# Patient Record
Sex: MALE | Race: WHITE | NOT HISPANIC OR LATINO | Employment: OTHER | URBAN - METROPOLITAN AREA
[De-identification: names, ages, dates, MRNs, and addresses within clinical notes are randomized per-mention and may not be internally consistent; named-entity substitution may affect disease eponyms.]

---

## 2017-01-03 ENCOUNTER — ALLSCRIPTS OFFICE VISIT (OUTPATIENT)
Dept: OTHER | Facility: OTHER | Age: 77
End: 2017-01-03

## 2017-01-20 ENCOUNTER — ALLSCRIPTS OFFICE VISIT (OUTPATIENT)
Dept: OTHER | Facility: OTHER | Age: 77
End: 2017-01-20

## 2017-01-20 ENCOUNTER — APPOINTMENT (OUTPATIENT)
Dept: LAB | Facility: MEDICAL CENTER | Age: 77
End: 2017-01-20
Payer: COMMERCIAL

## 2017-01-20 ENCOUNTER — TRANSCRIBE ORDERS (OUTPATIENT)
Dept: ADMINISTRATIVE | Facility: HOSPITAL | Age: 77
End: 2017-01-20

## 2017-01-20 DIAGNOSIS — Z01.818 OTHER SPECIFIED PRE-OPERATIVE EXAMINATION: ICD-10-CM

## 2017-01-20 DIAGNOSIS — Z01.812 PRE-OPERATIVE LABORATORY EXAMINATION: ICD-10-CM

## 2017-01-20 DIAGNOSIS — C43.4 MALIGNANT MELANOMA OF SKIN OF SCALP AND NECK (HCC): ICD-10-CM

## 2017-01-20 DIAGNOSIS — C43.4 MALIGNANT MELANOMA OF SKIN OF SCALP AND NECK (HCC): Primary | ICD-10-CM

## 2017-01-20 LAB
ALBUMIN SERPL BCP-MCNC: 3.6 G/DL (ref 3.5–5)
ALP SERPL-CCNC: 127 U/L (ref 46–116)
ALT SERPL W P-5'-P-CCNC: 28 U/L (ref 12–78)
ANION GAP SERPL CALCULATED.3IONS-SCNC: 5 MMOL/L (ref 4–13)
APTT PPP: 28 SECONDS (ref 24–36)
AST SERPL W P-5'-P-CCNC: 20 U/L (ref 5–45)
BASOPHILS # BLD AUTO: 0.02 THOUSANDS/ΜL (ref 0–0.1)
BASOPHILS NFR BLD AUTO: 0 % (ref 0–1)
BILIRUB SERPL-MCNC: 1.08 MG/DL (ref 0.2–1)
BUN SERPL-MCNC: 15 MG/DL (ref 5–25)
CALCIUM SERPL-MCNC: 9.1 MG/DL (ref 8.3–10.1)
CHLORIDE SERPL-SCNC: 106 MMOL/L (ref 100–108)
CO2 SERPL-SCNC: 29 MMOL/L (ref 21–32)
CREAT SERPL-MCNC: 1.04 MG/DL (ref 0.6–1.3)
EOSINOPHIL # BLD AUTO: 0.27 THOUSAND/ΜL (ref 0–0.61)
EOSINOPHIL NFR BLD AUTO: 3 % (ref 0–6)
ERYTHROCYTE [DISTWIDTH] IN BLOOD BY AUTOMATED COUNT: 14.7 % (ref 11.6–15.1)
GFR SERPL CREATININE-BSD FRML MDRD: >60 ML/MIN/1.73SQ M
GLUCOSE SERPL-MCNC: 79 MG/DL (ref 65–140)
HCT VFR BLD AUTO: 40.6 % (ref 36.5–49.3)
HGB BLD-MCNC: 13.5 G/DL (ref 12–17)
INR PPP: 1.01 (ref 0.86–1.16)
LYMPHOCYTES # BLD AUTO: 1.7 THOUSANDS/ΜL (ref 0.6–4.47)
LYMPHOCYTES NFR BLD AUTO: 21 % (ref 14–44)
MCH RBC QN AUTO: 28.8 PG (ref 26.8–34.3)
MCHC RBC AUTO-ENTMCNC: 33.3 G/DL (ref 31.4–37.4)
MCV RBC AUTO: 87 FL (ref 82–98)
MONOCYTES # BLD AUTO: 0.83 THOUSAND/ΜL (ref 0.17–1.22)
MONOCYTES NFR BLD AUTO: 10 % (ref 4–12)
NEUTROPHILS # BLD AUTO: 5.13 THOUSANDS/ΜL (ref 1.85–7.62)
NEUTS SEG NFR BLD AUTO: 66 % (ref 43–75)
NRBC BLD AUTO-RTO: 0 /100 WBCS
PLATELET # BLD AUTO: 175 THOUSANDS/UL (ref 149–390)
PMV BLD AUTO: 11.9 FL (ref 8.9–12.7)
POTASSIUM SERPL-SCNC: 4.3 MMOL/L (ref 3.5–5.3)
PROT SERPL-MCNC: 7.5 G/DL (ref 6.4–8.2)
PROTHROMBIN TIME: 13.4 SECONDS (ref 12–14.3)
RBC # BLD AUTO: 4.69 MILLION/UL (ref 3.88–5.62)
SODIUM SERPL-SCNC: 140 MMOL/L (ref 136–145)
WBC # BLD AUTO: 7.97 THOUSAND/UL (ref 4.31–10.16)

## 2017-01-20 PROCEDURE — 85610 PROTHROMBIN TIME: CPT

## 2017-01-20 PROCEDURE — 85025 COMPLETE CBC W/AUTO DIFF WBC: CPT

## 2017-01-20 PROCEDURE — 36415 COLL VENOUS BLD VENIPUNCTURE: CPT

## 2017-01-20 PROCEDURE — 85730 THROMBOPLASTIN TIME PARTIAL: CPT

## 2017-01-20 PROCEDURE — 80053 COMPREHEN METABOLIC PANEL: CPT

## 2017-01-30 ENCOUNTER — GENERIC CONVERSION - ENCOUNTER (OUTPATIENT)
Dept: OTHER | Facility: OTHER | Age: 77
End: 2017-01-30

## 2017-01-31 ENCOUNTER — ANESTHESIA EVENT (OUTPATIENT)
Dept: PERIOP | Facility: HOSPITAL | Age: 77
End: 2017-01-31
Payer: COMMERCIAL

## 2017-01-31 ENCOUNTER — TRANSCRIBE ORDERS (OUTPATIENT)
Dept: ADMINISTRATIVE | Facility: HOSPITAL | Age: 77
End: 2017-01-31

## 2017-01-31 DIAGNOSIS — Z01.818 OTHER SPECIFIED PRE-OPERATIVE EXAMINATION: ICD-10-CM

## 2017-01-31 DIAGNOSIS — Z01.812 PRE-OPERATIVE LABORATORY EXAMINATION: ICD-10-CM

## 2017-01-31 DIAGNOSIS — C43.4 MALIGNANT MELANOMA OF SKIN OF SCALP AND NECK (HCC): Primary | ICD-10-CM

## 2017-02-06 ENCOUNTER — GENERIC CONVERSION - ENCOUNTER (OUTPATIENT)
Dept: OTHER | Facility: OTHER | Age: 77
End: 2017-02-06

## 2017-02-06 ENCOUNTER — ANESTHESIA (OUTPATIENT)
Dept: PERIOP | Facility: HOSPITAL | Age: 77
End: 2017-02-06
Payer: COMMERCIAL

## 2017-02-06 ENCOUNTER — HOSPITAL ENCOUNTER (OUTPATIENT)
Facility: HOSPITAL | Age: 77
Setting detail: OUTPATIENT SURGERY
Discharge: HOME | End: 2017-02-06
Attending: SURGERY | Admitting: SURGERY
Payer: COMMERCIAL

## 2017-02-06 VITALS
WEIGHT: 224 LBS | TEMPERATURE: 97.8 F | RESPIRATION RATE: 20 BRPM | OXYGEN SATURATION: 100 % | BODY MASS INDEX: 32.07 KG/M2 | SYSTOLIC BLOOD PRESSURE: 158 MMHG | DIASTOLIC BLOOD PRESSURE: 78 MMHG | HEIGHT: 70 IN | HEART RATE: 65 BPM

## 2017-02-06 DIAGNOSIS — C43.4 MALIGNANT MELANOMA OF SCALP AND NECK (HCC): ICD-10-CM

## 2017-02-06 PROCEDURE — 88305 TISSUE EXAM BY PATHOLOGIST: CPT | Performed by: SURGERY

## 2017-02-06 RX ORDER — FENTANYL CITRATE 50 UG/ML
INJECTION, SOLUTION INTRAMUSCULAR; INTRAVENOUS AS NEEDED
Status: DISCONTINUED | OUTPATIENT
Start: 2017-02-06 | End: 2017-02-06 | Stop reason: SURG

## 2017-02-06 RX ORDER — OXYCODONE HYDROCHLORIDE AND ACETAMINOPHEN 5; 325 MG/1; MG/1
1 TABLET ORAL EVERY 4 HOURS PRN
Status: DISCONTINUED | OUTPATIENT
Start: 2017-02-06 | End: 2017-02-06 | Stop reason: HOSPADM

## 2017-02-06 RX ORDER — EPHEDRINE SULFATE 50 MG/ML
INJECTION, SOLUTION INTRAVENOUS AS NEEDED
Status: DISCONTINUED | OUTPATIENT
Start: 2017-02-06 | End: 2017-02-06 | Stop reason: SURG

## 2017-02-06 RX ORDER — SODIUM CHLORIDE 9 MG/ML
125 INJECTION, SOLUTION INTRAVENOUS CONTINUOUS
Status: DISCONTINUED | OUTPATIENT
Start: 2017-02-06 | End: 2017-02-06 | Stop reason: HOSPADM

## 2017-02-06 RX ORDER — PROPOFOL 10 MG/ML
INJECTION, EMULSION INTRAVENOUS AS NEEDED
Status: DISCONTINUED | OUTPATIENT
Start: 2017-02-06 | End: 2017-02-06 | Stop reason: SURG

## 2017-02-06 RX ORDER — ONDANSETRON 2 MG/ML
4 INJECTION INTRAMUSCULAR; INTRAVENOUS ONCE
Status: DISCONTINUED | OUTPATIENT
Start: 2017-02-06 | End: 2017-02-06 | Stop reason: HOSPADM

## 2017-02-06 RX ORDER — FENTANYL CITRATE/PF 50 MCG/ML
25 SYRINGE (ML) INJECTION
Status: DISCONTINUED | OUTPATIENT
Start: 2017-02-06 | End: 2017-02-06 | Stop reason: HOSPADM

## 2017-02-06 RX ORDER — ONDANSETRON 2 MG/ML
INJECTION INTRAMUSCULAR; INTRAVENOUS AS NEEDED
Status: DISCONTINUED | OUTPATIENT
Start: 2017-02-06 | End: 2017-02-06 | Stop reason: SURG

## 2017-02-06 RX ADMIN — SODIUM CHLORIDE 125 ML/HR: 0.9 INJECTION, SOLUTION INTRAVENOUS at 06:50

## 2017-02-06 RX ADMIN — LIDOCAINE HYDROCHLORIDE 40 MG: 20 INJECTION, SOLUTION INTRAVENOUS at 08:01

## 2017-02-06 RX ADMIN — DEXAMETHASONE SODIUM PHOSPHATE 8 MG: 10 INJECTION INTRAMUSCULAR; INTRAVENOUS at 07:47

## 2017-02-06 RX ADMIN — FENTANYL CITRATE 50 MCG: 50 INJECTION, SOLUTION INTRAMUSCULAR; INTRAVENOUS at 07:47

## 2017-02-06 RX ADMIN — FENTANYL CITRATE 50 MCG: 50 INJECTION, SOLUTION INTRAMUSCULAR; INTRAVENOUS at 08:11

## 2017-02-06 RX ADMIN — PROPOFOL 200 MG: 10 INJECTION, EMULSION INTRAVENOUS at 08:01

## 2017-02-06 RX ADMIN — EPHEDRINE SULFATE 10 MG: 50 INJECTION, SOLUTION INTRAMUSCULAR; INTRAVENOUS; SUBCUTANEOUS at 08:19

## 2017-02-06 RX ADMIN — ONDANSETRON HYDROCHLORIDE 8 MG: 2 INJECTION, SOLUTION INTRAVENOUS at 07:46

## 2017-02-24 ENCOUNTER — ALLSCRIPTS OFFICE VISIT (OUTPATIENT)
Dept: OTHER | Facility: OTHER | Age: 77
End: 2017-02-24

## 2017-04-11 ENCOUNTER — ALLSCRIPTS OFFICE VISIT (OUTPATIENT)
Dept: OTHER | Facility: OTHER | Age: 77
End: 2017-04-11

## 2017-05-10 ENCOUNTER — GENERIC CONVERSION - ENCOUNTER (OUTPATIENT)
Dept: OTHER | Facility: OTHER | Age: 77
End: 2017-05-10

## 2017-07-11 DIAGNOSIS — E78.00 PURE HYPERCHOLESTEROLEMIA: ICD-10-CM

## 2017-07-11 DIAGNOSIS — R73.01 IMPAIRED FASTING GLUCOSE: ICD-10-CM

## 2017-07-11 DIAGNOSIS — I10 ESSENTIAL (PRIMARY) HYPERTENSION: ICD-10-CM

## 2017-07-11 DIAGNOSIS — G47.30 SLEEP APNEA: ICD-10-CM

## 2017-07-20 ENCOUNTER — LAB CONVERSION - ENCOUNTER (OUTPATIENT)
Dept: OTHER | Facility: OTHER | Age: 77
End: 2017-07-20

## 2017-07-20 LAB
A/G RATIO (HISTORICAL): 1.6 (CALC) (ref 1–2.5)
ALBUMIN SERPL BCP-MCNC: 4.1 G/DL (ref 3.6–5.1)
ALP SERPL-CCNC: 129 U/L (ref 40–115)
ALT SERPL W P-5'-P-CCNC: 19 U/L (ref 9–46)
AST SERPL W P-5'-P-CCNC: 28 U/L (ref 10–35)
BILIRUB SERPL-MCNC: 1.2 MG/DL (ref 0.2–1.2)
BUN SERPL-MCNC: 19 MG/DL (ref 7–25)
BUN/CREA RATIO (HISTORICAL): ABNORMAL (CALC) (ref 6–22)
CALCIUM SERPL-MCNC: 9.4 MG/DL (ref 8.6–10.3)
CHLORIDE SERPL-SCNC: 103 MMOL/L (ref 98–110)
CHOLEST SERPL-MCNC: 162 MG/DL (ref 125–200)
CHOLEST/HDLC SERPL: 2.8 (CALC)
CO2 SERPL-SCNC: 26 MMOL/L (ref 20–31)
CREAT SERPL-MCNC: 1.09 MG/DL (ref 0.7–1.18)
EGFR AFRICAN AMERICAN (HISTORICAL): 76 ML/MIN/1.73M2
EGFR-AMERICAN CALC (HISTORICAL): 66 ML/MIN/1.73M2
GAMMA GLOBULIN (HISTORICAL): 2.6 G/DL (CALC) (ref 1.9–3.7)
GLUCOSE (HISTORICAL): 90 MG/DL (ref 65–99)
HBA1C MFR BLD HPLC: 5.9 % OF TOTAL HGB
HDLC SERPL-MCNC: 58 MG/DL
LDL CHOLESTEROL (HISTORICAL): 86 MG/DL (CALC)
NON-HDL-CHOL (CHOL-HDL) (HISTORICAL): 104 MG/DL (CALC)
POTASSIUM SERPL-SCNC: 5.2 MMOL/L (ref 3.5–5.3)
SODIUM SERPL-SCNC: 140 MMOL/L (ref 135–146)
TOTAL PROTEIN (HISTORICAL): 6.7 G/DL (ref 6.1–8.1)
TRIGL SERPL-MCNC: 89 MG/DL

## 2017-08-01 ENCOUNTER — ALLSCRIPTS OFFICE VISIT (OUTPATIENT)
Dept: OTHER | Facility: OTHER | Age: 77
End: 2017-08-01

## 2017-08-14 ENCOUNTER — HOSPITAL ENCOUNTER (OUTPATIENT)
Dept: SLEEP CENTER | Facility: CLINIC | Age: 77
Discharge: HOME | End: 2017-08-14
Payer: COMMERCIAL

## 2017-08-14 ENCOUNTER — TRANSCRIBE ORDERS (OUTPATIENT)
Dept: SLEEP CENTER | Facility: CLINIC | Age: 77
End: 2017-08-14

## 2017-08-14 DIAGNOSIS — G47.33 OSA (OBSTRUCTIVE SLEEP APNEA): Primary | ICD-10-CM

## 2017-08-14 DIAGNOSIS — G47.33 OBSTRUCTIVE SLEEP APNEA (ADULT) (PEDIATRIC): ICD-10-CM

## 2017-09-06 ENCOUNTER — GENERIC CONVERSION - ENCOUNTER (OUTPATIENT)
Dept: OTHER | Facility: OTHER | Age: 77
End: 2017-09-06

## 2017-09-15 ENCOUNTER — GENERIC CONVERSION - ENCOUNTER (OUTPATIENT)
Dept: OTHER | Facility: OTHER | Age: 77
End: 2017-09-15

## 2017-11-14 ENCOUNTER — ALLSCRIPTS OFFICE VISIT (OUTPATIENT)
Dept: OTHER | Facility: OTHER | Age: 77
End: 2017-11-14

## 2018-01-11 ENCOUNTER — GENERIC CONVERSION - ENCOUNTER (OUTPATIENT)
Dept: OTHER | Facility: OTHER | Age: 78
End: 2018-01-11

## 2018-01-11 ENCOUNTER — HOSPITAL ENCOUNTER (OUTPATIENT)
Dept: RADIOLOGY | Facility: HOSPITAL | Age: 78
Discharge: HOME | End: 2018-01-11
Payer: COMMERCIAL

## 2018-01-11 DIAGNOSIS — J20.9 ACUTE BRONCHITIS: ICD-10-CM

## 2018-01-11 PROCEDURE — 71046 X-RAY EXAM CHEST 2 VIEWS: CPT

## 2018-01-12 VITALS
WEIGHT: 224.25 LBS | TEMPERATURE: 98.4 F | SYSTOLIC BLOOD PRESSURE: 142 MMHG | OXYGEN SATURATION: 98 % | DIASTOLIC BLOOD PRESSURE: 88 MMHG | HEART RATE: 76 BPM | RESPIRATION RATE: 14 BRPM | BODY MASS INDEX: 33.21 KG/M2 | HEIGHT: 69 IN

## 2018-01-13 VITALS
WEIGHT: 227.13 LBS | HEART RATE: 56 BPM | TEMPERATURE: 98.1 F | BODY MASS INDEX: 34.42 KG/M2 | HEIGHT: 68 IN | SYSTOLIC BLOOD PRESSURE: 128 MMHG | RESPIRATION RATE: 15 BRPM | DIASTOLIC BLOOD PRESSURE: 80 MMHG

## 2018-01-13 VITALS
OXYGEN SATURATION: 98 % | SYSTOLIC BLOOD PRESSURE: 148 MMHG | RESPIRATION RATE: 15 BRPM | BODY MASS INDEX: 33.36 KG/M2 | TEMPERATURE: 98.5 F | WEIGHT: 225.25 LBS | HEART RATE: 77 BPM | DIASTOLIC BLOOD PRESSURE: 90 MMHG | HEIGHT: 69 IN

## 2018-01-13 VITALS
WEIGHT: 226 LBS | TEMPERATURE: 97.7 F | BODY MASS INDEX: 33.47 KG/M2 | HEIGHT: 69 IN | RESPIRATION RATE: 18 BRPM | HEART RATE: 60 BPM | DIASTOLIC BLOOD PRESSURE: 82 MMHG | SYSTOLIC BLOOD PRESSURE: 124 MMHG

## 2018-01-13 VITALS
BODY MASS INDEX: 33.25 KG/M2 | HEIGHT: 69 IN | HEART RATE: 56 BPM | RESPIRATION RATE: 24 BRPM | SYSTOLIC BLOOD PRESSURE: 130 MMHG | DIASTOLIC BLOOD PRESSURE: 86 MMHG | WEIGHT: 224.5 LBS | TEMPERATURE: 97.8 F

## 2018-01-14 VITALS
HEART RATE: 52 BPM | DIASTOLIC BLOOD PRESSURE: 78 MMHG | HEIGHT: 69 IN | SYSTOLIC BLOOD PRESSURE: 140 MMHG | TEMPERATURE: 97.8 F | RESPIRATION RATE: 16 BRPM | WEIGHT: 224.25 LBS | BODY MASS INDEX: 33.21 KG/M2

## 2018-01-15 NOTE — MISCELLANEOUS
Message  Post op phone call - left message      Active Problems    1  Bradycardia (427 89) (R00 1)   2  Bunion of great toe of left foot (727 1) (M21 612)   3  CAD (coronary artery disease) (414 00) (I25 10)   4  Colon cancer screening (V76 51) (Z12 11)   5  Colonoscopy (Fiberoptic) Screening   6  Depression (311) (F32 9)   7  Elevated prostate specific antigen (PSA) (790 93) (R97 20)   8  Glaucoma Screening   9  Hypercholesterolemia (272 0) (E78 00)   10  Hypertension (401 9) (I10)   11  Impaired fasting glucose (790 21) (R73 01)   12  Melanoma of right side of neck (172 4) (C43 4)   13  Need for diphtheria-tetanus-pertussis (Tdap) vaccine (V06 1) (Z23)   14  Need for pneumococcal vaccination (V03 82) (Z23)   15  Screening for genitourinary condition (V81 6) (Z13 89)   16  Screening for neurological condition (V80 09) (Z13 89)   17  Sleep apnea (780 57) (G47 30)   18  Stasis edema of left lower extremity (459 30) (I87 302)    Current Meds   1  Aspirin 81 MG TABS; TAKE 1 TABLET DAILY; Therapy: 39ANZ5453 to Recorded   2  Atorvastatin Calcium 80 MG Oral Tablet; TAKE 1 TABLET DAILY; Therapy: 30GYD4853 to ([phone])  Requested for: 73LHH6072; Last   Rx:86Sby3304 Ordered   3  Lisinopril 20 MG Oral Tablet; TAKE 1 TABLET DAILY; Therapy: 58CZR1149 to (Evaluate:28Jun2017)  Requested for: 17RFG2100; Last   Rx:15Jsk4870 Ordered   4  Metoprolol Tartrate 25 MG Oral Tablet; TAKE ONE TABLET BY MOUTH TWICE DAILY; Therapy: 33USK7858 to (Evaluate:15Mar2017)  Requested for: 03Zkq0990; Last   Rx:62Kze8785 Ordered   5  Nitroglycerin 0 4 MG Sublingual Tablet Sublingual (Nitrostat); PLACE 1 TABLET UNDER   THE TONGUE EVERY 5 MINUTES UP TO 3 DOSES AS NEEDED FOR   CHEST PAIN;   Therapy: 63CNM9959 to (Evaluate:73Hrd2290)  Requested for: 91CLY2100; Last   Rx:10Jan2017 Ordered    Allergies    1   Lexapro TABS    Signatures   Electronically signed by : Mercedes Carty, ; Feb 10 2017  3:15PM EST                       (Author)

## 2018-01-17 ENCOUNTER — ALLSCRIPTS OFFICE VISIT (OUTPATIENT)
Dept: OTHER | Facility: OTHER | Age: 78
End: 2018-01-17

## 2018-01-18 NOTE — PROGRESS NOTES
Assessment   1  Acute bronchitis with bronchospasm (466 0) (J20 9)   2  Hypertension (401 9) (I10)   3  Sleep apnea (780 57) (G47 30)   4  CAD (coronary artery disease) (414 00) (I25 10)    Discussion/Summary      I believe that the infectious component of the patient's illness has resolved  He does have postinfectious airway hyperreactivity  Because of concern that he is not using albuterol effectively, I recommended that he use Advair Diskus  I reviewed the appropriate use of this medication with him  He can still use albuterol if necessary  His blood pressure is well controlled  He has no symptoms of myocardial ischemia  He will maintain his other medications  He will return as scheduled in February  Chief Complaint   has bronchitis, given Z-Agapito and Ventolin MDI 1/11/2018 @ [facility], chest Xray negative  a little better but continue to cough, problem sleeping in bed, not using CPAP machine due to persistent cough and wheezing      History of Present Illness   HPI: The patient returned to the office for re-evaluation of acute bronchitis  He was here about 1 week ago  He was treated with azithromycin and albuterol inhaler  Initially, he was producing a lot of discolored sputum  His sputum has resolved  He has no fever  He has no shortness of breath or chest pain  However, he continues to cough  He has some wheezing at times  He has had no hemoptysis  He is not having much head congestion  His wife is concerned that he is not using his albuterol inhaler michele Colbert  Review of Systems        Constitutional: No fever or chills, feels well, no tiredness, no recent weight gain or weight loss  Eyes: No complaints of eye pain, no red eyes, no discharge from eyes, no itchy eyes  ENT: as noted in HPI  Cardiovascular: as noted in HPI  Respiratory: as noted in HPI  Gastrointestinal: No complaints of abdominal pain, no constipation, no nausea or vomiting, no diarrhea or bloody stools  Genitourinary: No complaints of dysuria, no incontinence, no hesitancy, no nocturia, no genital lesion, no testicular pain  Neurological: No compliants of headache, no confusion, no convulsions, no numbness or tingling, no dizziness or fainting, no limb weakness, no difficulty walking  Endocrine: No complaints of proptosis, no hot flashes, no muscle weakness, no erectile dysfunction, no deepening of the voice, no feelings of weakness  Hematologic/Lymphatic: No complaints of swollen glands, no swollen glands in the neck, does not bleed easily, no easy bruising  Active Problems   1  Bradycardia (427 89) (R00 1)   2  CAD (coronary artery disease) (414 00) (I25 10)   3  Colon cancer screening (V76 51) (Z12 11)   4  Depression (311) (F32 9)   5  Elevated prostate specific antigen (PSA) (790 93) (R97 20)   6  Hypercholesterolemia (272 0) (E78 00)   7  Hypertension (401 9) (I10)   8  Impaired fasting glucose (790 21) (R73 01)   9  Sleep apnea (780 57) (G47 30)   10  Stasis edema of left lower extremity (459 30) (I87 302)    Past Medical History   Active Problems And Past Medical History Reviewed: The active problems and past medical history were reviewed and updated today  Social History    · Denied: History of Alcohol use   · Never a smoker  The social history was reviewed and is unchanged  Family History   Family History Reviewed: The family history was reviewed and updated today  Current Meds    1  Aspirin 81 MG TABS; TAKE 1 TABLET DAILY; Therapy: 74OAA6461 to Recorded   2  Atorvastatin Calcium 80 MG Oral Tablet; TAKE 1 TABLET DAILY; Therapy: 85MJT6587 to [phone])  Requested for: 77MNB1926; Last     Rx:27Oct2017 Ordered   3  Lisinopril 20 MG Oral Tablet; TAKE 1 TABLET DAILY; Therapy: 65WZJ9987 to (OVBGJMDI:93ZNM5412)  Requested for: 85MHV0189; Last     Rx:67Vag7043 Ordered   4   Metoprolol Tartrate 25 MG Oral Tablet; TAKE ONE TABLET BY MOUTH TWICE DAILY; Therapy: 45XXF3782 to (Evaluate:10Mar2018)  Requested for: 08Gwn6363; Last     Rx:11Sep2017 Ordered   5  Nitroglycerin 0 4 MG Sublingual Tablet Sublingual; PLACE 1 TABLET UNDER THE     TONGUE EVERY 5 MINUTES UP TO 3 DOSES AS NEEDED FOR CHEST PAIN;     Therapy: 89SSG7308 to (Evaluate:46Vqv8716)  Requested for: 43REH9801; Last     Rx:10Jan2017 Ordered   6  Ventolin  (90 Base) MCG/ACT Inhalation Aerosol Solution; INHALE 1 TO 2 PUFFS     EVERY 4 TO 6 HOURS AS NEEDED; Therapy: 23HAM5660 to (Last Rx:11Jan2018)  Requested for: 82YKU3149 Ordered    Allergies   1  Lexapro TABS    Vitals    Recorded: 04XMM9142 10:49AM   Temperature 97 9 F   Heart Rate 68   Respiration 24   Systolic 085   Diastolic 86   Height 5 ft 8 in   Weight 219 lb 6 oz   BMI Calculated 33 36   BSA Calculated 2 13     Physical Exam        Constitutional      General appearance: No acute distress, well appearing and well nourished  Ears, Nose, Mouth, and Throat      External inspection of ears and nose: Normal        Otoscopic examination: Tympanic membrance translucent with normal light reflex  Canals patent without erythema  Nasal mucosa, septum, and turbinates: Normal without edema or erythema  Oropharynx: Normal with no erythema, edema, exudate or lesions  Pulmonary      Respiratory effort: No increased work of breathing or signs of respiratory distress  Auscultation of lungs: Abnormal  -- Scattered wheezes  Cardiovascular      Palpation of heart: Normal PMI, no thrills  Auscultation of heart: Normal rate and rhythm, normal S1 and S2, without murmurs  Examination of extremities for edema and/or varicosities: Normal        Carotid pulses: Normal        Abdomen      Abdomen: Non-tender, no masses  Liver and spleen: No hepatomegaly or splenomegaly  Lymphatic      Palpation of lymph nodes in neck: No lymphadenopathy         Musculoskeletal      Gait and station: Normal  Inspection/palpation of joints, bones, and muscles: Normal        Psychiatric      Orientation to person, place and time: Normal        Mood and affect: Normal           Future Appointments      Date/Time Provider Specialty Site   05/14/2018 09:45 AM Garold Riedel, MD Urology [address]   02/12/2018 10:00 AM RAYNE Camacho   Internal Medicine [facility]   03/16/2018 10:30 AM Jelena Garrison MD Surgical Oncology [facility] [place]     Signatures    Electronically signed by : Marrian Cabot, M D ; Jan 17 2018 11:42AM EST                       (Author)

## 2018-01-22 VITALS
BODY MASS INDEX: 34.56 KG/M2 | HEART RATE: 71 BPM | OXYGEN SATURATION: 99 % | TEMPERATURE: 99.1 F | RESPIRATION RATE: 18 BRPM | WEIGHT: 228 LBS | SYSTOLIC BLOOD PRESSURE: 128 MMHG | DIASTOLIC BLOOD PRESSURE: 88 MMHG | HEIGHT: 68 IN

## 2018-01-22 VITALS
HEIGHT: 68 IN | TEMPERATURE: 97.9 F | DIASTOLIC BLOOD PRESSURE: 86 MMHG | RESPIRATION RATE: 24 BRPM | HEART RATE: 68 BPM | BODY MASS INDEX: 33.25 KG/M2 | WEIGHT: 219.38 LBS | SYSTOLIC BLOOD PRESSURE: 144 MMHG

## 2018-01-24 VITALS
RESPIRATION RATE: 20 BRPM | OXYGEN SATURATION: 93 % | HEART RATE: 73 BPM | TEMPERATURE: 98.9 F | HEIGHT: 68 IN | BODY MASS INDEX: 33.87 KG/M2 | SYSTOLIC BLOOD PRESSURE: 200 MMHG | DIASTOLIC BLOOD PRESSURE: 100 MMHG | WEIGHT: 223.5 LBS

## 2018-01-24 VITALS — SYSTOLIC BLOOD PRESSURE: 156 MMHG | DIASTOLIC BLOOD PRESSURE: 94 MMHG

## 2018-02-12 ENCOUNTER — OFFICE VISIT (OUTPATIENT)
Dept: INTERNAL MEDICINE CLINIC | Facility: CLINIC | Age: 78
End: 2018-02-12
Payer: COMMERCIAL

## 2018-02-12 VITALS
HEART RATE: 42 BPM | BODY MASS INDEX: 32.07 KG/M2 | HEIGHT: 70 IN | WEIGHT: 224 LBS | SYSTOLIC BLOOD PRESSURE: 124 MMHG | TEMPERATURE: 97.5 F | DIASTOLIC BLOOD PRESSURE: 84 MMHG

## 2018-02-12 DIAGNOSIS — I25.10 CORONARY ARTERY DISEASE INVOLVING NATIVE CORONARY ARTERY OF NATIVE HEART WITHOUT ANGINA PECTORIS: Primary | ICD-10-CM

## 2018-02-12 DIAGNOSIS — I71.4 ABDOMINAL ANEURYSM (HCC): ICD-10-CM

## 2018-02-12 DIAGNOSIS — R73.01 IMPAIRED FASTING GLUCOSE: ICD-10-CM

## 2018-02-12 DIAGNOSIS — R00.1 BRADYCARDIA: ICD-10-CM

## 2018-02-12 DIAGNOSIS — G47.33 OBSTRUCTIVE SLEEP APNEA SYNDROME: ICD-10-CM

## 2018-02-12 DIAGNOSIS — I10 ESSENTIAL HYPERTENSION: ICD-10-CM

## 2018-02-12 DIAGNOSIS — E78.00 HYPERCHOLESTEROLEMIA: ICD-10-CM

## 2018-02-12 PROBLEM — C43.4 MALIGNANT MELANOMA OF NECK (HCC): Status: ACTIVE | Noted: 2017-02-01

## 2018-02-12 PROBLEM — E78.5 HYPERLIPIDEMIA: Status: ACTIVE | Noted: 2017-02-01

## 2018-02-12 PROBLEM — I71.40 ABDOMINAL ANEURYSM: Status: ACTIVE | Noted: 2017-02-01

## 2018-02-12 PROCEDURE — 3079F DIAST BP 80-89 MM HG: CPT | Performed by: INTERNAL MEDICINE

## 2018-02-12 PROCEDURE — 3074F SYST BP LT 130 MM HG: CPT | Performed by: INTERNAL MEDICINE

## 2018-02-12 PROCEDURE — 93000 ELECTROCARDIOGRAM COMPLETE: CPT | Performed by: INTERNAL MEDICINE

## 2018-02-12 PROCEDURE — 99214 OFFICE O/P EST MOD 30 MIN: CPT | Performed by: INTERNAL MEDICINE

## 2018-02-12 NOTE — PROGRESS NOTES
[facility] Internal Medicine [place]      NAME: Jn Nuñez  AGE: 69 y o  SEX: male  : 1940   MRN: [id number]    DATE: 2018  TIME: 11:44 AM    Assessment and Plan   1  Coronary artery disease involving native coronary artery of native heart without angina pectoris  Stable on current medications    2  Essential hypertension   improved with titration of lisinopril    3  Hypercholesterolemia    Stable on atorvastatin    4  Abdominal aneurysm [facility])   recently checked by Cardiology, stable    5  Obstructive sleep apnea syndrome   on CPAP    6  Impaired fasting glucose   repeat labs have been ordered to reassess this    7  Bradycardia   EKG show sinus bradycardia  This is no doubt related to metoprolol  The patient is asymptomatic  His current medication will continue  Overall, the patient is doing very well  He will maintain his current medications  Updated lab work will be obtained in the immediate future  - POCT ECG          Return to office in: Three months    Chief Complaint    routine follow-up    History of Present Illness      the patient returned to the office for re-evaluation of hypertension, hyperlipidemia, obstructive sleep apnea, and coronary artery disease  Since his last visit he has been feeling well  He has had no chest pain, shortness of breath, palpitations, or dizziness  He was seen by a cardiologist who increased his lisinopril because his blood pressure was 170  He is tolerating this well  He is tolerating his other medications  The following portions of the patient's history were reviewed and updated as appropriate: allergies, current medications, past family history, past medical history, past social history, past surgical history and problem list     Review of Systems   Review of Systems   Constitutional: Negative  HENT: Negative for congestion, ear pain, postnasal drip, rhinorrhea, sore throat and trouble swallowing      Eyes: Negative for pain, discharge, redness and visual disturbance  Respiratory: Negative for cough, shortness of breath and wheezing  Cardiovascular: Negative  Gastrointestinal: Negative  Endocrine: Negative  Genitourinary: Negative for difficulty urinating, dysuria, frequency, hematuria and urgency  Musculoskeletal: Negative for arthralgias, gait problem, joint swelling and myalgias  Skin: Negative for rash  Neurological: Negative for dizziness, speech difficulty, weakness, light-headedness, numbness and headaches  Hematological: Negative  Psychiatric/Behavioral: Negative for confusion, decreased concentration, dysphoric mood and sleep disturbance  The patient is not nervous/anxious  Active Problem List     Patient Active Problem List   Diagnosis    Abdominal aneurysm (HCC)    CAD (coronary artery disease)    Hypercholesterolemia    Hypertension    Impaired fasting glucose    Malignant melanoma of neck (HCC)    Sleep apnea    Bradycardia    Depression    Elevated prostate specific antigen (PSA)       Objective   /84 (BP Location: Left arm, Patient Position: Sitting, Cuff Size: Standard)   Pulse (!) 42   Temp 97 5 °F (36 4 °C) (Tympanic)   Ht 5' 10" (1 778 m)   Wt 102 kg (224 lb)   BMI 32 14 kg/m²     Physical Exam   Constitutional: He is oriented to person, place, and time  He appears well-developed and well-nourished  No distress  HENT:   Head: Normocephalic and atraumatic  Neck: Normal range of motion  Neck supple  No JVD present  No tracheal deviation present  No thyromegaly present  Cardiovascular: Regular rhythm, normal heart sounds and intact distal pulses  Exam reveals no gallop and no friction rub  No murmur heard  The patient is bradycardic   Pulmonary/Chest: Effort normal and breath sounds normal  No respiratory distress  He has no wheezes  He has no rales  He exhibits no tenderness  Abdominal: Soft  Bowel sounds are normal  He exhibits no distension and no mass   There is no tenderness  There is no rebound and no guarding  Musculoskeletal: Normal range of motion  He exhibits no edema, tenderness or deformity  Lymphadenopathy:     He has no cervical adenopathy  Neurological: He is alert and oriented to person, place, and time  Psychiatric: He has a normal mood and affect   His behavior is normal  Judgment and thought content normal        Pertinent Laboratory/Diagnostic Studies:    No recent labs    Current Medications     Current Outpatient Prescriptions:     aspirin 81 MG tablet, Take 1 tablet by mouth daily, Disp: , Rfl:     atorvastatin (LIPITOR) 80 mg tablet, Take 80 mg by mouth every morning, Disp: , Rfl:     ibuprofen (MOTRIN) 400 mg tablet, Take 400 mg by mouth every 6 (six) hours as needed for mild pain, Disp: , Rfl:     lisinopril (ZESTRIL) 20 mg tablet, Take 20 mg by mouth 2 (two) times a day  , Disp: , Rfl:     metoprolol tartrate (LOPRESSOR) 25 mg tablet, Take 25 mg by mouth 2 (two) times a day  , Disp: , Rfl:     nitroglycerin (NITROSTAT) 0 4 mg SL tablet, Place 0 4 mg under the tongue every 5 (five) minutes as needed for chest pain, Disp: , Rfl:       Kaiden Alba MD

## 2018-02-14 DIAGNOSIS — I25.10 ATHEROSCLEROTIC HEART DISEASE OF NATIVE CORONARY ARTERY WITHOUT ANGINA PECTORIS: ICD-10-CM

## 2018-02-14 DIAGNOSIS — E78.00 PURE HYPERCHOLESTEROLEMIA: ICD-10-CM

## 2018-02-14 DIAGNOSIS — I10 ESSENTIAL (PRIMARY) HYPERTENSION: ICD-10-CM

## 2018-02-14 DIAGNOSIS — G47.30 SLEEP APNEA: ICD-10-CM

## 2018-02-14 DIAGNOSIS — R73.01 IMPAIRED FASTING GLUCOSE: ICD-10-CM

## 2018-02-15 LAB
ALBUMIN SERPL-MCNC: 4 G/DL (ref 3.6–5.1)
ALBUMIN/GLOB SERPL: 1.6 (CALC) (ref 1–2.5)
ALP SERPL-CCNC: 117 U/L (ref 40–115)
ALT SERPL-CCNC: 19 U/L (ref 9–46)
AST SERPL-CCNC: 24 U/L (ref 10–35)
BASOPHILS # BLD AUTO: 38 CELLS/UL (ref 0–200)
BASOPHILS NFR BLD AUTO: 0.6 %
BILIRUB SERPL-MCNC: 1.2 MG/DL (ref 0.2–1.2)
BUN SERPL-MCNC: 16 MG/DL (ref 7–25)
BUN/CREAT SERPL: ABNORMAL (CALC) (ref 6–22)
CALCIUM SERPL-MCNC: 9 MG/DL (ref 8.6–10.3)
CHLORIDE SERPL-SCNC: 106 MMOL/L (ref 98–110)
CHOLEST SERPL-MCNC: 152 MG/DL
CHOLEST/HDLC SERPL: 2.6 (CALC)
CO2 SERPL-SCNC: 28 MMOL/L (ref 20–31)
CREAT SERPL-MCNC: 1.17 MG/DL (ref 0.7–1.18)
EOSINOPHIL # BLD AUTO: 246 CELLS/UL (ref 15–500)
EOSINOPHIL NFR BLD AUTO: 3.9 %
ERYTHROCYTE [DISTWIDTH] IN BLOOD BY AUTOMATED COUNT: 14 % (ref 11–15)
GLOBULIN SER CALC-MCNC: 2.5 G/DL (CALC) (ref 1.9–3.7)
GLUCOSE SERPL-MCNC: 102 MG/DL (ref 65–99)
HBA1C MFR BLD: 5.7 % OF TOTAL HGB
HCT VFR BLD AUTO: 41.6 % (ref 38.5–50)
HDLC SERPL-MCNC: 58 MG/DL
HGB BLD-MCNC: 13.8 G/DL (ref 13.2–17.1)
LDLC SERPL CALC-MCNC: 78 MG/DL (CALC)
LYMPHOCYTES # BLD AUTO: 1638 CELLS/UL (ref 850–3900)
LYMPHOCYTES NFR BLD AUTO: 26 %
MCH RBC QN AUTO: 29.6 PG (ref 27–33)
MCHC RBC AUTO-ENTMCNC: 33.2 G/DL (ref 32–36)
MCV RBC AUTO: 89.3 FL (ref 80–100)
MONOCYTES # BLD AUTO: 693 CELLS/UL (ref 200–950)
MONOCYTES NFR BLD AUTO: 11 %
NEUTROPHILS # BLD AUTO: 3686 CELLS/UL (ref 1500–7800)
NEUTROPHILS NFR BLD AUTO: 58.5 %
NONHDLC SERPL-MCNC: 94 MG/DL (CALC)
PLATELET # BLD AUTO: 136 THOUSAND/UL (ref 140–400)
PMV BLD REES-ECKER: 12.4 FL (ref 7.5–12.5)
POTASSIUM SERPL-SCNC: 4.5 MMOL/L (ref 3.5–5.3)
PROT SERPL-MCNC: 6.5 G/DL (ref 6.1–8.1)
RBC # BLD AUTO: 4.66 MILLION/UL (ref 4.2–5.8)
SL AMB EGFR AFRICAN AMERICAN: 70 ML/MIN/1.73M2
SL AMB EGFR NON AFRICAN AMERICAN: 60 ML/MIN/1.73M2
SODIUM SERPL-SCNC: 142 MMOL/L (ref 135–146)
TRIGL SERPL-MCNC: 79 MG/DL
WBC # BLD AUTO: 6.3 THOUSAND/UL (ref 3.8–10.8)

## 2018-02-26 NOTE — RESULT NOTES
Message   Recorded as Task   Date: 01/11/2018 04:30 PM, Created By: Estee Bell   Task Name: Follow Up   Assigned To: Brenden Platt   Regarding Patient: Pato Easton, Status: Active   CommentBolling Plane - 11 Jan 2018 4:30 PM     TASK CREATED  no pneumonia, can start the azithromycin   Erin Torres - 11 Jan 2018 4:39 PM     TASK EDITED  Spoke to wife and gave her the results   she understood

## 2018-03-12 DIAGNOSIS — I10 ESSENTIAL HYPERTENSION: Primary | ICD-10-CM

## 2018-04-12 RX ORDER — NITROGLYCERIN 0.4 MG/1
TABLET SUBLINGUAL
Qty: 25 TABLET | Refills: 4 | Status: CANCELLED | OUTPATIENT
Start: 2018-04-12

## 2018-04-13 DIAGNOSIS — I25.10 CORONARY ARTERY DISEASE INVOLVING NATIVE HEART WITHOUT ANGINA PECTORIS, UNSPECIFIED VESSEL OR LESION TYPE: Primary | ICD-10-CM

## 2018-04-13 RX ORDER — NITROGLYCERIN 0.4 MG/1
0.4 TABLET SUBLINGUAL
Qty: 90 TABLET | Refills: 0 | Status: SHIPPED | OUTPATIENT
Start: 2018-04-13 | End: 2019-03-05 | Stop reason: SDUPTHER

## 2018-05-04 ENCOUNTER — OFFICE VISIT (OUTPATIENT)
Dept: SURGICAL ONCOLOGY | Facility: CLINIC | Age: 78
End: 2018-05-04
Payer: COMMERCIAL

## 2018-05-04 VITALS
RESPIRATION RATE: 14 BRPM | TEMPERATURE: 98.4 F | WEIGHT: 229.4 LBS | BODY MASS INDEX: 32.84 KG/M2 | HEIGHT: 70 IN | HEART RATE: 56 BPM | SYSTOLIC BLOOD PRESSURE: 136 MMHG | DIASTOLIC BLOOD PRESSURE: 82 MMHG

## 2018-05-04 DIAGNOSIS — C43.4 MALIGNANT MELANOMA OF NECK (HCC): Primary | ICD-10-CM

## 2018-05-04 PROCEDURE — 99213 OFFICE O/P EST LOW 20 MIN: CPT | Performed by: NURSE PRACTITIONER

## 2018-05-04 RX ORDER — ACETAMINOPHEN 500 MG
500 TABLET ORAL EVERY 6 HOURS PRN
Status: ON HOLD | COMMUNITY
End: 2021-12-31 | Stop reason: CLARIF

## 2018-05-04 NOTE — PROGRESS NOTES
Surgical Oncology Follow Up       [facility] SURGICAL ONCOLOGY [place]  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  At the time of his original biopsy this was noted to be a T1a melanoma  Upon our pathology review, this was classified as a melanoma in situ  A wide excision was performed by Dr Zeina Finn  Surgical pathology revealed no residual melanoma  He has no new complaints today  He denies headaches, back pain, bone pain, cough, shortness of breath, abdominal pain  He notices no changes at his melanoma site  He continues to follow with Dr Bhavesh Barclay twice a year  He was last evaluated in January and he reports no new concerns at that visit  I did stress the importance of continuing dermatological surveillance with his dermatologist   We will plan to see the patient back on a p r n  basis  I have instructed him to call with any new concerns or symptoms  All of his questions are answered  History of Present Illness:        Malignant melanoma of neck ([facility] [address] )    1/26/2017 Initial Diagnosis     Malignant melanoma of neck (HCC)    Right superior lateral neck biopsy    Melanoma in situ         2/6/2017 Surgery     Wide excision (Dr Zeina Finn)    Surgical pathology revealed no residual melanoma in situ             -Interval History:  Patient presents today for a six-month follow-up visit  He has no new complaints today  He continues to follow with Dr Bhavesh Barclay on a regular basis- last seen in January with no new concerns       Dermatologist:   [place]    Review of Systems:  Review of Systems   Constitutional: Negative for appetite change, chills, diaphoresis, fever and unexpected weight change  Respiratory: Negative for cough, shortness of breath and wheezing  Cardiovascular: Negative for chest pain, palpitations and leg swelling  Gastrointestinal: Negative for abdominal pain, diarrhea, nausea and vomiting  Musculoskeletal: Negative for arthralgias, back pain and myalgias  Skin: Negative for rash  Neurological: Negative for light-headedness and headaches  Hematological: Negative for adenopathy  Psychiatric/Behavioral: Negative for agitation and confusion  Patient Active Problem List   Diagnosis    [facility]    CAD (coronary artery disease)    Hypercholesterolemia    Hypertension    Impaired fasting glucose    Malignant melanoma of neck (HCC)    Sleep apnea    Bradycardia    Depression    Elevated prostate specific antigen (PSA)     Past Medical History:   Diagnosis Date    AAA (abdominal aortic aneurysm) without rupture ([facility] )     has annual US to check    Angina pectoris ([facility] )     chronic stable angina, but it is mild and tolerates walking dogs and walking up flight os stairs without symptoms  Using Nitro no more than 2 times per mt      Ankle pain, left     Bilateral wrist pain     Coronary artery disease     CPAP (continuous positive airway pressure) dependence     History of colon polyps     [place] (hard of hearing)     Slight    Hyperlipidemia     Hypertension     Ischemic cardiomyopathy     with apical aneurysm    [facility] ) 12/2016    Right side of neck    Myocardial infarction [facility] ) 1996    x1    Pneumonia     x1 as a baby    Sleep apnea     Wears glasses      Past Surgical History:   Procedure Laterality Date    CARDIAC SURGERY      CABG x6    CATARACT EXTRACTION Bilateral     COLONOSCOPY      CORONARY ANGIOPLASTY WITH STENT PLACEMENT      2 stents    CORONARY ARTERY BYPASS GRAFT  SKIN BIOPSY      Melanoma - surgical removal    SKIN LESION EXCISION Right 2/6/2017    Procedure: WIDE EXCISION MELANOMA SCAR LATERAL NECK;  Surgeon: Cindy Manzano MD;  Location: [facility];  Service:      Family History   Problem Relation Age of Onset    Hypertension Mother     Breast cancer Mother      Social History     Social History    Marital status: /Civil Union     Spouse name: N/A    Number of children: N/A    Years of education: N/A     Occupational History    Not on file  Social History Main Topics    Smoking status: Never Smoker    Smokeless tobacco: Never Used    Alcohol use Yes      Comment: 1-2 beers rarely    Drug use: No    Sexual activity: Not on file     Other Topics Concern    Not on file     Social History Narrative    No narrative on file       Current Outpatient Prescriptions:     acetaminophen (TYLENOL) 500 mg tablet, Take 500 mg by mouth every 6 (six) hours as needed for mild pain, Disp: , Rfl:     aspirin 81 MG tablet, Take 1 tablet by mouth daily, Disp: , Rfl:     atorvastatin (LIPITOR) 80 mg tablet, Take 80 mg by mouth every morning, Disp: , Rfl:     lisinopril (ZESTRIL) 20 mg tablet, Take 20 mg by mouth 2 (two) times a day  , Disp: , Rfl:     metoprolol tartrate (LOPRESSOR) 25 mg tablet, Take 1 tablet (25 mg total) by mouth 2 (two) times a day, Disp: 180 tablet, Rfl: 1    nitroglycerin (NITROSTAT) 0 4 mg SL tablet, Place 1 tablet (0 4 mg total) under the tongue every 5 (five) minutes as needed for chest pain, Disp: 90 tablet, Rfl: 0    ibuprofen (MOTRIN) 400 mg tablet, Take 400 mg by mouth every 6 (six) hours as needed for mild pain, Disp: , Rfl:   Allergies   Allergen Reactions    Abciximab Other (See Comments)     rcd 9/27/01 & 5/16/03    Escitalopram Rash     Vitals:    05/04/18 1048   BP: 136/82   Pulse: 56   Resp: 14   Temp: 98 4 °F (36 9 °C)       Physical Exam   Constitutional: He is oriented to person, place, and time   He appears well-developed and well-nourished  No distress  HENT:   Head: Normocephalic and atraumatic  Neck: Normal range of motion  Cardiovascular: Normal rate, regular rhythm and normal heart sounds  Pulmonary/Chest: Effort normal and breath sounds normal    Abdominal: Soft  Normal appearance  He exhibits no distension and no mass  There is no hepatosplenomegaly  There is no tenderness  Musculoskeletal: Normal range of motion  He exhibits no edema  Lymphadenopathy:     He has no axillary adenopathy  Right: No supraclavicular adenopathy present  Left: No supraclavicular adenopathy present  Neurological: He is alert and oriented to person, place, and time  Skin: Skin is warm and dry  No rash noted  Right lateral incision site is well healed without skin changes or nodularity is noted  There is no neck, supraclavicular or axillary lymphadenopathy noted  Psychiatric: He has a normal mood and affect  Vitals reviewed  Advance Care Planning/Advance Directives:  Discussed disease status, cancer treatment plans and/or cancer treatment goals with the patient

## 2018-05-14 ENCOUNTER — OFFICE VISIT (OUTPATIENT)
Dept: UROLOGY | Facility: MEDICAL CENTER | Age: 78
End: 2018-05-14
Payer: COMMERCIAL

## 2018-05-14 VITALS
BODY MASS INDEX: 32.5 KG/M2 | HEIGHT: 70 IN | DIASTOLIC BLOOD PRESSURE: 82 MMHG | SYSTOLIC BLOOD PRESSURE: 112 MMHG | WEIGHT: 227 LBS

## 2018-05-14 DIAGNOSIS — N40.1 BPH WITH OBSTRUCTION/LOWER URINARY TRACT SYMPTOMS: Primary | ICD-10-CM

## 2018-05-14 DIAGNOSIS — N13.8 BPH WITH OBSTRUCTION/LOWER URINARY TRACT SYMPTOMS: Primary | ICD-10-CM

## 2018-05-14 DIAGNOSIS — R97.20 ELEVATED PROSTATE SPECIFIC ANTIGEN (PSA): ICD-10-CM

## 2018-05-14 LAB
SL AMB  POCT GLUCOSE, UA: ABNORMAL
SL AMB LEUKOCYTE ESTERASE,UA: ABNORMAL
SL AMB POCT BILIRUBIN,UA: ABNORMAL
SL AMB POCT BLOOD,UA: ABNORMAL
SL AMB POCT CLARITY,UA: CLEAR
SL AMB POCT COLOR,UA: YELLOW
SL AMB POCT KETONES,UA: ABNORMAL
SL AMB POCT NITRITE,UA: ABNORMAL
SL AMB POCT PH,UA: 5.5
SL AMB POCT SPECIFIC GRAVITY,UA: 1.01
SL AMB POCT URINE PROTEIN: ABNORMAL
SL AMB POCT UROBILINOGEN: 1

## 2018-05-14 PROCEDURE — 99214 OFFICE O/P EST MOD 30 MIN: CPT | Performed by: UROLOGY

## 2018-05-14 PROCEDURE — 81003 URINALYSIS AUTO W/O SCOPE: CPT | Performed by: UROLOGY

## 2018-05-14 NOTE — ASSESSMENT & PLAN NOTE
The patient has undergone prior prostate biopsies which were benign  His most recent PSA is improved at 3 9   (May 1, 2018)  The pros and cons of PSA testing were discussed

## 2018-05-14 NOTE — PATIENT INSTRUCTIONS
Benign Prostatic Hypertrophy   WHAT YOU NEED TO KNOW:   Benign prostatic hypertrophy (BPH) is a condition that causes your prostate gland to grow larger than normal  The prostate gland is the male sex gland that produces a fluid that is part of semen  It is about the size of a walnut and it is located under the bladder  As the prostate grows, it can squeeze the urethra  This can block urine flow and cause urinary problems  DISCHARGE INSTRUCTIONS:   Medicines:   · Alpha blockers: This medicine relaxes the muscles in your prostate and bladder  It may help you urinate more easily  · 5 alpha reductase inhibitors: These medicines block the production of a hormone that causes the prostate to get larger  It may help slow the growth of the prostate or shrink the prostate  · Take your medicine as directed  Contact your healthcare provider if you think your medicine is not helping or if you have side effects  Tell him or her if you are allergic to any medicine  Keep a list of the medicines, vitamins, and herbs you take  Include the amounts, and when and why you take them  Bring the list or the pill bottles to follow-up visits  Carry your medicine list with you in case of an emergency  Follow up with your healthcare provider as directed:  Write down your questions so you remember to ask them during your visits  Manage BPH:   · Do not let your bladder get too full before you empty it  Urinate when you feel the urge  · Limit alcohol  Do not drink large amounts of any liquid at one time  · Decrease the amount of salt you eat  Examples of salty foods are chips, cured meats, and canned soups  Do not use table salt  · Healthcare providers may tell you not to eat spicy foods such as chilli peppers  This may help you find out if spicy food makes your BPH symptoms worse  · You may have sex if you feel well  Contact your healthcare provider if:   · There is a large amount of blood in your urine  · Your signs and symptoms get worse  · You have a fever  · You have questions or concerns about your condition or care  Seek care immediately if:   · You are unable to urinate  · Your bladder feels very full and painful  © 2017 [address] [employer] Information is for End User's use only and may not be sold, redistributed or otherwise used for commercial purposes  All illustrations and images included in CareNotes® are the copyrighted property of A D A M , Inc  or Petar Herr  The above information is an  only  It is not intended as medical advice for individual conditions or treatments  Talk to your doctor, nurse or pharmacist before following any medical regimen to see if it is safe and effective for you

## 2018-05-14 NOTE — PROGRESS NOTES
Assessment/Plan:    BPH with obstruction/lower urinary tract symptoms  AUA symptom score is 6  He is delighted with his voiding pattern  We will continue to follow with watchful waiting  Elevated prostate specific antigen (PSA)  The patient has undergone prior prostate biopsies which were benign  His most recent PSA is improved at 3 9   (May 1, 2018)  The pros and cons of PSA testing were discussed  Diagnoses and all orders for this visit:    BPH with obstruction/lower urinary tract symptoms  -     POCT urine dip auto non-scope    Elevated prostate specific antigen (PSA)          Subjective:      Patient ID: Ritesh Odom is a 69 y o  male  69-year-old male followed for lower tract symptoms secondary to BPH and history of elevated PSA  He reports he is voiding well  His stream is good and he empties his bladder adequately  He gets up at most once a night to urinate  He has no urgency or incontinence  There is no gross hematuria, dysuria or symptoms of infection  He is very happy with his voiding pattern  He has no new back or bone pain  The following portions of the patient's history were reviewed and updated as appropriate: allergies, current medications, past family history, past medical history, past social history, past surgical history and problem list     Review of Systems   Constitutional: Negative for chills, diaphoresis, fatigue and fever  HENT: Negative  Eyes: Negative  Respiratory: Negative  Cardiovascular: Negative  Endocrine: Negative  Musculoskeletal: Negative  Skin: Negative  Skin cancer   Allergic/Immunologic: Negative  Neurological: Negative  Hematological: Negative  Psychiatric/Behavioral: Negative  Objective:      /82 (BP Location: Left arm, Patient Position: Sitting)   Ht 5' 10" (1 778 m)   Wt 103 kg (227 lb)   BMI 32 57 kg/m²          Physical Exam   Constitutional: He is oriented to person, place, and time   He appears well-developed and well-nourished  HENT:   Head: Normocephalic and atraumatic  Eyes: Conjunctivae are normal    Neck: Neck supple  Cardiovascular: Normal rate  Pulmonary/Chest: Effort normal    Abdominal: Soft  Bowel sounds are normal  He exhibits no distension and no mass  There is no tenderness  There is no rebound, no guarding and no CVA tenderness  Hernia confirmed negative in the right inguinal area and confirmed negative in the left inguinal area  No hernia   Genitourinary: Rectum normal, testes normal and penis normal  Prostate is enlarged  Prostate is not tender  Right testis shows no mass  Left testis shows no mass  No phimosis or hypospadias  Genitourinary Comments: 2+ no nodule   Musculoskeletal: He exhibits no edema  Neurological: He is alert and oriented to person, place, and time  Skin: Skin is warm and dry  Psychiatric: He has a normal mood and affect   His behavior is normal  Judgment and thought content normal

## 2018-05-14 NOTE — ASSESSMENT & PLAN NOTE
AUA symptom score is 6  He is delighted with his voiding pattern  We will continue to follow with watchful waiting

## 2018-05-15 ENCOUNTER — OFFICE VISIT (OUTPATIENT)
Dept: INTERNAL MEDICINE CLINIC | Facility: CLINIC | Age: 78
End: 2018-05-15
Payer: COMMERCIAL

## 2018-05-15 VITALS
HEIGHT: 70 IN | TEMPERATURE: 98.1 F | HEART RATE: 56 BPM | WEIGHT: 223 LBS | RESPIRATION RATE: 18 BRPM | DIASTOLIC BLOOD PRESSURE: 82 MMHG | SYSTOLIC BLOOD PRESSURE: 152 MMHG | BODY MASS INDEX: 31.92 KG/M2

## 2018-05-15 DIAGNOSIS — E78.00 HYPERCHOLESTEROLEMIA: ICD-10-CM

## 2018-05-15 DIAGNOSIS — I10 ESSENTIAL HYPERTENSION: Primary | ICD-10-CM

## 2018-05-15 DIAGNOSIS — R97.20 ELEVATED PROSTATE SPECIFIC ANTIGEN (PSA): ICD-10-CM

## 2018-05-15 DIAGNOSIS — M79.672 LEFT FOOT PAIN: ICD-10-CM

## 2018-05-15 DIAGNOSIS — I25.10 CORONARY ARTERY DISEASE INVOLVING NATIVE CORONARY ARTERY OF NATIVE HEART WITHOUT ANGINA PECTORIS: ICD-10-CM

## 2018-05-15 DIAGNOSIS — G47.33 OBSTRUCTIVE SLEEP APNEA SYNDROME: ICD-10-CM

## 2018-05-15 DIAGNOSIS — R73.01 IMPAIRED FASTING GLUCOSE: ICD-10-CM

## 2018-05-15 PROCEDURE — 1101F PT FALLS ASSESS-DOCD LE1/YR: CPT | Performed by: INTERNAL MEDICINE

## 2018-05-15 PROCEDURE — 3725F SCREEN DEPRESSION PERFORMED: CPT | Performed by: INTERNAL MEDICINE

## 2018-05-15 PROCEDURE — 99214 OFFICE O/P EST MOD 30 MIN: CPT | Performed by: INTERNAL MEDICINE

## 2018-05-16 NOTE — PROGRESS NOTES
[facility] [place]      NAME: Zully Croft  AGE: 69 y o  SEX: male  : 1940   MRN: [id number]    DATE: 2018  TIME: 3:40 PM    Assessment and Plan   1  Essential hypertension  Well controlled  - CBC  - Comprehensive metabolic panel    2  Hypercholesterolemia  Well controlled on atorvastatin  - Lipid panel    3  Coronary artery disease involving native coronary artery of native heart without angina pectoris  No angina  On aggressive risk factor modification  4  Obstructive sleep apnea syndrome  Using CPAP  5  Impaired fasting glucose  Stable on diet  Check hemoglobin A1c   - HEMOGLOBIN A1C W/ EAG ESTIMATION; Future    6  Elevated prostate specific antigen (PSA)  Following with Urology  7  Left foot pain  The patient has some foot pain which is likely related to a bunion   - Ambulatory referral to Podiatry; Future    Overall, the patient is doing well  He has no angina  His risk factor control is good  He will continue his current medications  Return to office in:  3 months    Chief Complaint     Chief Complaint   Patient presents with    Follow-up     3 months       History of Present Illness     The patient returned to the office for re-evaluation of hypertension, hypercholesterolemia, coronary artery disease, obstructive sleep apnea, and impaired fasting glucose  Overall, he has been feeling well  He has had no chest pain, shortness of breath, palpitations, or dizziness  He is tolerating CPAP for sleep apnea  He has remained quite active  He is tolerating his medications well  The following portions of the patient's history were reviewed and updated as appropriate: allergies, current medications, past family history, past medical history, past social history, past surgical history and problem list     Review of Systems   Review of Systems   Constitutional: Negative      HENT: Negative for congestion, ear pain, postnasal drip, rhinorrhea, sore throat and trouble swallowing  Eyes: Negative for pain, discharge, redness and visual disturbance  Respiratory: Negative for cough, shortness of breath and wheezing  Cardiovascular: Negative  Gastrointestinal: Negative  Endocrine: Negative  Genitourinary: Negative for difficulty urinating, dysuria, frequency, hematuria and urgency  Musculoskeletal: Negative for arthralgias, gait problem, joint swelling and myalgias  Skin: Negative for rash  Neurological: Negative for dizziness, speech difficulty, weakness, light-headedness, numbness and headaches  Hematological: Negative  Psychiatric/Behavioral: Negative for confusion, decreased concentration, dysphoric mood and sleep disturbance  The patient is not nervous/anxious  Active Problem List     Patient Active Problem List   Diagnosis    Abdominal aneurysm ([address] )    CAD (coronary artery disease)    Hypercholesterolemia    Hypertension    Impaired fasting glucose    Malignant melanoma of neck (HCC)    Sleep apnea    Bradycardia    Depression    Elevated prostate specific antigen (PSA)    BPH with obstruction/lower urinary tract symptoms    Left foot pain       Objective   /82 (BP Location: Left arm, Patient Position: Sitting, Cuff Size: Standard)   Pulse 56   Temp 98 1 °F (36 7 °C) (Tympanic)   Resp 18   Ht 5' 10" (1 778 m)   Wt 101 kg (223 lb)   BMI 32 00 kg/m²     Physical Exam   Constitutional: He is oriented to person, place, and time  He appears well-developed and well-nourished  No distress  HENT:   Head: Normocephalic and atraumatic  Neck: Neck supple  No JVD present  No tracheal deviation present  No thyromegaly present  Cardiovascular: Normal rate, regular rhythm, normal heart sounds and intact distal pulses  Exam reveals no gallop and no friction rub  No murmur heard  Pulmonary/Chest: Effort normal and breath sounds normal  He has no wheezes  He has no rales  He exhibits no tenderness  Abdominal: Soft  Bowel sounds are normal  He exhibits no distension and no mass  There is no tenderness  There is no rebound  Musculoskeletal: Normal range of motion  He exhibits no edema or tenderness  Lymphadenopathy:     He has no cervical adenopathy  Neurological: He is alert and oriented to person, place, and time  Skin: Skin is warm and dry  Psychiatric: He has a normal mood and affect   His behavior is normal  Judgment and thought content normal        Pertinent Laboratory/Diagnostic Studies:  Office Visit on 05/14/2018   Component Date Value Ref Range Status     COLOR,UA 05/14/2018 yellow   Final     CLARITY,UA 05/14/2018 clear   Final     SPECIFIC GRAVITY,UA 05/14/2018 1 015   Final     PH,UA 05/14/2018 5 5   Final    LEUKOCYTE ESTERASE,UA 05/14/2018 neg   Final     NITRITE,UA 05/14/2018 neg   Final    GLUCOSE, UA 05/14/2018 neg   Final     KETONES,UA 05/14/2018 neg   Final     BILIRUBIN,UA 05/14/2018 neg   Final     BLOOD,UA 05/14/2018 trace-intact   Final    SL AMB POCT URINE PROTEIN 05/14/2018 trace   Final    SL AMB POCT UROBILINOGEN 05/14/2018 1 0   Final           Current Medications     Current Outpatient Prescriptions:     acetaminophen (TYLENOL) 500 mg tablet, Take 500 mg by mouth every 6 (six) hours as needed for mild pain, Disp: , Rfl:     aspirin 81 MG tablet, Take 1 tablet by mouth daily, Disp: , Rfl:     atorvastatin (LIPITOR) 80 mg tablet, Take 80 mg by mouth every morning, Disp: , Rfl:     lisinopril (ZESTRIL) 20 mg tablet, Take 20 mg by mouth 2 (two) times a day  , Disp: , Rfl:     metoprolol tartrate (LOPRESSOR) 25 mg tablet, Take 1 tablet (25 mg total) by mouth 2 (two) times a day, Disp: 180 tablet, Rfl: 1    nitroglycerin (NITROSTAT) 0 4 mg SL tablet, Place 1 tablet (0 4 mg total) under the tongue every 5 (five) minutes as needed for chest pain, Disp: 90 tablet, Rfl: 0      Cipriano Somers MD

## 2018-07-20 DIAGNOSIS — I10 ESSENTIAL HYPERTENSION: ICD-10-CM

## 2018-07-24 DIAGNOSIS — E78.00 HYPERCHOLESTEROLEMIA: Primary | ICD-10-CM

## 2018-07-24 RX ORDER — ATORVASTATIN CALCIUM 80 MG/1
TABLET, FILM COATED ORAL
Qty: 90 TABLET | Refills: 3 | Status: SHIPPED | OUTPATIENT
Start: 2018-07-24 | End: 2018-10-24 | Stop reason: SDUPTHER

## 2018-08-13 ENCOUNTER — OFFICE VISIT (OUTPATIENT)
Dept: SLEEP CENTER | Facility: CLINIC | Age: 78
End: 2018-08-13
Payer: COMMERCIAL

## 2018-08-13 VITALS
DIASTOLIC BLOOD PRESSURE: 70 MMHG | WEIGHT: 224.6 LBS | BODY MASS INDEX: 32.16 KG/M2 | SYSTOLIC BLOOD PRESSURE: 124 MMHG | HEIGHT: 70 IN | HEART RATE: 60 BPM

## 2018-08-13 DIAGNOSIS — E66.9 OBESITY (BMI 30-39.9): ICD-10-CM

## 2018-08-13 DIAGNOSIS — G47.33 OSA (OBSTRUCTIVE SLEEP APNEA): Primary | ICD-10-CM

## 2018-08-13 DIAGNOSIS — G47.61 PLMD (PERIODIC LIMB MOVEMENT DISORDER): ICD-10-CM

## 2018-08-13 DIAGNOSIS — G47.10 HYPERSOMNIA: ICD-10-CM

## 2018-08-13 DIAGNOSIS — I10 ESSENTIAL HYPERTENSION: ICD-10-CM

## 2018-08-13 DIAGNOSIS — G47.9 SLEEP DISTURBANCE: ICD-10-CM

## 2018-08-13 DIAGNOSIS — I25.10 CORONARY ARTERY DISEASE INVOLVING NATIVE CORONARY ARTERY OF NATIVE HEART WITHOUT ANGINA PECTORIS: ICD-10-CM

## 2018-08-13 PROCEDURE — 99214 OFFICE O/P EST MOD 30 MIN: CPT | Performed by: INTERNAL MEDICINE

## 2018-08-13 RX ORDER — ALBUTEROL SULFATE 90 UG/1
1 AEROSOL, METERED RESPIRATORY (INHALATION) EVERY 6 HOURS PRN
COMMUNITY
End: 2019-03-05 | Stop reason: SDUPTHER

## 2018-08-13 RX ORDER — CARVEDILOL 6.25 MG/1
6.25 TABLET ORAL 2 TIMES DAILY WITH MEALS
Refills: 0 | COMMUNITY
Start: 2018-07-30 | End: 2018-11-27 | Stop reason: SINTOL

## 2018-08-13 NOTE — PROGRESS NOTES
Review of Systems      Genitourinary none   Cardiology none   Gastrointestinal none   Neurology none   Constitutional none   Integumentary none   Psychiatry none   Musculoskeletal muscle aches   Pulmonary none   ENT none   Endocrine none   Hematological none

## 2018-08-13 NOTE — PROGRESS NOTES
Follow-Up Note - Sleep Center   Holger London  69 y o  male  RNY:[birth year]  XOE:[id number]    CC: I saw this patient for follow-up in clinic today for his Sleep Disordered Breathing, Coexisting Sleep and Medical Problems  PFSH, Problem List, Medications & Allergies were reviewed in EMR  Interval changes: [none reported ]   He  has a past medical history of AAA (abdominal aortic aneurysm) without rupture ([facility] ); Angina pectoris ([place] [address] ); Ankle pain, left; Bilateral wrist pain; BPH with obstruction/lower urinary tract symptoms; Coronary artery disease; CPAP (continuous positive airway pressure) dependence; Elevated PSA; History of colon polyps; [place] hearing); Hyperlipidemia; Hypertension; Ischemic cardiomyopathy; Melanoma ([place] [address] ) (12/2016); Myocardial infarction ([place] [address] ) (1996); Pneumonia; Sleep apnea; and Wears glasses  He has a current medication list which includes the following prescription(s): acetaminophen, albuterol, aspirin, atorvastatin, carvedilol, lisinopril, nitroglycerin, and metoprolol tartrate  ROS: Reviewed (see attached)  He reports less sore throat since he got cleaning machine for his CPAP  He is no longer needing albuterol  He has had adjustment of his antihypertensive medication  HPI:  With respect to compliance, data download shows:  No data was available, but he reports regular use of the device for > 4hours/night  Joselito Kim reports  · no  difficulty tolerating PAP;   · no  adverse effects:   · Benefitting from use:    · He is not aware of jerking movements during sleep  Sleep Routine: He reports getting 10 hr sleep; he has no difficulty initiating or maintaining sleep   He awakens spontaneously feeling refreshed  He denied excessive drowsiness[ ]  He rated himself at Total score: 3 /24 on the [place] sleepiness scale  Habits:[ reports that he has never smoked  He has never used smokeless tobacco ], [ reports that he drinks alcohol ], [ reports that he does not use drugs  ], Caffeine use: limited[ ], Exercise routine: regular [ ]  EXAM: /70   Pulse 60   Ht 5' 10" (1 778 m)   Wt 102 kg (224 lb 9 6 oz)   BMI 32 23 kg/m²      Patient is alert, orientated, cooperative [and in no distress]  Mental state [appears normal]  Craniofacial anatomy normal  There are [no] facial pressure marks or rashes  Neck Circumference: 43 cm  There are no abnormal neck masses  Nasal airway is [patent ]  Mucous membranes appeared normal  The oral airway [is crowded ] [Apart from truncal obesity,] the rest of exam (Heart, Lungs, Abdomen, CNS and Musculoskeletal systems) was unremarkable   IMPRESSION:     1  JORGE (obstructive sleep apnea)  Sleep F/U  - established patient   2  PLMD (periodic limb movement disorder)     3  Sleep disturbance     4  Hypersomnia     5  Essential hypertension     6  Coronary artery disease involving native coronary artery of native heart without angina pectoris     7  Obesity (BMI 30-39  9)         PLAN:  1  Treatment with  PAP is medically necessary and Shila Sheehan is agreable to continue use  2  Instruction on care of equipment, strategies to improve comfort and importance of compliance with PAP therapy were discussed  3  Pressure settin cm  4  Rx provided to replace supplies and Care coordinated with DME provider  5  Strategies for weight reduction discussed  6  No treatment is needed for the periodic limb movements of sleep  7  I advise limiting time in bed to less than 9 hours  8  With your consent, follow-up is advised in [1 Megha Tracy [or sooner if needed] [to monitor progress, compliance and to adjust therapy]  Thank you for allowing me to participate in the care of this patient      Sincerely,    Authenticated electronically by Yulia Christy MD on    Board Certified Specialist

## 2018-08-21 LAB — HBA1C MFR BLD HPLC: 5.8 %

## 2018-08-22 LAB
ALBUMIN SERPL-MCNC: 3.7 G/DL (ref 3.6–5.1)
ALBUMIN/GLOB SERPL: 1.5 (CALC) (ref 1–2.5)
ALP SERPL-CCNC: 109 U/L (ref 40–115)
ALT SERPL-CCNC: 16 U/L (ref 9–46)
AST SERPL-CCNC: 20 U/L (ref 10–35)
BASOPHILS # BLD AUTO: 41 CELLS/UL (ref 0–200)
BASOPHILS NFR BLD AUTO: 0.7 %
BILIRUB SERPL-MCNC: 1 MG/DL (ref 0.2–1.2)
BUN SERPL-MCNC: 15 MG/DL (ref 7–25)
BUN/CREAT SERPL: NORMAL (CALC) (ref 6–22)
CALCIUM SERPL-MCNC: 8.7 MG/DL (ref 8.6–10.3)
CHLORIDE SERPL-SCNC: 104 MMOL/L (ref 98–110)
CHOLEST SERPL-MCNC: 139 MG/DL
CHOLEST/HDLC SERPL: 2.6 (CALC)
CO2 SERPL-SCNC: 29 MMOL/L (ref 20–32)
CREAT SERPL-MCNC: 1.15 MG/DL (ref 0.7–1.18)
EOSINOPHIL # BLD AUTO: 302 CELLS/UL (ref 15–500)
EOSINOPHIL NFR BLD AUTO: 5.2 %
ERYTHROCYTE [DISTWIDTH] IN BLOOD BY AUTOMATED COUNT: 13.4 % (ref 11–15)
EST. AVERAGE GLUCOSE BLD GHB EST-MCNC: 120 (CALC)
EST. AVERAGE GLUCOSE BLD GHB EST-SCNC: 6.6 (CALC)
GLOBULIN SER CALC-MCNC: 2.5 G/DL (CALC) (ref 1.9–3.7)
GLUCOSE SERPL-MCNC: 91 MG/DL (ref 65–99)
HBA1C MFR BLD: 5.8 % OF TOTAL HGB
HCT VFR BLD AUTO: 39.9 % (ref 38.5–50)
HDLC SERPL-MCNC: 54 MG/DL
HGB BLD-MCNC: 13.1 G/DL (ref 13.2–17.1)
LDLC SERPL CALC-MCNC: 67 MG/DL (CALC)
LYMPHOCYTES # BLD AUTO: 1340 CELLS/UL (ref 850–3900)
LYMPHOCYTES NFR BLD AUTO: 23.1 %
MCH RBC QN AUTO: 29.8 PG (ref 27–33)
MCHC RBC AUTO-ENTMCNC: 32.8 G/DL (ref 32–36)
MCV RBC AUTO: 90.7 FL (ref 80–100)
MONOCYTES # BLD AUTO: 580 CELLS/UL (ref 200–950)
MONOCYTES NFR BLD AUTO: 10 %
NEUTROPHILS # BLD AUTO: 3538 CELLS/UL (ref 1500–7800)
NEUTROPHILS NFR BLD AUTO: 61 %
NONHDLC SERPL-MCNC: 85 MG/DL (CALC)
PLATELET # BLD AUTO: 146 THOUSAND/UL (ref 140–400)
PMV BLD REES-ECKER: 11.6 FL (ref 7.5–12.5)
POTASSIUM SERPL-SCNC: 4.8 MMOL/L (ref 3.5–5.3)
PROT SERPL-MCNC: 6.2 G/DL (ref 6.1–8.1)
RBC # BLD AUTO: 4.4 MILLION/UL (ref 4.2–5.8)
SL AMB EGFR AFRICAN AMERICAN: 71 ML/MIN/1.73M2
SL AMB EGFR NON AFRICAN AMERICAN: 61 ML/MIN/1.73M2
SODIUM SERPL-SCNC: 139 MMOL/L (ref 135–146)
TRIGL SERPL-MCNC: 92 MG/DL
WBC # BLD AUTO: 5.8 THOUSAND/UL (ref 3.8–10.8)

## 2018-08-27 ENCOUNTER — OFFICE VISIT (OUTPATIENT)
Dept: INTERNAL MEDICINE CLINIC | Facility: CLINIC | Age: 78
End: 2018-08-27
Payer: COMMERCIAL

## 2018-08-27 VITALS
RESPIRATION RATE: 20 BRPM | DIASTOLIC BLOOD PRESSURE: 84 MMHG | HEIGHT: 70 IN | TEMPERATURE: 97.8 F | BODY MASS INDEX: 32.5 KG/M2 | SYSTOLIC BLOOD PRESSURE: 168 MMHG | WEIGHT: 227 LBS | HEART RATE: 60 BPM

## 2018-08-27 DIAGNOSIS — G47.33 OSA (OBSTRUCTIVE SLEEP APNEA): ICD-10-CM

## 2018-08-27 DIAGNOSIS — R73.01 IMPAIRED FASTING GLUCOSE: ICD-10-CM

## 2018-08-27 DIAGNOSIS — I10 ESSENTIAL HYPERTENSION: Primary | ICD-10-CM

## 2018-08-27 DIAGNOSIS — E78.00 HYPERCHOLESTEROLEMIA: ICD-10-CM

## 2018-08-27 DIAGNOSIS — N13.8 BPH WITH OBSTRUCTION/LOWER URINARY TRACT SYMPTOMS: ICD-10-CM

## 2018-08-27 DIAGNOSIS — N40.1 BPH WITH OBSTRUCTION/LOWER URINARY TRACT SYMPTOMS: ICD-10-CM

## 2018-08-27 DIAGNOSIS — I25.10 CORONARY ARTERY DISEASE INVOLVING NATIVE CORONARY ARTERY OF NATIVE HEART WITHOUT ANGINA PECTORIS: ICD-10-CM

## 2018-08-27 PROCEDURE — 3008F BODY MASS INDEX DOCD: CPT | Performed by: INTERNAL MEDICINE

## 2018-08-27 PROCEDURE — 99214 OFFICE O/P EST MOD 30 MIN: CPT | Performed by: INTERNAL MEDICINE

## 2018-08-28 NOTE — PROGRESS NOTES
[facility] [place]      NAME: Ritesh Odom  AGE: 69 y o  SEX: male  : 1940   MRN: [id number]    DATE: 2018  TIME: 7:48 AM    Assessment and Plan   1  Essential hypertension  Well controlled  2  Coronary artery disease involving native coronary artery of native heart without angina pectoris  Asymptomatic  On aspirin and appropriate risk factor modification   - Comprehensive metabolic panel; Future    3  JORGE (obstructive sleep apnea)  Tolerating CPAP  4  Impaired fasting glucose  Stable on diet  - Hemoglobin A1C; Future    5  Hypercholesterolemia  Adequately controlled on atorvastatin  - Lipid panel; Future    6  BPH with obstruction/lower urinary tract symptoms  The patient is satisfied with his current voiding pattern  No pharmacologic intervention is needed at present  Return to office in:   3 months    Chief Complaint     Chief Complaint   Patient presents with    Follow-up     3 months, Dr Vi Erazo, cardiology, stopped Metoprolol and started Coreg 6 25mg BID       History of Present Illness     The patient returned to the office for re-evaluation of hypertension, hyperlipidemia, coronary artery disease, obstructive sleep apnea, and impaired fasting glucose  Since his last visit he has been feeling pretty well  When he was seen by his cardiologist, his blood pressure control was not adequate  He was therefore withdrawn from metoprolol and started on carvedilol  He has been monitoring his blood pressure at home and it has been rather good  He has had no chest pain, shortness of breath, palpitations, or dizziness  He is tolerating his medications  The following portions of the patient's history were reviewed and updated as appropriate: allergies, current medications, past family history, past medical history, past social history, past surgical history and problem list     Review of Systems   Review of Systems   Constitutional: Negative      HENT: Negative for congestion, ear pain, postnasal drip, rhinorrhea, sore throat and trouble swallowing  Eyes: Negative for pain, discharge, redness and visual disturbance  Respiratory: Negative for cough, shortness of breath and wheezing  Cardiovascular: Negative  Gastrointestinal: Negative  Endocrine: Negative  Genitourinary: Negative for difficulty urinating, dysuria, frequency, hematuria and urgency  Musculoskeletal: Negative for arthralgias, gait problem, joint swelling and myalgias  Skin: Negative for rash  Neurological: Negative for dizziness, speech difficulty, weakness, light-headedness, numbness and headaches  Hematological: Negative  Psychiatric/Behavioral: Negative for confusion, decreased concentration, dysphoric mood and sleep disturbance  The patient is not nervous/anxious  Active Problem List     Patient Active Problem List   Diagnosis    CAD (coronary artery disease)    Hypercholesterolemia    Essential hypertension    Impaired fasting glucose    Malignant melanoma of neck (HCC)    JORGE (obstructive sleep apnea)    Bradycardia    Depression    Elevated prostate specific antigen (PSA)    BPH with obstruction/lower urinary tract symptoms    Left foot pain    PLMD (periodic limb movement disorder)    Hypersomnia    Obesity (BMI 30-39  9)       Objective   /84 (BP Location: Left arm, Patient Position: Sitting, Cuff Size: Standard)   Pulse 60   Temp 97 8 °F (36 6 °C) (Tympanic)   Resp 20   Ht 5' 10" (1 778 m)   Wt 103 kg (227 lb)   BMI 32 57 kg/m²     Physical Exam   Constitutional: He is oriented to person, place, and time  He appears well-developed and well-nourished  No distress  HENT:   Head: Normocephalic and atraumatic  Neck: Neck supple  No JVD present  No tracheal deviation present  No thyromegaly present  Cardiovascular: Normal rate, regular rhythm, normal heart sounds and intact distal pulses    Exam reveals no gallop and no friction rub     No murmur heard  Pulmonary/Chest: Effort normal and breath sounds normal  He has no wheezes  He has no rales  He exhibits no tenderness  Abdominal: Soft  Bowel sounds are normal  He exhibits no distension and no mass  There is no tenderness  There is no rebound  Musculoskeletal: Normal range of motion  He exhibits no edema or tenderness  Lymphadenopathy:     He has no cervical adenopathy  Neurological: He is alert and oriented to person, place, and time  Skin: Skin is warm and dry  Psychiatric: He has a normal mood and affect   His behavior is normal  Judgment and thought content normal        Pertinent Laboratory/Diagnostic Studies:  Orders Only on 08/22/2018   Component Date Value Ref Range Status    Hemoglobin A1C 08/21/2018 5 8   Final   Orders Only on 08/21/2018   Component Date Value Ref Range Status    Total Cholesterol 08/21/2018 139  <200 mg/dL Final    HDL 08/21/2018 54  >40 mg/dL Final    Triglycerides 08/21/2018 92  <150 mg/dL Final    LDL Direct 08/21/2018 67  mg/dL (calc) Final    Chol HDLC Ratio 08/21/2018 2 6  <5 0 (calc) Final    Non-HDL Cholesterol 08/21/2018 85  <130 mg/dL (calc) Final    Glucose 08/21/2018 91  65 - 99 mg/dL Final    BUN 08/21/2018 15  7 - 25 mg/dL Final    Creatinine 08/21/2018 1 15  0 70 - 1 18 mg/dL Final    eGFR Non  08/21/2018 61  > OR = 60 mL/min/1 73m2 Final    SL AMB EGFR  08/21/2018 71  > OR = 60 mL/min/1 73m2 Final    SL AMB BUN/CREATININE RATIO 92/15/0741 NOT APPLICABLE  6 - 22 (calc) Final    Sodium 08/21/2018 139  135 - 146 mmol/L Final    SL AMB POTASSIUM 08/21/2018 4 8  3 5 - 5 3 mmol/L Final    Chloride 08/21/2018 104  98 - 110 mmol/L Final    SL AMB CARBON DIOXIDE 08/21/2018 29  20 - 32 mmol/L Final    SL AMB CALCIUM 08/21/2018 8 7  8 6 - 10 3 mg/dL Final    SL AMB PROTEIN, TOTAL 08/21/2018 6 2  6 1 - 8 1 g/dL Final    Albumin 08/21/2018 3 7  3 6 - 5 1 g/dL Final    Globulin 08/21/2018 2 5 1 9 - 3 7 g/dL (calc) Final    SL AMB ALBUMIN/GLOBULIN RATIO 08/21/2018 1 5  1 0 - 2 5 (calc) Final    TOTAL BILIRUBIN 08/21/2018 1 0  0 2 - 1 2 mg/dL Final    Alkaline Phosphatase 08/21/2018 109  40 - 115 U/L Final    SL AMB AST 08/21/2018 20  10 - 35 U/L Final    SL AMB ALT 08/21/2018 16  9 - 46 U/L Final    White Blood Cell Count 08/21/2018 5 8  3 8 - 10 8 Thousand/uL Final    Red Blood Cell Count 08/21/2018 4 40  4 20 - 5 80 Million/uL Final    Hemoglobin 08/21/2018 13 1* 13 2 - 17 1 g/dL Final    HCT 08/21/2018 39 9  38 5 - 50 0 % Final    MCV 08/21/2018 90 7  80 0 - 100 0 fL Final    MCH 08/21/2018 29 8  27 0 - 33 0 pg Final    MCHC 08/21/2018 32 8  32 0 - 36 0 g/dL Final    RDW 08/21/2018 13 4  11 0 - 15 0 % Final    Platelet Count 54/61/5742 146  140 - 400 Thousand/uL Final    SL AMB MPV 08/21/2018 11 6  7 5 - 12 5 fL Final    Neutrophils (Absolute) 08/21/2018 3538  1,500 - 7,800 cells/uL Final    Lymphocytes (Absolute) 08/21/2018 1340  850 - 3,900 cells/uL Final    Monocytes (Absolute) 08/21/2018 580  200 - 950 cells/uL Final    Eosinophils (Absolute) 08/21/2018 302  15 - 500 cells/uL Final    Basophils (Absolute) 08/21/2018 41  0 - 200 cells/uL Final    Neutrophils 08/21/2018 61  % Final    Lymphocytes 08/21/2018 23 1  % Final    Monocytes 08/21/2018 10 0  % Final    Eosinophils 08/21/2018 5 2  % Final    Basophils Relative 08/21/2018 0 7  % Final    Hemoglobin A1C 08/21/2018 5 8* <5 7 % of total Hgb Final    Estimated Average Glucose 08/21/2018 120  (calc) Final    Estimated Average Glucose (mmol/L) 08/21/2018 6 6  (calc) Final           Current Medications     Current Outpatient Prescriptions:     acetaminophen (TYLENOL) 500 mg tablet, Take 500 mg by mouth every 6 (six) hours as needed for mild pain, Disp: , Rfl:     albuterol (PROVENTIL HFA,VENTOLIN HFA) 90 mcg/act inhaler, Inhale 1 puff every 6 (six) hours as needed for wheezing, Disp: , Rfl:     aspirin 81 MG tablet, Take 1 tablet by mouth daily, Disp: , Rfl:     atorvastatin (LIPITOR) 80 mg tablet, take 1 tablet by mouth once daily, Disp: 90 tablet, Rfl: 3    carvedilol (COREG) 6 25 mg tablet, Take 6 25 mg by mouth 2 (two) times a day with meals, Disp: , Rfl: 0    lisinopril (ZESTRIL) 20 mg tablet, Take 20 mg by mouth 2 (two) times a day  , Disp: , Rfl:     nitroglycerin (NITROSTAT) 0 4 mg SL tablet, Place 1 tablet (0 4 mg total) under the tongue every 5 (five) minutes as needed for chest pain, Disp: 90 tablet, Rfl: 0      Tariq Hidalgo MD

## 2018-09-12 ENCOUNTER — APPOINTMENT (OUTPATIENT)
Dept: RADIOLOGY | Age: 78
End: 2018-09-12
Payer: COMMERCIAL

## 2018-09-12 ENCOUNTER — TRANSCRIBE ORDERS (OUTPATIENT)
Dept: ADMINISTRATIVE | Facility: HOSPITAL | Age: 78
End: 2018-09-12

## 2018-09-12 DIAGNOSIS — M79.672 LEFT FOOT PAIN: Primary | ICD-10-CM

## 2018-09-12 DIAGNOSIS — M79.672 LEFT FOOT PAIN: ICD-10-CM

## 2018-09-12 PROCEDURE — 73630 X-RAY EXAM OF FOOT: CPT

## 2018-10-24 DIAGNOSIS — E78.00 HYPERCHOLESTEROLEMIA: ICD-10-CM

## 2018-10-24 RX ORDER — ATORVASTATIN CALCIUM 80 MG/1
80 TABLET, FILM COATED ORAL DAILY
Qty: 90 TABLET | Refills: 1 | Status: SHIPPED | OUTPATIENT
Start: 2018-10-24 | End: 2019-10-22 | Stop reason: SDUPTHER

## 2018-11-27 ENCOUNTER — OFFICE VISIT (OUTPATIENT)
Dept: INTERNAL MEDICINE CLINIC | Facility: CLINIC | Age: 78
End: 2018-11-27
Payer: COMMERCIAL

## 2018-11-27 VITALS
BODY MASS INDEX: 31.92 KG/M2 | HEIGHT: 70 IN | HEART RATE: 68 BPM | SYSTOLIC BLOOD PRESSURE: 144 MMHG | WEIGHT: 223 LBS | TEMPERATURE: 98.1 F | DIASTOLIC BLOOD PRESSURE: 84 MMHG | RESPIRATION RATE: 24 BRPM

## 2018-11-27 DIAGNOSIS — I25.10 CORONARY ARTERY DISEASE INVOLVING NATIVE CORONARY ARTERY OF NATIVE HEART WITHOUT ANGINA PECTORIS: ICD-10-CM

## 2018-11-27 DIAGNOSIS — Z23 NEED FOR INFLUENZA VACCINATION: ICD-10-CM

## 2018-11-27 DIAGNOSIS — N13.8 BPH WITH OBSTRUCTION/LOWER URINARY TRACT SYMPTOMS: ICD-10-CM

## 2018-11-27 DIAGNOSIS — N40.1 BPH WITH OBSTRUCTION/LOWER URINARY TRACT SYMPTOMS: ICD-10-CM

## 2018-11-27 DIAGNOSIS — I10 ESSENTIAL HYPERTENSION: ICD-10-CM

## 2018-11-27 DIAGNOSIS — G47.33 OSA (OBSTRUCTIVE SLEEP APNEA): ICD-10-CM

## 2018-11-27 DIAGNOSIS — E78.00 HYPERCHOLESTEROLEMIA: ICD-10-CM

## 2018-11-27 DIAGNOSIS — R07.89 CHEST TIGHTNESS: Primary | ICD-10-CM

## 2018-11-27 PROCEDURE — 90662 IIV NO PRSV INCREASED AG IM: CPT

## 2018-11-27 PROCEDURE — 99214 OFFICE O/P EST MOD 30 MIN: CPT | Performed by: INTERNAL MEDICINE

## 2018-11-27 PROCEDURE — 4040F PNEUMOC VAC/ADMIN/RCVD: CPT

## 2018-11-27 PROCEDURE — 3008F BODY MASS INDEX DOCD: CPT | Performed by: INTERNAL MEDICINE

## 2018-11-27 PROCEDURE — 93000 ELECTROCARDIOGRAM COMPLETE: CPT | Performed by: INTERNAL MEDICINE

## 2018-11-27 PROCEDURE — 1036F TOBACCO NON-USER: CPT | Performed by: INTERNAL MEDICINE

## 2018-11-27 PROCEDURE — G0008 ADMIN INFLUENZA VIRUS VAC: HCPCS

## 2018-11-27 PROCEDURE — 1160F RVW MEDS BY RX/DR IN RCRD: CPT | Performed by: INTERNAL MEDICINE

## 2018-11-27 PROCEDURE — 1160F RVW MEDS BY RX/DR IN RCRD: CPT

## 2018-11-27 NOTE — PROGRESS NOTES
[facility] Internal Medicine [place]      NAME: Stephen Henriquez  AGE: 69 y o  SEX: male  : 1940   MRN: [id number]    DATE: 2018  TIME: 5:21 PM    Assessment and Plan   1  Chest tightness  EKG is unremarkable  The patient attributes this symptom to the institution of carvedilol therapy  - POCT ECG  - metoprolol tartrate (LOPRESSOR) 25 mg tablet; Take 1 tablet (25 mg total) by mouth every 12 (twelve) hours  Dispense: 180 tablet; Refill: 0    2  Need for influenza vaccination  - influenza vaccine, [id number], high-dose, PF 0 5 mL, for patients 65 yr+ (FLUZONE HIGH-DOSE)    3  Essential hypertension  Adequately controlled at the moment    4  Coronary artery disease involving native coronary artery of native heart without angina pectoris  Asymptomatic  On aspirin and appropriate risk factor modification  5  JORGE (obstructive sleep apnea)  On CPAP    6  Hypercholesterolemia  On atorvastatin    7  BPH with obstruction/lower urinary tract symptoms  Currently satisfied with his urinary pattern  The patient has been intolerant of carvedilol  He will be switched back to metoprolol  He will monitor his blood pressure carefully  If his blood pressure remains inadequately controlled, metoprolol could be increased or alternately, lisinopril could be increased  Return to office in:   1 month    Chief Complaint     Chief Complaint   Patient presents with    Cough     yellow phlegm, runny nose, chest tightness, no fever       History of Present Illness     The patient came to the office because of chest tightness and lethargy  Several months ago he was switched from metoprolol to carvedilol  He says since then he has not felt well  He has a feeling of tightness or heaviness in his chest   This is constant and not related to any exercise  He is not short of breath  He has had some cough    He has had no fever, sputum, wheezing, etc   He has been monitoring his blood pressure at home and it has been high at times  The following portions of the patient's history were reviewed and updated as appropriate: allergies, current medications, past family history, past medical history, past social history, past surgical history and problem list     Review of Systems   Review of Systems   Constitutional: Negative  HENT: Negative for congestion, ear pain, postnasal drip, rhinorrhea, sore throat and trouble swallowing  Eyes: Negative for pain, discharge, redness and visual disturbance  Respiratory: Positive for cough and chest tightness  Negative for shortness of breath and wheezing  Cardiovascular: Negative  Gastrointestinal: Negative  Endocrine: Negative  Genitourinary: Negative for difficulty urinating, dysuria, frequency, hematuria and urgency  Musculoskeletal: Negative for arthralgias, gait problem, joint swelling and myalgias  Skin: Negative for rash  Neurological: Negative for dizziness, speech difficulty, weakness, light-headedness, numbness and headaches  Hematological: Negative  Psychiatric/Behavioral: Negative for confusion, decreased concentration, dysphoric mood and sleep disturbance  The patient is not nervous/anxious  Active Problem List     Patient Active Problem List   Diagnosis    CAD (coronary artery disease)    Hypercholesterolemia    Essential hypertension    Impaired fasting glucose    Malignant melanoma of neck (HCC)    JORGE (obstructive sleep apnea)    Bradycardia    Depression    Elevated prostate specific antigen (PSA)    BPH with obstruction/lower urinary tract symptoms    PLMD (periodic limb movement disorder)    Obesity (BMI 30-39  9)       Objective   /84 (BP Location: Left arm, Patient Position: Sitting, Cuff Size: Standard)   Pulse 68 Comment: irregular  Temp 98 1 °F (36 7 °C) (Tympanic)   Resp (!) 24   Ht 5' 10" (1 778 m)   Wt 101 kg (223 lb)   BMI 32 00 kg/m²     Physical Exam   Constitutional: He is oriented to person, place, and time  He appears well-developed and well-nourished  No distress  HENT:   Head: Normocephalic and atraumatic  Neck: Neck supple  No JVD present  No tracheal deviation present  No thyromegaly present  Cardiovascular: Normal rate, regular rhythm, normal heart sounds and intact distal pulses  Exam reveals no gallop and no friction rub  No murmur heard  Pulmonary/Chest: Effort normal and breath sounds normal  He has no wheezes  He has no rales  He exhibits no tenderness  Abdominal: Soft  Bowel sounds are normal  He exhibits no distension and no mass  There is no tenderness  There is no rebound  Musculoskeletal: Normal range of motion  He exhibits no edema or tenderness  Lymphadenopathy:     He has no cervical adenopathy  Neurological: He is alert and oriented to person, place, and time  Skin: Skin is warm and dry  Psychiatric: He has a normal mood and affect  His behavior is normal  Judgment and thought content normal        Pertinent Laboratory/Diagnostic Studies:  No visits with results within 3 Month(s) from this visit     Latest known visit with results is:   Orders Only on 08/22/2018   Component Date Value Ref Range Status    Hemoglobin A1C 08/21/2018 5 8   Final           Current Medications     Current Outpatient Prescriptions:     acetaminophen (TYLENOL) 500 mg tablet, Take 500 mg by mouth every 6 (six) hours as needed for mild pain, Disp: , Rfl:     albuterol (PROVENTIL HFA,VENTOLIN HFA) 90 mcg/act inhaler, Inhale 1 puff every 6 (six) hours as needed for wheezing, Disp: , Rfl:     aspirin 81 MG tablet, Take 1 tablet by mouth daily, Disp: , Rfl:     atorvastatin (LIPITOR) 80 mg tablet, Take 1 tablet (80 mg total) by mouth daily, Disp: 90 tablet, Rfl: 1    lisinopril (ZESTRIL) 20 mg tablet, Take 20 mg by mouth 2 (two) times a day  , Disp: , Rfl:     metoprolol tartrate (LOPRESSOR) 25 mg tablet, Take 1 tablet (25 mg total) by mouth every 12 (twelve) hours, Disp: 180 tablet, Rfl: 0    nitroglycerin (NITROSTAT) 0 4 mg SL tablet, Place 1 tablet (0 4 mg total) under the tongue every 5 (five) minutes as needed for chest pain, Disp: 90 tablet, Rfl: 0      Josie Sandy MD

## 2018-11-29 ENCOUNTER — TELEPHONE (OUTPATIENT)
Dept: INTERNAL MEDICINE CLINIC | Facility: CLINIC | Age: 78
End: 2018-11-29

## 2018-12-18 ENCOUNTER — APPOINTMENT (OUTPATIENT)
Dept: LAB | Age: 78
End: 2018-12-18
Payer: COMMERCIAL

## 2018-12-18 DIAGNOSIS — R73.01 IMPAIRED FASTING GLUCOSE: ICD-10-CM

## 2018-12-18 DIAGNOSIS — E78.00 HYPERCHOLESTEROLEMIA: ICD-10-CM

## 2018-12-18 DIAGNOSIS — I25.10 CORONARY ARTERY DISEASE INVOLVING NATIVE CORONARY ARTERY OF NATIVE HEART WITHOUT ANGINA PECTORIS: ICD-10-CM

## 2018-12-18 LAB
ALBUMIN SERPL BCP-MCNC: 3.5 G/DL (ref 3.5–5)
ALP SERPL-CCNC: 132 U/L (ref 46–116)
ALT SERPL W P-5'-P-CCNC: 31 U/L (ref 12–78)
ANION GAP SERPL CALCULATED.3IONS-SCNC: 3 MMOL/L (ref 4–13)
AST SERPL W P-5'-P-CCNC: 26 U/L (ref 5–45)
BILIRUB SERPL-MCNC: 1.17 MG/DL (ref 0.2–1)
BUN SERPL-MCNC: 18 MG/DL (ref 5–25)
CALCIUM SERPL-MCNC: 9.1 MG/DL (ref 8.3–10.1)
CHLORIDE SERPL-SCNC: 104 MMOL/L (ref 100–108)
CHOLEST SERPL-MCNC: 148 MG/DL (ref 50–200)
CO2 SERPL-SCNC: 30 MMOL/L (ref 21–32)
CREAT SERPL-MCNC: 1.2 MG/DL (ref 0.6–1.3)
ERYTHROCYTE [DISTWIDTH] IN BLOOD BY AUTOMATED COUNT: 13.9 % (ref 11.6–15.1)
EST. AVERAGE GLUCOSE BLD GHB EST-MCNC: 140 MG/DL
GFR SERPL CREATININE-BSD FRML MDRD: 58 ML/MIN/1.73SQ M
GLUCOSE P FAST SERPL-MCNC: 94 MG/DL (ref 65–99)
HBA1C MFR BLD: 6.5 % (ref 4.2–6.3)
HCT VFR BLD AUTO: 41.1 % (ref 36.5–49.3)
HDLC SERPL-MCNC: 50 MG/DL (ref 40–60)
HGB BLD-MCNC: 13.3 G/DL (ref 12–17)
LDLC SERPL CALC-MCNC: 73 MG/DL (ref 0–100)
MCH RBC QN AUTO: 29 PG (ref 26.8–34.3)
MCHC RBC AUTO-ENTMCNC: 32.4 G/DL (ref 31.4–37.4)
MCV RBC AUTO: 90 FL (ref 82–98)
NONHDLC SERPL-MCNC: 98 MG/DL
PLATELET # BLD AUTO: 161 THOUSANDS/UL (ref 149–390)
PMV BLD AUTO: 11.4 FL (ref 8.9–12.7)
POTASSIUM SERPL-SCNC: 4.4 MMOL/L (ref 3.5–5.3)
PROT SERPL-MCNC: 7.2 G/DL (ref 6.4–8.2)
RBC # BLD AUTO: 4.59 MILLION/UL (ref 3.88–5.62)
SODIUM SERPL-SCNC: 137 MMOL/L (ref 136–145)
TRIGL SERPL-MCNC: 123 MG/DL
WBC # BLD AUTO: 6.74 THOUSAND/UL (ref 4.31–10.16)

## 2018-12-18 PROCEDURE — 36415 COLL VENOUS BLD VENIPUNCTURE: CPT | Performed by: INTERNAL MEDICINE

## 2018-12-18 PROCEDURE — 83036 HEMOGLOBIN GLYCOSYLATED A1C: CPT

## 2018-12-18 PROCEDURE — 80061 LIPID PANEL: CPT | Performed by: INTERNAL MEDICINE

## 2018-12-18 PROCEDURE — 80053 COMPREHEN METABOLIC PANEL: CPT | Performed by: INTERNAL MEDICINE

## 2018-12-18 PROCEDURE — 85027 COMPLETE CBC AUTOMATED: CPT | Performed by: INTERNAL MEDICINE

## 2018-12-26 ENCOUNTER — OFFICE VISIT (OUTPATIENT)
Dept: INTERNAL MEDICINE CLINIC | Facility: CLINIC | Age: 78
End: 2018-12-26
Payer: COMMERCIAL

## 2018-12-26 VITALS
HEIGHT: 70 IN | WEIGHT: 222 LBS | HEART RATE: 60 BPM | SYSTOLIC BLOOD PRESSURE: 134 MMHG | TEMPERATURE: 98.9 F | DIASTOLIC BLOOD PRESSURE: 82 MMHG | BODY MASS INDEX: 31.78 KG/M2 | RESPIRATION RATE: 20 BRPM

## 2018-12-26 DIAGNOSIS — N13.8 BPH WITH OBSTRUCTION/LOWER URINARY TRACT SYMPTOMS: ICD-10-CM

## 2018-12-26 DIAGNOSIS — I10 ESSENTIAL HYPERTENSION: ICD-10-CM

## 2018-12-26 DIAGNOSIS — R73.01 IMPAIRED FASTING GLUCOSE: ICD-10-CM

## 2018-12-26 DIAGNOSIS — E78.00 HYPERCHOLESTEROLEMIA: ICD-10-CM

## 2018-12-26 DIAGNOSIS — G47.33 OSA (OBSTRUCTIVE SLEEP APNEA): ICD-10-CM

## 2018-12-26 DIAGNOSIS — I25.10 CORONARY ARTERY DISEASE INVOLVING NATIVE CORONARY ARTERY OF NATIVE HEART WITHOUT ANGINA PECTORIS: Primary | ICD-10-CM

## 2018-12-26 DIAGNOSIS — N40.1 BPH WITH OBSTRUCTION/LOWER URINARY TRACT SYMPTOMS: ICD-10-CM

## 2018-12-26 PROCEDURE — 1160F RVW MEDS BY RX/DR IN RCRD: CPT | Performed by: INTERNAL MEDICINE

## 2018-12-26 PROCEDURE — 3079F DIAST BP 80-89 MM HG: CPT | Performed by: INTERNAL MEDICINE

## 2018-12-26 PROCEDURE — 99214 OFFICE O/P EST MOD 30 MIN: CPT | Performed by: INTERNAL MEDICINE

## 2018-12-26 PROCEDURE — 1036F TOBACCO NON-USER: CPT | Performed by: INTERNAL MEDICINE

## 2018-12-26 PROCEDURE — 3075F SYST BP GE 130 - 139MM HG: CPT | Performed by: INTERNAL MEDICINE

## 2018-12-27 NOTE — PROGRESS NOTES
[facility] [place]      NAME: Sonia Sylvester  AGE: 69 y o  SEX: male  : 1940   MRN: [id number]    DATE: 2018  TIME: 3:58 PM    Assessment and Plan   1  Coronary artery disease involving native coronary artery of native heart without angina pectoris  The patient is having no angina  He is on aspirin and appropriate risk factor modification  2  Essential hypertension  Well controlled  3  Hypercholesterolemia  Well controlled on atorvastatin  4  BPH with obstruction/lower urinary tract symptoms  The patient's symptoms are currently mild  No specific intervention is needed at present  5  JORGE (obstructive sleep apnea)  On CPAP  6  Impaired fasting glucose  Stable on diet  Overall, the patient is doing well  His blood pressure and lipids are good  He has no angina  He will continue his current medications  Return to office in:   3 months    Chief Complaint     Chief Complaint   Patient presents with    Follow-up     3 months       History of Present Illness     The patient returned to the office for re-evaluation of hypertension, hypercholesterolemia, coronary artery disease, and obstructive sleep apnea  Since his last visit he has been feeling pretty well  He denies chest pain, shortness of breath, palpitations, or dizziness  He is tolerating his medications well  The following portions of the patient's history were reviewed and updated as appropriate: allergies, current medications, past family history, past medical history, past social history, past surgical history and problem list     Review of Systems   Review of Systems   Constitutional: Negative  HENT: Negative for congestion, ear pain, postnasal drip, rhinorrhea, sore throat and trouble swallowing  Eyes: Negative for pain, discharge, redness and visual disturbance  Respiratory: Negative for cough, shortness of breath and wheezing  Cardiovascular: Negative      Gastrointestinal: Negative  Endocrine: Negative  Genitourinary: Negative for difficulty urinating, dysuria, frequency, hematuria and urgency  Musculoskeletal: Negative for arthralgias, gait problem, joint swelling and myalgias  Skin: Negative for rash  Neurological: Negative for dizziness, speech difficulty, weakness, light-headedness, numbness and headaches  Hematological: Negative  Psychiatric/Behavioral: Negative for confusion, decreased concentration, dysphoric mood and sleep disturbance  The patient is not nervous/anxious  Active Problem List     Patient Active Problem List   Diagnosis    CAD (coronary artery disease)    Hypercholesterolemia    Essential hypertension    Impaired fasting glucose    Malignant melanoma of neck (HCC)    JORGE (obstructive sleep apnea)    Bradycardia    Depression    Elevated prostate specific antigen (PSA)    BPH with obstruction/lower urinary tract symptoms    PLMD (periodic limb movement disorder)    Obesity (BMI 30-39  9)       Objective   /82 (BP Location: Left arm, Patient Position: Sitting, Cuff Size: Standard)   Pulse 60   Temp 98 9 °F (37 2 °C) (Tympanic)   Resp 20   Ht 5' 10" (1 778 m)   Wt 101 kg (222 lb)   BMI 31 85 kg/m²     Physical Exam   Constitutional: He is oriented to person, place, and time  He appears well-developed and well-nourished  No distress  HENT:   Head: Normocephalic and atraumatic  Neck: Neck supple  No JVD present  No tracheal deviation present  No thyromegaly present  Cardiovascular: Normal rate, regular rhythm, normal heart sounds and intact distal pulses  Exam reveals no gallop and no friction rub  No murmur heard  Pulmonary/Chest: Effort normal and breath sounds normal  He has no wheezes  He has no rales  He exhibits no tenderness  Abdominal: Soft  Bowel sounds are normal  He exhibits no distension and no mass  There is no tenderness  There is no rebound  Musculoskeletal: Normal range of motion   He exhibits no edema or tenderness  Lymphadenopathy:     He has no cervical adenopathy  Neurological: He is alert and oriented to person, place, and time  Skin: Skin is warm and dry  Psychiatric: He has a normal mood and affect   His behavior is normal  Judgment and thought content normal        Pertinent Laboratory/Diagnostic Studies:  Appointment on 12/18/2018   Component Date Value Ref Range Status    Hemoglobin A1C 12/18/2018 6 5* 4 2 - 6 3 % Final    EAG 12/18/2018 140  mg/dl Final           Current Medications     Current Outpatient Prescriptions:     acetaminophen (TYLENOL) 500 mg tablet, Take 500 mg by mouth every 6 (six) hours as needed for mild pain, Disp: , Rfl:     albuterol (PROVENTIL HFA,VENTOLIN HFA) 90 mcg/act inhaler, Inhale 1 puff every 6 (six) hours as needed for wheezing, Disp: , Rfl:     aspirin 81 MG tablet, Take 1 tablet by mouth daily, Disp: , Rfl:     atorvastatin (LIPITOR) 80 mg tablet, Take 1 tablet (80 mg total) by mouth daily, Disp: 90 tablet, Rfl: 1    lisinopril (ZESTRIL) 20 mg tablet, Take 20 mg by mouth 2 (two) times a day  , Disp: , Rfl:     metoprolol tartrate (LOPRESSOR) 25 mg tablet, Take 1 tablet (25 mg total) by mouth every 12 (twelve) hours, Disp: 180 tablet, Rfl: 0    nitroglycerin (NITROSTAT) 0 4 mg SL tablet, Place 1 tablet (0 4 mg total) under the tongue every 5 (five) minutes as needed for chest pain, Disp: 90 tablet, Rfl: 0      Adolph Vasquez MD

## 2019-01-22 DIAGNOSIS — I10 ESSENTIAL HYPERTENSION: Primary | ICD-10-CM

## 2019-01-22 RX ORDER — LISINOPRIL 20 MG/1
20 TABLET ORAL 2 TIMES DAILY
Qty: 180 TABLET | Refills: 0 | Status: SHIPPED | OUTPATIENT
Start: 2019-01-22 | End: 2020-01-22 | Stop reason: SDUPTHER

## 2019-02-22 DIAGNOSIS — R07.89 CHEST TIGHTNESS: ICD-10-CM

## 2019-03-05 DIAGNOSIS — J98.01 BRONCHOSPASM: Primary | ICD-10-CM

## 2019-03-05 DIAGNOSIS — I25.10 CORONARY ARTERY DISEASE INVOLVING NATIVE HEART WITHOUT ANGINA PECTORIS, UNSPECIFIED VESSEL OR LESION TYPE: ICD-10-CM

## 2019-03-06 PROBLEM — J98.01 BRONCHOSPASM: Status: ACTIVE | Noted: 2019-03-06

## 2019-03-06 RX ORDER — ALBUTEROL SULFATE 90 UG/1
1 AEROSOL, METERED RESPIRATORY (INHALATION) EVERY 6 HOURS PRN
Qty: 1 INHALER | Refills: 4 | Status: SHIPPED | OUTPATIENT
Start: 2019-03-06 | End: 2020-07-13 | Stop reason: SDUPTHER

## 2019-03-06 RX ORDER — NITROGLYCERIN 0.4 MG/1
0.4 TABLET SUBLINGUAL
Qty: 90 TABLET | Refills: 1 | Status: SHIPPED | OUTPATIENT
Start: 2019-03-06 | End: 2020-06-09 | Stop reason: SDUPTHER

## 2019-03-21 RX ORDER — AMLODIPINE BESYLATE 5 MG/1
5 TABLET ORAL DAILY
Refills: 0 | COMMUNITY
Start: 2019-01-28 | End: 2020-01-28 | Stop reason: SDUPTHER

## 2019-03-26 ENCOUNTER — OFFICE VISIT (OUTPATIENT)
Dept: INTERNAL MEDICINE CLINIC | Facility: CLINIC | Age: 79
End: 2019-03-26
Payer: COMMERCIAL

## 2019-03-26 VITALS
TEMPERATURE: 98.4 F | WEIGHT: 222 LBS | RESPIRATION RATE: 16 BRPM | DIASTOLIC BLOOD PRESSURE: 78 MMHG | BODY MASS INDEX: 31.78 KG/M2 | HEART RATE: 50 BPM | HEIGHT: 70 IN | SYSTOLIC BLOOD PRESSURE: 118 MMHG

## 2019-03-26 DIAGNOSIS — I10 ESSENTIAL HYPERTENSION: Primary | ICD-10-CM

## 2019-03-26 DIAGNOSIS — I25.10 CORONARY ARTERY DISEASE INVOLVING NATIVE CORONARY ARTERY OF NATIVE HEART WITHOUT ANGINA PECTORIS: ICD-10-CM

## 2019-03-26 DIAGNOSIS — R73.01 IMPAIRED FASTING GLUCOSE: ICD-10-CM

## 2019-03-26 DIAGNOSIS — E66.9 OBESITY (BMI 30-39.9): ICD-10-CM

## 2019-03-26 DIAGNOSIS — E78.00 HYPERCHOLESTEROLEMIA: ICD-10-CM

## 2019-03-26 DIAGNOSIS — G47.33 OSA (OBSTRUCTIVE SLEEP APNEA): ICD-10-CM

## 2019-03-26 PROCEDURE — 99214 OFFICE O/P EST MOD 30 MIN: CPT | Performed by: INTERNAL MEDICINE

## 2019-03-26 PROCEDURE — 3078F DIAST BP <80 MM HG: CPT | Performed by: INTERNAL MEDICINE

## 2019-03-26 PROCEDURE — 3074F SYST BP LT 130 MM HG: CPT | Performed by: INTERNAL MEDICINE

## 2019-03-26 PROCEDURE — 1160F RVW MEDS BY RX/DR IN RCRD: CPT | Performed by: INTERNAL MEDICINE

## 2019-03-26 PROCEDURE — 1125F AMNT PAIN NOTED PAIN PRSNT: CPT | Performed by: INTERNAL MEDICINE

## 2019-03-26 PROCEDURE — 1170F FXNL STATUS ASSESSED: CPT | Performed by: INTERNAL MEDICINE

## 2019-03-26 PROCEDURE — G0439 PPPS, SUBSEQ VISIT: HCPCS | Performed by: INTERNAL MEDICINE

## 2019-03-26 NOTE — PROGRESS NOTES
Assessment and Plan:    Problem List Items Addressed This Visit     None        Health Maintenance Due   Topic Date Due    Medicare Annual Wellness Visit (AWV)  1940    SLP PLAN OF CARE  1940    BMI: Followup Plan  05/28/1959     The patient is doing well  He is maintaining a healthy lifestyle  He neither smokes, drinks, nor uses illicit drugs  He is watching his diet pretty well  I did suggest that he try to lose some weight  He exercises regularly  He has no symptoms of psychiatric or cognitive dysfunction  He has not fallen  His home environment seem safe  He is up-to-date with his health screening tests  He is up-to-date with pneumococcal, influenza, and tetanus vaccines  We discussed herpes zoster vaccination  I discussed advance care planning with the patient  He does have a living will  He anticipates that his wife or daughter would be his surrogate decision maker  He does not have an advanced directive  I reviewed with him the nature of this document  HPI:  Juany Harrell is a 69 y o  male here for his Subsequent Wellness Visit  Patient Active Problem List   Diagnosis    CAD (coronary artery disease)    Hypercholesterolemia    Essential hypertension    Impaired fasting glucose    Malignant melanoma of neck (HCC)    JORGE (obstructive sleep apnea)    Bradycardia    Depression    Elevated prostate specific antigen (PSA)    BPH with obstruction/lower urinary tract symptoms    PLMD (periodic limb movement disorder)    Obesity (BMI 30-39  9)    Bronchospasm     Past Medical History:   Diagnosis Date    AAA (abdominal aortic aneurysm) without rupture (HCC)     has annual US to check    Angina pectoris ([address] )     chronic stable angina, but it is mild and tolerates walking dogs and walking up flight os stairs without symptoms  Using Nitro no more than 2 times per mt      Ankle pain, left     Bilateral wrist pain     BPH with obstruction/lower urinary tract symptoms  Coronary artery disease     CPAP (continuous positive airway pressure) dependence     Elevated PSA     History of colon polyps     [place] (hard of hearing)     Slight    Hyperlipidemia     Hypertension     Ischemic cardiomyopathy     with apical aneurysm    Melanoma ([facility] [address] ) 12/2016    Right side of neck    Myocardial infarction ([facility] [address] ) 1996    x1    Pneumonia     x1 as a baby    Sleep apnea     Wears glasses      Past Surgical History:   Procedure Laterality Date    CARDIAC SURGERY      CABG x6    CATARACT EXTRACTION Bilateral     COLONOSCOPY      CORONARY ANGIOPLASTY WITH STENT PLACEMENT      2 stents    CORONARY ARTERY BYPASS GRAFT      PROSTATE BIOPSY  2000,2001    SKIN BIOPSY      Melanoma - surgical removal    SKIN LESION EXCISION Right 2/6/2017    Procedure: WIDE EXCISION MELANOMA SCAR LATERAL NECK;  Surgeon: Mark Cramer MD;  Location: [facility];  Service:      Family History   Problem Relation Age of Onset    Hypertension Mother     Breast cancer Mother      Social History     Tobacco Use   Smoking Status Never Smoker   Smokeless Tobacco Never Used     Social History     Substance and Sexual Activity   Alcohol Use Yes    Comment: 1-2 beers rarely      Social History     Substance and Sexual Activity   Drug Use No       Current Outpatient Medications   Medication Sig Dispense Refill    acetaminophen (TYLENOL) 500 mg tablet Take 500 mg by mouth every 6 (six) hours as needed for mild pain      albuterol (PROVENTIL HFA,VENTOLIN HFA) 90 mcg/act inhaler Inhale 1 puff every 6 (six) hours as needed for wheezing 1 Inhaler 4    amLODIPine (NORVASC) 5 mg tablet Take 5 mg by mouth daily  0    aspirin 81 MG tablet Take 1 tablet by mouth daily      atorvastatin (LIPITOR) 80 mg tablet Take 1 tablet (80 mg total) by mouth daily 90 tablet 1    lisinopril (ZESTRIL) 20 mg tablet Take 1 tablet (20 mg total) by mouth 2 (two) times a day 180 tablet 0    metoprolol tartrate (LOPRESSOR) 25 mg tablet Take 1 tablet (25 mg total) by mouth every 12 (twelve) hours 180 tablet 1    nitroglycerin (NITROSTAT) 0 4 mg SL tablet Place 1 tablet (0 4 mg total) under the tongue every 5 (five) minutes as needed for chest pain 90 tablet 1     No current facility-administered medications for this visit  Allergies   Allergen Reactions    Abciximab Other (See Comments)     rcd 9/27/01 & 5/16/03    Escitalopram Rash     Immunization History   Administered Date(s) Administered    INFLUENZA 11/03/2015, 09/26/2016, 09/28/2016, 11/14/2017, 11/27/2018    Influenza Quadrivalent Preservative Free 3 years and older IM 10/14/2014    Influenza Quadrivalent, 6-35 Months IM 11/03/2015    Influenza Split High Dose Preservative Free IM 09/26/2016, 11/14/2017    Influenza TIV (IM) 09/27/2011, 10/01/2012, 12/02/2013    Influenza, high dose seasonal 0 5 mL 11/27/2018    Pneumococcal Conjugate 13-Valent 07/28/2015    Pneumococcal Polysaccharide PPV23 09/20/2006, 01/03/2017    Tdap 01/03/2017       Patient Care Team:  Rolanda Oneil MD as PCP - DO Rolanda Mooney MD Boby Lites, MD    Medicare Screening Tests and Risk Assessments:  Ben Morrissey is here for his Subsequent Wellness visit  Health Risk Assessment:  Patient rates overall health as good  Patient feels that their physical health rating is Same  Eyesight was rated as Same  Hearing was rated as Same  Patient feels that their emotional and mental health rating is Same  Pain experienced by patient in the last 7 days has been Some  Patient's pain rating has been 5/10  Patient states that he has experienced no weight loss or gain in last 6 months  Emotional/Mental Health:  Patient has been feeling nervous/anxious  PHQ-9 Depression Screening:    Frequency of the following problems over the past two weeks:      1  Little interest or pleasure in doing things: 0 - not at all      2   Feeling down, depressed, or hopeless: 0 - not at all  PHQ-2 Score: 0          Broken Bones/Falls: Fall Risk Assessment:    In the past year, patient has experienced: No history of falling in past year          Bladder/Bowel:  Patient has not leaked urine accidently in the last six months  Patient reports no loss of bowel control  Immunizations:  Patient has had a flu vaccination within the last year  Patient has received a pneumonia shot  Patient has not received a shingles shot  Patient has received tetanus/diphtheria shot  Date of tetanus/diphtheria shot: 1/3/2017    Home Safety:  Patient does not have trouble with stairs inside or outside of their home  Patient currently reports that there are no safety hazards present in home, working smoke alarms, working carbon monoxide detectors  Preventative Screenings:   prostate cancer screen performed, colon cancer screen completed, cholesterol screen completed, glaucoma eye exam completed, 5/8/2018      Nutrition:  Current diet: Regular, Low Cholesterol, Low Saturated Fat, Limited junk food and No Added Salt with servings of the following:    Medications:  Patient is currently taking over-the-counter supplements  List of OTC medications includes: tylenol & ASA  Patient is able to manage medications  Lifestyle Choices:  Patient reports no tobacco use  Patient has not smoked or used tobacco in the past   Patient reports no alcohol use  Patient drives a vehicle  Patient wears seat belt  Activities of Daily Living:  Can get out of bed by his or her self, able to dress self, able to make own meals, able to do own shopping, able to bathe self, can do own laundry/housekeeping, can manage own money, pay bills and track expenses    Previous Hospitalizations:  No hospitalization or ED visit in past 12 months        Advanced Directives:  Patient has decided on a power of   Patient has spoken to designated power of   Patient has completed advanced directive          Preventative Screening/Counseling:      Cardiovascular:      General: Screening Current          Diabetes:      General: Screening Current          Colorectal Cancer:      General: Screening Current          Prostate Cancer:      General: Screening Current          Osteoporosis:      General: Screening Not Indicated          AAA:      General: Screening Not Indicated          Glaucoma:      General: Screening Current          HIV:      General: Screening Not Indicated          Hepatitis C:      General: Screening Not Indicated        Advanced Directives:   Patient has living will for healthcare, has durable POA for healthcare, patient does not have an advanced directive  Information on ACP and/or AD provided  End of life assessment reviewed with patient       Immunizations:      Influenza: Influenza UTD This Year      Pneumococcal: Lifetime Vaccine Completed      Shingrix: Risks & Benefits Discussed      Hepatitis B (Low risk patients): Series Not Indicated      TDAP: Tdap Vaccine UTD

## 2019-03-27 NOTE — PROGRESS NOTES
[facility] [place]      NAME: Juliet Patel  AGE: 69 y o  SEX: male  : 1940   MRN: [id number]    DATE: 3/27/2019  TIME: 12:11 PM    Assessment and Plan   1  Essential hypertension  Well controlled  - CBC; Future  - Comprehensive metabolic panel; Future    2  Coronary artery disease involving native coronary artery of native heart without angina pectoris  No angina  On aspirin and appropriate risk factor modification  3  JORGE (obstructive sleep apnea)  On CPAP  4  Impaired fasting glucose  Stable on diet  5  Hypercholesterolemia  Well controlled on atorvastatin  - Lipid panel; Future    6  Obesity (BMI 30-39  9)  BMI Counseling: Body mass index is 31 85 kg/m²  Discussed the patient's BMI with him  The BMI is above average  BMI counseling and education was provided to the patient  Nutrition recommendations include reducing portion sizes and decreasing overall calorie intake  Exercise recommendations include moderate aerobic physical activity for 150 minutes/week  The patient is doing generally well  His blood pressure, lipids, and glucose have been adequately controlled  He has no angina  He will continue his current medications  Return to office in:  3 months    Chief Complaint     Chief Complaint   Patient presents with    Medicare Wellness Visit    Follow-up     3 months       History of Present Illness     The patient returned to the office for re-evaluation of hypertension, hypercholesterolemia, impaired fasting glucose, coronary artery disease, and obstructive sleep apnea  Since his last visit he has been feeling pretty well  He denies chest pain, shortness of breath, palpitations, or dizziness  He has been tolerating his medications well  He is using CPAP with good effect      The following portions of the patient's history were reviewed and updated as appropriate: allergies, current medications, past family history, past medical history, past social history, past surgical history and problem list     Review of Systems   Review of Systems   Constitutional: Negative  HENT: Negative for congestion, ear pain, postnasal drip, rhinorrhea, sore throat and trouble swallowing  Eyes: Negative for pain, discharge, redness and visual disturbance  Respiratory: Negative for cough, shortness of breath and wheezing  Cardiovascular: Negative  Gastrointestinal: Negative  Endocrine: Negative  Genitourinary: Negative for difficulty urinating, dysuria, frequency, hematuria and urgency  Musculoskeletal: Negative for arthralgias, gait problem, joint swelling and myalgias  Skin: Negative for rash  Neurological: Negative for dizziness, speech difficulty, weakness, light-headedness, numbness and headaches  Hematological: Negative  Psychiatric/Behavioral: Negative for confusion, decreased concentration, dysphoric mood and sleep disturbance  The patient is not nervous/anxious  Active Problem List     Patient Active Problem List   Diagnosis    CAD (coronary artery disease)    Hypercholesterolemia    Essential hypertension    Impaired fasting glucose    Malignant melanoma of neck (HCC)    JORGE (obstructive sleep apnea)    Bradycardia    Depression    Elevated prostate specific antigen (PSA)    BPH with obstruction/lower urinary tract symptoms    PLMD (periodic limb movement disorder)    Obesity (BMI 30-39  9)    Bronchospasm       Objective   /78 (BP Location: Left arm, Patient Position: Sitting, Cuff Size: Standard)   Pulse (!) 50   Temp 98 4 °F (36 9 °C) (Tympanic)   Resp 16   Ht 5' 10" (1 778 m)   Wt 101 kg (222 lb)   BMI 31 85 kg/m²     Physical Exam   Constitutional: He is oriented to person, place, and time  He appears well-developed and well-nourished  No distress  HENT:   Head: Normocephalic and atraumatic  Neck: Neck supple  No JVD present  No tracheal deviation present  No thyromegaly present     Cardiovascular: Normal rate, regular rhythm, normal heart sounds and intact distal pulses  Exam reveals no gallop and no friction rub  No murmur heard  Pulmonary/Chest: Effort normal and breath sounds normal  He has no wheezes  He has no rales  He exhibits no tenderness  Abdominal: Soft  Bowel sounds are normal  He exhibits no distension and no mass  There is no tenderness  There is no rebound  Musculoskeletal: Normal range of motion  He exhibits no edema or tenderness  Lymphadenopathy:     He has no cervical adenopathy  Neurological: He is alert and oriented to person, place, and time  Skin: Skin is warm and dry  Psychiatric: He has a normal mood and affect  His behavior is normal  Judgment and thought content normal        Pertinent Laboratory/Diagnostic Studies:  No visits with results within 3 Month(s) from this visit     Latest known visit with results is:   Appointment on 12/18/2018   Component Date Value Ref Range Status    Hemoglobin A1C 12/18/2018 6 5* 4 2 - 6 3 % Final    EAG 12/18/2018 140  mg/dl Final           Current Medications     Current Outpatient Medications:     acetaminophen (TYLENOL) 500 mg tablet, Take 500 mg by mouth every 6 (six) hours as needed for mild pain, Disp: , Rfl:     albuterol (PROVENTIL HFA,VENTOLIN HFA) 90 mcg/act inhaler, Inhale 1 puff every 6 (six) hours as needed for wheezing, Disp: 1 Inhaler, Rfl: 4    amLODIPine (NORVASC) 5 mg tablet, Take 5 mg by mouth daily, Disp: , Rfl: 0    aspirin 81 MG tablet, Take 1 tablet by mouth daily, Disp: , Rfl:     atorvastatin (LIPITOR) 80 mg tablet, Take 1 tablet (80 mg total) by mouth daily, Disp: 90 tablet, Rfl: 1    lisinopril (ZESTRIL) 20 mg tablet, Take 1 tablet (20 mg total) by mouth 2 (two) times a day, Disp: 180 tablet, Rfl: 0    metoprolol tartrate (LOPRESSOR) 25 mg tablet, Take 1 tablet (25 mg total) by mouth every 12 (twelve) hours, Disp: 180 tablet, Rfl: 1    nitroglycerin (NITROSTAT) 0 4 mg SL tablet, Place 1 tablet (0 4 mg total) under the tongue every 5 (five) minutes as needed for chest pain, Disp: 90 tablet, Rfl: 1      Maia Georges MD

## 2019-04-11 ENCOUNTER — TELEPHONE (OUTPATIENT)
Dept: UROLOGY | Facility: MEDICAL CENTER | Age: 79
End: 2019-04-11

## 2019-04-11 DIAGNOSIS — N13.8 BPH WITH OBSTRUCTION/LOWER URINARY TRACT SYMPTOMS: Primary | ICD-10-CM

## 2019-04-11 DIAGNOSIS — N40.1 BPH WITH OBSTRUCTION/LOWER URINARY TRACT SYMPTOMS: Primary | ICD-10-CM

## 2019-05-24 ENCOUNTER — APPOINTMENT (OUTPATIENT)
Dept: LAB | Age: 79
End: 2019-05-24
Payer: COMMERCIAL

## 2019-05-24 DIAGNOSIS — N13.8 BPH WITH OBSTRUCTION/LOWER URINARY TRACT SYMPTOMS: ICD-10-CM

## 2019-05-24 DIAGNOSIS — N40.1 BPH WITH OBSTRUCTION/LOWER URINARY TRACT SYMPTOMS: ICD-10-CM

## 2019-05-24 LAB — PSA SERPL-MCNC: 4.6 NG/ML (ref 0–4)

## 2019-05-24 PROCEDURE — G0103 PSA SCREENING: HCPCS

## 2019-05-24 PROCEDURE — 36415 COLL VENOUS BLD VENIPUNCTURE: CPT

## 2019-06-05 ENCOUNTER — OFFICE VISIT (OUTPATIENT)
Dept: UROLOGY | Facility: MEDICAL CENTER | Age: 79
End: 2019-06-05
Payer: COMMERCIAL

## 2019-06-05 VITALS
HEART RATE: 56 BPM | BODY MASS INDEX: 31.7 KG/M2 | WEIGHT: 221.4 LBS | DIASTOLIC BLOOD PRESSURE: 76 MMHG | HEIGHT: 70 IN | SYSTOLIC BLOOD PRESSURE: 118 MMHG

## 2019-06-05 DIAGNOSIS — N40.1 BPH WITH OBSTRUCTION/LOWER URINARY TRACT SYMPTOMS: Primary | ICD-10-CM

## 2019-06-05 DIAGNOSIS — N13.8 BPH WITH OBSTRUCTION/LOWER URINARY TRACT SYMPTOMS: Primary | ICD-10-CM

## 2019-06-05 DIAGNOSIS — R97.20 ELEVATED PSA: ICD-10-CM

## 2019-06-05 LAB
SL AMB  POCT GLUCOSE, UA: NORMAL
SL AMB LEUKOCYTE ESTERASE,UA: NORMAL
SL AMB POCT BILIRUBIN,UA: NORMAL
SL AMB POCT BLOOD,UA: NORMAL
SL AMB POCT CLARITY,UA: CLEAR
SL AMB POCT COLOR,UA: NORMAL
SL AMB POCT KETONES,UA: NORMAL
SL AMB POCT NITRITE,UA: NORMAL
SL AMB POCT PH,UA: 6.5
SL AMB POCT SPECIFIC GRAVITY,UA: 1.01
SL AMB POCT URINE PROTEIN: NORMAL
SL AMB POCT UROBILINOGEN: 1

## 2019-06-05 PROCEDURE — 81003 URINALYSIS AUTO W/O SCOPE: CPT | Performed by: UROLOGY

## 2019-06-05 PROCEDURE — 99214 OFFICE O/P EST MOD 30 MIN: CPT | Performed by: UROLOGY

## 2019-06-28 ENCOUNTER — APPOINTMENT (OUTPATIENT)
Dept: LAB | Age: 79
End: 2019-06-28
Payer: COMMERCIAL

## 2019-06-28 DIAGNOSIS — E78.00 HYPERCHOLESTEROLEMIA: ICD-10-CM

## 2019-06-28 DIAGNOSIS — I10 ESSENTIAL HYPERTENSION: ICD-10-CM

## 2019-06-28 LAB
ALBUMIN SERPL BCP-MCNC: 3.8 G/DL (ref 3.5–5)
ALP SERPL-CCNC: 119 U/L (ref 46–116)
ALT SERPL W P-5'-P-CCNC: 27 U/L (ref 12–78)
ANION GAP SERPL CALCULATED.3IONS-SCNC: 5 MMOL/L (ref 4–13)
AST SERPL W P-5'-P-CCNC: 21 U/L (ref 5–45)
BILIRUB SERPL-MCNC: 1.05 MG/DL (ref 0.2–1)
BUN SERPL-MCNC: 22 MG/DL (ref 5–25)
CALCIUM SERPL-MCNC: 9 MG/DL (ref 8.3–10.1)
CHLORIDE SERPL-SCNC: 106 MMOL/L (ref 100–108)
CHOLEST SERPL-MCNC: 153 MG/DL (ref 50–200)
CO2 SERPL-SCNC: 27 MMOL/L (ref 21–32)
CREAT SERPL-MCNC: 1.12 MG/DL (ref 0.6–1.3)
ERYTHROCYTE [DISTWIDTH] IN BLOOD BY AUTOMATED COUNT: 13.6 % (ref 11.6–15.1)
GFR SERPL CREATININE-BSD FRML MDRD: 63 ML/MIN/1.73SQ M
GLUCOSE P FAST SERPL-MCNC: 104 MG/DL (ref 65–99)
HCT VFR BLD AUTO: 41.8 % (ref 36.5–49.3)
HDLC SERPL-MCNC: 56 MG/DL (ref 40–60)
HGB BLD-MCNC: 13.6 G/DL (ref 12–17)
LDLC SERPL CALC-MCNC: 75 MG/DL (ref 0–100)
MCH RBC QN AUTO: 29.7 PG (ref 26.8–34.3)
MCHC RBC AUTO-ENTMCNC: 32.5 G/DL (ref 31.4–37.4)
MCV RBC AUTO: 91 FL (ref 82–98)
NONHDLC SERPL-MCNC: 97 MG/DL
PLATELET # BLD AUTO: 157 THOUSANDS/UL (ref 149–390)
PMV BLD AUTO: 12 FL (ref 8.9–12.7)
POTASSIUM SERPL-SCNC: 4.2 MMOL/L (ref 3.5–5.3)
PROT SERPL-MCNC: 7.4 G/DL (ref 6.4–8.2)
RBC # BLD AUTO: 4.58 MILLION/UL (ref 3.88–5.62)
SODIUM SERPL-SCNC: 138 MMOL/L (ref 136–145)
TRIGL SERPL-MCNC: 109 MG/DL
WBC # BLD AUTO: 7.61 THOUSAND/UL (ref 4.31–10.16)

## 2019-06-28 PROCEDURE — 80053 COMPREHEN METABOLIC PANEL: CPT

## 2019-06-28 PROCEDURE — 85027 COMPLETE CBC AUTOMATED: CPT

## 2019-06-28 PROCEDURE — 80061 LIPID PANEL: CPT

## 2019-06-28 PROCEDURE — 36415 COLL VENOUS BLD VENIPUNCTURE: CPT

## 2019-07-09 ENCOUNTER — OFFICE VISIT (OUTPATIENT)
Dept: INTERNAL MEDICINE CLINIC | Facility: CLINIC | Age: 79
End: 2019-07-09
Payer: COMMERCIAL

## 2019-07-09 ENCOUNTER — APPOINTMENT (OUTPATIENT)
Dept: RADIOLOGY | Age: 79
End: 2019-07-09
Payer: COMMERCIAL

## 2019-07-09 VITALS
HEART RATE: 52 BPM | HEIGHT: 70 IN | DIASTOLIC BLOOD PRESSURE: 80 MMHG | WEIGHT: 225.6 LBS | TEMPERATURE: 98.4 F | SYSTOLIC BLOOD PRESSURE: 144 MMHG | RESPIRATION RATE: 16 BRPM | BODY MASS INDEX: 32.3 KG/M2

## 2019-07-09 DIAGNOSIS — N40.1 BPH WITH OBSTRUCTION/LOWER URINARY TRACT SYMPTOMS: ICD-10-CM

## 2019-07-09 DIAGNOSIS — N13.8 BPH WITH OBSTRUCTION/LOWER URINARY TRACT SYMPTOMS: ICD-10-CM

## 2019-07-09 DIAGNOSIS — M79.632 LEFT FOREARM PAIN: ICD-10-CM

## 2019-07-09 DIAGNOSIS — G47.33 OSA (OBSTRUCTIVE SLEEP APNEA): ICD-10-CM

## 2019-07-09 DIAGNOSIS — C43.4 MALIGNANT MELANOMA OF NECK (HCC): ICD-10-CM

## 2019-07-09 DIAGNOSIS — R73.01 IMPAIRED FASTING GLUCOSE: ICD-10-CM

## 2019-07-09 DIAGNOSIS — I10 ESSENTIAL HYPERTENSION: ICD-10-CM

## 2019-07-09 DIAGNOSIS — E78.00 HYPERCHOLESTEROLEMIA: ICD-10-CM

## 2019-07-09 DIAGNOSIS — I25.10 CORONARY ARTERY DISEASE INVOLVING NATIVE CORONARY ARTERY OF NATIVE HEART WITHOUT ANGINA PECTORIS: Primary | ICD-10-CM

## 2019-07-09 PROCEDURE — 73090 X-RAY EXAM OF FOREARM: CPT

## 2019-07-09 PROCEDURE — 1160F RVW MEDS BY RX/DR IN RCRD: CPT | Performed by: INTERNAL MEDICINE

## 2019-07-09 PROCEDURE — 99214 OFFICE O/P EST MOD 30 MIN: CPT | Performed by: INTERNAL MEDICINE

## 2019-07-10 NOTE — PROGRESS NOTES
[facility] [place]      NAME: Aletha Romero  AGE: 67 y o  SEX: male  : 1940   MRN: [id number]    DATE: 7/10/2019  TIME: 7:52 AM    Assessment and Plan   1  Coronary artery disease involving native coronary artery of native heart without angina pectoris  No angina  The patient is on aspirin and appropriate risk factor modification  2  Essential hypertension  Well controlled  3  Hypercholesterolemia  Well controlled on atorvastatin  - Comprehensive metabolic panel; Future  - Lipid panel; Future    4  Impaired fasting glucose  Stable on diet  - Hemoglobin A1C; Future    5  JORGE (obstructive sleep apnea)  Stable on CPAP  6  BPH with obstruction/lower urinary tract symptoms  The patient is voiding is satisfactory at present  7  Malignant melanoma of neck (HCC)  No clinical evidence of recurrence  8  Left forearm pain  The patient has a lump on his left forearm  This is seems to be part of his ulna     An x-ray will be obtained to clarify the issue  - XR forearm 2 vw left; Future    Overall, the patient is doing well  He will continue his current medications  I encouraged him to continue to watch his diet carefully and to exercise as much as possible  Return to office in:  3 months    Chief Complaint     Chief Complaint   Patient presents with    Follow-up     3 month       History of Present Illness     The patient returned to the office for re-evaluation of hypertension, hypercholesterolemia, coronary artery disease, impaired fasting glucose, and obstructive sleep apnea  Since his last visit he has been feeling pretty well  He does feel somewhat anxious because his wife was recently diagnosed with recurrent lymphoma  He has no chest pain, shortness of breath, palpitations, or dizziness  He is walking his dog twice daily  He is tolerating his medications well      The following portions of the patient's history were reviewed and updated as appropriate: allergies, current medications, past family history, past medical history, past social history, past surgical history and problem list     Review of Systems   Review of Systems   Constitutional: Negative  HENT: Negative for congestion, ear pain, postnasal drip, rhinorrhea, sore throat and trouble swallowing  Eyes: Negative for pain, discharge, redness and visual disturbance  Respiratory: Negative for cough, shortness of breath and wheezing  Cardiovascular: Negative  Gastrointestinal: Negative  Endocrine: Negative  Genitourinary: Negative for difficulty urinating, dysuria, frequency, hematuria and urgency  Musculoskeletal: Negative for arthralgias, gait problem, joint swelling and myalgias  Skin: Negative for rash  Neurological: Negative for dizziness, speech difficulty, weakness, light-headedness, numbness and headaches  Hematological: Negative  Psychiatric/Behavioral: Negative for confusion, decreased concentration, dysphoric mood and sleep disturbance  The patient is not nervous/anxious  Active Problem List     Patient Active Problem List   Diagnosis    CAD (coronary artery disease)    Hypercholesterolemia    Essential hypertension    Impaired fasting glucose    Malignant melanoma of neck (HCC)    JORGE (obstructive sleep apnea)    Bradycardia    Depression    Elevated prostate specific antigen (PSA)    BPH with obstruction/lower urinary tract symptoms    PLMD (periodic limb movement disorder)    Obesity (BMI 30-39  9)    Bronchospasm    Left forearm pain       Objective   /80 (BP Location: Left arm, Patient Position: Sitting, Cuff Size: Standard)   Pulse (!) 52   Temp 98 4 °F (36 9 °C)   Resp 16   Ht 5' 10" (1 778 m)   Wt 102 kg (225 lb 9 6 oz)   BMI 32 37 kg/m²     Physical Exam   Constitutional: He is oriented to person, place, and time  He appears well-developed and well-nourished  No distress  HENT:   Head: Normocephalic and atraumatic  Neck: Neck supple  No JVD present  No tracheal deviation present  No thyromegaly present  Cardiovascular: Normal rate, regular rhythm, normal heart sounds and intact distal pulses  Exam reveals no gallop and no friction rub  No murmur heard  Pulmonary/Chest: Effort normal and breath sounds normal  He has no wheezes  He has no rales  He exhibits no tenderness  Abdominal: Soft  Bowel sounds are normal  He exhibits no distension and no mass  There is no tenderness  There is no rebound  Musculoskeletal: Normal range of motion  He exhibits no edema or tenderness  Lymphadenopathy:     He has no cervical adenopathy  Neurological: He is alert and oriented to person, place, and time  Skin: Skin is warm and dry  Psychiatric: He has a normal mood and affect   His behavior is normal  Judgment and thought content normal        Pertinent Laboratory/Diagnostic Studies:  Appointment on 06/28/2019   Component Date Value Ref Range Status    WBC 06/28/2019 7 61  4 31 - 10 16 Thousand/uL Final    RBC 06/28/2019 4 58  3 88 - 5 62 Million/uL Final    Hemoglobin 06/28/2019 13 6  12 0 - 17 0 g/dL Final    Hematocrit 06/28/2019 41 8  36 5 - 49 3 % Final    MCV 06/28/2019 91  82 - 98 fL Final    MCH 06/28/2019 29 7  26 8 - 34 3 pg Final    MCHC 06/28/2019 32 5  31 4 - 37 4 g/dL Final    RDW 06/28/2019 13 6  11 6 - 15 1 % Final    Platelets 04/26/4410 157  149 - 390 Thousands/uL Final    MPV 06/28/2019 12 0  8 9 - 12 7 fL Final    Sodium 06/28/2019 138  136 - 145 mmol/L Final    Potassium 06/28/2019 4 2  3 5 - 5 3 mmol/L Final    Chloride 06/28/2019 106  100 - 108 mmol/L Final    CO2 06/28/2019 27  21 - 32 mmol/L Final    ANION GAP 06/28/2019 5  4 - 13 mmol/L Final    BUN 06/28/2019 22  5 - 25 mg/dL Final    Creatinine 06/28/2019 1 12  0 60 - 1 30 mg/dL Final    Glucose, Fasting 06/28/2019 104* 65 - 99 mg/dL Final    Calcium 06/28/2019 9 0  8 3 - 10 1 mg/dL Final    AST 06/28/2019 21  5 - 45 U/L Final    ALT 06/28/2019 27 12 - 78 U/L Final    Alkaline Phosphatase 06/28/2019 119* 46 - 116 U/L Final    Total Protein 06/28/2019 7 4  6 4 - 8 2 g/dL Final    Albumin 06/28/2019 3 8  3 5 - 5 0 g/dL Final    Total Bilirubin 06/28/2019 1 05* 0 20 - 1 00 mg/dL Final    eGFR 06/28/2019 63  ml/min/1 73sq m Final    Cholesterol 06/28/2019 153  50 - 200 mg/dL Final    Triglycerides 06/28/2019 109  <=150 mg/dL Final    HDL, Direct 06/28/2019 56  40 - 60 mg/dL Final    LDL Calculated 06/28/2019 75  0 - 100 mg/dL Final    Non-HDL-Chol (CHOL-HDL) 06/28/2019 97  mg/dl Final   Office Visit on 06/05/2019   Component Date Value Ref Range Status     COLOR,UA 06/05/2019 gold   Final    CLARITY,UA 06/05/2019 clear   Final    SPECIFIC GRAVITY,UA 06/05/2019 1 015   Final     PH,UA 06/05/2019 6 5   Final    LEUKOCYTE ESTERASE,UA 06/05/2019 neg   Final    NITRITE,UA 06/05/2019 neg   Final    GLUCOSE, UA 06/05/2019 neg   Final    KETONES,UA 06/05/2019 neg   Final    BILIRUBIN,UA 06/05/2019 neg   Final    BLOOD,UA 06/05/2019 neg   Final    POCT URINE PROTEIN 06/05/2019 neg   Final    SL AMB POCT UROBILINOGEN 06/05/2019 1 0   Final   Appointment on 05/24/2019   Component Date Value Ref Range Status    PSA 05/24/2019 4 6* 0 0 - 4 0 ng/mL Final           Current Medications     Current Outpatient Medications:     acetaminophen (TYLENOL) 500 mg tablet, Take 500 mg by mouth every 6 (six) hours as needed for mild pain, Disp: , Rfl:     albuterol (PROVENTIL HFA,VENTOLIN HFA) 90 mcg/act inhaler, Inhale 1 puff every 6 (six) hours as needed for wheezing, Disp: 1 Inhaler, Rfl: 4    amLODIPine (NORVASC) 5 mg tablet, Take 5 mg by mouth daily, Disp: , Rfl: 0    aspirin 81 MG tablet, Take 1 tablet by mouth daily, Disp: , Rfl:     atorvastatin (LIPITOR) 80 mg tablet, Take 1 tablet (80 mg total) by mouth daily, Disp: 90 tablet, Rfl: 1    lisinopril (ZESTRIL) 20 mg tablet, Take 1 tablet (20 mg total) by mouth 2 (two) times a day, Disp: 180 tablet, Rfl: 0    metoprolol tartrate (LOPRESSOR) 25 mg tablet, Take 1 tablet (25 mg total) by mouth every 12 (twelve) hours, Disp: 180 tablet, Rfl: 1    nitroglycerin (NITROSTAT) 0 4 mg SL tablet, Place 1 tablet (0 4 mg total) under the tongue every 5 (five) minutes as needed for chest pain, Disp: 90 tablet, Rfl: 1      Shae Medina MD

## 2019-07-22 DIAGNOSIS — E78.00 HYPERCHOLESTEROLEMIA: ICD-10-CM

## 2019-07-22 RX ORDER — ATORVASTATIN CALCIUM 80 MG/1
TABLET, FILM COATED ORAL
Qty: 90 TABLET | Refills: 3 | Status: SHIPPED | OUTPATIENT
Start: 2019-07-22 | End: 2019-09-12 | Stop reason: SDUPTHER

## 2019-08-19 ENCOUNTER — OFFICE VISIT (OUTPATIENT)
Dept: SLEEP CENTER | Facility: CLINIC | Age: 79
End: 2019-08-19
Payer: COMMERCIAL

## 2019-08-19 VITALS
BODY MASS INDEX: 32.5 KG/M2 | HEIGHT: 70 IN | SYSTOLIC BLOOD PRESSURE: 110 MMHG | WEIGHT: 227 LBS | DIASTOLIC BLOOD PRESSURE: 60 MMHG

## 2019-08-19 DIAGNOSIS — I10 ESSENTIAL HYPERTENSION: ICD-10-CM

## 2019-08-19 DIAGNOSIS — G47.33 OSA (OBSTRUCTIVE SLEEP APNEA): Primary | ICD-10-CM

## 2019-08-19 DIAGNOSIS — E66.9 OBESITY (BMI 30-39.9): ICD-10-CM

## 2019-08-19 DIAGNOSIS — I25.10 CORONARY ARTERY DISEASE INVOLVING NATIVE CORONARY ARTERY OF NATIVE HEART WITHOUT ANGINA PECTORIS: ICD-10-CM

## 2019-08-19 DIAGNOSIS — G47.61 PERIODIC LIMB MOVEMENTS OF SLEEP: ICD-10-CM

## 2019-08-19 PROCEDURE — 3074F SYST BP LT 130 MM HG: CPT | Performed by: INTERNAL MEDICINE

## 2019-08-19 PROCEDURE — 99214 OFFICE O/P EST MOD 30 MIN: CPT | Performed by: INTERNAL MEDICINE

## 2019-08-19 NOTE — PROGRESS NOTES
Review of Systems      Genitourinary need to urinate more than twice a night   Cardiology none   Gastrointestinal none   Neurology none   Constitutional none   Integumentary none   Psychiatry none   Musculoskeletal none   Pulmonary wheezing   ENT none   Endocrine none   Hematological none

## 2019-08-19 NOTE — PROGRESS NOTES
Follow-Up Note - [facility]   Diallo Youssef  67 y o  male  NFS:[id number]  WVN:[id number]    CC: I saw this patient for follow-up in clinic today for his Sleep Disordered Breathing, Coexisting Sleep and Medical Problems  PFSH, Problem List, Medications & Allergies were reviewed in EMR  Interval changes: none reported  He  has a past medical history of AAA (abdominal aortic aneurysm) without rupture ([facility] ), Angina pectoris ([facility] ), Ankle pain, left, Bilateral wrist pain, BPH with obstruction/lower urinary tract symptoms, Coronary artery disease, CPAP (continuous positive airway pressure) dependence, Elevated PSA, History of colon polyps, [place] (hard of hearing), Hyperlipidemia, Hypertension, Ischemic cardiomyopathy, Melanoma ([place] [address] ) (12/2016), Myocardial infarction ([place] [address] ) (1996), Pneumonia, Sleep apnea, and Wears glasses  He has a current medication list which includes the following prescription(s): acetaminophen, albuterol, amlodipine, aspirin, atorvastatin, atorvastatin, lisinopril, metoprolol tartrate, and nitroglycerin  ROS: Reviewed (see attached)  DATA REVIEWED:  using PAP > 4 hours/night 100% of the time  Estimated CONNIE 1 6/hour at pressure of 12cm H2O     SUBJECTIVE: Regarding use of PAP, Tuan Bacon reports:   · He is experiencing no  adverse effects:   · He is   benefiting from use: sleeping better   · Periodic limb movements are no longer interrupting sleep  Sleep Routine: He reports getting >8 5 hrs sleep  ; he has no difficulty initiating or maintaining sleep   He awakens spontaneously and feels refreshed  He denied excessive drowsiness  He rated himself at Total score: 4 /24 on the [facility] sleepiness scale  Habits: reports that he has never smoked  He has never used smokeless tobacco ,  reports that he drinks alcohol ,  reports that he does not use drugs  , Caffeine use: limited , Exercise routine: regular          OBJECTIVE: /60   Ht 5' 10" (1 778 m)   Wt 103 kg (227 lb)   BMI 32 57 kg/m²    Constitutional: Patient is well groomed; well appearing  Skin/Extrem: warm & dry; col & hydration normal; no edema  Psych: cooperativeand in no distress  Mental State appears normal   CNS: Alert, orientated, clear & coherent speech  H&N: EOMI; NC/AT:no facial pressure marks, no rashes  ENMT Mucus membranes normal Nasal airway:patent  Oral airway: crowded  Resp:effort is normal CVS: RRR ABD:truncal obesity MSK:Gait normal     ASSESSMENT: Primary Sleep/Secondary(to Medical or Psych conditions) & comorbidities   1  JORGE (obstructive sleep apnea)  PAP DME Resupply/Reorder   2  Periodic limb movements of sleep     3  Essential hypertension     4  Coronary artery disease involving native coronary artery of native heart without angina pectoris     5  Obesity (BMI 30-39  9)       PLAN:  1  Treatment with  PAP is medically necessary and Josep Mike is agreable to continue use  2  Care of equipment, methods to improve comfort using PAP and importance of compliance with therapy were discussed  3  Pressure setting: continue 12 cmH2O     4  Rx provided to replace supplies and Care coordinated with DME provider  5  Strategies for weight reduction were discussed  6  No medication is needed for the periodic limb movements of sleep  7  Follow-up is advised in 1 year or sooner if needed to monitor progress, compliance and to adjust therapy  Thank you for allowing me to participate in the care of this patient      Sincerely,    Authenticated electronically by Nikolai Mallory MD on 98/87/52   Board Certified Specialist

## 2019-08-19 NOTE — PATIENT INSTRUCTIONS

## 2019-09-12 ENCOUNTER — OFFICE VISIT (OUTPATIENT)
Dept: INTERNAL MEDICINE CLINIC | Facility: CLINIC | Age: 79
End: 2019-09-12
Payer: COMMERCIAL

## 2019-09-12 VITALS
SYSTOLIC BLOOD PRESSURE: 132 MMHG | HEART RATE: 64 BPM | WEIGHT: 224.2 LBS | TEMPERATURE: 98.8 F | BODY MASS INDEX: 32.1 KG/M2 | DIASTOLIC BLOOD PRESSURE: 70 MMHG | OXYGEN SATURATION: 99 % | HEIGHT: 70 IN

## 2019-09-12 DIAGNOSIS — H66.90 ACUTE OTITIS MEDIA, UNSPECIFIED OTITIS MEDIA TYPE: Primary | ICD-10-CM

## 2019-09-12 PROCEDURE — 99213 OFFICE O/P EST LOW 20 MIN: CPT | Performed by: INTERNAL MEDICINE

## 2019-09-12 RX ORDER — AMOXICILLIN AND CLAVULANATE POTASSIUM 875; 125 MG/1; MG/1
1 TABLET, FILM COATED ORAL EVERY 12 HOURS SCHEDULED
Qty: 14 TABLET | Refills: 0 | Status: SHIPPED | OUTPATIENT
Start: 2019-09-12 | End: 2019-09-19

## 2019-09-12 NOTE — PROGRESS NOTES
Assessment/Plan:  Advised to start Augmentin, Tylenol as needed for pain  Keep the eyes clean, if the redness in the discharge does not improve, call the office  I do not suspect he has bacterial conjunctivitis  Call the office if symptoms do not improve by Monday  Otherwise vitals are stable, he will be seeing Dr Phillip Funez in October  Diagnoses and all orders for this visit:    Acute otitis media, unspecified otitis media type  -     amoxicillin-clavulanate (AUGMENTIN) 875-125 mg per tablet; Take 1 tablet by mouth every 12 (twelve) hours for 7 days          Subjective:   Chief Complaint   Patient presents with    Itchy Eye     red and watery     Cough    Earache        Patient ID: Chace Real is a 67 y o  male  He comes in for an acute appointment  For the last 2 days he has been feeling unwell  He had quite a bit of watery discharge from his eyes yesterday, he has been complaining of significant pain on his left ear with some difficulty swallowing due to discomfort in his throat  No visual changes  He has had a dry cough, does not feel congested on the chest, no wheezing or shortness of breath worse than usual   He has a rescue inhaler which she carries a long but has not needed to use recently  No fevers      The following portions of the patient's history were reviewed and updated as appropriate: current medications, past medical history, past social history and past surgical history      PHQ-9 Depression Screening    PHQ-9:    Frequency of the following problems over the past two weeks:                Current Outpatient Medications:     acetaminophen (TYLENOL) 500 mg tablet, Take 500 mg by mouth every 6 (six) hours as needed for mild pain, Disp: , Rfl:     albuterol (PROVENTIL HFA,VENTOLIN HFA) 90 mcg/act inhaler, Inhale 1 puff every 6 (six) hours as needed for wheezing, Disp: 1 Inhaler, Rfl: 4    amLODIPine (NORVASC) 5 mg tablet, Take 5 mg by mouth daily, Disp: , Rfl: 0    aspirin 81 MG tablet, Take 1 tablet by mouth daily, Disp: , Rfl:     atorvastatin (LIPITOR) 80 mg tablet, Take 1 tablet (80 mg total) by mouth daily, Disp: 90 tablet, Rfl: 1    lisinopril (ZESTRIL) 20 mg tablet, Take 1 tablet (20 mg total) by mouth 2 (two) times a day, Disp: 180 tablet, Rfl: 0    metoprolol tartrate (LOPRESSOR) 25 mg tablet, Take 1 tablet (25 mg total) by mouth every 12 (twelve) hours, Disp: 180 tablet, Rfl: 1    nitroglycerin (NITROSTAT) 0 4 mg SL tablet, Place 1 tablet (0 4 mg total) under the tongue every 5 (five) minutes as needed for chest pain, Disp: 90 tablet, Rfl: 1    amoxicillin-clavulanate (AUGMENTIN) 875-125 mg per tablet, Take 1 tablet by mouth every 12 (twelve) hours for 7 days, Disp: 14 tablet, Rfl: 0    Review of Systems   Constitutional: Negative for fatigue, fever and unexpected weight change  HENT: Positive for congestion and sore throat  Negative for ear pain and hearing loss  Eyes: Positive for discharge  Negative for pain  Respiratory: Positive for cough  Negative for chest tightness and shortness of breath  Cardiovascular: Negative for chest pain and palpitations  Gastrointestinal: Negative for abdominal pain, blood in stool, constipation, diarrhea and nausea  Genitourinary: Negative for dysuria, frequency and hematuria  Musculoskeletal: Negative for arthralgias and joint swelling  Skin: Negative for rash  Allergic/Immunologic: Negative for immunocompromised state  Neurological: Negative for dizziness and headaches  Hematological: Negative for adenopathy  Psychiatric/Behavioral: Negative for confusion and sleep disturbance  Objective:  /70 (BP Location: Left arm, Patient Position: Sitting, Cuff Size: Large)   Pulse 64   Temp 98 8 °F (37 1 °C) (Tympanic)   Ht 5' 10" (1 778 m)   Wt 102 kg (224 lb 3 2 oz)   SpO2 99%   BMI 32 17 kg/m²      Physical Exam   Constitutional: He appears well-developed and well-nourished     HENT:   Head: Normocephalic and atraumatic  Right Ear: Tympanic membrane normal    Left Ear: There is swelling  Tympanic membrane is erythematous  Nose: Nose normal    Mouth/Throat: Posterior oropharyngeal erythema present  No posterior oropharyngeal edema  Eyes: Pupils are equal, round, and reactive to light  Conjunctivae are normal  Right eye exhibits no discharge  Left eye exhibits no discharge  Right conjunctiva is not injected  Left conjunctiva is not injected  Neck: Normal range of motion  Neck supple  No thyromegaly present  Cardiovascular: Normal rate, regular rhythm, S1 normal, S2 normal and normal heart sounds  PMI is not displaced  No murmur heard  Pulmonary/Chest: Effort normal and breath sounds normal  No accessory muscle usage  No apnea  No respiratory distress  He has no rhonchi  He has no rales  Abdominal: Soft  Normal appearance and bowel sounds are normal  He exhibits no shifting dullness  There is no hepatosplenomegaly  There is no tenderness  There is no rebound and no CVA tenderness  Musculoskeletal: Normal range of motion  He exhibits no edema or tenderness  Lymphadenopathy:     He has no cervical adenopathy  Neurological: He is alert  Skin: Skin is warm and intact  No rash noted  Psychiatric: He has a normal mood and affect  His speech is normal    Nursing note and vitals reviewed  No results found for this or any previous visit (from the past 1008 hour(s))  ]    No results found

## 2019-09-23 ENCOUNTER — OFFICE VISIT (OUTPATIENT)
Dept: INTERNAL MEDICINE CLINIC | Facility: CLINIC | Age: 79
End: 2019-09-23
Payer: COMMERCIAL

## 2019-09-23 VITALS
HEIGHT: 70 IN | SYSTOLIC BLOOD PRESSURE: 138 MMHG | BODY MASS INDEX: 32.35 KG/M2 | RESPIRATION RATE: 18 BRPM | WEIGHT: 226 LBS | HEART RATE: 49 BPM | DIASTOLIC BLOOD PRESSURE: 79 MMHG | TEMPERATURE: 99.1 F

## 2019-09-23 DIAGNOSIS — H92.02 LEFT EAR PAIN: Primary | ICD-10-CM

## 2019-09-23 PROCEDURE — 99213 OFFICE O/P EST LOW 20 MIN: CPT | Performed by: INTERNAL MEDICINE

## 2019-09-24 NOTE — PROGRESS NOTES
Assessment/Plan:  I advised an evaluation with an ENT surgeon, arrangements were made him to see an ENT surgeon by our office  Otherwise vitals are stable     Diagnoses and all orders for this visit:    Left ear pain  -     Ambulatory Referral to Otolaryngology; Future          Subjective:   Chief Complaint   Patient presents with    Earache     Left        Patient ID: Letha Fang is a 67 y o  male  He comes in for an acute appointment, he notes that for the last 2 weeks he has continued to have discomfort on his left ear and feels congested, here his hearing seems to be impaired  He completed Augmentin as instructed  The following portions of the patient's history were reviewed and updated as appropriate: current medications, past medical history, past social history and past surgical history      PHQ-9 Depression Screening    PHQ-9:    Frequency of the following problems over the past two weeks:                Current Outpatient Medications:     acetaminophen (TYLENOL) 500 mg tablet, Take 500 mg by mouth every 6 (six) hours as needed for mild pain, Disp: , Rfl:     albuterol (PROVENTIL HFA,VENTOLIN HFA) 90 mcg/act inhaler, Inhale 1 puff every 6 (six) hours as needed for wheezing, Disp: 1 Inhaler, Rfl: 4    amLODIPine (NORVASC) 5 mg tablet, Take 5 mg by mouth daily, Disp: , Rfl: 0    aspirin 81 MG tablet, Take 1 tablet by mouth daily, Disp: , Rfl:     atorvastatin (LIPITOR) 80 mg tablet, Take 1 tablet (80 mg total) by mouth daily, Disp: 90 tablet, Rfl: 1    lisinopril (ZESTRIL) 20 mg tablet, Take 1 tablet (20 mg total) by mouth 2 (two) times a day, Disp: 180 tablet, Rfl: 0    metoprolol tartrate (LOPRESSOR) 25 mg tablet, Take 1 tablet (25 mg total) by mouth every 12 (twelve) hours, Disp: 180 tablet, Rfl: 1    nitroglycerin (NITROSTAT) 0 4 mg SL tablet, Place 1 tablet (0 4 mg total) under the tongue every 5 (five) minutes as needed for chest pain, Disp: 90 tablet, Rfl: 1    Review of Systems Constitutional: Negative for fatigue, fever and unexpected weight change  HENT: Positive for ear pain  Negative for hearing loss and sore throat  Eyes: Negative for pain and discharge  Respiratory: Negative for cough, chest tightness and shortness of breath  Cardiovascular: Negative for chest pain and palpitations  Gastrointestinal: Negative for abdominal pain, blood in stool, constipation, diarrhea and nausea  Genitourinary: Negative for dysuria, frequency and hematuria  Musculoskeletal: Negative for arthralgias and joint swelling  Skin: Negative for rash  Allergic/Immunologic: Negative for immunocompromised state  Neurological: Negative for dizziness and headaches  Hematological: Negative for adenopathy  Psychiatric/Behavioral: Negative for confusion and sleep disturbance  Objective:  /79 (BP Location: Left arm, Patient Position: Sitting, Cuff Size: Standard)   Pulse (!) 49   Temp 99 1 °F (37 3 °C)   Resp 18   Ht 5' 10" (1 778 m)   Wt 103 kg (226 lb)   BMI 32 43 kg/m²      Physical Exam   Constitutional: He appears well-developed and well-nourished  HENT:   Head: Normocephalic and atraumatic  Right Ear: Tympanic membrane and ear canal normal    Nose: Nose normal    Mouth/Throat: Oropharynx is clear and moist and mucous membranes are normal  No oropharyngeal exudate or posterior oropharyngeal edema  No tonsillar exudate  Visualize blood around the tympanic membrane, the canal looks normal but unable to visualize the entire temp acne membrane due to the blood  No results found for this or any previous visit (from the past 1008 hour(s))  ]    No results found

## 2019-10-09 ENCOUNTER — APPOINTMENT (OUTPATIENT)
Dept: LAB | Age: 79
End: 2019-10-09
Payer: COMMERCIAL

## 2019-10-09 DIAGNOSIS — R73.01 IMPAIRED FASTING GLUCOSE: ICD-10-CM

## 2019-10-09 DIAGNOSIS — E78.00 HYPERCHOLESTEROLEMIA: ICD-10-CM

## 2019-10-09 LAB
ALBUMIN SERPL BCP-MCNC: 3.9 G/DL (ref 3.5–5)
ALP SERPL-CCNC: 120 U/L (ref 46–116)
ALT SERPL W P-5'-P-CCNC: 26 U/L (ref 12–78)
ANION GAP SERPL CALCULATED.3IONS-SCNC: 6 MMOL/L (ref 4–13)
AST SERPL W P-5'-P-CCNC: 24 U/L (ref 5–45)
BILIRUB SERPL-MCNC: 1.12 MG/DL (ref 0.2–1)
BUN SERPL-MCNC: 17 MG/DL (ref 5–25)
CALCIUM SERPL-MCNC: 9.4 MG/DL (ref 8.3–10.1)
CHLORIDE SERPL-SCNC: 107 MMOL/L (ref 100–108)
CHOLEST SERPL-MCNC: 152 MG/DL (ref 50–200)
CO2 SERPL-SCNC: 29 MMOL/L (ref 21–32)
CREAT SERPL-MCNC: 1.18 MG/DL (ref 0.6–1.3)
EST. AVERAGE GLUCOSE BLD GHB EST-MCNC: 111 MG/DL
GFR SERPL CREATININE-BSD FRML MDRD: 59 ML/MIN/1.73SQ M
GLUCOSE P FAST SERPL-MCNC: 91 MG/DL (ref 65–99)
HBA1C MFR BLD: 5.5 % (ref 4.2–6.3)
HDLC SERPL-MCNC: 49 MG/DL (ref 40–60)
LDLC SERPL CALC-MCNC: 78 MG/DL (ref 0–100)
NONHDLC SERPL-MCNC: 103 MG/DL
POTASSIUM SERPL-SCNC: 4.9 MMOL/L (ref 3.5–5.3)
PROT SERPL-MCNC: 7.5 G/DL (ref 6.4–8.2)
SODIUM SERPL-SCNC: 142 MMOL/L (ref 136–145)
TRIGL SERPL-MCNC: 125 MG/DL

## 2019-10-09 PROCEDURE — 80061 LIPID PANEL: CPT

## 2019-10-09 PROCEDURE — 36415 COLL VENOUS BLD VENIPUNCTURE: CPT

## 2019-10-09 PROCEDURE — 80053 COMPREHEN METABOLIC PANEL: CPT

## 2019-10-09 PROCEDURE — 83036 HEMOGLOBIN GLYCOSYLATED A1C: CPT

## 2019-10-22 ENCOUNTER — OFFICE VISIT (OUTPATIENT)
Dept: INTERNAL MEDICINE CLINIC | Facility: CLINIC | Age: 79
End: 2019-10-22
Payer: COMMERCIAL

## 2019-10-22 VITALS
HEART RATE: 73 BPM | HEIGHT: 70 IN | OXYGEN SATURATION: 97 % | DIASTOLIC BLOOD PRESSURE: 77 MMHG | SYSTOLIC BLOOD PRESSURE: 133 MMHG | TEMPERATURE: 99.5 F | WEIGHT: 223.6 LBS | BODY MASS INDEX: 32.01 KG/M2

## 2019-10-22 DIAGNOSIS — I10 ESSENTIAL HYPERTENSION: ICD-10-CM

## 2019-10-22 DIAGNOSIS — G47.33 OSA (OBSTRUCTIVE SLEEP APNEA): ICD-10-CM

## 2019-10-22 DIAGNOSIS — N13.8 BPH WITH OBSTRUCTION/LOWER URINARY TRACT SYMPTOMS: ICD-10-CM

## 2019-10-22 DIAGNOSIS — Z23 FLU VACCINE NEED: ICD-10-CM

## 2019-10-22 DIAGNOSIS — I25.10 CORONARY ARTERY DISEASE INVOLVING NATIVE CORONARY ARTERY OF NATIVE HEART WITHOUT ANGINA PECTORIS: Primary | ICD-10-CM

## 2019-10-22 DIAGNOSIS — E78.00 HYPERCHOLESTEROLEMIA: ICD-10-CM

## 2019-10-22 DIAGNOSIS — R07.89 CHEST TIGHTNESS: ICD-10-CM

## 2019-10-22 DIAGNOSIS — R73.01 IMPAIRED FASTING GLUCOSE: ICD-10-CM

## 2019-10-22 DIAGNOSIS — N40.1 BPH WITH OBSTRUCTION/LOWER URINARY TRACT SYMPTOMS: ICD-10-CM

## 2019-10-22 PROCEDURE — 3078F DIAST BP <80 MM HG: CPT | Performed by: INTERNAL MEDICINE

## 2019-10-22 PROCEDURE — 90662 IIV NO PRSV INCREASED AG IM: CPT | Performed by: INTERNAL MEDICINE

## 2019-10-22 PROCEDURE — 99214 OFFICE O/P EST MOD 30 MIN: CPT | Performed by: INTERNAL MEDICINE

## 2019-10-22 PROCEDURE — G0008 ADMIN INFLUENZA VIRUS VAC: HCPCS | Performed by: INTERNAL MEDICINE

## 2019-10-22 PROCEDURE — 3075F SYST BP GE 130 - 139MM HG: CPT | Performed by: INTERNAL MEDICINE

## 2019-10-22 RX ORDER — ATORVASTATIN CALCIUM 80 MG/1
80 TABLET, FILM COATED ORAL DAILY
Qty: 90 TABLET | Refills: 3 | Status: SHIPPED | OUTPATIENT
Start: 2019-10-22 | End: 2020-07-13 | Stop reason: SDUPTHER

## 2019-10-23 NOTE — PROGRESS NOTES
[facility] [place]      NAME: Gigi Simon  AGE: 67 y o  SEX: male  : 1940   MRN: [id number]    DATE: 10/23/2019  TIME: 7:44 AM    Assessment and Plan   1  Coronary artery disease involving native coronary artery of native heart without angina pectoris  No angina  On aspirin and appropriate risk factor modification  2  Essential hypertension  Well controlled    3  Hypercholesterolemia  Stable on atorvastatin  - atorvastatin (LIPITOR) 80 mg tablet; Take 1 tablet (80 mg total) by mouth daily  Dispense: 90 tablet; Refill: 3    4  Impaired fasting glucose  Stable on diet  5  JORGE (obstructive sleep apnea)  Utilizing CPAP  6  BPH with obstruction/lower urinary tract symptoms  The patient's symptoms are currently minimal   Continued observation is appropriate  7  Flu vaccine need  - influenza vaccine, [id number], high-dose, PF 0 5 mL (FLUZONE HIGH-DOSE)    Overall, the patient is doing reasonably well  He will continue his current medications  I encouraged him to continue to watch his diet and exercise as much as possible  Return to office in:  3 months    Chief Complaint     Chief Complaint   Patient presents with    Follow-up     3 month       History of Present Illness     The patient returned to the office for re-evaluation of hypertension, hypercholesterolemia, impaired fasting glucose, and coronary artery disease  Since his last visit he has been feeling pretty well  He has had no chest pain, shortness of breath, palpitations, or dizziness  He denies PND, orthopnea, or edema  He is tolerating his medications well  His wife is undergoing chemotherapy for non-Hodgkin's lymphoma and seems to be doing pretty well      The following portions of the patient's history were reviewed and updated as appropriate: allergies, current medications, past family history, past medical history, past social history, past surgical history and problem list     Review of Systems   Review of Systems   Constitutional: Negative  HENT: Negative for congestion, ear pain, postnasal drip, rhinorrhea, sore throat and trouble swallowing  Eyes: Negative for pain, discharge, redness and visual disturbance  Respiratory: Negative for cough, shortness of breath and wheezing  Cardiovascular: Negative  Gastrointestinal: Negative  Endocrine: Negative  Genitourinary: Negative for difficulty urinating, dysuria, frequency, hematuria and urgency  Musculoskeletal: Negative for arthralgias, gait problem, joint swelling and myalgias  Skin: Negative for rash  Neurological: Negative for dizziness, speech difficulty, weakness, light-headedness, numbness and headaches  Hematological: Negative  Psychiatric/Behavioral: Negative for confusion, decreased concentration, dysphoric mood and sleep disturbance  The patient is not nervous/anxious  Active Problem List     Patient Active Problem List   Diagnosis    CAD (coronary artery disease)    Hypercholesterolemia    Essential hypertension    Impaired fasting glucose    Malignant melanoma of neck (HCC)    JORGE (obstructive sleep apnea)    Bradycardia    Depression    Elevated prostate specific antigen (PSA)    BPH with obstruction/lower urinary tract symptoms    PLMD (periodic limb movement disorder)    Obesity (BMI 30-39  9)    Bronchospasm    Left forearm pain       Objective   /77 (BP Location: Left arm, Patient Position: Sitting, Cuff Size: Standard)   Pulse 73   Temp 99 5 °F (37 5 °C) (Tympanic)   Ht 5' 10" (1 778 m)   Wt 101 kg (223 lb 9 6 oz)   SpO2 97%   BMI 32 08 kg/m²     Physical Exam   Constitutional: He is oriented to person, place, and time  He appears well-developed and well-nourished  No distress  HENT:   Head: Normocephalic and atraumatic  Neck: Neck supple  No JVD present  No tracheal deviation present  No thyromegaly present     Cardiovascular: Normal rate, regular rhythm, normal heart sounds and intact distal pulses  Exam reveals no gallop and no friction rub  No murmur heard  Pulmonary/Chest: Effort normal and breath sounds normal  He has no wheezes  He has no rales  He exhibits no tenderness  Abdominal: Soft  Bowel sounds are normal  He exhibits no distension and no mass  There is no tenderness  There is no rebound  Musculoskeletal: Normal range of motion  He exhibits no edema or tenderness  Lymphadenopathy:     He has no cervical adenopathy  Neurological: He is alert and oriented to person, place, and time  Skin: Skin is warm and dry  Psychiatric: He has a normal mood and affect   His behavior is normal  Judgment and thought content normal        Pertinent Laboratory/Diagnostic Studies:  Appointment on 10/09/2019   Component Date Value Ref Range Status    Sodium 10/09/2019 142  136 - 145 mmol/L Final    Potassium 10/09/2019 4 9  3 5 - 5 3 mmol/L Final    Chloride 10/09/2019 107  100 - 108 mmol/L Final    CO2 10/09/2019 29  21 - 32 mmol/L Final    ANION GAP 10/09/2019 6  4 - 13 mmol/L Final    BUN 10/09/2019 17  5 - 25 mg/dL Final    Creatinine 10/09/2019 1 18  0 60 - 1 30 mg/dL Final    Glucose, Fasting 10/09/2019 91  65 - 99 mg/dL Final    Calcium 10/09/2019 9 4  8 3 - 10 1 mg/dL Final    AST 10/09/2019 24  5 - 45 U/L Final    ALT 10/09/2019 26  12 - 78 U/L Final    Alkaline Phosphatase 10/09/2019 120* 46 - 116 U/L Final    Total Protein 10/09/2019 7 5  6 4 - 8 2 g/dL Final    Albumin 10/09/2019 3 9  3 5 - 5 0 g/dL Final    Total Bilirubin 10/09/2019 1 12* 0 20 - 1 00 mg/dL Final    eGFR 10/09/2019 59  ml/min/1 73sq m Final    Hemoglobin A1C 10/09/2019 5 5  4 2 - 6 3 % Final    EAG 10/09/2019 111  mg/dl Final    Cholesterol 10/09/2019 152  50 - 200 mg/dL Final    Triglycerides 10/09/2019 125  <=150 mg/dL Final    HDL, Direct 10/09/2019 49  40 - 60 mg/dL Final    LDL Calculated 10/09/2019 78  0 - 100 mg/dL Final    Non-HDL-Chol (CHOL-HDL) 10/09/2019 103  mg/dl Final Current Medications     Current Outpatient Medications:     acetaminophen (TYLENOL) 500 mg tablet, Take 500 mg by mouth every 6 (six) hours as needed for mild pain, Disp: , Rfl:     albuterol (PROVENTIL HFA,VENTOLIN HFA) 90 mcg/act inhaler, Inhale 1 puff every 6 (six) hours as needed for wheezing, Disp: 1 Inhaler, Rfl: 4    amLODIPine (NORVASC) 5 mg tablet, Take 5 mg by mouth daily, Disp: , Rfl: 0    aspirin 81 MG tablet, Take 1 tablet by mouth daily, Disp: , Rfl:     atorvastatin (LIPITOR) 80 mg tablet, Take 1 tablet (80 mg total) by mouth daily, Disp: 90 tablet, Rfl: 3    fluticasone (FLONASE) 50 mcg/act nasal spray, 2 sprays into each nostril daily, Disp: 1 Bottle, Rfl: 4    lisinopril (ZESTRIL) 20 mg tablet, Take 1 tablet (20 mg total) by mouth 2 (two) times a day, Disp: 180 tablet, Rfl: 0    nitroglycerin (NITROSTAT) 0 4 mg SL tablet, Place 1 tablet (0 4 mg total) under the tongue every 5 (five) minutes as needed for chest pain, Disp: 90 tablet, Rfl: 1    ofloxacin (FLOXIN) 0 3 % otic solution, Administer 5 drops into the left ear 2 (two) times a day, Disp: 10 mL, Rfl: 5    metoprolol tartrate (LOPRESSOR) 25 mg tablet, take 1 tablet by mouth every 12 hours, Disp: 180 tablet, Rfl: 1      Aimee Perez MD

## 2019-12-02 ENCOUNTER — APPOINTMENT (OUTPATIENT)
Dept: LAB | Age: 79
End: 2019-12-02
Payer: COMMERCIAL

## 2019-12-02 DIAGNOSIS — R97.20 ELEVATED PSA: ICD-10-CM

## 2019-12-02 PROCEDURE — 36415 COLL VENOUS BLD VENIPUNCTURE: CPT

## 2019-12-02 PROCEDURE — 84153 ASSAY OF PSA TOTAL: CPT

## 2019-12-02 PROCEDURE — 84154 ASSAY OF PSA FREE: CPT

## 2019-12-03 LAB
PSA FREE MFR SERPL: 30.2 %
PSA FREE SERPL-MCNC: 1.63 NG/ML
PSA SERPL-MCNC: 5.4 NG/ML (ref 0–4)

## 2019-12-06 NOTE — PROGRESS NOTES
12/10/2019      Chief Complaint   Patient presents with    Elevated PSA       Assessment and Plan    67 y o  male managed by Dr Kira Sanchez    1  Elevated PSA s/p prior negative prostate biopsies  - PSA 5 4 (12/2/19), 4 6 (5/24/19), 3 9 (5/1/18)  - recommend a mpMRI at this time as the patients PSA continues to rise, the patient is agreeable to this     FU 1 month to review mpMRI      History of Present Illness  Rolando Reyna is a 67 y o  male here for follow up evaluation of an elevated PSA  The patient is status post negative prostate biopsies in the past   His PSA from 2018 to now has risen from 3 9 up to 5 4  The patient is doing well from a urinary standpoint  His AUA symptom score is 2 and his quality of life is mostly satisfied  He does mention that he has been under lot of stress recently as his wife was diagnosed with cancer and has been undergoing chemo  This has occurred between July and now  Review of Systems   Constitutional: Negative for activity change, chills and fever  Gastrointestinal: Negative for abdominal distention and abdominal pain  Musculoskeletal: Negative for back pain and gait problem  Psychiatric/Behavioral: Negative for behavioral problems and confusion  AUA SYMPTOM SCORE      Most Recent Value   AUA SYMPTOM SCORE   How often have you had a sensation of not emptying your bladder completely after you finished urinating? 0   How often have you had to urinate again less than two hours after you finished urinating? 0   How often have you found you stopped and started again several times when you urinate?  0   How often have you found it difficult to postpone urination? 0   How often have you had a weak urinary stream?  0   How often have you had to push or strain to begin urination? 0   How many times did you most typically get up to urinate from the time you went to bed at night until the time you got up in the morning?   2   Quality of Life: If you were to spend the rest of your life with your urinary condition just the way it is now, how would you feel about that?  2   AUA SYMPTOM SCORE  2          Past Medical History  Past Medical History:   Diagnosis Date    AAA [facility]) [facility] )     has annual US to check    Angina pectoris ([facility] )     chronic stable angina, but it is mild and tolerates walking dogs and walking up flight os stairs without symptoms  Using Nitro no more than 2 times per mt      Ankle pain, left     Bilateral wrist pain     BPH with obstruction/lower urinary tract symptoms     Coronary artery disease     CPAP (continuous positive airway pressure) dependence     Elevated PSA     History of colon polyps     [place] (hard of hearing)     Slight    Hyperlipidemia     Hypertension     Ischemic cardiomyopathy     with apical aneurysm    Melanoma [facility] ) 12/2016    Right side of neck    Myocardial infarction ([facility] ) 1996    x1    Pneumonia     x1 as a baby    Sleep apnea     Wears glasses        Past Social History  Past Surgical History:   Procedure Laterality Date    CARDIAC SURGERY      CABG x6    CATARACT EXTRACTION Bilateral     COLONOSCOPY      CORONARY ANGIOPLASTY WITH STENT PLACEMENT      2 stents    CORONARY ARTERY BYPASS GRAFT      PROSTATE BIOPSY  2000,2001    SKIN BIOPSY      Melanoma - surgical removal    SKIN LESION EXCISION Right 2/6/2017    Procedure: WIDE EXCISION MELANOMA SCAR LATERAL NECK;  Surgeon: Brody Knapp MD;  Location: [facility];  Service:     WISDOM TOOTH EXTRACTION       Social History     Tobacco Use   Smoking Status Never Smoker   Smokeless Tobacco Never Used       Past Family History  Family History   Problem Relation Age of Onset    Hypertension Mother     Breast cancer Mother        Past Social history  Social History     Socioeconomic History    Marital status: /Civil Union     Spouse name: Not on file    Number of children: Not on file    Years of education: Not on file    Highest education level: Not on file   Occupational History    Not on file   Social Needs    Financial resource strain: Not on file    Food insecurity:     Worry: Not on file     Inability: Not on file    Transportation needs:     Medical: Not on file     Non-medical: Not on file   Tobacco Use    Smoking status: Never Smoker    Smokeless tobacco: Never Used   Substance and Sexual Activity    Alcohol use: Yes     Comment: 1-2 beers rarely    Drug use: No    Sexual activity: Not on file   Lifestyle    Physical activity:     Days per week: Not on file     Minutes per session: Not on file    Stress: Not on file   Relationships    Social connections:     Talks on phone: Not on file     Gets together: Not on file     Attends [employer] service: Not on file     Active member of club or organization: Not on file     Attends meetings of clubs or organizations: Not on file     Relationship status: Not on file    Intimate partner violence:     Fear of current or ex partner: Not on file     Emotionally abused: Not on file     Physically abused: Not on file     Forced sexual activity: Not on file   Other Topics Concern    Not on file   Social History Narrative    DOES NOT CONSUME CAFFEINE       Current Medications  Current Outpatient Medications   Medication Sig Dispense Refill    acetaminophen (TYLENOL) 500 mg tablet Take 500 mg by mouth every 6 (six) hours as needed for mild pain      albuterol (PROVENTIL HFA,VENTOLIN HFA) 90 mcg/act inhaler Inhale 1 puff every 6 (six) hours as needed for wheezing 1 Inhaler 4    amLODIPine (NORVASC) 5 mg tablet Take 5 mg by mouth daily  0    aspirin 81 MG tablet Take 1 tablet by mouth daily      atorvastatin (LIPITOR) 80 mg tablet Take 1 tablet (80 mg total) by mouth daily 90 tablet 3    fluticasone (FLONASE) 50 mcg/act nasal spray 2 sprays into each nostril daily 1 Bottle 4    lisinopril (ZESTRIL) 20 mg tablet Take 1 tablet (20 mg total) by mouth 2 (two) times a day 180 tablet 0    metoprolol tartrate (LOPRESSOR) 25 mg tablet take 1 tablet by mouth every 12 hours 180 tablet 1    nitroglycerin (NITROSTAT) 0 4 mg SL tablet Place 1 tablet (0 4 mg total) under the tongue every 5 (five) minutes as needed for chest pain 90 tablet 1    ofloxacin (FLOXIN) 0 3 % otic solution Administer 5 drops into the left ear 2 (two) times a day 10 mL 5     No current facility-administered medications for this visit  Allergies  Allergies   Allergen Reactions    Abciximab Other (See Comments)     rcd 9/27/01 & 5/16/03    Escitalopram Rash         The following portions of the patient's history were reviewed and updated as appropriate: allergies, current medications, past medical history, past social history, past surgical history and problem list       Vitals  Vitals:    12/10/19 1018   BP: 130/68   Pulse: 68   Weight: 104 kg (229 lb 9 6 oz)   Height: 5' 10" (1 778 m)         Physical Exam  Constitutional   General appearance: Patient is seated and in no acute distress, well appearing and well nourished  Head and Face   Head and face: Normal     Eyes   Conjunctiva and lids: No erythema, swelling or discharge  Ears, Nose, Mouth, and Throat   Hearing: Normal     Pulmonary   Respiratory effort: No increased work of breathing or signs of respiratory distress  Cardiovascular   Examination of extremities for edema and/or varicosities: Normal     Abdomen   Abdomen: Non-tender, no masses  Musculoskeletal   Gait and station: Normal   Skin   Skin and subcutaneous tissue: Warm, dry, and intact  No visible lesions or rashes    Psychiatric   Judgment and insight: Normal  Recent and remote memory:  Normal  Mood and affect: Normal      Results  No results found for this or any previous visit (from the past 1 hour(s)) ]  Lab Results   Component Value Date    PSA 5 4 (H) 12/02/2019    PSA 4 6 (H) 05/24/2019     Lab Results   Component Value Date    CALCIUM 9 4 10/09/2019     07/19/2017 K 4 9 10/09/2019    CO2 29 10/09/2019     10/09/2019    BUN 17 10/09/2019    CREATININE 1 18 10/09/2019     Lab Results   Component Value Date    WBC 7 61 06/28/2019    HGB 13 6 06/28/2019    HCT 41 8 06/28/2019    MCV 91 06/28/2019     06/28/2019       Orders  Orders Placed This Encounter   Procedures    MRI prostate multiparametric wo w contrast     Standing Status:   Future     Standing Expiration Date:   12/10/2023     Scheduling Instructions: There is no preparation for this test  Please leave your jewelry and valuables at home, wedding rings are the exception  Please bring your physician order, insurance cards, a form of photo ID and a list of your medications with you  Arrive 15 minutes prior to your appointment time in order to       register  Please bring any prior CT or MRI studies of this area that were not performed at a [facility]  To schedule this appointment, please contact Central Scheduling at [phone]  Order Specific Question:   Reason for Exam:     Answer:   elevated PSA s/p negative biopsies     Order Specific Question:   What is the patient's sedation requirement? Answer:   Unknown    Basic metabolic panel     This is a patient instruction: Patient fasting for 8 hours or longer recommended       Standing Status:   Future     Standing Expiration Date:   12/10/2020

## 2019-12-10 ENCOUNTER — OFFICE VISIT (OUTPATIENT)
Dept: UROLOGY | Facility: MEDICAL CENTER | Age: 79
End: 2019-12-10
Payer: COMMERCIAL

## 2019-12-10 VITALS
SYSTOLIC BLOOD PRESSURE: 130 MMHG | HEART RATE: 68 BPM | WEIGHT: 229.6 LBS | DIASTOLIC BLOOD PRESSURE: 68 MMHG | HEIGHT: 70 IN | BODY MASS INDEX: 32.87 KG/M2

## 2019-12-10 DIAGNOSIS — N40.1 BPH WITH OBSTRUCTION/LOWER URINARY TRACT SYMPTOMS: Primary | ICD-10-CM

## 2019-12-10 DIAGNOSIS — N13.8 BPH WITH OBSTRUCTION/LOWER URINARY TRACT SYMPTOMS: Primary | ICD-10-CM

## 2019-12-10 DIAGNOSIS — R97.20 ELEVATED PROSTATE SPECIFIC ANTIGEN (PSA): ICD-10-CM

## 2019-12-10 PROCEDURE — 99213 OFFICE O/P EST LOW 20 MIN: CPT | Performed by: PHYSICIAN ASSISTANT

## 2019-12-13 ENCOUNTER — APPOINTMENT (OUTPATIENT)
Dept: LAB | Age: 79
End: 2019-12-13
Payer: COMMERCIAL

## 2019-12-13 DIAGNOSIS — R97.20 ELEVATED PROSTATE SPECIFIC ANTIGEN (PSA): ICD-10-CM

## 2019-12-13 LAB
ANION GAP SERPL CALCULATED.3IONS-SCNC: 3 MMOL/L (ref 4–13)
BUN SERPL-MCNC: 20 MG/DL (ref 5–25)
CALCIUM SERPL-MCNC: 9.2 MG/DL (ref 8.3–10.1)
CHLORIDE SERPL-SCNC: 107 MMOL/L (ref 100–108)
CO2 SERPL-SCNC: 29 MMOL/L (ref 21–32)
CREAT SERPL-MCNC: 1.11 MG/DL (ref 0.6–1.3)
GFR SERPL CREATININE-BSD FRML MDRD: 63 ML/MIN/1.73SQ M
GLUCOSE SERPL-MCNC: 84 MG/DL (ref 65–140)
POTASSIUM SERPL-SCNC: 4.3 MMOL/L (ref 3.5–5.3)
SODIUM SERPL-SCNC: 139 MMOL/L (ref 136–145)

## 2019-12-13 PROCEDURE — 36415 COLL VENOUS BLD VENIPUNCTURE: CPT

## 2019-12-13 PROCEDURE — 80048 BASIC METABOLIC PNL TOTAL CA: CPT

## 2019-12-20 ENCOUNTER — HOSPITAL ENCOUNTER (OUTPATIENT)
Dept: RADIOLOGY | Age: 79
Discharge: HOME | End: 2019-12-20
Payer: COMMERCIAL

## 2019-12-20 DIAGNOSIS — R97.20 ELEVATED PROSTATE SPECIFIC ANTIGEN (PSA): ICD-10-CM

## 2019-12-20 PROCEDURE — 76377 3D RENDER W/INTRP POSTPROCES: CPT

## 2019-12-20 PROCEDURE — 72197 MRI PELVIS W/O & W/DYE: CPT

## 2019-12-20 PROCEDURE — A9585 GADOBUTROL INJECTION: HCPCS | Performed by: PHYSICIAN ASSISTANT

## 2019-12-20 RX ADMIN — GADOBUTROL 10 ML: 604.72 INJECTION INTRAVENOUS at 11:48

## 2020-01-03 ENCOUNTER — OFFICE VISIT (OUTPATIENT)
Dept: UROLOGY | Facility: MEDICAL CENTER | Age: 80
End: 2020-01-03
Payer: COMMERCIAL

## 2020-01-03 VITALS
SYSTOLIC BLOOD PRESSURE: 138 MMHG | HEIGHT: 70 IN | BODY MASS INDEX: 32.35 KG/M2 | DIASTOLIC BLOOD PRESSURE: 82 MMHG | WEIGHT: 226 LBS | HEART RATE: 68 BPM

## 2020-01-03 DIAGNOSIS — N13.8 BPH WITH OBSTRUCTION/LOWER URINARY TRACT SYMPTOMS: Primary | ICD-10-CM

## 2020-01-03 DIAGNOSIS — N40.1 BPH WITH OBSTRUCTION/LOWER URINARY TRACT SYMPTOMS: Primary | ICD-10-CM

## 2020-01-03 DIAGNOSIS — R97.20 ELEVATED PSA: ICD-10-CM

## 2020-01-03 LAB
SL AMB  POCT GLUCOSE, UA: NORMAL
SL AMB LEUKOCYTE ESTERASE,UA: NORMAL
SL AMB POCT BILIRUBIN,UA: NORMAL
SL AMB POCT BLOOD,UA: NORMAL
SL AMB POCT CLARITY,UA: CLEAR
SL AMB POCT COLOR,UA: YELLOW
SL AMB POCT KETONES,UA: NORMAL
SL AMB POCT NITRITE,UA: NORMAL
SL AMB POCT PH,UA: 6.5
SL AMB POCT SPECIFIC GRAVITY,UA: 1.01
SL AMB POCT URINE PROTEIN: NORMAL
SL AMB POCT UROBILINOGEN: 0.2

## 2020-01-03 PROCEDURE — 99213 OFFICE O/P EST LOW 20 MIN: CPT | Performed by: UROLOGY

## 2020-01-03 PROCEDURE — 81003 URINALYSIS AUTO W/O SCOPE: CPT | Performed by: UROLOGY

## 2020-01-03 NOTE — PATIENT INSTRUCTIONS
Prostate Specific Antigen   WHAT YOU NEED TO KNOW:   What is a prostate specific antigen (PSA) test?  A PSA test is a blood test used to screen men for prostate cancer  A PSA test is also used to monitor how well prostate cancer treatment is working  What other test may be done with a PSA test?  A digital rectal exam is usually performed with a PSA test  Your healthcare provider will insert a gloved finger into your rectum to feel if your prostate is large, firm, or has lumps  Who needs a PSA test?  Some experts recommend a PSA test for men ages 48 to 79  They also recommend testing men with a high risk for prostate cancer at age 36 or 39  Risk factors may include being  or having a brother or father with prostate cancer  Other experts may not recommend PSA testing  Your healthcare provider can help you decide if you need a PSA test    What do the results of a PSA test mean? Most healthy men have a PSA level less than 4 ng/mL  If your PSA level is higher than 4 ng/mL you may need more tests  Examples include blood or urine tests, an ultrasound, MRI, CT scan, or a prostate biopsy  Ask your healthcare provider for more information on these tests  What else can cause a high PSA level? A high PSA level does not always mean you have prostate cancer  Certain conditions or procedures can increase PSA levels  Examples include:  · Older age    · Procedures such as a prostate biopsy or a cystoscopy    · An enlarged prostate    · Recent sexual activity    · A prostate infection    · Certain exercises that put pressure on the prostate such as bicycling    · Medicine such as testosterone  CARE AGREEMENT:   You have the right to help plan your care  Learn about your health condition and how it may be treated  Discuss treatment options with your caregivers to decide what care you want to receive  You always have the right to refuse treatment  The above information is an  only   It is not intended as medical advice for individual conditions or treatments  Talk to your doctor, nurse or pharmacist before following any medical regimen to see if it is safe and effective for you  © 2017 [employer] Information is for End User's use only and may not be sold, redistributed or otherwise used for commercial purposes  All illustrations and images included in CareNotes® are the copyrighted property of A D A M , Inc  or Petar Herr

## 2020-01-03 NOTE — PROGRESS NOTES
Chief complaint:  Elevated PSA    HPI:  25-year-old male who has recently been noted to have a rising PSA  Most recently his PSA was 5 4  He reports he is voiding well  His stream is good and he empties his bladder adequately  There is no gross hematuria, dysuria or symptoms of infection  He has not had any change in his voiding pattern  He remains pleased with how he is urinating  Patient has had a prior prostate biopsy  He recently underwent multiparametric MRI of the prostate is here to review results  Past medical history, allergies, medications, past surgical history, tobacco history and family history were reviewed  The MRI reveals a 101 g prostate with no lesion identified  and is felt to represent a PIRADS 1 gland  Urinalysis today is negative  AUA symptom score is 2 and the patient is pleased with his voiding pattern      I discussed the findings of the MRI with the patient  We discussed the PIRADS scoring system  At this point I do not feel a repeat biopsy is necessary  The patient will return in 6 months and we will plan to recheck a PSA prior to his next visit  51    15 minutes was spent in face-to-face discussion regarding the above

## 2020-01-03 NOTE — LETTER
January 3, 2020     Kallie Lisa MD  [address] [facility]    Patient: Stefanie Barroso   YOB: 1940   Date of Visit: 1/3/2020       Dear Dr Tata Adkins: Thank you for referring Stefanie Barrsoo to me for evaluation  Below are my notes for this consultation  If you have questions, please do not hesitate to call me  I look forward to following your patient along with you  Sincerely,        Volodymyr Jewell MD        CC: No Recipients  Volodymyr Jewell MD  1/3/2020 12:06 PM  Sign at close encounter  Chief complaint:  Elevated PSA    HPI:  23-year-old male who has recently been noted to have a rising PSA  Most recently his PSA was 5 4  He reports he is voiding well  His stream is good and he empties his bladder adequately  There is no gross hematuria, dysuria or symptoms of infection  He has not had any change in his voiding pattern  He remains pleased with how he is urinating  Patient has had a prior prostate biopsy  He recently underwent multiparametric MRI of the prostate is here to review results  Past medical history, allergies, medications, past surgical history, tobacco history and family history were reviewed  The MRI reveals a 101 g prostate with no lesion identified  and is felt to represent a PIRADS 1 gland  Urinalysis today is negative  AUA symptom score is 2 and the patient is pleased with his voiding pattern      I discussed the findings of the MRI with the patient  We discussed the PIRADS scoring system  At this point I do not feel a repeat biopsy is necessary  The patient will return in 6 months and we will plan to recheck a PSA prior to his next visit

## 2020-01-22 DIAGNOSIS — I10 ESSENTIAL HYPERTENSION: ICD-10-CM

## 2020-01-22 RX ORDER — LISINOPRIL 20 MG/1
20 TABLET ORAL 2 TIMES DAILY
Qty: 180 TABLET | Refills: 3 | Status: SHIPPED | OUTPATIENT
Start: 2020-01-22 | End: 2020-07-13 | Stop reason: SDUPTHER

## 2020-01-28 ENCOUNTER — OFFICE VISIT (OUTPATIENT)
Dept: INTERNAL MEDICINE CLINIC | Facility: CLINIC | Age: 80
End: 2020-01-28
Payer: COMMERCIAL

## 2020-01-28 VITALS
SYSTOLIC BLOOD PRESSURE: 110 MMHG | TEMPERATURE: 98.7 F | HEART RATE: 60 BPM | BODY MASS INDEX: 32.07 KG/M2 | WEIGHT: 224 LBS | HEIGHT: 70 IN | OXYGEN SATURATION: 97 % | DIASTOLIC BLOOD PRESSURE: 60 MMHG

## 2020-01-28 DIAGNOSIS — I10 ESSENTIAL HYPERTENSION: Primary | ICD-10-CM

## 2020-01-28 DIAGNOSIS — G47.33 OSA (OBSTRUCTIVE SLEEP APNEA): ICD-10-CM

## 2020-01-28 DIAGNOSIS — I25.10 CORONARY ARTERY DISEASE INVOLVING NATIVE CORONARY ARTERY OF NATIVE HEART WITHOUT ANGINA PECTORIS: ICD-10-CM

## 2020-01-28 DIAGNOSIS — R73.01 IMPAIRED FASTING GLUCOSE: ICD-10-CM

## 2020-01-28 DIAGNOSIS — N13.8 BPH WITH OBSTRUCTION/LOWER URINARY TRACT SYMPTOMS: ICD-10-CM

## 2020-01-28 DIAGNOSIS — E78.00 HYPERCHOLESTEROLEMIA: ICD-10-CM

## 2020-01-28 DIAGNOSIS — N40.1 BPH WITH OBSTRUCTION/LOWER URINARY TRACT SYMPTOMS: ICD-10-CM

## 2020-01-28 PROCEDURE — 99214 OFFICE O/P EST MOD 30 MIN: CPT | Performed by: INTERNAL MEDICINE

## 2020-01-28 PROCEDURE — 1160F RVW MEDS BY RX/DR IN RCRD: CPT | Performed by: INTERNAL MEDICINE

## 2020-01-28 PROCEDURE — 1036F TOBACCO NON-USER: CPT | Performed by: INTERNAL MEDICINE

## 2020-01-28 PROCEDURE — 3074F SYST BP LT 130 MM HG: CPT | Performed by: INTERNAL MEDICINE

## 2020-01-28 PROCEDURE — 3078F DIAST BP <80 MM HG: CPT | Performed by: INTERNAL MEDICINE

## 2020-01-28 RX ORDER — AMLODIPINE BESYLATE 5 MG/1
5 TABLET ORAL DAILY
Qty: 90 TABLET | Refills: 3 | Status: SHIPPED | OUTPATIENT
Start: 2020-01-28 | End: 2020-07-13 | Stop reason: SDUPTHER

## 2020-01-28 NOTE — PROGRESS NOTES
[facility] [place]      NAME: Ranjeet Coombs  AGE: 67 y o  SEX: male  : 1940   MRN: [id number]    DATE: 2020  TIME: 12:34 PM    Assessment and Plan   1  Essential hypertension  Well controlled  - amLODIPine (NORVASC) 5 mg tablet; Take 1 tablet (5 mg total) by mouth daily  Dispense: 90 tablet; Refill: 3    2  Coronary artery disease involving native coronary artery of native heart without angina pectoris  No angina  On appropriate risk factor modification and aspirin  3  Hypercholesterolemia  Well controlled on atorvastatin  4  BPH with obstruction/lower urinary tract symptoms  The patient's symptoms are mild  No specific intervention is necessary  5  JORGE (obstructive sleep apnea)  Tolerating CPAP  6  Impaired fasting glucose  Stable on diet  The patient is doing well  He has follow-up arranged with dermatology  He will be contacting his cardiologist's office for follow-up in March  He will continue his medications as they are for now  Return to office in:  3 months    Chief Complaint     Chief Complaint   Patient presents with    Follow-up     3 month follow up        History of Present Illness     The patient returned to the office for re-evaluation of hypertension, hypercholesterolemia, impaired fasting glucose, coronary artery disease, BPH, and obstructive sleep apnea  Since his last visit he has been feeling well  He has had no chest pain, shortness of breath, palpitations, or dizziness  He is tolerating his medications well  The following portions of the patient's history were reviewed and updated as appropriate: allergies, current medications, past family history, past medical history, past social history, past surgical history and problem list     Review of Systems   Review of Systems   Constitutional: Negative  HENT: Negative for congestion, ear pain, postnasal drip, rhinorrhea, sore throat and trouble swallowing      Eyes: Negative for pain, discharge, redness and visual disturbance  Respiratory: Negative for cough, shortness of breath and wheezing  Cardiovascular: Negative  Gastrointestinal: Negative  Endocrine: Negative  Genitourinary: Negative for difficulty urinating, dysuria, frequency, hematuria and urgency  Musculoskeletal: Negative for arthralgias, gait problem, joint swelling and myalgias  Skin: Negative for rash  Neurological: Negative for dizziness, speech difficulty, weakness, light-headedness, numbness and headaches  Hematological: Negative  Psychiatric/Behavioral: Negative for confusion, decreased concentration, dysphoric mood and sleep disturbance  The patient is not nervous/anxious  Active Problem List     Patient Active Problem List   Diagnosis    CAD (coronary artery disease)    Hypercholesterolemia    Essential hypertension    Impaired fasting glucose    Malignant melanoma of neck (HCC)    JORGE (obstructive sleep apnea)    Bradycardia    Depression    Elevated prostate specific antigen (PSA)    BPH with obstruction/lower urinary tract symptoms    PLMD (periodic limb movement disorder)    Obesity (BMI 30-39  9)    Bronchospasm    Left forearm pain       Objective   /60 (BP Location: Left arm, Patient Position: Sitting, Cuff Size: Standard)   Pulse 60   Temp 98 7 °F (37 1 °C) (Tympanic)   Ht 5' 10" (1 778 m)   Wt 102 kg (224 lb)   SpO2 97%   BMI 32 14 kg/m²     Physical Exam   Constitutional: He is oriented to person, place, and time  He appears well-developed and well-nourished  No distress  HENT:   Head: Normocephalic and atraumatic  Neck: Neck supple  No JVD present  No tracheal deviation present  No thyromegaly present  Cardiovascular: Normal rate, regular rhythm, normal heart sounds and intact distal pulses  Exam reveals no gallop and no friction rub  No murmur heard  Pulmonary/Chest: Effort normal and breath sounds normal  He has no wheezes  He has no rales   He exhibits no tenderness  Abdominal: Soft  Bowel sounds are normal  He exhibits no distension and no mass  There is no tenderness  There is no rebound  Musculoskeletal: Normal range of motion  He exhibits no edema or tenderness  Lymphadenopathy:     He has no cervical adenopathy  Neurological: He is alert and oriented to person, place, and time  Skin: Skin is warm and dry  Psychiatric: He has a normal mood and affect   His behavior is normal  Judgment and thought content normal        Pertinent Laboratory/Diagnostic Studies:  Office Visit on 01/03/2020   Component Date Value Ref Range Status     COLOR,UA 01/03/2020 yellow   Final    CLARITY,UA 01/03/2020 clear   Final    SPECIFIC GRAVITY,UA 01/03/2020 1 015   Final     PH,UA 01/03/2020 6 5   Final    LEUKOCYTE ESTERASE,UA 01/03/2020 neg   Final    NITRITE,UA 01/03/2020 neg   Final    GLUCOSE, UA 01/03/2020 neg   Final    KETONES,UA 01/03/2020 neg   Final    BILIRUBIN,UA 01/03/2020 neg   Final    BLOOD,UA 01/03/2020 neg   Final    POCT URINE PROTEIN 01/03/2020 neg   Final    SL AMB POCT UROBILINOGEN 01/03/2020 0 2   Final   Appointment on 12/13/2019   Component Date Value Ref Range Status    Sodium 12/13/2019 139  136 - 145 mmol/L Final    Potassium 12/13/2019 4 3  3 5 - 5 3 mmol/L Final    Chloride 12/13/2019 107  100 - 108 mmol/L Final    CO2 12/13/2019 29  21 - 32 mmol/L Final    ANION GAP 12/13/2019 3* 4 - 13 mmol/L Final    BUN 12/13/2019 20  5 - 25 mg/dL Final    Creatinine 12/13/2019 1 11  0 60 - 1 30 mg/dL Final    Glucose 12/13/2019 84  65 - 140 mg/dL Final    Calcium 12/13/2019 9 2  8 3 - 10 1 mg/dL Final    eGFR 12/13/2019 63  ml/min/1 73sq m Final   Appointment on 12/02/2019   Component Date Value Ref Range Status    Prostate Specific Antigen Total 12/02/2019 5 4* 0 0 - 4 0 ng/mL Final    PSA, Free 12/02/2019 1 63  N/A ng/mL Final    PSA, Free Pct 12/02/2019 30 2  % Final           Current Medications     Current Outpatient Medications:     acetaminophen (TYLENOL) 500 mg tablet, Take 500 mg by mouth every 6 (six) hours as needed for mild pain, Disp: , Rfl:     albuterol (PROVENTIL HFA,VENTOLIN HFA) 90 mcg/act inhaler, Inhale 1 puff every 6 (six) hours as needed for wheezing, Disp: 1 Inhaler, Rfl: 4    amLODIPine (NORVASC) 5 mg tablet, Take 1 tablet (5 mg total) by mouth daily, Disp: 90 tablet, Rfl: 3    aspirin 81 MG tablet, Take 1 tablet by mouth daily, Disp: , Rfl:     atorvastatin (LIPITOR) 80 mg tablet, Take 1 tablet (80 mg total) by mouth daily, Disp: 90 tablet, Rfl: 3    fluticasone (FLONASE) 50 mcg/act nasal spray, 2 sprays into each nostril daily, Disp: 1 Bottle, Rfl: 4    lisinopril (ZESTRIL) 20 mg tablet, Take 1 tablet (20 mg total) by mouth 2 (two) times a day, Disp: 180 tablet, Rfl: 3    metoprolol tartrate (LOPRESSOR) 25 mg tablet, take 1 tablet by mouth every 12 hours, Disp: 180 tablet, Rfl: 1    nitroglycerin (NITROSTAT) 0 4 mg SL tablet, Place 1 tablet (0 4 mg total) under the tongue every 5 (five) minutes as needed for chest pain, Disp: 90 tablet, Rfl: 1    ofloxacin (FLOXIN) 0 3 % otic solution, Administer 5 drops into the left ear 2 (two) times a day, Disp: 10 mL, Rfl: 5      Arely Cabezas MD

## 2020-02-20 DIAGNOSIS — R07.89 CHEST TIGHTNESS: ICD-10-CM

## 2020-06-09 DIAGNOSIS — I25.10 CORONARY ARTERY DISEASE INVOLVING NATIVE HEART WITHOUT ANGINA PECTORIS, UNSPECIFIED VESSEL OR LESION TYPE: ICD-10-CM

## 2020-06-09 RX ORDER — NITROGLYCERIN 0.4 MG/1
0.4 TABLET SUBLINGUAL
Qty: 90 TABLET | Refills: 1 | Status: SHIPPED | OUTPATIENT
Start: 2020-06-09 | End: 2020-07-13 | Stop reason: SDUPTHER

## 2020-07-02 ENCOUNTER — APPOINTMENT (OUTPATIENT)
Dept: LAB | Age: 80
End: 2020-07-02
Payer: COMMERCIAL

## 2020-07-02 DIAGNOSIS — N13.8 BPH WITH OBSTRUCTION/LOWER URINARY TRACT SYMPTOMS: ICD-10-CM

## 2020-07-02 DIAGNOSIS — R97.20 ELEVATED PSA: ICD-10-CM

## 2020-07-02 DIAGNOSIS — N40.1 BPH WITH OBSTRUCTION/LOWER URINARY TRACT SYMPTOMS: ICD-10-CM

## 2020-07-02 PROCEDURE — 36415 COLL VENOUS BLD VENIPUNCTURE: CPT

## 2020-07-02 PROCEDURE — 84154 ASSAY OF PSA FREE: CPT

## 2020-07-02 PROCEDURE — 84153 ASSAY OF PSA TOTAL: CPT

## 2020-07-04 LAB
PSA FREE MFR SERPL: 26.3 %
PSA FREE SERPL-MCNC: 0.92 NG/ML
PSA SERPL-MCNC: 3.5 NG/ML (ref 0–4)

## 2020-07-10 ENCOUNTER — OFFICE VISIT (OUTPATIENT)
Dept: UROLOGY | Facility: MEDICAL CENTER | Age: 80
End: 2020-07-10
Payer: COMMERCIAL

## 2020-07-10 VITALS
BODY MASS INDEX: 32.35 KG/M2 | SYSTOLIC BLOOD PRESSURE: 118 MMHG | DIASTOLIC BLOOD PRESSURE: 72 MMHG | WEIGHT: 226 LBS | HEIGHT: 70 IN | TEMPERATURE: 97.1 F

## 2020-07-10 DIAGNOSIS — N40.1 BPH WITH OBSTRUCTION/LOWER URINARY TRACT SYMPTOMS: Primary | ICD-10-CM

## 2020-07-10 DIAGNOSIS — N13.8 BPH WITH OBSTRUCTION/LOWER URINARY TRACT SYMPTOMS: Primary | ICD-10-CM

## 2020-07-10 DIAGNOSIS — R97.20 ELEVATED PROSTATE SPECIFIC ANTIGEN (PSA): ICD-10-CM

## 2020-07-10 PROCEDURE — 1036F TOBACCO NON-USER: CPT | Performed by: UROLOGY

## 2020-07-10 PROCEDURE — 1160F RVW MEDS BY RX/DR IN RCRD: CPT | Performed by: UROLOGY

## 2020-07-10 PROCEDURE — 4040F PNEUMOC VAC/ADMIN/RCVD: CPT | Performed by: UROLOGY

## 2020-07-10 PROCEDURE — 3008F BODY MASS INDEX DOCD: CPT | Performed by: UROLOGY

## 2020-07-10 PROCEDURE — 3074F SYST BP LT 130 MM HG: CPT | Performed by: UROLOGY

## 2020-07-10 PROCEDURE — 3078F DIAST BP <80 MM HG: CPT | Performed by: UROLOGY

## 2020-07-10 PROCEDURE — 99214 OFFICE O/P EST MOD 30 MIN: CPT | Performed by: UROLOGY

## 2020-07-10 NOTE — ASSESSMENT & PLAN NOTE
PSA has improved  With most recent PSA being 3 5 (July 2, 2020)  We will continue to follow and plan to recheck a PSA in 1 year  MRI done in December 2019 reveals a 101 g prostate and was felt to represent a PIRADS 1 MRI

## 2020-07-10 NOTE — PROGRESS NOTES
Assessment/Plan:    BPH with obstruction/lower urinary tract symptoms  AUA symptom score is 2  He is pleased with his voiding pattern  Urinalysis in January was negative  He is doing well and we will plan to continue to follow his voiding pattern with watchful waiting  He will return in 1 year  Elevated prostate specific antigen (PSA)  PSA has improved  With most recent PSA being 3 5 (July 2, 2020)  We will continue to follow and plan to recheck a PSA in 1 year  MRI done in December 2019 reveals a 101 g prostate and was felt to represent a PIRADS 1 MRI  Diagnoses and all orders for this visit:    BPH with obstruction/lower urinary tract symptoms    Elevated prostate specific antigen (PSA)  -     PSA, total and free; Future          Subjective:      Patient ID: Massimo Frost is a 79 y o  male  Benign Prostatic Hypertrophy   This is a chronic problem  The current episode started more than 1 year ago  The problem is unchanged  Irritative symptoms do not include frequency, nocturia or urgency  Nocturia x 1  Obstructive symptoms include a slower stream  Obstructive symptoms do not include dribbling, incomplete emptying, an intermittent stream, straining or a weak stream  Pertinent negatives include no chills, dysuria, genital pain, hematuria, hesitancy, nausea or vomiting  AUA score is 0-7  His sexual activity is non-contributory to the current illness  Nothing aggravates the symptoms  Past treatments include nothing  The following portions of the patient's history were reviewed and updated as appropriate: allergies, current medications, past family history, past medical history, past social history, past surgical history and problem list     Review of Systems   Constitutional: Negative for chills, diaphoresis, fatigue and fever  HENT: Negative  Eyes: Negative  Respiratory: Negative  Cardiovascular: Negative  Gastrointestinal: Negative  Negative for nausea and vomiting  Endocrine: Negative  Genitourinary: Negative for dysuria, frequency, hematuria, hesitancy, incomplete emptying, nocturia and urgency  See HPI   Musculoskeletal: Negative  Skin: Negative  Allergic/Immunologic: Negative  Neurological: Negative  Hematological: Negative  Psychiatric/Behavioral: Negative  AUA SYMPTOM SCORE      Most Recent Value   AUA SYMPTOM SCORE   How often have you had a sensation of not emptying your bladder completely after you finished urinating? 1   How often have you had to urinate again less than two hours after you finished urinating? 0   How often have you found you stopped and started again several times when you urinate?  0   How often have you found it difficult to postpone urination? 0   How often have you had a weak urinary stream?  0   How often have you had to push or strain to begin urination? 0   How many times did you most typically get up to urinate from the time you went to bed at night until the time you got up in the morning? 1   Quality of Life: If you were to spend the rest of your life with your urinary condition just the way it is now, how would you feel about that?  1   AUA SYMPTOM SCORE  2        Objective:      /72 (BP Location: Left arm, Patient Position: Sitting, Cuff Size: Adult)   Temp (!) 97 1 °F (36 2 °C)   Ht 5' 10" (1 778 m)   Wt 103 kg (226 lb)   BMI 32 43 kg/m²          Physical Exam   Constitutional: He is oriented to person, place, and time  He appears well-developed and well-nourished  HENT:   Head: Normocephalic and atraumatic  Eyes: Conjunctivae are normal    Neck: Neck supple  Cardiovascular: Normal rate  Pulmonary/Chest: Effort normal    Abdominal: Soft  Bowel sounds are normal  He exhibits no distension and no mass  There is no tenderness  There is no rebound, no guarding and no CVA tenderness  No hernia  Genitourinary: Rectum normal, testes normal and penis normal  Right testis shows no mass  Left testis shows no mass  No phimosis or hypospadias  Genitourinary Comments: Prostate 2 times enlarged and palpably benign  There is no nodularity noted  Musculoskeletal: He exhibits no edema  Neurological: He is alert and oriented to person, place, and time  Skin: Skin is warm and dry  Psychiatric: He has a normal mood and affect  His behavior is normal  Judgment and thought content normal    Vitals reviewed

## 2020-07-10 NOTE — ASSESSMENT & PLAN NOTE
AUA symptom score is 2  He is pleased with his voiding pattern  Urinalysis in January was negative  He is doing well and we will plan to continue to follow his voiding pattern with watchful waiting  He will return in 1 year

## 2020-07-13 ENCOUNTER — OFFICE VISIT (OUTPATIENT)
Dept: INTERNAL MEDICINE CLINIC | Facility: CLINIC | Age: 80
End: 2020-07-13
Payer: COMMERCIAL

## 2020-07-13 VITALS
HEIGHT: 70 IN | BODY MASS INDEX: 31.92 KG/M2 | DIASTOLIC BLOOD PRESSURE: 80 MMHG | HEART RATE: 54 BPM | WEIGHT: 223 LBS | TEMPERATURE: 97.5 F | SYSTOLIC BLOOD PRESSURE: 150 MMHG | OXYGEN SATURATION: 98 %

## 2020-07-13 DIAGNOSIS — I25.10 CORONARY ARTERY DISEASE INVOLVING NATIVE HEART WITHOUT ANGINA PECTORIS, UNSPECIFIED VESSEL OR LESION TYPE: ICD-10-CM

## 2020-07-13 DIAGNOSIS — G47.33 OSA (OBSTRUCTIVE SLEEP APNEA): ICD-10-CM

## 2020-07-13 DIAGNOSIS — E78.00 HYPERCHOLESTEROLEMIA: ICD-10-CM

## 2020-07-13 DIAGNOSIS — R73.01 IMPAIRED FASTING GLUCOSE: ICD-10-CM

## 2020-07-13 DIAGNOSIS — Z00.00 MEDICARE ANNUAL WELLNESS VISIT, SUBSEQUENT: Primary | ICD-10-CM

## 2020-07-13 DIAGNOSIS — J98.01 BRONCHOSPASM: ICD-10-CM

## 2020-07-13 DIAGNOSIS — R97.20 ELEVATED PROSTATE SPECIFIC ANTIGEN (PSA): ICD-10-CM

## 2020-07-13 DIAGNOSIS — I25.10 CORONARY ARTERY DISEASE INVOLVING NATIVE CORONARY ARTERY OF NATIVE HEART WITHOUT ANGINA PECTORIS: ICD-10-CM

## 2020-07-13 DIAGNOSIS — I10 ESSENTIAL HYPERTENSION: ICD-10-CM

## 2020-07-13 PROCEDURE — 1125F AMNT PAIN NOTED PAIN PRSNT: CPT | Performed by: INTERNAL MEDICINE

## 2020-07-13 PROCEDURE — 3079F DIAST BP 80-89 MM HG: CPT | Performed by: INTERNAL MEDICINE

## 2020-07-13 PROCEDURE — 1160F RVW MEDS BY RX/DR IN RCRD: CPT | Performed by: INTERNAL MEDICINE

## 2020-07-13 PROCEDURE — 4040F PNEUMOC VAC/ADMIN/RCVD: CPT | Performed by: INTERNAL MEDICINE

## 2020-07-13 PROCEDURE — 3077F SYST BP >= 140 MM HG: CPT | Performed by: INTERNAL MEDICINE

## 2020-07-13 PROCEDURE — 1170F FXNL STATUS ASSESSED: CPT | Performed by: INTERNAL MEDICINE

## 2020-07-13 PROCEDURE — 1101F PT FALLS ASSESS-DOCD LE1/YR: CPT | Performed by: INTERNAL MEDICINE

## 2020-07-13 PROCEDURE — G0439 PPPS, SUBSEQ VISIT: HCPCS | Performed by: INTERNAL MEDICINE

## 2020-07-13 PROCEDURE — 3008F BODY MASS INDEX DOCD: CPT | Performed by: INTERNAL MEDICINE

## 2020-07-13 PROCEDURE — 1036F TOBACCO NON-USER: CPT | Performed by: INTERNAL MEDICINE

## 2020-07-13 PROCEDURE — 99214 OFFICE O/P EST MOD 30 MIN: CPT | Performed by: INTERNAL MEDICINE

## 2020-07-13 PROCEDURE — 3288F FALL RISK ASSESSMENT DOCD: CPT | Performed by: INTERNAL MEDICINE

## 2020-07-13 RX ORDER — ATORVASTATIN CALCIUM 80 MG/1
80 TABLET, FILM COATED ORAL DAILY
Qty: 90 TABLET | Refills: 3 | Status: SHIPPED | OUTPATIENT
Start: 2020-07-13 | End: 2021-07-13 | Stop reason: SDUPTHER

## 2020-07-13 RX ORDER — LISINOPRIL 20 MG/1
20 TABLET ORAL 2 TIMES DAILY
Qty: 180 TABLET | Refills: 3 | Status: SHIPPED | OUTPATIENT
Start: 2020-07-13 | End: 2021-07-27 | Stop reason: SDUPTHER

## 2020-07-13 RX ORDER — NITROGLYCERIN 0.4 MG/1
0.4 TABLET SUBLINGUAL
Qty: 100 TABLET | Refills: 1 | Status: SHIPPED | OUTPATIENT
Start: 2020-07-13 | End: 2020-09-14

## 2020-07-13 RX ORDER — ALBUTEROL SULFATE 90 UG/1
1 AEROSOL, METERED RESPIRATORY (INHALATION) EVERY 6 HOURS PRN
Qty: 1 INHALER | Refills: 4 | Status: SHIPPED | OUTPATIENT
Start: 2020-07-13 | End: 2020-11-13 | Stop reason: SDUPTHER

## 2020-07-13 RX ORDER — AMLODIPINE BESYLATE 5 MG/1
5 TABLET ORAL DAILY
Qty: 90 TABLET | Refills: 3 | Status: SHIPPED | OUTPATIENT
Start: 2020-07-13 | End: 2021-09-02 | Stop reason: DRUGHIGH

## 2020-07-13 NOTE — PATIENT INSTRUCTIONS

## 2020-07-13 NOTE — PROGRESS NOTES
[facility] [place]      NAME: Yared Zapata  AGE: 79 y o  SEX: male  : 1940   MRN: [id number]    DATE: 2020  TIME: 12:26 PM    Assessment and Plan   1  Medicare annual wellness visit, subsequent  See separate note    2  Coronary artery disease involving native coronary artery of native heart without angina pectoris  No angina  On aspirin and appropriate risk factor modification   - CBC  - Comprehensive metabolic panel    3  Hypercholesterolemia  Controlled on atorvastatin  - Lipid panel  - atorvastatin (LIPITOR) 80 mg tablet; Take 1 tablet (80 mg total) by mouth daily  Dispense: 90 tablet; Refill: 3    4  JORGE (obstructive sleep apnea)  Stable on CPAP  5  Impaired fasting glucose  The patient's glucose has been generally stable  Hemoglobin A1c will be repeated  - Hemoglobin A1C    6  Elevated prostate specific antigen (PSA)  The patient recently had an MRI of his prostate which was reassuring  He is seeing Dr Ryan Nunes regularly  7  Obesity  BMI Counseling: Body mass index is 32 kg/m²  The BMI is above normal  Nutrition recommendations include reducing portion sizes and decreasing overall calorie intake  Exercise recommendations include moderate aerobic physical activity for 150 minutes/week  8  Essential hypertension  Well controlled  - amLODIPine (NORVASC) 5 mg tablet; Take 1 tablet (5 mg total) by mouth daily  Dispense: 90 tablet; Refill: 3  - lisinopril (ZESTRIL) 20 mg tablet; Take 1 tablet (20 mg total) by mouth 2 (two) times a day  Dispense: 180 tablet; Refill: 3    The patient is doing generally well  His blood pressure, lipids, and glucose have all been stable  He is having no angina  He will continue his current regimen      Return to office in:  3 months    Chief Complaint     Chief Complaint   Patient presents with   [facility] Wellness Visit     Subsequent       History of Present Illness     The patient returned to the office for re-evaluation of hypertension, hypercholesterolemia, impaired fasting glucose, coronary artery disease, BPH with an elevated PSA, and history of malignant melanoma  Since his last visit he has been feeling well  He has had no chest pain, shortness of breath, palpitations, dizziness  His appetite has been good  He has been walking regularly  He has had no trouble with his medications  The following portions of the patient's history were reviewed and updated as appropriate: allergies, current medications, past family history, past medical history, past social history, past surgical history and problem list     Review of Systems   Review of Systems   Constitutional: Negative  HENT: Negative for congestion, ear pain, postnasal drip, rhinorrhea, sore throat and trouble swallowing  Eyes: Negative for pain, discharge, redness and visual disturbance  Respiratory: Negative for cough, shortness of breath and wheezing  Cardiovascular: Negative  Gastrointestinal: Negative  Endocrine: Negative  Genitourinary: Negative for difficulty urinating, dysuria, frequency, hematuria and urgency  Musculoskeletal: Negative for arthralgias, gait problem, joint swelling and myalgias  Skin: Negative for rash  Neurological: Negative for dizziness, speech difficulty, weakness, light-headedness, numbness and headaches  Hematological: Negative  Psychiatric/Behavioral: Negative for confusion, decreased concentration, dysphoric mood and sleep disturbance  The patient is not nervous/anxious          Active Problem List     Patient Active Problem List   Diagnosis    CAD (coronary artery disease)    Hypercholesterolemia    Essential hypertension    Impaired fasting glucose    Malignant melanoma of neck (HCC)    JORGE (obstructive sleep apnea)    Bradycardia    Depression    Elevated prostate specific antigen (PSA)    BPH with obstruction/lower urinary tract symptoms    PLMD (periodic limb movement disorder)    Obesity (BMI 30-39  9)    Bronchospasm    Left forearm pain       Objective   /80   Pulse (!) 54   Temp 97 5 °F (36 4 °C) (Tympanic)   Ht 5' 10" (1 778 m)   Wt 101 kg (223 lb)   SpO2 98%   BMI 32 00 kg/m²     Physical Exam   Constitutional: He is oriented to person, place, and time  He appears well-developed and well-nourished  No distress  HENT:   Head: Normocephalic and atraumatic  Neck: Neck supple  No JVD present  No tracheal deviation present  No thyromegaly present  Cardiovascular: Normal rate, regular rhythm, normal heart sounds and intact distal pulses  Exam reveals no gallop and no friction rub  No murmur heard  Pulmonary/Chest: Effort normal and breath sounds normal  He has no wheezes  He has no rales  He exhibits no tenderness  Abdominal: Soft  Bowel sounds are normal  He exhibits no distension and no mass  There is no tenderness  There is no rebound  Musculoskeletal: Normal range of motion  He exhibits no edema or tenderness  Lymphadenopathy:     He has no cervical adenopathy  Neurological: He is alert and oriented to person, place, and time  Skin: Skin is warm and dry  Psychiatric: He has a normal mood and affect   His behavior is normal  Judgment and thought content normal        Pertinent Laboratory/Diagnostic Studies:  Appointment on 07/02/2020   Component Date Value Ref Range Status    Prostate Specific Antigen Total 07/02/2020 3 5  0 0 - 4 0 ng/mL Final    PSA, Free 07/02/2020 0 92  N/A ng/mL Final    PSA, Free Pct 07/02/2020 26 3  % Final           Current Medications     Current Outpatient Medications:     acetaminophen (TYLENOL) 500 mg tablet, Take 500 mg by mouth every 6 (six) hours as needed for mild pain, Disp: , Rfl:     albuterol (PROVENTIL HFA,VENTOLIN HFA) 90 mcg/act inhaler, Inhale 1 puff every 6 (six) hours as needed for wheezing, Disp: 1 Inhaler, Rfl: 4    amLODIPine (NORVASC) 5 mg tablet, Take 1 tablet (5 mg total) by mouth daily, Disp: 90 tablet, Rfl: 3    aspirin 81 MG tablet, Take 1 tablet by mouth daily, Disp: , Rfl:     atorvastatin (LIPITOR) 80 mg tablet, Take 1 tablet (80 mg total) by mouth daily, Disp: 90 tablet, Rfl: 3    fluticasone (FLONASE) 50 mcg/act nasal spray, 2 sprays into each nostril daily, Disp: 1 Bottle, Rfl: 4    lisinopril (ZESTRIL) 20 mg tablet, Take 1 tablet (20 mg total) by mouth 2 (two) times a day, Disp: 180 tablet, Rfl: 3    metoprolol tartrate (LOPRESSOR) 25 mg tablet, Take 1 tablet (25 mg total) by mouth every 12 (twelve) hours, Disp: 180 tablet, Rfl: 1    nitroglycerin (NITROSTAT) 0 4 mg SL tablet, Place 1 tablet (0 4 mg total) under the tongue every 5 (five) minutes as needed for chest pain, Disp: 100 tablet, Rfl: 1      Radha Babin MD

## 2020-07-13 NOTE — PROGRESS NOTES
Assessment and Plan:     1  Medicare wellness evaluation    The patient is doing pretty well  He is trying to maintain a healthy lifestyle  He does not smoke, drink, or use illicit drugs  He does not follow his diet as carefully as he might  He does exercise regularly by walking his dog  He has not fallen  He has no symptoms of psychiatric or cognitive disease  His home environment is safe  He is managing his activities of daily living without any difficulty  He is up-to-date with appropriate health screening studies  He is up-to-date with pneumococcal, influenza, and tetanus vaccines  I advised him to consider a herpes zoster vaccination  I discussed advance care planning with the patient  He does have a living will  His wife will be his durable power of   We reviewed advance directives  Preventive health issues were discussed with patient, and age appropriate screening tests were ordered as noted in patient's After Visit Summary  Personalized health advice and appropriate referrals for health education or preventive services given if needed, as noted in patient's After Visit Summary  History of Present Illness:     Patient presents for Medicare Annual Wellness visit    Patient Care Team:  Josie Sandy MD as PCP - General  DO Sandoval Florez MD Marine Cannon, DPM (Podiatry)  Aimee Santos MD (Cardiology)     Problem List:     Patient Active Problem List   Diagnosis    CAD (coronary artery disease)    Hypercholesterolemia    Essential hypertension    Impaired fasting glucose    Malignant [facility] [address] )    JORGE (obstructive sleep apnea)    Bradycardia    Depression    Elevated prostate specific antigen (PSA)    BPH with obstruction/lower urinary tract symptoms    PLMD (periodic limb movement disorder)    Obesity (BMI 30-39  9)    Bronchospasm    Left forearm pain      Past Medical and Surgical History:     Past Medical History:   Diagnosis Date    AAA (abdominal aortic aneurysm) without rupture (HCC)     has annual US to check    [facility] p[facility]is [facility] )     chronic stable angina, but it is mild and tolerates walking dogs and walking up flight os stairs without symptoms  Using Nitro no more than 2 times per mt      Ankle pain, left     Bilateral wrist pain     BPH with obstruction/lower urinary tract symptoms     Coronary artery disease     CPAP (continuous positive airway pressure) dependence     Elevated PSA     History of colon polyps     [place] (hard of hearing)     Slight    Hyperlipidemia     Hypertension     Ischemic cardiomyopathy     with apical aneurysm    Melanoma ([facility] ) 12/2016    Right side of neck    Myocardial infarction ([facility] ) 1996    x1    Pneumonia     x1 as a baby    Sleep apnea     Wears glasses      Past Surgical History:   Procedure Laterality Date    CARDIAC SURGERY      CABG x6    CATARACT EXTRACTION Bilateral     COLONOSCOPY      CORONARY ANGIOPLASTY WITH STENT PLACEMENT      2 stents    CORONARY ARTERY BYPASS GRAFT      PROSTATE BIOPSY  2000,2001    SKIN BIOPSY      Melanoma - surgical removal    SKIN LESION EXCISION Right 2/6/2017    Procedure: WIDE EXCISION MELANOMA SCAR LATERAL NECK;  Surgeon: Reagan Singh MD;  Location: [facility];  Service:     WISDOM TOOTH EXTRACTION        Family History:     Family History   Problem Relation Age of Onset    Hypertension Mother     Breast cancer Mother       Social History:        Social History     Socioeconomic History    Marital status: /Civil Union     Spouse name: None    Number of children: None    Years of education: None    Highest education level: None   Occupational History    None   Social Needs    Financial resource strain: None    Food insecurity:     Worry: None     Inability: None    Transportation needs:     Medical: None     Non-medical: None   Tobacco Use    Smoking status: Never Smoker    Smokeless tobacco: Never Used   Substance and Sexual Activity    Alcohol use: Yes     Comment: 1-2 beers rarely    Drug use: No    Sexual activity: None   Lifestyle    Physical activity:     Days per week: None     Minutes per session: None    Stress: None   Relationships    Social connections:     Talks on phone: None     Gets together: None     Attends Advent service: None     Active member of club or organization: None     Attends meetings of clubs or organizations: None     Relationship status: None    Intimate partner violence:     Fear of current or ex partner: None     Emotionally abused: None     Physically abused: None     Forced sexual activity: None   Other Topics Concern    None   Social History Narrative    DOES NOT CONSUME CAFFEINE      Medications and Allergies:     Current Outpatient Medications   Medication Sig Dispense Refill    acetaminophen (TYLENOL) 500 mg tablet Take 500 mg by mouth every 6 (six) hours as needed for mild pain      albuterol (PROVENTIL HFA,VENTOLIN HFA) 90 mcg/act inhaler Inhale 1 puff every 6 (six) hours as needed for wheezing 1 Inhaler 4    amLODIPine (NORVASC) 5 mg tablet Take 1 tablet (5 mg total) by mouth daily 90 tablet 3    aspirin 81 MG tablet Take 1 tablet by mouth daily      atorvastatin (LIPITOR) 80 mg tablet Take 1 tablet (80 mg total) by mouth daily 90 tablet 3    fluticasone (FLONASE) 50 mcg/act nasal spray 2 sprays into each nostril daily 1 Bottle 4    lisinopril (ZESTRIL) 20 mg tablet Take 1 tablet (20 mg total) by mouth 2 (two) times a day 180 tablet 3    metoprolol tartrate (LOPRESSOR) 25 mg tablet Take 1 tablet (25 mg total) by mouth every 12 (twelve) hours 180 tablet 1    nitroglycerin (NITROSTAT) 0 4 mg SL tablet Place 1 tablet (0 4 mg total) under the tongue every 5 (five) minutes as needed for chest pain 90 tablet 1    ofloxacin (FLOXIN) 0 3 % otic solution Administer 5 drops into the left ear 2 (two) times a day 10 mL 5     No current facility-administered medications for this visit  Allergies   Allergen Reactions    Abciximab Other (See Comments)     rcd 9/27/01 & 5/16/03    Escitalopram Rash      Immunizations:     Immunization History   Administered Date(s) Administered    INFLUENZA 11/03/2015, 09/26/2016, 09/28/2016, 11/14/2017, 11/27/2018    Influenza Quadrivalent Preservative Free 3 years and older IM 10/14/2014    Influenza Quadrivalent, 6-35 Months IM 11/03/2015    Influenza Split High Dose Preservative Free IM 09/26/2016, 11/14/2017    Influenza TIV (IM) 09/27/2011, 10/01/2012, 12/02/2013    Influenza, high dose seasonal 0 5 mL 11/27/2018, 10/22/2019    Pneumococcal Conjugate 13-Valent 07/28/2015    Pneumococcal Polysaccharide PPV23 09/20/2006, 01/03/2017    Tdap 01/03/2017      Health Maintenance:         Topic Date Due    CRC Screening: Colonoscopy  04/12/2021         Topic Date Due    Influenza Vaccine  07/01/2020      Medicare Health Risk Assessment:     /80   Pulse (!) 54   Temp 97 5 °F (36 4 °C) (Tympanic)   Ht 5' 10" (1 778 m)   Wt 101 kg (223 lb)   SpO2 98%   BMI 32 00 kg/m²      Fili Roberts is here for his Subsequent Wellness visit  Last Medicare Wellness visit information reviewed, patient interviewed and updates made to the record today  Health Risk Assessment:   Patient rates overall health as good  Patient feels that their physical health rating is Same  Eyesight was rated as Same  Hearing was rated as Same  Patient feels that their emotional and mental health rating is Same  Pain experienced in the last 7 days has been some  Patient's pain rating has been 5/10  Patient states that he has experienced no weight loss or gain in last 6 months  Fall Risk Screening: In the past year, patient has experienced: no history of falling in past year      Home Safety:  Patient does not have trouble with stairs inside or outside of their home   Patient has working smoke alarms and has working carbon monoxide detector  Home safety hazards include: none  Nutrition:   Current diet is Regular, Low Cholesterol, Limited junk food, No Added Salt and Low Saturated Fat  Medications:   Patient is currently taking over-the-counter supplements  OTC medications include: tylenol & ASA  Patient is able to manage medications  Activities of Daily Living (ADLs)/Instrumental Activities of Daily Living (IADLs):   Walk and transfer into and out of bed and chair?: Yes  Dress and groom yourself?: Yes    Bathe or shower yourself?: Yes    Feed yourself? Yes  Do your laundry/housekeeping?: Yes  Manage your money, pay your bills and track your expenses?: Yes  Make your own meals?: Yes    Do your own shopping?: Yes    Previous Hospitalizations:   Any hospitalizations or ED visits within the last 12 months?: No      Advance Care Planning:   Living will: Yes    Durable POA for healthcare:  Yes    Advanced directive: No    Advanced directive counseling given: Yes    End of Life Decisions reviewed with patient: Yes      Cognitive Screening:   Provider or family/friend/caregiver concerned regarding cognition?: No    PREVENTIVE SCREENINGS      Cardiovascular Screening:    General: Screening Not Indicated and History Lipid Disorder      Diabetes Screening:     General: Screening Current      Colorectal Cancer Screening:     General: Screening Current      Prostate Cancer Screening:    General: Screening Not Indicated      Osteoporosis Screening:    General: Screening Not Indicated      Abdominal Aortic Aneurysm (AAA) Screening:        General: Screening Not Indicated and History AAA      Lung Cancer Screening:     General: Screening Not Indicated      Hepatitis C Screening:    General: Screening Not Indicated      Gabriele Holbrook MD

## 2020-09-11 ENCOUNTER — OFFICE VISIT (OUTPATIENT)
Dept: SLEEP CENTER | Facility: CLINIC | Age: 80
End: 2020-09-11
Payer: COMMERCIAL

## 2020-09-11 VITALS
WEIGHT: 226.2 LBS | DIASTOLIC BLOOD PRESSURE: 74 MMHG | SYSTOLIC BLOOD PRESSURE: 130 MMHG | HEART RATE: 59 BPM | BODY MASS INDEX: 32.38 KG/M2 | HEIGHT: 70 IN | OXYGEN SATURATION: 97 %

## 2020-09-11 DIAGNOSIS — I10 ESSENTIAL HYPERTENSION: ICD-10-CM

## 2020-09-11 DIAGNOSIS — G47.61 PERIODIC LIMB MOVEMENTS OF SLEEP: ICD-10-CM

## 2020-09-11 DIAGNOSIS — J98.01 BRONCHOSPASM: ICD-10-CM

## 2020-09-11 DIAGNOSIS — G47.33 OSA (OBSTRUCTIVE SLEEP APNEA): Primary | ICD-10-CM

## 2020-09-11 DIAGNOSIS — E66.9 OBESITY (BMI 30-39.9): ICD-10-CM

## 2020-09-11 DIAGNOSIS — I25.10 CORONARY ARTERY DISEASE INVOLVING NATIVE CORONARY ARTERY OF NATIVE HEART WITHOUT ANGINA PECTORIS: ICD-10-CM

## 2020-09-11 PROCEDURE — 3078F DIAST BP <80 MM HG: CPT | Performed by: INTERNAL MEDICINE

## 2020-09-11 PROCEDURE — 99214 OFFICE O/P EST MOD 30 MIN: CPT | Performed by: INTERNAL MEDICINE

## 2020-09-11 NOTE — PROGRESS NOTES
Review of Systems      Genitourinary need to urinate more than twice a night   Cardiology none   Gastrointestinal none   Neurology none   Constitutional none   Integumentary none   Psychiatry none   Musculoskeletal joint pain and muscle aches   Pulmonary none   ENT none   Endocrine none   Hematological none

## 2020-09-11 NOTE — PATIENT INSTRUCTIONS

## 2020-09-11 NOTE — PROGRESS NOTES
Follow-Up Note - Sleep [facility]   Massimo Frost  79 y o  male  VFF:[id number]  KSB:[id number]    CC: I saw this patient for follow-up in clinic today for Sleep disordered breathing, Coexisting Sleep and Medical Problems  Results of prior studies in 2015: The diagnostic study demonstrated an RDI of 11 per hour, considerably higher during REM at 33 per hour  Minimum oxygen saturation was 71% any spent 2% of the study with saturations less than 90%  During the subsequent therapeutic study, sleep disordered breathing was adequately remediated with nasal CPAP at 12 cm H2O  There were persistent moderate to severe periodic limb movements of sleep    PFSH, Problem List, Medications & Allergies were reviewed in EMR  Interval changes: none reported  He  has a past medical history of AAA (abdominal aortic aneurysm) without rupture ([facility] ), Angina pectoris ([facility] ), Ankle pain, left, Bilateral wrist pain, BPH with obstruction/lower urinary tract symptoms, Coronary artery disease, CPAP (continuous positive airway pressure) dependence, Elevated PSA, History of colon polyps, [place] (hard of hearing), Hyperlipidemia, Hypertension, Ischemic cardiomyopathy, Melanoma ([facility] ) (12/2016), Myocardial infarction ([facility] ) (1996), Pneumonia, Sleep apnea, and Wears glasses  He has a current medication list which includes the following prescription(s): acetaminophen, albuterol, amlodipine, aspirin, atorvastatin, lisinopril, metoprolol tartrate, nitroglycerin, and fluticasone  ROS: constitutional, psychiatric, ENT, respiratory,CVS, GI, UGS, CNS, MSK, integumentary, endocrine, hematological reviewed  Significant for he is on a inhaler for wheezing  He reports no cardiac symptoms  Margoth Skipper DATA REVIEWED:  using PAP > 4 hours/night 100% of the time  Estimated CONNIE 0 9/hour at pressure of 12 cm H2O @90th percentile     SUBJECTIVE: Regarding use of PAP, Lugenia Corson reports:   · He is experiencing no  adverse effects:   · He is   benefiting from use: sleeping better and no longer snoring / having breathing difficulties   ·   Sleep Routine: He reports getting 8-9 hrs sleep  ; he has no difficulty initiating or maintaining sleep   He awakens spontaneously and feels refreshed  He denied excessive drowsiness   He rated himself at Total score: 2 /24 on the Birney sleepiness scale  Habits: reports that he has never smoked  He has never used smokeless tobacco ,  reports current alcohol use ,  reports no history of drug use , Caffeine use: limited , Exercise routine: regular    EXAM: /74   Pulse 59   Ht 5' 10" (1 778 m)   Wt 103 kg (226 lb 3 2 oz)   SpO2 97%   BMI 32 46 kg/m²     Patient is well groomed; well appearing  Skin/Extrem: warm & dry; col & hydration normal; no edema  Psych: cooperativeand in no distress  Mental state appears normal   CNS: Alert, orientated, clear & coherent speech  H&N: EOMI; NC/AT:no facial pressure marks, no rashes  Neck Circumference: 17 5 cm  ENMT Mucus membranes appear normal Nasal airway:patent  Oral airway:  crowded  Resp:effort is normal CVS: RRR ABD:truncal obesity MSK:Gait normal     IMPRESSION: Primary Sleep/Secondary(to Medical or Psych conditions) & comorbidities   1  JORGE (obstructive sleep apnea)  Cpap DME   2  Periodic limb movements of sleep     3  Essential hypertension     4  Coronary artery disease involving native coronary artery of native heart without angina pectoris     5  Obesity (BMI 30-39 9)     6  Bronchospasm         PLAN:  1  Treatment with  PAP is medically necessary and Noman Holley is agreable to continue use  2  Care of equipment, methods to improve comfort using PAP and importance of compliance with therapy were discussed  3  Pressure setting: continue 12 cmH2O     4  The patient's current unit has now reached its 5 yr reasonable, useful life and needs to be replaced  5  Rx provided to replace he has machine, supplies and Care coordinated with DME provider     6  No medication is needed for the periodic limb movements of sleep  7  Discussed  strategies for weight reduction  8  Follow-up is advised in 2 months to monitor progress, compliance and to adjust therapy  Thank you for allowing me to participate in the care of this patient      Sincerely,    Authenticated electronically by Anthony Miller MD on 72/41/47   Board Certified Specialist

## 2020-09-12 DIAGNOSIS — I25.10 CORONARY ARTERY DISEASE INVOLVING NATIVE HEART WITHOUT ANGINA PECTORIS, UNSPECIFIED VESSEL OR LESION TYPE: ICD-10-CM

## 2020-09-14 RX ORDER — NITROGLYCERIN 0.4 MG/1
0.4 TABLET SUBLINGUAL
Qty: 100 TABLET | Refills: 1 | Status: SHIPPED | OUTPATIENT
Start: 2020-09-14

## 2020-10-12 ENCOUNTER — LAB (OUTPATIENT)
Dept: LAB | Age: 80
End: 2020-10-12
Payer: COMMERCIAL

## 2020-10-12 LAB
ALBUMIN SERPL BCP-MCNC: 3.9 G/DL (ref 3.5–5)
ALP SERPL-CCNC: 124 U/L (ref 46–116)
ALT SERPL W P-5'-P-CCNC: 23 U/L (ref 12–78)
ANION GAP SERPL CALCULATED.3IONS-SCNC: 2 MMOL/L (ref 4–13)
AST SERPL W P-5'-P-CCNC: 21 U/L (ref 5–45)
BILIRUB SERPL-MCNC: 1.09 MG/DL (ref 0.2–1)
BUN SERPL-MCNC: 19 MG/DL (ref 5–25)
CALCIUM SERPL-MCNC: 9.1 MG/DL (ref 8.3–10.1)
CHLORIDE SERPL-SCNC: 109 MMOL/L (ref 100–108)
CHOLEST SERPL-MCNC: 145 MG/DL (ref 50–200)
CO2 SERPL-SCNC: 29 MMOL/L (ref 21–32)
CREAT SERPL-MCNC: 1.2 MG/DL (ref 0.6–1.3)
ERYTHROCYTE [DISTWIDTH] IN BLOOD BY AUTOMATED COUNT: 13.9 % (ref 11.6–15.1)
EST. AVERAGE GLUCOSE BLD GHB EST-MCNC: 117 MG/DL
GFR SERPL CREATININE-BSD FRML MDRD: 57 ML/MIN/1.73SQ M
GLUCOSE P FAST SERPL-MCNC: 97 MG/DL (ref 65–99)
HBA1C MFR BLD: 5.7 %
HCT VFR BLD AUTO: 42.6 % (ref 36.5–49.3)
HDLC SERPL-MCNC: 56 MG/DL
HGB BLD-MCNC: 13.6 G/DL (ref 12–17)
LDLC SERPL CALC-MCNC: 69 MG/DL (ref 0–100)
MCH RBC QN AUTO: 29.6 PG (ref 26.8–34.3)
MCHC RBC AUTO-ENTMCNC: 31.9 G/DL (ref 31.4–37.4)
MCV RBC AUTO: 93 FL (ref 82–98)
NONHDLC SERPL-MCNC: 89 MG/DL
PLATELET # BLD AUTO: 168 THOUSANDS/UL (ref 149–390)
PMV BLD AUTO: 11.6 FL (ref 8.9–12.7)
POTASSIUM SERPL-SCNC: 4.7 MMOL/L (ref 3.5–5.3)
PROT SERPL-MCNC: 7.5 G/DL (ref 6.4–8.2)
RBC # BLD AUTO: 4.6 MILLION/UL (ref 3.88–5.62)
SODIUM SERPL-SCNC: 140 MMOL/L (ref 136–145)
TRIGL SERPL-MCNC: 99 MG/DL
WBC # BLD AUTO: 7.45 THOUSAND/UL (ref 4.31–10.16)

## 2020-10-12 PROCEDURE — 83036 HEMOGLOBIN GLYCOSYLATED A1C: CPT | Performed by: INTERNAL MEDICINE

## 2020-10-12 PROCEDURE — 85027 COMPLETE CBC AUTOMATED: CPT | Performed by: INTERNAL MEDICINE

## 2020-10-12 PROCEDURE — 36415 COLL VENOUS BLD VENIPUNCTURE: CPT | Performed by: INTERNAL MEDICINE

## 2020-10-12 PROCEDURE — 80061 LIPID PANEL: CPT | Performed by: INTERNAL MEDICINE

## 2020-10-12 PROCEDURE — 80053 COMPREHEN METABOLIC PANEL: CPT | Performed by: INTERNAL MEDICINE

## 2020-10-22 ENCOUNTER — TELEPHONE (OUTPATIENT)
Dept: ADMINISTRATIVE | Facility: OTHER | Age: 80
End: 2020-10-22

## 2020-10-22 ENCOUNTER — OFFICE VISIT (OUTPATIENT)
Dept: INTERNAL MEDICINE CLINIC | Facility: CLINIC | Age: 80
End: 2020-10-22
Payer: COMMERCIAL

## 2020-10-22 VITALS
HEART RATE: 65 BPM | BODY MASS INDEX: 32.07 KG/M2 | DIASTOLIC BLOOD PRESSURE: 62 MMHG | SYSTOLIC BLOOD PRESSURE: 140 MMHG | WEIGHT: 224 LBS | OXYGEN SATURATION: 96 % | HEIGHT: 70 IN

## 2020-10-22 DIAGNOSIS — I25.10 CORONARY ARTERY DISEASE INVOLVING NATIVE CORONARY ARTERY OF NATIVE HEART WITHOUT ANGINA PECTORIS: ICD-10-CM

## 2020-10-22 DIAGNOSIS — G47.33 OSA (OBSTRUCTIVE SLEEP APNEA): ICD-10-CM

## 2020-10-22 DIAGNOSIS — R73.01 IMPAIRED FASTING GLUCOSE: ICD-10-CM

## 2020-10-22 DIAGNOSIS — F32.A DEPRESSION, UNSPECIFIED DEPRESSION TYPE: ICD-10-CM

## 2020-10-22 DIAGNOSIS — Z23 FLU VACCINE NEED: Primary | ICD-10-CM

## 2020-10-22 DIAGNOSIS — I10 ESSENTIAL HYPERTENSION: ICD-10-CM

## 2020-10-22 DIAGNOSIS — R07.89 CHEST TIGHTNESS: ICD-10-CM

## 2020-10-22 DIAGNOSIS — E78.00 HYPERCHOLESTEROLEMIA: ICD-10-CM

## 2020-10-22 PROCEDURE — 90662 IIV NO PRSV INCREASED AG IM: CPT | Performed by: INTERNAL MEDICINE

## 2020-10-22 PROCEDURE — 3725F SCREEN DEPRESSION PERFORMED: CPT | Performed by: INTERNAL MEDICINE

## 2020-10-22 PROCEDURE — 1160F RVW MEDS BY RX/DR IN RCRD: CPT | Performed by: INTERNAL MEDICINE

## 2020-10-22 PROCEDURE — 99214 OFFICE O/P EST MOD 30 MIN: CPT | Performed by: INTERNAL MEDICINE

## 2020-10-22 PROCEDURE — 1036F TOBACCO NON-USER: CPT | Performed by: INTERNAL MEDICINE

## 2020-10-22 PROCEDURE — G0008 ADMIN INFLUENZA VIRUS VAC: HCPCS | Performed by: INTERNAL MEDICINE

## 2020-11-13 DIAGNOSIS — J98.01 BRONCHOSPASM: ICD-10-CM

## 2020-11-13 RX ORDER — ALBUTEROL SULFATE 90 UG/1
1 AEROSOL, METERED RESPIRATORY (INHALATION) EVERY 6 HOURS PRN
Qty: 1 INHALER | Refills: 4 | Status: SHIPPED | OUTPATIENT
Start: 2020-11-13 | End: 2021-09-02 | Stop reason: SDUPTHER

## 2020-11-20 ENCOUNTER — OFFICE VISIT (OUTPATIENT)
Dept: SLEEP CENTER | Facility: CLINIC | Age: 80
End: 2020-11-20
Payer: COMMERCIAL

## 2020-11-20 VITALS
WEIGHT: 223.4 LBS | HEART RATE: 55 BPM | DIASTOLIC BLOOD PRESSURE: 58 MMHG | BODY MASS INDEX: 31.98 KG/M2 | HEIGHT: 70 IN | SYSTOLIC BLOOD PRESSURE: 106 MMHG

## 2020-11-20 DIAGNOSIS — I10 ESSENTIAL HYPERTENSION: ICD-10-CM

## 2020-11-20 DIAGNOSIS — G47.61 PERIODIC LIMB MOVEMENTS OF SLEEP: ICD-10-CM

## 2020-11-20 DIAGNOSIS — I25.10 CORONARY ARTERY DISEASE INVOLVING NATIVE CORONARY ARTERY OF NATIVE HEART WITHOUT ANGINA PECTORIS: ICD-10-CM

## 2020-11-20 DIAGNOSIS — G47.33 OSA (OBSTRUCTIVE SLEEP APNEA): Primary | ICD-10-CM

## 2020-11-20 DIAGNOSIS — E66.9 OBESITY (BMI 30-39.9): ICD-10-CM

## 2020-11-20 DIAGNOSIS — J98.01 BRONCHOSPASM: ICD-10-CM

## 2020-11-20 PROCEDURE — 3078F DIAST BP <80 MM HG: CPT | Performed by: INTERNAL MEDICINE

## 2020-11-20 PROCEDURE — 99214 OFFICE O/P EST MOD 30 MIN: CPT | Performed by: INTERNAL MEDICINE

## 2020-11-20 PROCEDURE — 3074F SYST BP LT 130 MM HG: CPT | Performed by: INTERNAL MEDICINE

## 2020-11-20 PROCEDURE — 1160F RVW MEDS BY RX/DR IN RCRD: CPT | Performed by: INTERNAL MEDICINE

## 2020-11-20 PROCEDURE — 1036F TOBACCO NON-USER: CPT | Performed by: INTERNAL MEDICINE

## 2020-11-23 ENCOUNTER — TELEPHONE (OUTPATIENT)
Dept: SLEEP CENTER | Facility: CLINIC | Age: 80
End: 2020-11-23

## 2021-01-28 ENCOUNTER — OFFICE VISIT (OUTPATIENT)
Dept: INTERNAL MEDICINE CLINIC | Facility: CLINIC | Age: 81
End: 2021-01-28
Payer: COMMERCIAL

## 2021-01-28 VITALS
HEIGHT: 70 IN | DIASTOLIC BLOOD PRESSURE: 66 MMHG | HEART RATE: 52 BPM | RESPIRATION RATE: 12 BRPM | WEIGHT: 218.2 LBS | BODY MASS INDEX: 31.24 KG/M2 | TEMPERATURE: 97.8 F | SYSTOLIC BLOOD PRESSURE: 119 MMHG

## 2021-01-28 DIAGNOSIS — G47.33 OSA (OBSTRUCTIVE SLEEP APNEA): ICD-10-CM

## 2021-01-28 DIAGNOSIS — I10 ESSENTIAL HYPERTENSION: Primary | ICD-10-CM

## 2021-01-28 DIAGNOSIS — I25.10 CORONARY ARTERY DISEASE INVOLVING NATIVE CORONARY ARTERY OF NATIVE HEART WITHOUT ANGINA PECTORIS: ICD-10-CM

## 2021-01-28 DIAGNOSIS — C43.4 MALIGNANT MELANOMA OF NECK (HCC): ICD-10-CM

## 2021-01-28 DIAGNOSIS — N13.8 BPH WITH OBSTRUCTION/LOWER URINARY TRACT SYMPTOMS: ICD-10-CM

## 2021-01-28 DIAGNOSIS — I25.118 CORONARY ARTERY DISEASE OF NATIVE ARTERY OF NATIVE HEART WITH STABLE ANGINA PECTORIS (HCC): ICD-10-CM

## 2021-01-28 DIAGNOSIS — R73.01 IMPAIRED FASTING GLUCOSE: ICD-10-CM

## 2021-01-28 DIAGNOSIS — N40.1 BPH WITH OBSTRUCTION/LOWER URINARY TRACT SYMPTOMS: ICD-10-CM

## 2021-01-28 DIAGNOSIS — E78.00 HYPERCHOLESTEROLEMIA: ICD-10-CM

## 2021-01-28 PROCEDURE — 3078F DIAST BP <80 MM HG: CPT | Performed by: INTERNAL MEDICINE

## 2021-01-28 PROCEDURE — 1036F TOBACCO NON-USER: CPT | Performed by: INTERNAL MEDICINE

## 2021-01-28 PROCEDURE — 1160F RVW MEDS BY RX/DR IN RCRD: CPT | Performed by: INTERNAL MEDICINE

## 2021-01-28 PROCEDURE — 3074F SYST BP LT 130 MM HG: CPT | Performed by: INTERNAL MEDICINE

## 2021-01-28 PROCEDURE — 99214 OFFICE O/P EST MOD 30 MIN: CPT | Performed by: INTERNAL MEDICINE

## 2021-01-28 NOTE — PROGRESS NOTES
[facility]      NAME: Elva Bateman  AGE: 79 y o  SEX: male  : 1940   MRN: [id number]    DATE: 2021  TIME: 12:46 PM    Assessment and Plan   1  Essential hypertension  Adequately controlled    2  Hypercholesterolemia  Well controlled on atorvastatin  - Comprehensive metabolic panel; Future  - Lipid panel; Future    3  Impaired fasting glucose  Stable on diet  - Hemoglobin A1C; Future    4  JORGE (obstructive sleep apnea)  Stable on CPAP    5  Coronary artery disease involving native coronary artery of native heart without angina pectoris  No angina  On aspirin and appropriate risk factor modification  6  Malignant melanoma of neck ([facility] [address] )  Followed closely by Dermatology  7  BPH with obstruction/lower urinary tract symptoms  Symptoms are mild  No specific intervention is needed  The patient appears to be doing well  His blood pressure, lipids, and glucose are well controlled  He is having no angina  A history of aortic aneurysm was raised by our coding team after review of the patient's medical record  He underwent catheterization in  at which time the possibility of an aortic aneurysm was mention  Thereafter he underwent ultrasonography which did not confirm the presence of an aneurysm  I recommended to the patient that he received COVID-19 vaccination  We will attempt to enroll him for this via the [address] [place] [address] portal         Return to office in:  3 months    Chief Complaint     Chief Complaint   Patient presents with    Follow-up     3 month       History of Present Illness     The patient returned to the office for re-evaluation of hypertension, hypercholesterolemia, impaired fasting glucose, coronary artery disease, obstructive sleep apnea, and BPH  Since his last visit he has been feeling well  He has had no chest pain, shortness of breath, palpitations, or dizziness  He is tolerating his medications well    He follows regularly with Dermatology because of a history of melanoma  The following portions of the patient's history were reviewed and updated as appropriate: allergies, current medications, past family history, past medical history, past social history, past surgical history and problem list     Review of Systems   Review of Systems   Constitutional: Negative  HENT: Negative for congestion, ear pain, postnasal drip, rhinorrhea, sore throat and trouble swallowing  Eyes: Negative for pain, discharge, redness and visual disturbance  Respiratory: Negative for cough, shortness of breath and wheezing  Cardiovascular: Negative  Gastrointestinal: Negative  Endocrine: Negative  Genitourinary: Negative for difficulty urinating, dysuria, frequency, hematuria and urgency  Musculoskeletal: Negative for arthralgias, gait problem, joint swelling and myalgias  Skin: Negative for rash  Neurological: Negative for dizziness, speech difficulty, weakness, light-headedness, numbness and headaches  Hematological: Negative  Psychiatric/Behavioral: Negative for confusion, decreased concentration, dysphoric mood and sleep disturbance  The patient is not nervous/anxious  Active Problem List     Patient Active Problem List   Diagnosis    Coronary artery disease of native artery of native heart with stable angina pectoris ([facility] [address] )    Hypercholesterolemia    Essential hypertension    Impaired fasting glucose    Malignant melanoma of neck (HCC)    JORGE (obstructive sleep apnea)    Bradycardia    Depression    Elevated prostate specific antigen (PSA)    BPH with obstruction/lower urinary tract symptoms    PLMD (periodic limb movement disorder)    Obesity (BMI 30-39  9)    Bronchospasm    Left forearm pain       Objective   /66 (BP Location: Left arm, Patient Position: Sitting, Cuff Size: Standard)   Pulse (!) 52   Temp 97 8 °F (36 6 °C)   Resp 12   Ht 5' 10" (1 778 m)   Wt 99 kg (218 lb 3 2 oz)   BMI 31 31 kg/m² Physical Exam  Constitutional:       General: He is not in acute distress  Appearance: He is well-developed  HENT:      Head: Normocephalic and atraumatic  Neck:      Musculoskeletal: Neck supple  Thyroid: No thyromegaly  Vascular: No JVD  Trachea: No tracheal deviation  Cardiovascular:      Rate and Rhythm: Normal rate and regular rhythm  Heart sounds: Normal heart sounds  No murmur  No friction rub  No gallop  Pulmonary:      Effort: Pulmonary effort is normal       Breath sounds: Normal breath sounds  No wheezing or rales  Chest:      Chest wall: No tenderness  Abdominal:      General: Bowel sounds are normal  There is no distension  Palpations: Abdomen is soft  There is no mass  Tenderness: There is no abdominal tenderness  There is no rebound  Musculoskeletal: Normal range of motion  General: No tenderness  Lymphadenopathy:      Cervical: No cervical adenopathy  Skin:     General: Skin is warm and dry  Neurological:      Mental Status: He is alert and oriented to person, place, and time  Psychiatric:         Mood and Affect: Mood normal          Behavior: Behavior normal          Thought Content: Thought content normal          Judgment: Judgment normal          Pertinent Laboratory/Diagnostic Studies:  No visits with results within 3 Month(s) from this visit     Latest known visit with results is:   Office Visit on 07/13/2020   Component Date Value Ref Range Status    WBC 10/12/2020 7 45  4 31 - 10 16 Thousand/uL Final    RBC 10/12/2020 4 60  3 88 - 5 62 Million/uL Final    Hemoglobin 10/12/2020 13 6  12 0 - 17 0 g/dL Final    Hematocrit 10/12/2020 42 6  36 5 - 49 3 % Final    MCV 10/12/2020 93  82 - 98 fL Final    MCH 10/12/2020 29 6  26 8 - 34 3 pg Final    MCHC 10/12/2020 31 9  31 4 - 37 4 g/dL Final    RDW 10/12/2020 13 9  11 6 - 15 1 % Final    Platelets 87/51/3613 168  149 - 390 Thousands/uL Final    MPV 10/12/2020 11 6  8 9 - 12 7 fL Final    Sodium 10/12/2020 140  136 - 145 mmol/L Final    Potassium 10/12/2020 4 7  3 5 - 5 3 mmol/L Final    Chloride 10/12/2020 109* 100 - 108 mmol/L Final    CO2 10/12/2020 29  21 - 32 mmol/L Final    ANION GAP 10/12/2020 2* 4 - 13 mmol/L Final    BUN 10/12/2020 19  5 - 25 mg/dL Final    Creatinine 10/12/2020 1 20  0 60 - 1 30 mg/dL Final    Glucose, Fasting 10/12/2020 97  65 - 99 mg/dL Final    Calcium 10/12/2020 9 1  8 3 - 10 1 mg/dL Final    AST 10/12/2020 21  5 - 45 U/L Final    ALT 10/12/2020 23  12 - 78 U/L Final    Alkaline Phosphatase 10/12/2020 124* 46 - 116 U/L Final    Total Protein 10/12/2020 7 5  6 4 - 8 2 g/dL Final    Albumin 10/12/2020 3 9  3 5 - 5 0 g/dL Final    Total Bilirubin 10/12/2020 1 09* 0 20 - 1 00 mg/dL Final    eGFR 10/12/2020 57  ml/min/1 73sq m Final    Hemoglobin A1C 10/12/2020 5 7* Normal 3 8-5 6%; PreDiabetic 5 7-6 4%;  Diabetic >=6 5%; Glycemic control for adults with diabetes <7 0% % Final    EAG 10/12/2020 117  mg/dl Final    Cholesterol 10/12/2020 145  50 - 200 mg/dL Final    Triglycerides 10/12/2020 99  <=150 mg/dL Final    HDL, Direct 10/12/2020 56  >=40 mg/dL Final    LDL Calculated 10/12/2020 69  0 - 100 mg/dL Final    Non-HDL-Chol (CHOL-HDL) 10/12/2020 89  mg/dl Final           Current Medications     Current Outpatient Medications:     acetaminophen (TYLENOL) 500 mg tablet, Take 500 mg by mouth every 6 (six) hours as needed for mild pain, Disp: , Rfl:     albuterol (PROVENTIL HFA,VENTOLIN HFA) 90 mcg/act inhaler, Inhale 1 puff every 6 (six) hours as needed for wheezing, Disp: 1 Inhaler, Rfl: 4    amLODIPine (NORVASC) 5 mg tablet, Take 1 tablet (5 mg total) by mouth daily, Disp: 90 tablet, Rfl: 3    aspirin 81 MG tablet, Take 1 tablet by mouth daily, Disp: , Rfl:     atorvastatin (LIPITOR) 80 mg tablet, Take 1 tablet (80 mg total) by mouth daily, Disp: 90 tablet, Rfl: 3    fluticasone (FLONASE) 50 mcg/act nasal spray, 2 sprays into each nostril daily, Disp: 1 Bottle, Rfl: 4    lisinopril (ZESTRIL) 20 mg tablet, Take 1 tablet (20 mg total) by mouth 2 (two) times a day, Disp: 180 tablet, Rfl: 3    metoprolol tartrate (LOPRESSOR) 25 mg tablet, Take 1 tablet (25 mg total) by mouth every 12 (twelve) hours, Disp: 180 tablet, Rfl: 3    nitroglycerin (NITROSTAT) 0 4 mg SL tablet, PLACE 1 TABLET (0 4 MG TOTAL) UNDER THE TONGUE EVERY 5 (FIVE) MINUTES AS NEEDED FOR CHEST PAIN, Disp: 100 tablet, Rfl: 1      Nini Perez MD

## 2021-02-06 ENCOUNTER — IMMUNIZATIONS (OUTPATIENT)
Dept: FAMILY MEDICINE CLINIC | Facility: HOSPITAL | Age: 81
End: 2021-02-06

## 2021-02-06 DIAGNOSIS — Z23 ENCOUNTER FOR IMMUNIZATION: Primary | ICD-10-CM

## 2021-02-06 PROCEDURE — 0011A SARS-COV-2 / COVID-19 MRNA VACCINE (MODERNA) 100 MCG: CPT

## 2021-02-06 PROCEDURE — 91301 SARS-COV-2 / COVID-19 MRNA VACCINE (MODERNA) 100 MCG: CPT

## 2021-03-09 ENCOUNTER — IMMUNIZATIONS (OUTPATIENT)
Dept: FAMILY MEDICINE CLINIC | Facility: HOSPITAL | Age: 81
End: 2021-03-09

## 2021-03-09 DIAGNOSIS — Z23 ENCOUNTER FOR IMMUNIZATION: Primary | ICD-10-CM

## 2021-03-09 PROCEDURE — 0012A SARS-COV-2 / COVID-19 MRNA VACCINE (MODERNA) 100 MCG: CPT

## 2021-03-09 PROCEDURE — 91301 SARS-COV-2 / COVID-19 MRNA VACCINE (MODERNA) 100 MCG: CPT

## 2021-04-05 ENCOUNTER — LAB (OUTPATIENT)
Dept: LAB | Age: 81
End: 2021-04-05
Payer: COMMERCIAL

## 2021-04-05 DIAGNOSIS — R73.01 IMPAIRED FASTING GLUCOSE: ICD-10-CM

## 2021-04-05 DIAGNOSIS — E78.00 HYPERCHOLESTEROLEMIA: ICD-10-CM

## 2021-04-05 LAB
ALBUMIN SERPL BCP-MCNC: 3.8 G/DL (ref 3.5–5)
ALP SERPL-CCNC: 123 U/L (ref 46–116)
ALT SERPL W P-5'-P-CCNC: 27 U/L (ref 12–78)
ANION GAP SERPL CALCULATED.3IONS-SCNC: 4 MMOL/L (ref 4–13)
AST SERPL W P-5'-P-CCNC: 19 U/L (ref 5–45)
BILIRUB SERPL-MCNC: 1.09 MG/DL (ref 0.2–1)
BUN SERPL-MCNC: 21 MG/DL (ref 5–25)
CALCIUM SERPL-MCNC: 8.9 MG/DL (ref 8.3–10.1)
CHLORIDE SERPL-SCNC: 105 MMOL/L (ref 100–108)
CHOLEST SERPL-MCNC: 149 MG/DL (ref 50–200)
CO2 SERPL-SCNC: 29 MMOL/L (ref 21–32)
CREAT SERPL-MCNC: 1.23 MG/DL (ref 0.6–1.3)
EST. AVERAGE GLUCOSE BLD GHB EST-MCNC: 126 MG/DL
GFR SERPL CREATININE-BSD FRML MDRD: 55 ML/MIN/1.73SQ M
GLUCOSE P FAST SERPL-MCNC: 93 MG/DL (ref 65–99)
HBA1C MFR BLD: 6 %
HDLC SERPL-MCNC: 54 MG/DL
LDLC SERPL CALC-MCNC: 78 MG/DL (ref 0–100)
NONHDLC SERPL-MCNC: 95 MG/DL
POTASSIUM SERPL-SCNC: 4.4 MMOL/L (ref 3.5–5.3)
PROT SERPL-MCNC: 7.2 G/DL (ref 6.4–8.2)
SODIUM SERPL-SCNC: 138 MMOL/L (ref 136–145)
TRIGL SERPL-MCNC: 87 MG/DL

## 2021-04-05 PROCEDURE — 83036 HEMOGLOBIN GLYCOSYLATED A1C: CPT

## 2021-04-05 PROCEDURE — 36415 COLL VENOUS BLD VENIPUNCTURE: CPT

## 2021-04-05 PROCEDURE — 80061 LIPID PANEL: CPT

## 2021-04-05 PROCEDURE — 80053 COMPREHEN METABOLIC PANEL: CPT

## 2021-04-29 ENCOUNTER — RA CDI HCC (OUTPATIENT)
Dept: OTHER | Facility: HOSPITAL | Age: 81
End: 2021-04-29

## 2021-04-29 NOTE — PROGRESS NOTES
[facility] [[email or web address]] [facility]  coding opportunities          Chart reviewed, no opportunity found: CHART REVIEWED, NO OPPORTUNITY FOUND              Patients insurance company: [employer] (Medicare Advantage and Commercial)           HCC's reported 1/28/21

## 2021-05-06 ENCOUNTER — OFFICE VISIT (OUTPATIENT)
Dept: INTERNAL MEDICINE CLINIC | Facility: CLINIC | Age: 81
End: 2021-05-06
Payer: COMMERCIAL

## 2021-05-06 VITALS
HEART RATE: 66 BPM | DIASTOLIC BLOOD PRESSURE: 60 MMHG | RESPIRATION RATE: 16 BRPM | OXYGEN SATURATION: 99 % | SYSTOLIC BLOOD PRESSURE: 122 MMHG | BODY MASS INDEX: 31.5 KG/M2 | TEMPERATURE: 96.3 F | WEIGHT: 220 LBS | HEIGHT: 70 IN

## 2021-05-06 DIAGNOSIS — I25.118 CORONARY ARTERY DISEASE OF NATIVE ARTERY OF NATIVE HEART WITH STABLE ANGINA PECTORIS (HCC): ICD-10-CM

## 2021-05-06 DIAGNOSIS — R73.01 IMPAIRED FASTING GLUCOSE: ICD-10-CM

## 2021-05-06 DIAGNOSIS — E78.00 HYPERCHOLESTEROLEMIA: ICD-10-CM

## 2021-05-06 DIAGNOSIS — N13.8 BPH WITH OBSTRUCTION/LOWER URINARY TRACT SYMPTOMS: ICD-10-CM

## 2021-05-06 DIAGNOSIS — R97.20 ELEVATED PROSTATE SPECIFIC ANTIGEN (PSA): ICD-10-CM

## 2021-05-06 DIAGNOSIS — G47.33 OSA (OBSTRUCTIVE SLEEP APNEA): ICD-10-CM

## 2021-05-06 DIAGNOSIS — I10 ESSENTIAL HYPERTENSION: Primary | ICD-10-CM

## 2021-05-06 DIAGNOSIS — N40.1 BPH WITH OBSTRUCTION/LOWER URINARY TRACT SYMPTOMS: ICD-10-CM

## 2021-05-06 DIAGNOSIS — C43.4 MALIGNANT MELANOMA OF NECK (HCC): ICD-10-CM

## 2021-05-06 PROCEDURE — 99214 OFFICE O/P EST MOD 30 MIN: CPT | Performed by: INTERNAL MEDICINE

## 2021-05-06 PROCEDURE — 3078F DIAST BP <80 MM HG: CPT | Performed by: INTERNAL MEDICINE

## 2021-05-06 PROCEDURE — 3074F SYST BP LT 130 MM HG: CPT | Performed by: INTERNAL MEDICINE

## 2021-05-06 PROCEDURE — 1036F TOBACCO NON-USER: CPT | Performed by: INTERNAL MEDICINE

## 2021-05-06 PROCEDURE — 1160F RVW MEDS BY RX/DR IN RCRD: CPT | Performed by: INTERNAL MEDICINE

## 2021-05-06 NOTE — PROGRESS NOTES
[facility] [place]      NAME: Lis Millan  AGE: 79 y o  SEX: male  : 1940   MRN: [id number]    DATE: 2021  TIME: 12:19 PM    Assessment and Plan   1  Essential hypertension    Well controlled  2  Coronary artery disease of native artery of native heart with stable angina pectoris ([address] )    No angina  On aspirin and appropriate risk factor modification  3  Impaired fasting glucose    Stable on diet  4  JORGE (obstructive sleep apnea)    Tolerating CPAP  5  Hypercholesterolemia    Controlled on atorvastatin  6  BPH with obstruction/lower urinary tract symptoms    Satisfactory symptom control at the moment without any medication  7  Elevated prostate specific antigen (PSA)    Follows regularly with Urology  8  Malignant melanoma of neck (HCC)    No evidence of recurrence  The patient is doing generally well  His blood pressure, lipids, and glucose are all well controlled  He will continue his current medications  He has regular follow-up arranged for his prostate and for his melanoma  He has received his COVID-19 vaccine  Return to office in: Three months    Chief Complaint     Chief Complaint   Patient presents with    Follow-up     3 month        History of Present Illness      the patient returned to the office for re-evaluation of hypertension, hypercholesterolemia, impaired fasting glucose, coronary artery disease, BPH, and history of superficial malignant melanoma  Since his last visit he has been feeling well  He has had no chest pain, shortness of breath, palpitations, or dizziness  He has been quite active  He has had no issues with his medicatio    The following portions of the patient's history were reviewed and updated as appropriate: allergies, current medications, past family history, past medical history, past social history, past surgical history and problem list     Review of Systems   Review of Systems   Constitutional: Negative  HENT: Negative for congestion, ear pain, postnasal drip, rhinorrhea, sore throat and trouble swallowing  Eyes: Negative for pain, discharge, redness and visual disturbance  Respiratory: Negative for cough, shortness of breath and wheezing  Cardiovascular: Negative  Gastrointestinal: Negative  Endocrine: Negative  Genitourinary: Negative for difficulty urinating, dysuria, frequency, hematuria and urgency  Musculoskeletal: Negative for arthralgias, gait problem, joint swelling and myalgias  Skin: Negative for rash  Neurological: Negative for dizziness, speech difficulty, weakness, light-headedness, numbness and headaches  Hematological: Negative  Psychiatric/Behavioral: Negative for confusion, decreased concentration, dysphoric mood and sleep disturbance  The patient is not nervous/anxious  Active Problem List     Patient Active Problem List   Diagnosis    Coronary artery disease of native artery of native heart with stable angina pectoris ([facility] [address] )    Hypercholesterolemia    Essential hypertension    Impaired fasting glucose    Malignant melanoma of neck (HCC)    JORGE (obstructive sleep apnea)    Bradycardia    Depression    Elevated prostate specific antigen (PSA)    BPH with obstruction/lower urinary tract symptoms    PLMD (periodic limb movement disorder)    Obesity (BMI 30-39  9)    Bronchospasm    Left forearm pain       Objective   /60 (BP Location: Left arm, Patient Position: Sitting, Cuff Size: Standard)   Pulse 66   Temp (!) 96 3 °F (35 7 °C)   Resp 16   Ht 5' 10" (1 778 m)   Wt 99 8 kg (220 lb)   SpO2 99%   BMI 31 57 kg/m²     Physical Exam  Constitutional:       General: He is not in acute distress  Appearance: He is well-developed  HENT:      Head: Normocephalic and atraumatic  Neck:      Musculoskeletal: Neck supple  Thyroid: No thyromegaly  Vascular: No JVD  Trachea: No tracheal deviation     Cardiovascular:      Rate and Rhythm: Normal rate and regular rhythm  Heart sounds: Normal heart sounds  No murmur  No friction rub  No gallop  Pulmonary:      Effort: Pulmonary effort is normal       Breath sounds: Normal breath sounds  No wheezing or rales  Chest:      Chest wall: No tenderness  Abdominal:      General: Bowel sounds are normal  There is no distension  Palpations: Abdomen is soft  There is no mass  Tenderness: There is no abdominal tenderness  There is no rebound  Musculoskeletal: Normal range of motion  General: No tenderness  Lymphadenopathy:      Cervical: No cervical adenopathy  Skin:     General: Skin is warm and dry  Neurological:      Mental Status: He is alert and oriented to person, place, and time  Psychiatric:         Mood and Affect: Mood normal          Behavior: Behavior normal          Thought Content: Thought content normal          Judgment: Judgment normal          Pertinent Laboratory/Diagnostic Studies:  Lab on 04/05/2021   Component Date Value Ref Range Status    Sodium 04/05/2021 138  136 - 145 mmol/L Final    Potassium 04/05/2021 4 4  3 5 - 5 3 mmol/L Final    Chloride 04/05/2021 105  100 - 108 mmol/L Final    CO2 04/05/2021 29  21 - 32 mmol/L Final    ANION GAP 04/05/2021 4  4 - 13 mmol/L Final    BUN 04/05/2021 21  5 - 25 mg/dL Final    Creatinine 04/05/2021 1 23  0 60 - 1 30 mg/dL Final    Glucose, Fasting 04/05/2021 93  65 - 99 mg/dL Final    Calcium 04/05/2021 8 9  8 3 - 10 1 mg/dL Final    AST 04/05/2021 19  5 - 45 U/L Final    ALT 04/05/2021 27  12 - 78 U/L Final    Alkaline Phosphatase 04/05/2021 123* 46 - 116 U/L Final    Total Protein 04/05/2021 7 2  6 4 - 8 2 g/dL Final    Albumin 04/05/2021 3 8  3 5 - 5 0 g/dL Final    Total Bilirubin 04/05/2021 1 09* 0 20 - 1 00 mg/dL Final    eGFR 04/05/2021 55  ml/min/1 73sq m Final    Hemoglobin A1C 04/05/2021 6 0* Normal 3 8-5 6%; PreDiabetic 5 7-6 4%;  Diabetic >=6 5%; Glycemic control for adults with diabetes <7 0% % Final    EAG 04/05/2021 126  mg/dl Final    Cholesterol 04/05/2021 149  50 - 200 mg/dL Final    Triglycerides 04/05/2021 87  <=150 mg/dL Final    HDL, Direct 04/05/2021 54  >=40 mg/dL Final    LDL Calculated 04/05/2021 78  0 - 100 mg/dL Final    Non-HDL-Chol (CHOL-HDL) 04/05/2021 95  mg/dl Final           Current Medications     Current Outpatient Medications:     acetaminophen (TYLENOL) 500 mg tablet, Take 500 mg by mouth every 6 (six) hours as needed for mild pain, Disp: , Rfl:     albuterol (PROVENTIL HFA,VENTOLIN HFA) 90 mcg/act inhaler, Inhale 1 puff every 6 (six) hours as needed for wheezing, Disp: 1 Inhaler, Rfl: 4    amLODIPine (NORVASC) 5 mg tablet, Take 1 tablet (5 mg total) by mouth daily, Disp: 90 tablet, Rfl: 3    aspirin 81 MG tablet, Take 1 tablet by mouth daily, Disp: , Rfl:     atorvastatin (LIPITOR) 80 mg tablet, Take 1 tablet (80 mg total) by mouth daily, Disp: 90 tablet, Rfl: 3    fluticasone (FLONASE) 50 mcg/act nasal spray, 2 sprays into each nostril daily, Disp: 1 Bottle, Rfl: 4    lisinopril (ZESTRIL) 20 mg tablet, Take 1 tablet (20 mg total) by mouth 2 (two) times a day, Disp: 180 tablet, Rfl: 3    metoprolol tartrate (LOPRESSOR) 25 mg tablet, Take 1 tablet (25 mg total) by mouth every 12 (twelve) hours, Disp: 180 tablet, Rfl: 3    nitroglycerin (NITROSTAT) 0 4 mg SL tablet, PLACE 1 TABLET (0 4 MG TOTAL) UNDER THE TONGUE EVERY 5 (FIVE) MINUTES AS NEEDED FOR CHEST PAIN, Disp: 100 tablet, Rfl: 1      Robert Figueredo MD

## 2021-05-10 ENCOUNTER — TELEPHONE (OUTPATIENT)
Dept: INTERNAL MEDICINE CLINIC | Facility: CLINIC | Age: 81
End: 2021-05-10

## 2021-07-10 ENCOUNTER — APPOINTMENT (OUTPATIENT)
Dept: LAB | Age: 81
End: 2021-07-10
Payer: COMMERCIAL

## 2021-07-10 DIAGNOSIS — R97.20 ELEVATED PROSTATE SPECIFIC ANTIGEN (PSA): ICD-10-CM

## 2021-07-10 PROCEDURE — 36415 COLL VENOUS BLD VENIPUNCTURE: CPT

## 2021-07-10 PROCEDURE — 84154 ASSAY OF PSA FREE: CPT

## 2021-07-10 PROCEDURE — 84153 ASSAY OF PSA TOTAL: CPT

## 2021-07-13 DIAGNOSIS — E78.00 HYPERCHOLESTEROLEMIA: ICD-10-CM

## 2021-07-14 RX ORDER — ATORVASTATIN CALCIUM 80 MG/1
80 TABLET, FILM COATED ORAL DAILY
Qty: 90 TABLET | Refills: 3 | Status: SHIPPED | OUTPATIENT
Start: 2021-07-14 | End: 2022-07-25 | Stop reason: SDUPTHER

## 2021-07-15 LAB
PSA FREE MFR SERPL: 23.2 %
PSA FREE SERPL-MCNC: 1.88 NG/ML
PSA SERPL-MCNC: 8.1 NG/ML (ref 0–4)

## 2021-07-23 ENCOUNTER — OFFICE VISIT (OUTPATIENT)
Dept: UROLOGY | Facility: MEDICAL CENTER | Age: 81
End: 2021-07-23
Payer: COMMERCIAL

## 2021-07-23 VITALS
BODY MASS INDEX: 31.21 KG/M2 | DIASTOLIC BLOOD PRESSURE: 72 MMHG | HEIGHT: 70 IN | WEIGHT: 218 LBS | SYSTOLIC BLOOD PRESSURE: 118 MMHG

## 2021-07-23 DIAGNOSIS — N40.1 BPH WITH OBSTRUCTION/LOWER URINARY TRACT SYMPTOMS: Primary | ICD-10-CM

## 2021-07-23 DIAGNOSIS — N13.8 BPH WITH OBSTRUCTION/LOWER URINARY TRACT SYMPTOMS: Primary | ICD-10-CM

## 2021-07-23 DIAGNOSIS — R97.20 ELEVATED PSA: ICD-10-CM

## 2021-07-23 LAB
SL AMB  POCT GLUCOSE, UA: NORMAL
SL AMB LEUKOCYTE ESTERASE,UA: NORMAL
SL AMB POCT BILIRUBIN,UA: NORMAL
SL AMB POCT BLOOD,UA: NORMAL
SL AMB POCT CLARITY,UA: CLEAR
SL AMB POCT COLOR,UA: YELLOW
SL AMB POCT KETONES,UA: NORMAL
SL AMB POCT NITRITE,UA: NORMAL
SL AMB POCT PH,UA: 6.5
SL AMB POCT SPECIFIC GRAVITY,UA: 1.01
SL AMB POCT URINE PROTEIN: NORMAL
SL AMB POCT UROBILINOGEN: 1

## 2021-07-23 PROCEDURE — 1036F TOBACCO NON-USER: CPT | Performed by: UROLOGY

## 2021-07-23 PROCEDURE — 1160F RVW MEDS BY RX/DR IN RCRD: CPT | Performed by: UROLOGY

## 2021-07-23 PROCEDURE — 3074F SYST BP LT 130 MM HG: CPT | Performed by: UROLOGY

## 2021-07-23 PROCEDURE — 99214 OFFICE O/P EST MOD 30 MIN: CPT | Performed by: UROLOGY

## 2021-07-23 PROCEDURE — 3078F DIAST BP <80 MM HG: CPT | Performed by: UROLOGY

## 2021-07-23 PROCEDURE — 81003 URINALYSIS AUTO W/O SCOPE: CPT | Performed by: UROLOGY

## 2021-07-23 NOTE — ASSESSMENT & PLAN NOTE
Most recent PSA is 8 1 with 23% free (July 10, 2021)  I recommended that prior to proceeding with any further evaluation we recheck the PSA to ensure it does not improve on its own  We will plan to recheck the PSA in the next 6-12 weeks  He will then return for further discussion

## 2021-07-23 NOTE — PROGRESS NOTES
Assessment/Plan:    BPH with obstruction/lower urinary tract symptoms  AUA symptom score is 1  He is very pleased with his voiding pattern  Urinalysis is negative  We will continue to follow his voiding pattern watchful waiting  Elevated PSA  Most recent PSA is 8 1 with 23% free (July 10, 2021)  I recommended that prior to proceeding with any further evaluation we recheck the PSA to ensure it does not improve on its own  We will plan to recheck the PSA in the next 6-12 weeks  He will then return for further discussion  Diagnoses and all orders for this visit:    BPH with obstruction/lower urinary tract symptoms  -     POCT urine dip auto non-scope    Elevated PSA  -     PSA, total and free; Future          Subjective:      Patient ID: Marimar Sparks is a [de-identified] y o  male  Benign Prostatic Hypertrophy  This is a chronic problem  The current episode started more than 1 year ago  The problem is unchanged  Irritative symptoms do not include frequency, nocturia or urgency  Nocturia x 1  Obstructive symptoms include a slower stream  Obstructive symptoms do not include dribbling, incomplete emptying, an intermittent stream, straining or a weak stream  Pertinent negatives include no chills, dysuria, genital pain, hematuria, hesitancy, nausea or vomiting  AUA score is 0-7  His sexual activity is non-contributory to the current illness  Nothing aggravates the symptoms  Past treatments include nothing  The following portions of the patient's history were reviewed and updated as appropriate: allergies, current medications, past family history, past medical history, past social history, past surgical history and problem list     Review of Systems   Constitutional: Negative  Negative for chills, diaphoresis, fatigue and fever  HENT: Negative  Eyes: Negative  Respiratory: Negative  Cardiovascular: Negative  Gastrointestinal: Negative  Negative for nausea and vomiting  Endocrine: Negative  Genitourinary: Negative for dysuria, frequency, hematuria, hesitancy, incomplete emptying, nocturia and urgency  See HPI   Musculoskeletal: Negative  Skin: Negative  Allergic/Immunologic: Negative  Neurological: Negative  Hematological: Negative  Psychiatric/Behavioral: Negative  AUA SYMPTOM SCORE      Most Recent Value   AUA SYMPTOM SCORE   How often have you had a sensation of not emptying your bladder completely after you finished urinating? 0   How often have you had to urinate again less than two hours after you finished urinating? 0   How often have you found you stopped and started again several times when you urinate?  0   How often have you found it difficult to postpone urination? 0   How often have you had a weak urinary stream?  0   How often have you had to push or strain to begin urination? 0   How many times did you most typically get up to urinate from the time you went to bed at night until the time you got up in the morning? 1   Quality of Life: If you were to spend the rest of your life with your urinary condition just the way it is now, how would you feel about that?  1   AUA SYMPTOM SCORE  1        Objective:      /72   Ht 5' 10" (1 778 m)   Wt 98 9 kg (218 lb)   BMI 31 28 kg/m²          Physical Exam  Vitals reviewed  Constitutional:       Appearance: He is well-developed  HENT:      Head: Normocephalic and atraumatic  Eyes:      General: No scleral icterus  Conjunctiva/sclera: Conjunctivae normal    Cardiovascular:      Rate and Rhythm: Normal rate  Pulmonary:      Effort: Pulmonary effort is normal    Abdominal:      General: Bowel sounds are normal  There is no distension  Palpations: Abdomen is soft  There is no mass  Tenderness: There is no abdominal tenderness  There is no right CVA tenderness, left CVA tenderness, guarding or rebound  Hernia: No hernia is present     Genitourinary:     Penis: Normal  No phimosis or hypospadias  Testes: Normal          Right: Mass not present  Left: Mass not present  Rectum: Normal       Comments: Prostate 2 times enlarged and palpably benign  There is no nodularity noted  Musculoskeletal:         General: Normal range of motion  Cervical back: Normal range of motion and neck supple  Skin:     General: Skin is warm and dry  Neurological:      General: No focal deficit present  Mental Status: He is alert and oriented to person, place, and time  Psychiatric:         Mood and Affect: Mood normal          Behavior: Behavior normal          Thought Content:  Thought content normal          Judgment: Judgment normal

## 2021-07-23 NOTE — PATIENT INSTRUCTIONS
Prostate Specific Antigen   AMBULATORY CARE:   A prostate specific antigen (PSA) test  is a blood test used to screen men for prostate cancer  A PSA test is also used to monitor how well prostate cancer treatment is working  Other test that may be done with a PSA test:  A digital rectal exam is usually performed with a PSA test  Your healthcare provider will insert a gloved finger into your rectum to feel if your prostate is large, firm, or has lumps  Who may need a PSA test:  Some experts recommend a PSA test for men ages 48 to 79  They also recommend testing men with a high risk for prostate cancer at age 36 or 39  Risk factors may include being  or having a brother or father with prostate cancer  Other experts may not recommend PSA testing  Your healthcare provider can help you decide if you need a PSA test    What the results of a PSA test mean:  Most healthy men have a PSA level less than 4 ng/mL  If your PSA level is higher than 4 ng/mL you may need more tests  Examples include blood or urine tests, an ultrasound, MRI, CT scan, or a prostate biopsy  Ask your healthcare provider for more information on these tests  Other causes of a high PSA level:  A high PSA level does not always mean you have prostate cancer  Certain conditions or procedures can increase PSA levels  Examples include:  · Older age    · Procedures such as a prostate biopsy or a cystoscopy    · An enlarged prostate    · Recent sexual activity    · A prostate infection    · Certain exercises that put pressure on the prostate such as bicycling    · Medicine such as testosterone    © Copyright [employer] 2021 Information is for End User's use only and may not be sold, redistributed or otherwise used for commercial purposes  All illustrations and images included in CareNotes® are the copyrighted property of [employer]  or [facility] [employer] Kaiser   The above information is an  only   It is not intended as medical advice for individual conditions or treatments  Talk to your doctor, nurse or pharmacist before following any medical regimen to see if it is safe and effective for you

## 2021-07-23 NOTE — ASSESSMENT & PLAN NOTE
AUA symptom score is 1  He is very pleased with his voiding pattern  Urinalysis is negative  We will continue to follow his voiding pattern watchful waiting

## 2021-07-27 DIAGNOSIS — I10 ESSENTIAL HYPERTENSION: ICD-10-CM

## 2021-07-28 RX ORDER — LISINOPRIL 20 MG/1
20 TABLET ORAL 2 TIMES DAILY
Qty: 180 TABLET | Refills: 3 | Status: SHIPPED | OUTPATIENT
Start: 2021-07-28 | End: 2021-12-16 | Stop reason: SDUPTHER

## 2021-09-02 ENCOUNTER — OFFICE VISIT (OUTPATIENT)
Dept: INTERNAL MEDICINE CLINIC | Facility: CLINIC | Age: 81
End: 2021-09-02
Payer: COMMERCIAL

## 2021-09-02 VITALS
BODY MASS INDEX: 31.18 KG/M2 | HEIGHT: 70 IN | WEIGHT: 217.8 LBS | OXYGEN SATURATION: 99 % | HEART RATE: 65 BPM | SYSTOLIC BLOOD PRESSURE: 127 MMHG | TEMPERATURE: 97.8 F | DIASTOLIC BLOOD PRESSURE: 66 MMHG

## 2021-09-02 DIAGNOSIS — C43.4 MALIGNANT MELANOMA OF NECK (HCC): ICD-10-CM

## 2021-09-02 DIAGNOSIS — I25.118 CORONARY ARTERY DISEASE OF NATIVE ARTERY OF NATIVE HEART WITH STABLE ANGINA PECTORIS (HCC): ICD-10-CM

## 2021-09-02 DIAGNOSIS — J98.01 BRONCHOSPASM: ICD-10-CM

## 2021-09-02 DIAGNOSIS — R97.20 ELEVATED PSA: ICD-10-CM

## 2021-09-02 DIAGNOSIS — Z00.00 MEDICARE ANNUAL WELLNESS VISIT, SUBSEQUENT: Primary | ICD-10-CM

## 2021-09-02 DIAGNOSIS — I10 ESSENTIAL HYPERTENSION: ICD-10-CM

## 2021-09-02 DIAGNOSIS — E78.00 HYPERCHOLESTEROLEMIA: ICD-10-CM

## 2021-09-02 DIAGNOSIS — R73.01 IMPAIRED FASTING GLUCOSE: ICD-10-CM

## 2021-09-02 DIAGNOSIS — R07.89 CHEST TIGHTNESS: ICD-10-CM

## 2021-09-02 DIAGNOSIS — I25.5 CARDIOMYOPATHY, ISCHEMIC: ICD-10-CM

## 2021-09-02 PROCEDURE — 99214 OFFICE O/P EST MOD 30 MIN: CPT | Performed by: INTERNAL MEDICINE

## 2021-09-02 PROCEDURE — G0439 PPPS, SUBSEQ VISIT: HCPCS | Performed by: INTERNAL MEDICINE

## 2021-09-02 RX ORDER — AMLODIPINE BESYLATE 2.5 MG/1
2.5 TABLET ORAL DAILY
Qty: 90 TABLET | Refills: 3 | Status: SHIPPED | OUTPATIENT
Start: 2021-09-02 | End: 2021-12-16 | Stop reason: ALTCHOICE

## 2021-09-02 RX ORDER — AMLODIPINE BESYLATE 5 MG/1
5 TABLET ORAL DAILY
Qty: 90 TABLET | Refills: 3 | Status: CANCELLED | OUTPATIENT
Start: 2021-09-02

## 2021-09-02 RX ORDER — ALBUTEROL SULFATE 90 UG/1
1 AEROSOL, METERED RESPIRATORY (INHALATION) EVERY 6 HOURS PRN
Qty: 8 G | Refills: 1 | Status: SHIPPED | OUTPATIENT
Start: 2021-09-02 | End: 2022-04-04 | Stop reason: SDUPTHER

## 2021-09-02 NOTE — PROGRESS NOTES
Assessment and Plan:     1  Medicare wellness evaluation    The patient is doing pretty well  He does not smoke, drink, or use illicit drugs  He watches his diet pretty carefully  He exercises regularly mostly by walking  He has not fallen  He has no symptoms of cognitive or psychiatric dysfunction  He is able to manage his activities of daily living  His home environment is safe  He is due for colonoscopy for follow-up of a personal history of polyps  We discussed this and he intends to make the arrangements  He is up-to-date with pneumococcal, tetanus, and COVID-19 vaccinations  He intends to get a flu shot this fall be getting a COVID booster when they become available  I recommended herpes zoster vaccine  I discussed advance care planning with the patient  He does have a living will  His wife Jero Newman is his power of   We reviewed advance directives  Preventive health issues were discussed with patient, and age appropriate screening tests were ordered as noted in patient's After Visit Summary  Personalized health advice and appropriate referrals for health education or preventive services given if needed, as noted in patient's After Visit Summary  History of Present Illness:     Patient presents for Medicare Annual Wellness visit    Patient Care Team:  Harshal Harley MD as PCP - General  Drue Merlin, DO Macario Reas, MD Raelyn Butts, DPM (Podiatry)  Brenda Hewitt MD (Cardiology)     Problem List:     Patient Active Problem List   Diagnosis    Coronary artery disease of native artery of native heart with stable angina pectoris ([facility] )    Hypercholesterolemia    Essential hypertension    Impaired fasting glucose    Malignant melanoma of neck ([facility] [facility]    JORGE (obstructive sleep apnea)    Bradycardia    Depression    Elevated PSA    BPH with obstruction/lower urinary tract symptoms    PLMD (periodic limb movement disorder)    Obesity (BMI 30-39  9)    Bronchospasm    Left forearm pain      Past Medical and Surgical History:     Past Medical History:   Diagnosis Date    AAA (abdominal aortic aneurysm) without rupture ([facility] )     has annual US to check    Angina pectoris (HCC)     chronic stable angina, but it is mild and tolerates walking dogs and walking up flight os stairs without symptoms  Using Nitro no more than 2 times per mt      Ankle pain, left     Bilateral wrist pain     BPH with obstruction/lower urinary tract symptoms     Coronary artery disease     CPAP (continuous positive airway pressure) dependence     Elevated PSA     History of colon polyps     [place] (hard of hearing)     Slight    Hyperlipidemia     Hypertension     Ischemic cardiomyopathy     with apical aneurysm    Melanoma ([facility] ) 12/2016    Right side of neck    Myocardial infarction ([place] [address] ) 1996    x1    Pneumonia     x1 as a baby    Sleep apnea     Wears glasses      Past Surgical History:   Procedure Laterality Date    CARDIAC SURGERY      CABG x6    CATARACT EXTRACTION Bilateral     COLONOSCOPY      CORONARY ANGIOPLASTY WITH STENT PLACEMENT      2 stents    CORONARY ARTERY BYPASS GRAFT      PROSTATE BIOPSY  2000,2001    SKIN BIOPSY      Melanoma - surgical removal    SKIN LESION EXCISION Right 2/6/2017    Procedure: WIDE EXCISION MELANOMA SCAR LATERAL NECK;  Surgeon: Joshua Araujo MD;  Location: [facility];  Service:     WISDOM TOOTH EXTRACTION        Family History:     Family History   Problem Relation Age of Onset    Hypertension Mother     Breast cancer Mother       Social History:     Social History     Socioeconomic History    Marital status: /Civil Union     Spouse name: Not on file    Number of children: Not on file    Years of education: Not on file    Highest education level: Not on file   Occupational History    Not on file   Tobacco Use    Smoking status: Never Smoker    Smokeless tobacco: Never Used   Vaping Use    Vaping Use: Never used   Substance and Sexual Activity    Alcohol use: Yes     Comment: 1-2 beers rarely    Drug use: No    Sexual activity: Not on file   Other Topics Concern    Not on file   Social History Narrative    DOES NOT CONSUME CAFFEINE     Social Determinants of Health     Financial Resource Strain:     Difficulty of Paying Living Expenses:    Food Insecurity:     Worried About Running Out of Food in the Last Year:     Ran Out of Food in the Last Year:    Transportation Needs:     Lack of Transportation (Medical):      Lack of Transportation (Non-Medical):    Physical Activity:     Days of Exercise per Week:     Minutes of Exercise per Session:    Stress:     Feeling of Stress :    Social Connections:     Frequency of Communication with Friends and Family:     Frequency of Social Gatherings with Friends and Family:     Attends Taoist Services:     Active Member of Clubs or Organizations:     Attends Club or Organization Meetings:     Marital Status:    Intimate Partner Violence:     Fear of Current or Ex-Partner:     Emotionally Abused:     Physically Abused:     Sexually Abused:       Medications and Allergies:     Current Outpatient Medications   Medication Sig Dispense Refill    acetaminophen (TYLENOL) 500 mg tablet Take 500 mg by mouth every 6 (six) hours as needed for mild pain      albuterol (PROVENTIL HFA,VENTOLIN HFA) 90 mcg/act inhaler Inhale 1 puff every 6 (six) hours as needed for wheezing 1 Inhaler 4    amLODIPine (NORVASC) 5 mg tablet Take 1 tablet (5 mg total) by mouth daily 90 tablet 3    aspirin 81 MG tablet Take 1 tablet by mouth daily      atorvastatin (LIPITOR) 80 mg tablet Take 1 tablet (80 mg total) by mouth daily 90 tablet 3    fluticasone (FLONASE) 50 mcg/act nasal spray 2 sprays into each nostril daily 1 Bottle 4    lisinopril (ZESTRIL) 20 mg tablet Take 1 tablet (20 mg total) by mouth 2 (two) times a day 180 tablet 3    metoprolol tartrate (LOPRESSOR) 25 mg tablet Take 1 tablet (25 mg total) by mouth every 12 (twelve) hours 180 tablet 3    nitroglycerin (NITROSTAT) 0 4 mg SL tablet PLACE 1 TABLET (0 4 MG TOTAL) UNDER THE TONGUE EVERY 5 (FIVE) MINUTES AS NEEDED FOR CHEST PAIN 100 tablet 1     No current facility-administered medications for this visit  Allergies   Allergen Reactions    Abciximab Other (See Comments)     rcd 9/27/01 & 5/16/03    Escitalopram Rash      Immunizations:     Immunization History   Administered Date(s) Administered    INFLUENZA 11/03/2015, 09/26/2016, 09/28/2016, 11/14/2017, 11/27/2018    Influenza Quadrivalent Preservative Free 3 years and older IM 10/14/2014    Influenza Quadrivalent, 6-35 Months IM 11/03/2015    Influenza Split High Dose Preservative Free IM 09/26/2016, 11/14/2017    Influenza, high dose seasonal 0 7 mL 11/27/2018, 10/22/2019, 10/22/2020    Influenza, seasonal, injectable 09/27/2011, 10/01/2012, 12/02/2013    Pneumococcal Conjugate 13-Valent 07/28/2015    Pneumococcal Polysaccharide PPV23 09/20/2006, 01/03/2017    SARS-CoV-2 / COVID-19 mRNA IM (Moderna) 02/06/2021, 03/09/2021    Tdap 01/03/2017      Health Maintenance:         Topic Date Due    Colorectal Cancer Screening  04/12/2021         Topic Date Due    Influenza Vaccine (1) 09/01/2021      Medicare Health Risk Assessment:     There were no vitals taken for this visit  Health Risk Assessment:   Patient rates overall health as good  Patient feels that their physical health rating is same  Patient is satisfied with their life  Eyesight was rated as same  Hearing was rated as same  Patient feels that their emotional and mental health rating is same  Patients states they are sometimes angry  Patient states they are sometimes unusually tired/fatigued  Pain experienced in the last 7 days has been none  Patient states that he has experienced no weight loss or gain in last 6 months  Fall Risk Screening:    In the past year, patient has experienced: no history of falling in past year      Home Safety:  Patient does not have trouble with stairs inside or outside of their home  Patient has working smoke alarms     Nutrition:   Current diet is Regular  Medications:   Patient is currently taking over-the-counter supplements  OTC medications include: see medication list  Patient is able to manage medications  Activities of Daily Living (ADLs)/Instrumental Activities of Daily Living (IADLs):   Walk and transfer into and out of bed and chair?: Yes  Dress and groom yourself?: Yes    Bathe or shower yourself?: Yes    Feed yourself? Yes  Do your laundry/housekeeping?: Yes  Manage your money, pay your bills and track your expenses?: Yes  Make your own meals?: Yes    Do your own shopping?: Yes    Previous Hospitalizations:   Any hospitalizations or ED visits within the last 12 months?: No      Advance Care Planning:   Living will: Yes    Durable POA for healthcare:  Yes    Advanced directive: No    Advanced directive counseling given: Yes    End of Life Decisions reviewed with patient: Yes      Cognitive Screening:   Provider or family/friend/caregiver concerned regarding cognition?: No    PREVENTIVE SCREENINGS      Cardiovascular Screening:    General: Screening Not Indicated and History Lipid Disorder      Diabetes Screening:     General: Screening Current      Colorectal Cancer Screening:     General: Risks and Benefits Discussed      Prostate Cancer Screening:    General: Screening Not Indicated      Osteoporosis Screening:    General: Screening Not Indicated      Abdominal Aortic Aneurysm (AAA) Screening:        General: Screening Not Indicated and History AAA      Lung Cancer Screening:     General: Screening Not Indicated      Hepatitis C Screening:    General: Risks and Benefits Discussed    Screening, Brief Intervention, and Referral to Treatment (SBIRT)    Screening      Single Item Drug Screening:  How often have you used an illegal drug (including marijuana) or a prescription medication for non-medical reasons in the past year? never    Single Item Drug Screen Score: 0  Interpretation: Negative screen for possible drug use disorder      Tommie Gatn MD

## 2021-09-02 NOTE — PATIENT INSTRUCTIONS

## 2021-09-08 NOTE — PROGRESS NOTES
[facility] [place]      NAME: Malika Mazariegos  AGE: [de-identified] y o  SEX: male  : 1940   MRN: [id number]    DATE: 2021  TIME: 5:10 PM    Assessment and Plan   1  Essential hypertension    Well controlled  - amLODIPine (NORVASC) 2 5 mg tablet; Take 1 tablet (2 5 mg total) by mouth daily  Dispense: 90 tablet; Refill: 3  - Comprehensive metabolic panel  - CBC and differential    2  Bronchospasm    Well controlled  The patient requires albuterol only occasionally  - albuterol (PROVENTIL HFA,VENTOLIN HFA) 90 mcg/act inhaler; Inhale 1 puff every 6 (six) hours as needed for wheezing  Dispense: 8 g; Refill: 1    3  Coronary artery disease of native artery of native heart with stable angina pectoris ([facility] [address] )    On aspirin and appropriate risk factor modification  4  Cardiomyopathy, ischemic    No symptoms of congestive heart failure  The patient's most recent echocardiogram showed an ejection fraction of 45%  5  Hypercholesterolemia    Controlled on atorvastatin  - Lipid panel    6  Malignant melanoma of neck ([facility] [address] )    No clinical evidence of recurrence  Followed by Surgical Oncology  7  Elevated PSA    Followed by urology  8  Impaired fasting glucose    Stable on diet  Check hemoglobin A1c   - Hemoglobin A1C    9  BMI   BMI Counseling: Body mass index is 31 25 kg/m²  The BMI is above normal  Nutrition recommendations include decreasing overall calorie intake and 3-5 servings of fruits/vegetables daily  Exercise recommendations include moderate aerobic physical activity for 150 minutes/week  The patient is doing well overall  He will continue his current medications  He will be getting his influenza vaccine and COVID booster later this fall  Return to office in:  Three months    Chief Complaint     Chief Complaint   Patient presents with   [facility] Wellness Visit     due for colonoscopy, and bmi f/u        History of Present Illness      the patient returned to the office for re-evaluation of hypertension, hypercholesterolemia, impaired fasting glucose, coronary artery disease with ischemic cardiomyopathy, and elevated PSA  Since his last visit he has been feeling well  He has had no chest pain, shortness of breath, palpitations, or dizziness  He has been eating and sleeping well  He has been tolerating his medications well  The following portions of the patient's history were reviewed and updated as appropriate: allergies, current medications, past family history, past medical history, past social history, past surgical history and problem list     Review of Systems   Review of Systems   Constitutional: Negative  HENT: Negative for congestion, ear pain, postnasal drip, rhinorrhea, sore throat and trouble swallowing  Eyes: Negative for pain, discharge, redness and visual disturbance  Respiratory: Negative for cough, shortness of breath and wheezing  Cardiovascular: Negative  Gastrointestinal: Negative  Endocrine: Negative  Genitourinary: Negative for difficulty urinating, dysuria, frequency, hematuria and urgency  Musculoskeletal: Negative for arthralgias, gait problem, joint swelling and myalgias  Skin: Negative for rash  Neurological: Negative for dizziness, speech difficulty, weakness, light-headedness, numbness and headaches  Hematological: Negative  Psychiatric/Behavioral: Negative for confusion, decreased concentration, dysphoric mood and sleep disturbance  The patient is not nervous/anxious          Active Problem List     Patient Active Problem List   Diagnosis    Coronary artery disease of native artery of native heart with stable angina pectoris ([address] )    Hypercholesterolemia    Essential hypertension    Impaired fasting glucose    Malignant melanoma of neck (HCC)    JORGE (obstructive sleep apnea)    Bradycardia    Depression    Elevated PSA    BPH with obstruction/lower urinary tract symptoms    PLMD (periodic limb movement disorder)    Obesity (BMI 30-39  9)    Bronchospasm    Left forearm pain    Cardiomyopathy, ischemic       Objective   /66 (BP Location: Left arm, Patient Position: Sitting, Cuff Size: Large)   Pulse 65   Temp 97 8 °F (36 6 °C) (Tympanic)   Ht 5' 10" (1 778 m)   Wt 98 8 kg (217 lb 12 8 oz)   SpO2 99%   BMI 31 25 kg/m²     Physical Exam  Constitutional:       General: He is not in acute distress  Appearance: He is well-developed  HENT:      Head: Normocephalic and atraumatic  Neck:      Thyroid: No thyromegaly  Vascular: No JVD  Trachea: No tracheal deviation  Cardiovascular:      Rate and Rhythm: Normal rate and regular rhythm  Heart sounds: Normal heart sounds  No murmur heard  No friction rub  No gallop  Pulmonary:      Effort: Pulmonary effort is normal       Breath sounds: Normal breath sounds  No wheezing or rales  Chest:      Chest wall: No tenderness  Abdominal:      General: Bowel sounds are normal  There is no distension  Palpations: Abdomen is soft  There is no mass  Tenderness: There is no abdominal tenderness  There is no rebound  Musculoskeletal:         General: No tenderness  Normal range of motion  Cervical back: Neck supple  Lymphadenopathy:      Cervical: No cervical adenopathy  Skin:     General: Skin is warm and dry  Neurological:      Mental Status: He is alert and oriented to person, place, and time  Psychiatric:         Mood and Affect: Mood normal          Behavior: Behavior normal          Thought Content:  Thought content normal          Judgment: Judgment normal          Pertinent Laboratory/Diagnostic Studies:  Office Visit on 07/23/2021   Component Date Value Ref Range Status     COLOR, 07/23/2021 yellow   Final    CLARITY, 07/23/2021 clear   Final    SPECIFIC GRAVITY, 07/23/2021 1 015   Final     PH, 07/23/2021 6 5   Final    LEUKOCYTE ESTERASE, 07/23/2021 neg   Final    NITRITE, 07/23/2021 neg Final    GLUCOSE, UA 07/23/2021 neg   Final    KETONES,UA 07/23/2021 neg   Final    BILIRUBIN,UA 07/23/2021 neg   Final    BLOOD,UA 07/23/2021 neg   Final    POCT URINE PROTEIN 07/23/2021 neg   Final    SL AMB POCT UROBILINOGEN 07/23/2021 1 0   Final   Appointment on 07/10/2021   Component Date Value Ref Range Status    Prostate Specific Antigen Total 07/10/2021 8 1* 0 0 - 4 0 ng/mL Final    PSA, Free 07/10/2021 1 88  N/A ng/mL Final    PSA, Free Pct 07/10/2021 23 2  % Final           Current Medications     Current Outpatient Medications:     acetaminophen (TYLENOL) 500 mg tablet, Take 500 mg by mouth every 6 (six) hours as needed for mild pain, Disp: , Rfl:     albuterol (PROVENTIL HFA,VENTOLIN HFA) 90 mcg/act inhaler, Inhale 1 puff every 6 (six) hours as needed for wheezing, Disp: 8 g, Rfl: 1    amLODIPine (NORVASC) 2 5 mg tablet, Take 1 tablet (2 5 mg total) by mouth daily, Disp: 90 tablet, Rfl: 3    aspirin 81 MG tablet, Take 1 tablet by mouth daily, Disp: , Rfl:     atorvastatin (LIPITOR) 80 mg tablet, Take 1 tablet (80 mg total) by mouth daily, Disp: 90 tablet, Rfl: 3    fluticasone (FLONASE) 50 mcg/act nasal spray, 2 sprays into each nostril daily, Disp: 1 Bottle, Rfl: 4    lisinopril (ZESTRIL) 20 mg tablet, Take 1 tablet (20 mg total) by mouth 2 (two) times a day, Disp: 180 tablet, Rfl: 3    metoprolol tartrate (LOPRESSOR) 25 mg tablet, Take 1 tablet (25 mg total) by mouth every 12 (twelve) hours, Disp: 180 tablet, Rfl: 3    nitroglycerin (NITROSTAT) 0 4 mg SL tablet, PLACE 1 TABLET (0 4 MG TOTAL) UNDER THE TONGUE EVERY 5 (FIVE) MINUTES AS NEEDED FOR CHEST PAIN, Disp: 100 tablet, Rfl: 1      Giselle Le MD

## 2021-09-17 ENCOUNTER — OFFICE VISIT (OUTPATIENT)
Dept: INTERNAL MEDICINE CLINIC | Facility: CLINIC | Age: 81
End: 2021-09-17
Payer: COMMERCIAL

## 2021-09-17 VITALS
SYSTOLIC BLOOD PRESSURE: 155 MMHG | HEIGHT: 70 IN | OXYGEN SATURATION: 99 % | BODY MASS INDEX: 30.95 KG/M2 | TEMPERATURE: 97.8 F | DIASTOLIC BLOOD PRESSURE: 74 MMHG | WEIGHT: 216.2 LBS | HEART RATE: 50 BPM

## 2021-09-17 DIAGNOSIS — G47.00 INSOMNIA, UNSPECIFIED TYPE: ICD-10-CM

## 2021-09-17 DIAGNOSIS — R06.00 DYSPNEA ON EXERTION: Primary | ICD-10-CM

## 2021-09-17 DIAGNOSIS — R06.02 SHORTNESS OF BREATH: ICD-10-CM

## 2021-09-17 PROCEDURE — 3077F SYST BP >= 140 MM HG: CPT | Performed by: INTERNAL MEDICINE

## 2021-09-17 PROCEDURE — 1036F TOBACCO NON-USER: CPT | Performed by: INTERNAL MEDICINE

## 2021-09-17 PROCEDURE — 99214 OFFICE O/P EST MOD 30 MIN: CPT | Performed by: INTERNAL MEDICINE

## 2021-09-17 PROCEDURE — 3078F DIAST BP <80 MM HG: CPT | Performed by: INTERNAL MEDICINE

## 2021-09-17 PROCEDURE — 1160F RVW MEDS BY RX/DR IN RCRD: CPT | Performed by: INTERNAL MEDICINE

## 2021-09-17 NOTE — PROGRESS NOTES
INTERNAL MEDICINE OFFICE VISIT  [facility] Medicine- [place]    NAME: Karli Albright  AGE: [de-identified] y o  SEX: male    DATE OF ENCOUNTER: 9/17/2021    Assessment and Plan     1  Dyspnea on exertion  - unclear etiology of this  Avis Muller is somewhat of a poor historian,  He has some questionable progression of his dyspnea/ dyspnea on exertion, does not appear to be having typical anginal symptoms  He appears to be euvolemic today and weight is similar to prior appointments  ECG was done in the office which showed sinus bradycardia with PVCs, possible T-wave inversions in the inferior leads as well as V3 through V6  -His primary cardiologist is Dr Sheba Ziegler at [facility]  I reached out to her office and was able to speak with 1 of her colleagues, Dr Heriberto Gomez  In speaking with her and reviewing recent visit notes, his symptoms do appear to be somewhat chronic in nature, ECG  Findings appear to be similar to prior obtained during his last cardiology visit in May  - his last stress test was in 2019  We will obtain an updated stress test  Dr Heriberto Gomez will kindly  Past on a message to Dr Sheba Ziegler to inform her of our conversation    - NM myocardial perfusion spect (rx stress and/or rest); Future    2  Insomnia, unspecified type  - advised that he try melatonin to help with insomnia    3  Shortness of breath            No orders of the defined types were placed in this encounter  Chief Complaint     Chief Complaint   Patient presents with    Hypertension    Insomnia     pt had a camila machine for apnea     Shortness of Breath     trouble breathing sometimes        History of Present Illness     Avis Muller is an 52-year-old male with a past medical history of hypertension, CAD status post CABG, ischemic cardiomyopathy ( most recent EF 45%), hypertension, hyperlipidemia here for same-day appointment for shortness of breath     patient is somewhat of a poor historian    He seems to be having possible progression of dyspnea/ dyspnea on exertion  He states his breathing difficulties common go  At times they seem to be related to stress, for example he sometimes "gets worked up with chest tightness" while watching TV  He states he has had some trouble breathing through his mask since the start of the pandemic  Dyspnea is also present with exertion, he states he "sits and relaxes after breakfast an hour before he does anything"  Denies any leg swelling, abdominal distension, palpitations, dizziness, lightheadedness  No associated cough, wheezing, fever, chills  There is a diagnosis of bronchospasm in his chart, he has an albuterol inhaler which he utilizes on an as-needed basis  He also has a CPAP machine at home  This was recalled recently, had a new filter about a month ago  He states that when he uses the machine he "feels like he is not getting on oxygen"  Sleeps on 1 pillow at night, denies orthopnea    The following portions of the patient's history were reviewed and updated as appropriate: allergies, current medications, past family history, past medical history, past social history, past surgical history and problem list     Review of Systems     10 point ROS negative except per HPI    Active Problem List     Patient Active Problem List   Diagnosis    Coronary artery disease of native artery of native heart with stable angina pectoris ([facility] [address] )    Hypercholesterolemia    Essential hypertension    Impaired fasting glucose    Malignant melanoma of neck (HCC)    JORGE (obstructive sleep apnea)    Bradycardia    Depression    Elevated PSA    BPH with obstruction/lower urinary tract symptoms    PLMD (periodic limb movement disorder)    Obesity (BMI 30-39  9)    Bronchospasm    Left forearm pain    Cardiomyopathy, ischemic       Objective     /74 (BP Location: Left arm, Patient Position: Sitting, Cuff Size: Large)   Pulse (!) 50   Temp 97 8 °F (36 6 °C) (Tympanic)   Ht 5' 10" (1 778 m)   Wt 98 1 kg (216 lb 3 2 oz)   SpO2 99%   BMI 31 02 kg/m²     Physical Exam  Constitutional:       Appearance: Normal appearance  He is not ill-appearing  HENT:      Head: Normocephalic and atraumatic  Eyes:      General: No scleral icterus  Right eye: No discharge  Left eye: No discharge  Cardiovascular:      Rate and Rhythm: Normal rate and regular rhythm  Heart sounds: No murmur heard  No friction rub  Pulmonary:      Effort: Pulmonary effort is normal       Breath sounds: Normal breath sounds  No wheezing or rales  Abdominal:      General: Abdomen is flat  There is no distension  Palpations: Abdomen is soft  Tenderness: There is no abdominal tenderness  Musculoskeletal:         General: No swelling or tenderness  Skin:     General: Skin is warm and dry  Findings: No erythema  Neurological:      Mental Status: He is alert  Mental status is at baseline  Motor: No weakness  Psychiatric:         Mood and Affect: Mood normal          Behavior: Behavior normal          Pertinent Laboratory/Diagnostic Studies:  MRI prostate multiparametric wo w contrast    Result Date: 12/27/2019  Impression: 1  PI-RADSv2 1 Category 1 - Very low (clinically significant cancer is highly unlikely to be present)   Workstation performed: [id number]       Images and diagnostics reviewed     Current Medications     Current Outpatient Medications:     acetaminophen (TYLENOL) 500 mg tablet, Take 500 mg by mouth every 6 (six) hours as needed for mild pain, Disp: , Rfl:     albuterol (PROVENTIL HFA,VENTOLIN HFA) 90 mcg/act inhaler, Inhale 1 puff every 6 (six) hours as needed for wheezing, Disp: 8 g, Rfl: 1    amLODIPine (NORVASC) 2 5 mg tablet, Take 1 tablet (2 5 mg total) by mouth daily, Disp: 90 tablet, Rfl: 3    aspirin 81 MG tablet, Take 1 tablet by mouth daily, Disp: , Rfl:     atorvastatin (LIPITOR) 80 mg tablet, Take 1 tablet (80 mg total) by mouth daily, Disp: 90 tablet, Rfl: 3   fluticasone (FLONASE) 50 mcg/act nasal spray, 2 sprays into each nostril daily, Disp: 1 Bottle, Rfl: 4    lisinopril (ZESTRIL) 20 mg tablet, Take 1 tablet (20 mg total) by mouth 2 (two) times a day, Disp: 180 tablet, Rfl: 3    metoprolol tartrate (LOPRESSOR) 25 mg tablet, Take 1 tablet (25 mg total) by mouth every 12 (twelve) hours, Disp: 180 tablet, Rfl: 3    nitroglycerin (NITROSTAT) 0 4 mg SL tablet, PLACE 1 TABLET (0 4 MG TOTAL) UNDER THE TONGUE EVERY 5 (FIVE) MINUTES AS NEEDED FOR CHEST PAIN, Disp: 100 tablet, Rfl: 1    Health Maintenance     Health Maintenance   Topic Date Due    Colorectal Cancer Screening  04/12/2021    Influenza Vaccine (1) 09/01/2021    Depression Remission PHQ  10/22/2021    SLP PLAN OF CARE  07/01/2022 (Originally 1940)    Fall Risk  09/02/2022    Medicare Annual Wellness Visit (AWV)  09/02/2022    BMI: Adult  09/02/2022    BMI: Followup Plan  09/08/2022    DTaP,Tdap,and Td Vaccines (2 - Td or Tdap) 01/03/2027    Pneumococcal Vaccine: 65+ Years  Completed    COVID-19 Vaccine  Completed    HIB Vaccine  Aged Out    Hepatitis B Vaccine  Aged Out    IPV Vaccine  Aged Out    Hepatitis A Vaccine  Aged Out    Meningococcal ACWY Vaccine  Aged Out    HPV Vaccine  Aged [employer] History   Administered Date(s) Administered    INFLUENZA 11/03/2015, 09/26/2016, 09/28/2016, 11/14/2017, 11/27/2018    Influenza Quadrivalent Preservative Free 3 years and older IM 10/14/2014    Influenza Quadrivalent, 6-35 Months IM 11/03/2015    Influenza Split High Dose Preservative Free IM 09/26/2016, 11/14/2017    Influenza, high dose seasonal 0 7 mL 11/27/2018, 10/22/2019, 10/22/2020    Influenza, seasonal, injectable 09/27/2011, 10/01/2012, 12/02/2013    Pneumococcal Conjugate 13-Valent 07/28/2015    Pneumococcal Polysaccharide PPV23 09/20/2006, 01/03/2017    SARS-CoV-2 / COVID-19 mRNA IM (Moderna) 02/06/2021, 03/09/2021    Tdap 01/03/2017       UMESH Hernandez    St  [facility] - [place]  [address], [address]  [place], [address] [facility]  Office: [phone]

## 2021-09-22 ENCOUNTER — TELEPHONE (OUTPATIENT)
Dept: SLEEP CENTER | Facility: CLINIC | Age: 81
End: 2021-09-22

## 2021-09-22 NOTE — TELEPHONE ENCOUNTER
Spoke with patient and his daughter about recalled machine  Reviewed the following information   Continuous Positive Airway Pressure (CPAP) therapy was prescribed to you as a medical necessity and there are risks of discontinuing  use of the device, some of which may be long term  Symptoms you experienced before using CPAP may return such as snoring, apneas, excessive daytime sleepiness, hypertension, cardiac arrhythmias, risk of stroke, congestive heart-failure, exacerbation of COPD and potential respiratory failure  Ultimately, it is a personal decision for you to make if you continue use of an affected device or discontinue until a replacement is provided  Another option would be to check with your medical equipment provider to determine if you are eligible for a new machine through your insurance, if not you can pay out of pocket for a new machine  We are able to provide you with a script, please include your mask type  Patient will check if his old machine is recalled or if it can be set to his current pressure

## 2021-11-30 ENCOUNTER — APPOINTMENT (OUTPATIENT)
Dept: LAB | Age: 81
End: 2021-11-30
Payer: COMMERCIAL

## 2021-11-30 LAB
ALBUMIN SERPL BCP-MCNC: 3.4 G/DL (ref 3.5–5)
ALP SERPL-CCNC: 93 U/L (ref 46–116)
ALT SERPL W P-5'-P-CCNC: 23 U/L (ref 12–78)
ANION GAP SERPL CALCULATED.3IONS-SCNC: 4 MMOL/L (ref 4–13)
AST SERPL W P-5'-P-CCNC: 16 U/L (ref 5–45)
BASOPHILS # BLD AUTO: 0.03 THOUSANDS/ΜL (ref 0–0.1)
BASOPHILS NFR BLD AUTO: 0 % (ref 0–1)
BILIRUB SERPL-MCNC: 1.02 MG/DL (ref 0.2–1)
BUN SERPL-MCNC: 30 MG/DL (ref 5–25)
CALCIUM ALBUM COR SERPL-MCNC: 9.7 MG/DL (ref 8.3–10.1)
CALCIUM SERPL-MCNC: 9.2 MG/DL (ref 8.3–10.1)
CHLORIDE SERPL-SCNC: 106 MMOL/L (ref 100–108)
CHOLEST SERPL-MCNC: 132 MG/DL
CO2 SERPL-SCNC: 28 MMOL/L (ref 21–32)
CREAT SERPL-MCNC: 1.23 MG/DL (ref 0.6–1.3)
EOSINOPHIL # BLD AUTO: 0.12 THOUSAND/ΜL (ref 0–0.61)
EOSINOPHIL NFR BLD AUTO: 2 % (ref 0–6)
ERYTHROCYTE [DISTWIDTH] IN BLOOD BY AUTOMATED COUNT: 15.2 % (ref 11.6–15.1)
EST. AVERAGE GLUCOSE BLD GHB EST-MCNC: 111 MG/DL
GFR SERPL CREATININE-BSD FRML MDRD: 55 ML/MIN/1.73SQ M
GLUCOSE P FAST SERPL-MCNC: 100 MG/DL (ref 65–99)
HBA1C MFR BLD: 5.5 %
HCT VFR BLD AUTO: 40.9 % (ref 36.5–49.3)
HDLC SERPL-MCNC: 49 MG/DL
HGB BLD-MCNC: 12.8 G/DL (ref 12–17)
IMM GRANULOCYTES # BLD AUTO: 0.02 THOUSAND/UL (ref 0–0.2)
IMM GRANULOCYTES NFR BLD AUTO: 0 % (ref 0–2)
LDLC SERPL CALC-MCNC: 68 MG/DL (ref 0–100)
LYMPHOCYTES # BLD AUTO: 1.68 THOUSANDS/ΜL (ref 0.6–4.47)
LYMPHOCYTES NFR BLD AUTO: 21 % (ref 14–44)
MCH RBC QN AUTO: 29.4 PG (ref 26.8–34.3)
MCHC RBC AUTO-ENTMCNC: 31.3 G/DL (ref 31.4–37.4)
MCV RBC AUTO: 94 FL (ref 82–98)
MONOCYTES # BLD AUTO: 0.71 THOUSAND/ΜL (ref 0.17–1.22)
MONOCYTES NFR BLD AUTO: 9 % (ref 4–12)
NEUTROPHILS # BLD AUTO: 5.62 THOUSANDS/ΜL (ref 1.85–7.62)
NEUTS SEG NFR BLD AUTO: 68 % (ref 43–75)
NONHDLC SERPL-MCNC: 83 MG/DL
NRBC BLD AUTO-RTO: 0 /100 WBCS
PLATELET # BLD AUTO: 202 THOUSANDS/UL (ref 149–390)
PMV BLD AUTO: 11.5 FL (ref 8.9–12.7)
POTASSIUM SERPL-SCNC: 4.5 MMOL/L (ref 3.5–5.3)
PROT SERPL-MCNC: 7.1 G/DL (ref 6.4–8.2)
RBC # BLD AUTO: 4.35 MILLION/UL (ref 3.88–5.62)
SODIUM SERPL-SCNC: 138 MMOL/L (ref 136–145)
TRIGL SERPL-MCNC: 76 MG/DL
WBC # BLD AUTO: 8.18 THOUSAND/UL (ref 4.31–10.16)

## 2021-11-30 PROCEDURE — 80053 COMPREHEN METABOLIC PANEL: CPT | Performed by: INTERNAL MEDICINE

## 2021-11-30 PROCEDURE — 85025 COMPLETE CBC W/AUTO DIFF WBC: CPT | Performed by: INTERNAL MEDICINE

## 2021-11-30 PROCEDURE — 36415 COLL VENOUS BLD VENIPUNCTURE: CPT | Performed by: INTERNAL MEDICINE

## 2021-11-30 PROCEDURE — 83036 HEMOGLOBIN GLYCOSYLATED A1C: CPT | Performed by: INTERNAL MEDICINE

## 2021-11-30 PROCEDURE — 80061 LIPID PANEL: CPT | Performed by: INTERNAL MEDICINE

## 2021-12-03 ENCOUNTER — OFFICE VISIT (OUTPATIENT)
Dept: UROLOGY | Facility: MEDICAL CENTER | Age: 81
End: 2021-12-03
Payer: COMMERCIAL

## 2021-12-03 VITALS
DIASTOLIC BLOOD PRESSURE: 80 MMHG | BODY MASS INDEX: 29.78 KG/M2 | HEART RATE: 53 BPM | HEIGHT: 70 IN | SYSTOLIC BLOOD PRESSURE: 130 MMHG | WEIGHT: 208 LBS

## 2021-12-03 DIAGNOSIS — N40.1 BPH WITH OBSTRUCTION/LOWER URINARY TRACT SYMPTOMS: Primary | ICD-10-CM

## 2021-12-03 DIAGNOSIS — N13.8 BPH WITH OBSTRUCTION/LOWER URINARY TRACT SYMPTOMS: Primary | ICD-10-CM

## 2021-12-03 DIAGNOSIS — R97.20 ELEVATED PSA: ICD-10-CM

## 2021-12-03 PROCEDURE — 99214 OFFICE O/P EST MOD 30 MIN: CPT | Performed by: UROLOGY

## 2021-12-03 RX ORDER — ISOSORBIDE MONONITRATE 30 MG/1
30 TABLET, EXTENDED RELEASE ORAL DAILY
COMMUNITY
Start: 2021-11-12 | End: 2022-01-06 | Stop reason: HOSPADM

## 2021-12-03 RX ORDER — TAMSULOSIN HYDROCHLORIDE 0.4 MG/1
0.4 CAPSULE ORAL
COMMUNITY
Start: 2021-10-13

## 2021-12-03 RX ORDER — METOPROLOL SUCCINATE 100 MG/1
100 TABLET, EXTENDED RELEASE ORAL 2 TIMES DAILY
COMMUNITY
Start: 2021-11-03 | End: 2022-01-06 | Stop reason: HOSPADM

## 2021-12-03 RX ORDER — AMIODARONE HYDROCHLORIDE 200 MG/1
200 TABLET ORAL DAILY
COMMUNITY
Start: 2021-11-24 | End: 2022-04-04

## 2021-12-03 RX ORDER — TORSEMIDE 10 MG/1
TABLET ORAL
COMMUNITY
Start: 2021-09-23 | End: 2021-12-16 | Stop reason: ALTCHOICE

## 2021-12-03 RX ORDER — CLOPIDOGREL BISULFATE 75 MG/1
75 TABLET ORAL DAILY
COMMUNITY
Start: 2021-11-17

## 2021-12-16 ENCOUNTER — OFFICE VISIT (OUTPATIENT)
Dept: INTERNAL MEDICINE CLINIC | Facility: CLINIC | Age: 81
End: 2021-12-16
Payer: COMMERCIAL

## 2021-12-16 VITALS
BODY MASS INDEX: 29.35 KG/M2 | TEMPERATURE: 97.6 F | SYSTOLIC BLOOD PRESSURE: 160 MMHG | HEIGHT: 70 IN | HEART RATE: 76 BPM | WEIGHT: 205 LBS | DIASTOLIC BLOOD PRESSURE: 74 MMHG | RESPIRATION RATE: 12 BRPM

## 2021-12-16 DIAGNOSIS — I10 ESSENTIAL HYPERTENSION: ICD-10-CM

## 2021-12-16 DIAGNOSIS — I71.4 ABDOMINAL AORTIC ANEURYSM (AAA) WITHOUT RUPTURE (HCC): ICD-10-CM

## 2021-12-16 DIAGNOSIS — I25.5 CARDIOMYOPATHY, ISCHEMIC: ICD-10-CM

## 2021-12-16 DIAGNOSIS — I25.118 CORONARY ARTERY DISEASE OF NATIVE ARTERY OF NATIVE HEART WITH STABLE ANGINA PECTORIS (HCC): Primary | ICD-10-CM

## 2021-12-16 DIAGNOSIS — F32.9 REACTIVE DEPRESSION: ICD-10-CM

## 2021-12-16 DIAGNOSIS — H69.82 DYSFUNCTION OF LEFT EUSTACHIAN TUBE: ICD-10-CM

## 2021-12-16 DIAGNOSIS — E78.00 HYPERCHOLESTEROLEMIA: ICD-10-CM

## 2021-12-16 PROBLEM — I47.29 NSVT (NONSUSTAINED VENTRICULAR TACHYCARDIA): Status: ACTIVE | Noted: 2021-10-15

## 2021-12-16 PROBLEM — I47.2 NSVT (NONSUSTAINED VENTRICULAR TACHYCARDIA) (HCC): Status: ACTIVE | Noted: 2021-10-15

## 2021-12-16 PROBLEM — I71.40 AAA (ABDOMINAL AORTIC ANEURYSM): Status: ACTIVE | Noted: 2021-10-11

## 2021-12-16 PROBLEM — Z86.79 STATUS POST ENDOVASCULAR ANEURYSM REPAIR (EVAR): Status: ACTIVE | Noted: 2021-11-17

## 2021-12-16 PROBLEM — Z98.890 STATUS POST ENDOVASCULAR ANEURYSM REPAIR (EVAR): Status: ACTIVE | Noted: 2021-11-17

## 2021-12-16 PROCEDURE — 1036F TOBACCO NON-USER: CPT | Performed by: INTERNAL MEDICINE

## 2021-12-16 PROCEDURE — 99214 OFFICE O/P EST MOD 30 MIN: CPT | Performed by: INTERNAL MEDICINE

## 2021-12-16 PROCEDURE — 1160F RVW MEDS BY RX/DR IN RCRD: CPT | Performed by: INTERNAL MEDICINE

## 2021-12-17 RX ORDER — FLUTICASONE PROPIONATE 50 MCG
2 SPRAY, SUSPENSION (ML) NASAL DAILY
Qty: 18.2 ML | Refills: 3 | Status: SHIPPED | OUTPATIENT
Start: 2021-12-17 | End: 2022-07-06 | Stop reason: SDUPTHER

## 2021-12-17 RX ORDER — LISINOPRIL 20 MG/1
20 TABLET ORAL 2 TIMES DAILY
Qty: 180 TABLET | Refills: 3 | Status: SHIPPED | OUTPATIENT
Start: 2021-12-17 | End: 2022-01-06 | Stop reason: HOSPADM

## 2021-12-30 ENCOUNTER — TELEPHONE (OUTPATIENT)
Dept: INTERNAL MEDICINE CLINIC | Facility: CLINIC | Age: 81
End: 2021-12-30

## 2021-12-31 ENCOUNTER — APPOINTMENT (EMERGENCY)
Dept: RADIOLOGY | Facility: HOSPITAL | Age: 81
DRG: 177 | End: 2021-12-31
Payer: COMMERCIAL

## 2021-12-31 ENCOUNTER — HOSPITAL ENCOUNTER (INPATIENT)
Facility: HOSPITAL | Age: 81
LOS: 6 days | Discharge: HOME HEALTH | DRG: 177 | End: 2022-01-06
Attending: EMERGENCY MEDICINE | Admitting: INTERNAL MEDICINE
Payer: COMMERCIAL

## 2021-12-31 ENCOUNTER — OFFICE VISIT (OUTPATIENT)
Dept: URGENT CARE | Age: 81
DRG: 177 | End: 2021-12-31
Payer: COMMERCIAL

## 2021-12-31 VITALS
HEIGHT: 70 IN | RESPIRATION RATE: 60 BRPM | HEART RATE: 45 BPM | SYSTOLIC BLOOD PRESSURE: 184 MMHG | WEIGHT: 205 LBS | TEMPERATURE: 97.1 F | OXYGEN SATURATION: 100 % | BODY MASS INDEX: 29.35 KG/M2 | DIASTOLIC BLOOD PRESSURE: 93 MMHG

## 2021-12-31 DIAGNOSIS — U07.1 COVID-19: ICD-10-CM

## 2021-12-31 DIAGNOSIS — I25.5 ISCHEMIC CARDIOMYOPATHY: ICD-10-CM

## 2021-12-31 DIAGNOSIS — U07.1 COVID-19 VIRUS INFECTION: ICD-10-CM

## 2021-12-31 DIAGNOSIS — R06.03 RESPIRATORY DISTRESS: Primary | ICD-10-CM

## 2021-12-31 DIAGNOSIS — R06.02 SHORTNESS OF BREATH: Primary | ICD-10-CM

## 2021-12-31 DIAGNOSIS — I24.9 ACS (ACUTE CORONARY SYNDROME) (HCC): ICD-10-CM

## 2021-12-31 DIAGNOSIS — I50.9 CHF (CONGESTIVE HEART FAILURE) (HCC): ICD-10-CM

## 2021-12-31 DIAGNOSIS — I50.23 ACUTE ON CHRONIC SYSTOLIC (CONGESTIVE) HEART FAILURE (HCC): ICD-10-CM

## 2021-12-31 PROBLEM — J12.82 PNEUMONIA DUE TO COVID-19 VIRUS: Status: ACTIVE | Noted: 2021-12-31

## 2021-12-31 PROBLEM — F41.9 ANXIETY: Status: ACTIVE | Noted: 2021-12-31

## 2021-12-31 PROBLEM — R06.89 ACUTE RESPIRATORY INSUFFICIENCY: Status: ACTIVE | Noted: 2021-12-31

## 2021-12-31 LAB
2HR DELTA HS TROPONIN: 6 NG/L
ALBUMIN SERPL BCP-MCNC: 3.5 G/DL (ref 3.5–5)
ALP SERPL-CCNC: 127 U/L (ref 46–116)
ALT SERPL W P-5'-P-CCNC: 51 U/L (ref 12–78)
ANION GAP SERPL CALCULATED.3IONS-SCNC: 6 MMOL/L (ref 4–13)
APTT PPP: 26 SECONDS (ref 23–37)
AST SERPL W P-5'-P-CCNC: 44 U/L (ref 5–45)
ATRIAL RATE: 47 BPM
BACTERIA UR QL AUTO: ABNORMAL /HPF
BASOPHILS # BLD AUTO: 0.01 THOUSANDS/ΜL (ref 0–0.1)
BASOPHILS NFR BLD AUTO: 0 % (ref 0–1)
BILIRUB SERPL-MCNC: 0.9 MG/DL (ref 0.2–1)
BILIRUB UR QL STRIP: NEGATIVE
BUN SERPL-MCNC: 19 MG/DL (ref 5–25)
CALCIUM SERPL-MCNC: 8.6 MG/DL (ref 8.3–10.1)
CARDIAC TROPONIN I PNL SERPL HS: 25 NG/L
CARDIAC TROPONIN I PNL SERPL HS: 31 NG/L
CHLORIDE SERPL-SCNC: 101 MMOL/L (ref 100–108)
CLARITY UR: CLEAR
CO2 SERPL-SCNC: 31 MMOL/L (ref 21–32)
COLOR UR: YELLOW
CREAT SERPL-MCNC: 1.2 MG/DL (ref 0.6–1.3)
EOSINOPHIL # BLD AUTO: 0.03 THOUSAND/ΜL (ref 0–0.61)
EOSINOPHIL NFR BLD AUTO: 0 % (ref 0–6)
ERYTHROCYTE [DISTWIDTH] IN BLOOD BY AUTOMATED COUNT: 14.3 % (ref 11.6–15.1)
FLUAV RNA RESP QL NAA+PROBE: NEGATIVE
FLUBV RNA RESP QL NAA+PROBE: NEGATIVE
GFR SERPL CREATININE-BSD FRML MDRD: 56 ML/MIN/1.73SQ M
GLUCOSE SERPL-MCNC: 111 MG/DL (ref 65–140)
GLUCOSE UR STRIP-MCNC: NEGATIVE MG/DL
HCT VFR BLD AUTO: 43.6 % (ref 36.5–49.3)
HGB BLD-MCNC: 14.1 G/DL (ref 12–17)
HGB UR QL STRIP.AUTO: ABNORMAL
IMM GRANULOCYTES # BLD AUTO: 0.03 THOUSAND/UL (ref 0–0.2)
IMM GRANULOCYTES NFR BLD AUTO: 0 % (ref 0–2)
INR PPP: 0.95 (ref 0.84–1.19)
KETONES UR STRIP-MCNC: NEGATIVE MG/DL
LACTATE SERPL-SCNC: 0.8 MMOL/L (ref 0.5–2)
LEUKOCYTE ESTERASE UR QL STRIP: NEGATIVE
LYMPHOCYTES # BLD AUTO: 1.34 THOUSANDS/ΜL (ref 0.6–4.47)
LYMPHOCYTES NFR BLD AUTO: 19 % (ref 14–44)
MCH RBC QN AUTO: 29.3 PG (ref 26.8–34.3)
MCHC RBC AUTO-ENTMCNC: 32.3 G/DL (ref 31.4–37.4)
MCV RBC AUTO: 91 FL (ref 82–98)
MONOCYTES # BLD AUTO: 0.61 THOUSAND/ΜL (ref 0.17–1.22)
MONOCYTES NFR BLD AUTO: 9 % (ref 4–12)
NEUTROPHILS # BLD AUTO: 5.11 THOUSANDS/ΜL (ref 1.85–7.62)
NEUTS SEG NFR BLD AUTO: 72 % (ref 43–75)
NITRITE UR QL STRIP: NEGATIVE
NON-SQ EPI CELLS URNS QL MICRO: ABNORMAL /HPF
NRBC BLD AUTO-RTO: 0 /100 WBCS
NT-PROBNP SERPL-MCNC: 3609 PG/ML
P AXIS: 49 DEGREES
PH UR STRIP.AUTO: 7 [PH]
PLATELET # BLD AUTO: 170 THOUSANDS/UL (ref 149–390)
PMV BLD AUTO: 11.8 FL (ref 8.9–12.7)
POTASSIUM SERPL-SCNC: 5.3 MMOL/L (ref 3.5–5.3)
PR INTERVAL: 202 MS
PROCALCITONIN SERPL-MCNC: <0.05 NG/ML
PROT SERPL-MCNC: 7.3 G/DL (ref 6.4–8.2)
PROT UR STRIP-MCNC: ABNORMAL MG/DL
PROTHROMBIN TIME: 12.4 SECONDS (ref 11.6–14.5)
QRS AXIS: 41 DEGREES
QRSD INTERVAL: 96 MS
QT INTERVAL: 476 MS
QTC INTERVAL: 421 MS
RBC # BLD AUTO: 4.81 MILLION/UL (ref 3.88–5.62)
RBC #/AREA URNS AUTO: ABNORMAL /HPF
RSV RNA RESP QL NAA+PROBE: NEGATIVE
SARS-COV-2 RNA RESP QL NAA+PROBE: POSITIVE
SODIUM SERPL-SCNC: 138 MMOL/L (ref 136–145)
SP GR UR STRIP.AUTO: 1.02 (ref 1–1.03)
T WAVE AXIS: -46 DEGREES
UROBILINOGEN UR QL STRIP.AUTO: 1 E.U./DL
VENTRICULAR RATE: 47 BPM
WBC # BLD AUTO: 7.13 THOUSAND/UL (ref 4.31–10.16)
WBC #/AREA URNS AUTO: ABNORMAL /HPF

## 2021-12-31 PROCEDURE — 87040 BLOOD CULTURE FOR BACTERIA: CPT | Performed by: EMERGENCY MEDICINE

## 2021-12-31 PROCEDURE — 93010 ELECTROCARDIOGRAM REPORT: CPT | Performed by: INTERNAL MEDICINE

## 2021-12-31 PROCEDURE — 81001 URINALYSIS AUTO W/SCOPE: CPT | Performed by: EMERGENCY MEDICINE

## 2021-12-31 PROCEDURE — 84145 PROCALCITONIN (PCT): CPT | Performed by: EMERGENCY MEDICINE

## 2021-12-31 PROCEDURE — 84484 ASSAY OF TROPONIN QUANT: CPT | Performed by: EMERGENCY MEDICINE

## 2021-12-31 PROCEDURE — 83605 ASSAY OF LACTIC ACID: CPT | Performed by: EMERGENCY MEDICINE

## 2021-12-31 PROCEDURE — 99213 OFFICE O/P EST LOW 20 MIN: CPT

## 2021-12-31 PROCEDURE — 85025 COMPLETE CBC W/AUTO DIFF WBC: CPT | Performed by: EMERGENCY MEDICINE

## 2021-12-31 PROCEDURE — 36415 COLL VENOUS BLD VENIPUNCTURE: CPT | Performed by: EMERGENCY MEDICINE

## 2021-12-31 PROCEDURE — 71045 X-RAY EXAM CHEST 1 VIEW: CPT

## 2021-12-31 PROCEDURE — XW033E5 INTRODUCTION OF REMDESIVIR ANTI-INFECTIVE INTO PERIPHERAL VEIN, PERCUTANEOUS APPROACH, NEW TECHNOLOGY GROUP 5: ICD-10-PCS | Performed by: INTERNAL MEDICINE

## 2021-12-31 PROCEDURE — 83880 ASSAY OF NATRIURETIC PEPTIDE: CPT | Performed by: EMERGENCY MEDICINE

## 2021-12-31 PROCEDURE — 80053 COMPREHEN METABOLIC PANEL: CPT | Performed by: EMERGENCY MEDICINE

## 2021-12-31 PROCEDURE — 99285 EMERGENCY DEPT VISIT HI MDM: CPT | Performed by: EMERGENCY MEDICINE

## 2021-12-31 PROCEDURE — 0241U HB NFCT DS VIR RESP RNA 4 TRGT: CPT | Performed by: EMERGENCY MEDICINE

## 2021-12-31 PROCEDURE — 93005 ELECTROCARDIOGRAM TRACING: CPT

## 2021-12-31 PROCEDURE — 85610 PROTHROMBIN TIME: CPT | Performed by: EMERGENCY MEDICINE

## 2021-12-31 PROCEDURE — S9083 URGENT CARE CENTER GLOBAL: HCPCS

## 2021-12-31 PROCEDURE — 99223 1ST HOSP IP/OBS HIGH 75: CPT | Performed by: INTERNAL MEDICINE

## 2021-12-31 PROCEDURE — 85730 THROMBOPLASTIN TIME PARTIAL: CPT | Performed by: EMERGENCY MEDICINE

## 2021-12-31 PROCEDURE — 99285 EMERGENCY DEPT VISIT HI MDM: CPT

## 2021-12-31 RX ORDER — SODIUM CHLORIDE 9 MG/ML
3 INJECTION INTRAVENOUS
Status: DISCONTINUED | OUTPATIENT
Start: 2021-12-31 | End: 2022-01-06 | Stop reason: HOSPADM

## 2021-12-31 RX ORDER — ISOSORBIDE MONONITRATE 30 MG/1
30 TABLET, EXTENDED RELEASE ORAL DAILY
Status: DISCONTINUED | OUTPATIENT
Start: 2021-12-31 | End: 2022-01-02

## 2021-12-31 RX ORDER — ONDANSETRON 2 MG/ML
4 INJECTION INTRAMUSCULAR; INTRAVENOUS EVERY 4 HOURS PRN
Status: DISCONTINUED | OUTPATIENT
Start: 2021-12-31 | End: 2022-01-06 | Stop reason: HOSPADM

## 2021-12-31 RX ORDER — ATORVASTATIN CALCIUM 80 MG/1
80 TABLET, FILM COATED ORAL
Status: DISCONTINUED | OUTPATIENT
Start: 2021-12-31 | End: 2022-01-06 | Stop reason: HOSPADM

## 2021-12-31 RX ORDER — FUROSEMIDE 10 MG/ML
40 INJECTION INTRAMUSCULAR; INTRAVENOUS ONCE
Status: COMPLETED | OUTPATIENT
Start: 2021-12-31 | End: 2021-12-31

## 2021-12-31 RX ORDER — CLOPIDOGREL BISULFATE 75 MG/1
75 TABLET ORAL DAILY
Status: DISCONTINUED | OUTPATIENT
Start: 2021-12-31 | End: 2022-01-02

## 2021-12-31 RX ORDER — DEXAMETHASONE SODIUM PHOSPHATE 4 MG/ML
6 INJECTION, SOLUTION INTRA-ARTICULAR; INTRALESIONAL; INTRAMUSCULAR; INTRAVENOUS; SOFT TISSUE EVERY 24 HOURS
Status: DISCONTINUED | OUTPATIENT
Start: 2021-12-31 | End: 2022-01-02

## 2021-12-31 RX ORDER — ALBUTEROL SULFATE 90 UG/1
1 AEROSOL, METERED RESPIRATORY (INHALATION) 4 TIMES DAILY
Status: DISCONTINUED | OUTPATIENT
Start: 2021-12-31 | End: 2022-01-02

## 2021-12-31 RX ORDER — FUROSEMIDE 10 MG/ML
20 INJECTION INTRAMUSCULAR; INTRAVENOUS
Status: DISCONTINUED | OUTPATIENT
Start: 2021-12-31 | End: 2022-01-02

## 2021-12-31 RX ORDER — AMIODARONE HYDROCHLORIDE 200 MG/1
200 TABLET ORAL DAILY
Status: DISCONTINUED | OUTPATIENT
Start: 2021-12-31 | End: 2022-01-02

## 2021-12-31 RX ORDER — GUAIFENESIN/DEXTROMETHORPHAN 100-10MG/5
10 SYRUP ORAL EVERY 4 HOURS PRN
Status: DISCONTINUED | OUTPATIENT
Start: 2021-12-31 | End: 2022-01-05

## 2021-12-31 RX ORDER — NITROGLYCERIN 0.4 MG/1
0.4 TABLET SUBLINGUAL ONCE
Status: COMPLETED | OUTPATIENT
Start: 2021-12-31 | End: 2021-12-31

## 2021-12-31 RX ORDER — ASPIRIN 81 MG/1
81 TABLET ORAL DAILY
Status: DISCONTINUED | OUTPATIENT
Start: 2021-12-31 | End: 2022-01-02

## 2021-12-31 RX ORDER — LISINOPRIL 20 MG/1
20 TABLET ORAL 2 TIMES DAILY
Status: DISCONTINUED | OUTPATIENT
Start: 2021-12-31 | End: 2022-01-02

## 2021-12-31 RX ORDER — LANOLIN ALCOHOL/MO/W.PET/CERES
6 CREAM (GRAM) TOPICAL
Status: DISCONTINUED | OUTPATIENT
Start: 2021-12-31 | End: 2022-01-06 | Stop reason: HOSPADM

## 2021-12-31 RX ORDER — HEPARIN SODIUM 5000 [USP'U]/ML
5000 INJECTION, SOLUTION INTRAVENOUS; SUBCUTANEOUS EVERY 8 HOURS SCHEDULED
Status: DISCONTINUED | OUTPATIENT
Start: 2021-12-31 | End: 2022-01-02

## 2021-12-31 RX ORDER — TAMSULOSIN HYDROCHLORIDE 0.4 MG/1
0.4 CAPSULE ORAL
Status: DISCONTINUED | OUTPATIENT
Start: 2021-12-31 | End: 2022-01-06 | Stop reason: HOSPADM

## 2021-12-31 RX ORDER — ASPIRIN 81 MG/1
324 TABLET, CHEWABLE ORAL DAILY
Status: DISCONTINUED | OUTPATIENT
Start: 2021-12-31 | End: 2021-12-31 | Stop reason: CLARIF

## 2021-12-31 RX ORDER — ACETAMINOPHEN 325 MG/1
650 TABLET ORAL EVERY 6 HOURS PRN
Status: DISCONTINUED | OUTPATIENT
Start: 2021-12-31 | End: 2022-01-06 | Stop reason: HOSPADM

## 2021-12-31 RX ORDER — METOPROLOL SUCCINATE 50 MG/1
50 TABLET, EXTENDED RELEASE ORAL 2 TIMES DAILY
Status: DISCONTINUED | OUTPATIENT
Start: 2021-12-31 | End: 2022-01-02

## 2021-12-31 RX ORDER — FLUTICASONE PROPIONATE 50 MCG
2 SPRAY, SUSPENSION (ML) NASAL DAILY
Status: DISCONTINUED | OUTPATIENT
Start: 2021-12-31 | End: 2022-01-02

## 2021-12-31 RX ADMIN — CLOPIDOGREL BISULFATE 75 MG: 75 TABLET ORAL at 16:07

## 2021-12-31 RX ADMIN — HEPARIN SODIUM 5000 UNITS: 5000 INJECTION INTRAVENOUS; SUBCUTANEOUS at 16:07

## 2021-12-31 RX ADMIN — ASPIRIN 81 MG: 81 TABLET, COATED ORAL at 16:07

## 2021-12-31 RX ADMIN — REMDESIVIR 200 MG: 100 INJECTION, POWDER, LYOPHILIZED, FOR SOLUTION INTRAVENOUS at 17:19

## 2021-12-31 RX ADMIN — ASPIRIN 324 MG: 81 TABLET, CHEWABLE ORAL at 08:59

## 2021-12-31 RX ADMIN — FLUTICASONE PROPIONATE 2 SPRAY: 50 SPRAY, METERED NASAL at 17:17

## 2021-12-31 RX ADMIN — AMIODARONE HYDROCHLORIDE 200 MG: 200 TABLET ORAL at 16:07

## 2021-12-31 RX ADMIN — FUROSEMIDE 40 MG: 10 INJECTION, SOLUTION INTRAVENOUS at 12:07

## 2021-12-31 RX ADMIN — ATORVASTATIN CALCIUM 80 MG: 80 TABLET, FILM COATED ORAL at 16:07

## 2021-12-31 RX ADMIN — MELATONIN TAB 3 MG 6 MG: 3 TAB at 21:10

## 2021-12-31 RX ADMIN — TAMSULOSIN HYDROCHLORIDE 0.4 MG: 0.4 CAPSULE ORAL at 16:07

## 2021-12-31 RX ADMIN — ACETAMINOPHEN 650 MG: 325 TABLET, FILM COATED ORAL at 21:12

## 2021-12-31 RX ADMIN — DEXAMETHASONE SODIUM PHOSPHATE 6 MG: 4 INJECTION, SOLUTION INTRA-ARTICULAR; INTRALESIONAL; INTRAMUSCULAR; INTRAVENOUS; SOFT TISSUE at 16:08

## 2021-12-31 RX ADMIN — ALBUTEROL SULFATE 1 PUFF: 90 AEROSOL, METERED RESPIRATORY (INHALATION) at 21:07

## 2021-12-31 RX ADMIN — HEPARIN SODIUM 5000 UNITS: 5000 INJECTION INTRAVENOUS; SUBCUTANEOUS at 21:07

## 2021-12-31 RX ADMIN — ALBUTEROL SULFATE 1 PUFF: 90 AEROSOL, METERED RESPIRATORY (INHALATION) at 17:17

## 2021-12-31 RX ADMIN — FUROSEMIDE 20 MG: 10 INJECTION, SOLUTION INTRAVENOUS at 16:08

## 2021-12-31 RX ADMIN — NITROGLYCERIN 0.4 MG: 0.4 TABLET SUBLINGUAL at 10:16

## 2021-12-31 NOTE — NURSING NOTE
Patient more calm at time after speaking with Dr David Otero  Patient requesting cough medicine and medication to aid with sleep  Dr David Otero made aware      Pooja Thorpe RN 12/31/2021 6:26 PM

## 2021-12-31 NOTE — PLAN OF CARE
Problem: PAIN - ADULT  Goal: Verbalizes/displays adequate comfort level or baseline comfort level  Description: Interventions:  - Encourage patient to monitor pain and request assistance  - Assess pain using appropriate pain scale  - Administer analgesics based on type and severity of pain and evaluate response  - Implement non-pharmacological measures as appropriate and evaluate response  - Consider cultural and social influences on pain and pain management  - Notify physician/advanced practitioner if interventions unsuccessful or patient reports new pain  Outcome: Progressing     Problem: INFECTION - ADULT  Goal: Absence or prevention of progression during hospitalization  Description: INTERVENTIONS:  - Assess and monitor for signs and symptoms of infection  - Monitor lab/diagnostic results  - Monitor all insertion sites, i e  indwelling lines, tubes, and drains  - Monitor endotracheal if appropriate and nasal secretions for changes in amount and color  - Rush appropriate cooling/warming therapies per order  - Administer medications as ordered  - Instruct and encourage patient and family to use good hand hygiene technique  - Identify and instruct in appropriate isolation precautions for identified infection/condition  Outcome: Progressing     Problem: SAFETY ADULT  Goal: Patient will remain free of falls  Description: INTERVENTIONS:  - Educate patient/family on patient safety including physical limitations  - Instruct patient to call for assistance with activity   - Consult OT/PT to assist with strengthening/mobility   - Keep Call bell within reach  - Keep bed low and locked with side rails adjusted as appropriate  - Keep care items and personal belongings within reach  - Initiate and maintain comfort rounds  - Make Fall Risk Sign visible to staff  - Offer Toileting every  Hours, in advance of need  - Initiate/Maintain alarm  - Obtain necessary fall risk management equipment:   - Apply yellow socks and bracelet for high fall risk patients  - Consider moving patient to room near nurses station  Outcome: Progressing  Goal: Maintain or return to baseline ADL function  Description: INTERVENTIONS:  -  Assess patient's ability to carry out ADLs; assess patient's baseline for ADL function and identify physical deficits which impact ability to perform ADLs (bathing, care of mouth/teeth, toileting, grooming, dressing, etc )  - Assess/evaluate cause of self-care deficits   - Assess range of motion  - Assess patient's mobility; develop plan if impaired  - Assess patient's need for assistive devices and provide as appropriate  - Encourage maximum independence but intervene and supervise when necessary  - Involve family in performance of ADLs  - Assess for home care needs following discharge   - Consider OT consult to assist with ADL evaluation and planning for discharge  - Provide patient education as appropriate  Outcome: Progressing  Goal: Maintains/Returns to pre admission functional level  Description: INTERVENTIONS:  - Perform BMAT or MOVE assessment daily    - Set and communicate daily mobility goal to care team and patient/family/caregiver  - Collaborate with rehabilitation services on mobility goals if consulted  - Perform Range of Motion  times a day  - Reposition patient every  hours    - Dangle patient  times a day  - Stand patient  times a day  - Ambulate patient  times a day  - Out of bed to chair  times a day   - Out of bed for meals times a day  - Out of bed for toileting  - Record patient progress and toleration of activity level   Outcome: Progressing     Problem: DISCHARGE PLANNING  Goal: Discharge to home or other facility with appropriate resources  Description: INTERVENTIONS:  - Identify barriers to discharge w/patient and caregiver  - Arrange for needed discharge resources and transportation as appropriate  - Identify discharge learning needs (meds, wound care, etc )  - Arrange for interpretive services to assist at discharge as needed  - Refer to Case Management Department for coordinating discharge planning if the patient needs post-hospital services based on physician/advanced practitioner order or complex needs related to functional status, cognitive ability, or social support system  Outcome: Progressing     Problem: Knowledge Deficit  Goal: Patient/family/caregiver demonstrates understanding of disease process, treatment plan, medications, and discharge instructions  Description: Complete learning assessment and assess knowledge base    Interventions:  - Provide teaching at level of understanding  - Provide teaching via preferred learning methods  Outcome: Progressing     Problem: CARDIOVASCULAR - ADULT  Goal: Maintains optimal cardiac output and hemodynamic stability  Description: INTERVENTIONS:  - Monitor I/O, vital signs and rhythm  - Monitor for S/S and trends of decreased cardiac output  - Administer and titrate ordered vasoactive medications to optimize hemodynamic stability  - Assess quality of pulses, skin color and temperature  - Assess for signs of decreased coronary artery perfusion  - Instruct patient to report change in severity of symptoms  Outcome: Progressing  Goal: Absence of cardiac dysrhythmias or at baseline rhythm  Description: INTERVENTIONS:  - Continuous cardiac monitoring, vital signs, obtain 12 lead EKG if ordered  - Administer antiarrhythmic and heart rate control medications as ordered  - Monitor electrolytes and administer replacement therapy as ordered  Outcome: Progressing     Problem: RESPIRATORY - ADULT  Goal: Achieves optimal ventilation and oxygenation  Description: INTERVENTIONS:  - Assess for changes in respiratory status  - Assess for changes in mentation and behavior  - Position to facilitate oxygenation and minimize respiratory effort  - Oxygen administered by appropriate delivery if ordered  - Initiate smoking cessation education as indicated  - Encourage broncho-pulmonary hygiene including cough, deep breathe, Incentive Spirometry  - Assess the need for suctioning and aspirate as needed  - Assess and instruct to report SOB or any respiratory difficulty  - Respiratory Therapy support as indicated  Outcome: Progressing

## 2021-12-31 NOTE — ASSESSMENT & PLAN NOTE
· Acute respiratory insufficiency currently requiring 1 L nasal cannula likely poly factorial secondary to COVID-19 as well as CHF given lower extremity edema

## 2021-12-31 NOTE — H&P
[address]  &Renetta [facility] 1940, [de-identified] y o  male MRN: [id number]  Unit/Bed#: [facility] -01 Encounter: [id number]  Primary Care Provider: Adolph Vasquez MD   Date and time admitted to hospital: 12/31/2021  9:33 AM    Assessment and Plan  * Acute respiratory insufficiency  Assessment & Plan  · Acute respiratory insufficiency currently requiring 1 L nasal cannula likely poly factorial secondary to COVID-19 as well as CHF given lower extremity edema    Pneumonia due to COVID-19 virus  Assessment & Plan  · Double vaccinated here with COVID pneumonia  · Given 1 L oxygen use per mild pathway start remdesivir and dexamethasone  · Wean oxygen when able    Lab Results   Component Value Date    SARSCOV2 Positive (A) 12/31/2021       Acute on chronic systolic (congestive) heart failure (HCC)  Assessment & Plan  Wt Readings from Last 3 Encounters:   12/31/21 93 kg (205 lb)   12/16/21 93 kg (205 lb)   12/03/21 94 3 kg (208 lb)     Results from last 7 days   Lab Units 12/31/21  0944   NT-PRO BNP pg/mL 3,609*     · Most recent echocardiogram LVEF 45% at [facility]  · Unfortunately now with worsening lower extremity edema shortness of breath  · Probable decompensation  Not on maintenance diuretics  · Start furosemide 20 mg IV b i d  and consult to cardiology    Anxiety  Assessment & Plan  · No longer on sertraline  Patient using relaxation techniques    NSVT (nonsustained ventricular tachycardia) ([facility])  Assessment & Plan  · ZIO patch recently noted an SVT    Has been started on amiodarone by primary cardiologist    AAA (abdominal aortic aneurysm) [facility])  Assessment & Plan  · Status post recent EVAR at [facility] October 2021    BPH with obstruction/lower urinary tract symptoms  Assessment & Plan  · Continue tamsulosin    Bradycardia  Assessment & Plan  · Given bradycardia will decrease metoprolol from 100 mg a m /50 mg p m  to just 50 mg b i d    · Monitor on telemetry    Essential hypertension  Assessment & Plan  · Patient hypertensive upon arrival   Will monitor with diuretics  · May be anxiety related  · Metoprolol being decreased due to bradycardia  · Continue lisinopril 20 mg b i d  Coronary artery disease of native artery of native heart with stable angina pectoris [facility])  Assessment & Plan  · CAD with history of CABG 1996 and [facility] [address] [facility] [address] and follows with [facility] cardiology  · Cardiac enzymes minimally elevated  Lab Results   Component Value Date    HSTNI0 25 12/31/2021    HSTNI2 31 12/31/2021       VTE Prophylaxis: Heparin  Code Status: Level 1 - Full Code  Anticipated Length of Stay:  Patient will be admitted on an Inpatient basis with an anticipated length of stay of  greater than 2 midnights  Justification for Hospital Stay: Acute respiratory insufficiency  Total Time for Visit, including Counseling / Coordination of Care: xx mins  Greater than 50% of this total time spent on direct patient counseling and coordination of care  Chief Complaint:     Chief Complaint   Patient presents with    Shortness of Breath     Pt reports increased SOB and cough, worst when laying down over the past 2 days  Pt denies sick contacts, SOB or fevers  Pt reports having Triple A surgery in october and has not felt good since then  Pt received 324mg Aspirin at urgent care  History of Present Illness:    Manas Murry is a [de-identified] y o  male with a past medical history of CAD status post CABG and recent stenting, AAA status post recent repair, BPH, hypertension who presents with worsening shortness of breath  The patient [facility] to [facility] for [facility]ness [facility] breath  He has had orthopnea over the past week  He has also had cough but no fevers or chills  At [facility] he was noted to be bradycardic and due to symptoms requiring supplemental oxygen he was transferred here to the emergency department where initial request for admission was for CHF given lower extremity edema and orthopnea    He subsequently tested COVID positive despite vaccination x2  Case was also discussed personally on telephone with patient's spouse who does not have any COVID symptoms  On examination with supplemental oxygen he is feeling better  He denies any chest pain  Wife reports lower extremity edema is new  Review of Systems:  Review of Systems   Constitutional: Positive for fatigue  Negative for chills, diaphoresis and fever  HENT: Negative for dental problem and facial swelling  Eyes: Negative for photophobia, pain and visual disturbance  Respiratory: Positive for cough and shortness of breath  Negative for wheezing and stridor  Cardiovascular: Positive for leg swelling  Negative for chest pain and palpitations  Gastrointestinal: Negative for abdominal distention, abdominal pain, diarrhea, nausea and vomiting  Genitourinary: Negative for dysuria and hematuria  Musculoskeletal: Negative for back pain  Skin: Negative for rash  Neurological: Negative for seizures, speech difficulty and numbness  Psychiatric/Behavioral: Positive for sleep disturbance  Negative for agitation  The patient is nervous/anxious  All other systems reviewed and are negative  Past Medical and Surgical History:   Past Medical History:   Diagnosis Date    AAA [facility]dominal [facility]     has annual US to check    Angina pectoris ([facility] )     chronic stable angina, but it is mild and tolerates walking dogs and walking up flight os stairs without symptoms  Using Nitro no more than 2 times per mt      Ankle pain, left     Bilateral wrist pain     BPH with obstruction/lower urinary tract symptoms     Coronary artery disease     CPAP (continuous positive airway pressure) dependence     Elevated PSA     History of colon polyps     [place]     Slight    Hyperlipidemia     Hypertension     Ischemic cardiomyopathy     with apical aneurysm    Melanoma ([facility] [address] ) 12/2016    Right side of neck    Myocardial infarction ([facility] [address] ) 1996    x1    Pneumonia     x1 as a baby    Sleep apnea      Past Surgical History:   Procedure Laterality Date    ARTERIAL ANEURYSM REPAIR  10/2021    CARDIAC SURGERY      CABG x6    CATARACT EXTRACTION Bilateral     COLONOSCOPY      CORONARY ANGIOPLASTY WITH STENT PLACEMENT      2 stents    CORONARY ANGIOPLASTY WITH STENT PLACEMENT      10/2021    CORONARY ARTERY BYPASS GRAFT      PROSTATE BIOPSY  ,    SKIN BIOPSY      Melanoma - surgical removal    SKIN LESION EXCISION Right 2017    Procedure: WIDE EXCISION MELANOMA SCAR LATERAL NECK;  Surgeon: Smith Paniagua MD;  Location: [facility];  Service:     WISDOM TOOTH EXTRACTION       Meds/Allergies: Allergies: Allergies   Allergen Reactions    Abciximab Other (See Comments)     rcd 01 & 03    Escitalopram Rash     Prior to Admission Medications   Prescriptions Last Dose Informant Patient Reported? Taking?    albuterol (PROVENTIL HFA,VENTOLIN HFA) 90 mcg/act inhaler   No No   Sig: Inhale 1 puff every 6 (six) hours as needed for wheezing   amiodarone 200 mg tablet   Yes No   Sig: Take 200 mg by mouth daily    aspirin 81 MG tablet  Self Yes No   Sig: Take 1 tablet by mouth daily   atorvastatin (LIPITOR) 80 mg tablet  Self No No   Sig: Take 1 tablet (80 mg total) by mouth daily   clopidogrel (PLAVIX) 75 mg tablet   Yes No   Sig: Take 75 mg by mouth daily     fluticasone (FLONASE) 50 mcg/act nasal spray   No No   Si sprays into each nostril daily   isosorbide mononitrate (IMDUR) 30 mg 24 hr tablet   Yes No   Sig: Take 30 mg by mouth daily     lisinopril (ZESTRIL) 20 mg tablet   No No   Sig: Take 1 tablet (20 mg total) by mouth 2 (two) times a day   metoprolol succinate (TOPROL-XL) 100 mg 24 hr tablet   Yes No   Sig: Take 100 mg by mouth 2 (two) times a day 100 mg q am, 50 mg q pm    nitroglycerin (NITROSTAT) 0 4 mg SL tablet  Self No No   Sig: PLACE 1 TABLET (0 4 MG TOTAL) UNDER THE TONGUE EVERY 5 (FIVE) MINUTES AS NEEDED FOR CHEST PAIN   tamsulosin (FLOMAX) 0 4 mg   Yes No   Sig: Take 0 4 mg by mouth daily with dinner        Facility-Administered Medications: None     Social History:     Social History     Socioeconomic History    Marital status: /Civil Union     Spouse name: Not on file    Number of children: Not on file    Years of education: Not on file    Highest education level: Not on file   Occupational History    Not on file   Tobacco Use    Smoking status: Never Smoker    Smokeless tobacco: Never Used   Vaping Use    Vaping Use: Never used   Substance and Sexual Activity    Alcohol use: Yes     Comment: 1-2 beers rarely    Drug use: No    Sexual activity: Not on file   Other Topics Concern    Not on file   Social History Narrative    DOES NOT CONSUME CAFFEINE     Social Determinants of Health     Financial Resource Strain: Not on file   Food Insecurity: Not on file   Transportation Needs: Not on file   Physical Activity: Not on file   Stress: Not on file   Social Connections: Not on file   Intimate Partner Violence: Not on file   Housing Stability: Not on file     Patient Pre-hospital Living Situation:  Lives with spouse  Patient Pre-hospital Level of Mobility:   Patient Pre-hospital Diet Restrictions:     Family History:  Family History   Problem Relation Age of Onset    Hypertension Mother     Breast cancer Mother      Physical Exam:   Vitals:   Blood Pressure: 158/72 (12/31/21 1548)  Pulse: 61 (12/31/21 1824)  Temperature: (!) 97 2 °F (36 2 °C) (12/31/21 1548)  Temp Source: Oral (12/31/21 1548)  Respirations: 17 (12/31/21 1548)  SpO2: 99 % (12/31/21 1548)    Physical Exam  Vitals reviewed  Constitutional:       General: He is not in acute distress  HENT:      Head: Atraumatic  Eyes:      Extraocular Movements: Extraocular movements intact  Pupils: Pupils are equal, round, and reactive to light     Cardiovascular:      Rate and Rhythm: Normal rate and regular rhythm  Heart sounds: No friction rub  Pulmonary:      Effort: Pulmonary effort is normal       Breath sounds: Decreased breath sounds present  No wheezing  Abdominal:      General: Bowel sounds are normal       Palpations: Abdomen is soft  Tenderness: There is no abdominal tenderness  There is no rebound  Musculoskeletal:         General: Swelling (2+ lower extremities bilaterally) present  No tenderness  Cervical back: Normal range of motion  Skin:     General: Skin is warm and dry  Neurological:      General: No focal deficit present  Mental Status: He is alert and oriented to person, place, and time  Cranial Nerves: No cranial nerve deficit  Psychiatric:         Mood and Affect: Mood normal        Lab Results: I have personally reviewed pertinent reports      Results from last 7 days   Lab Units 12/31/21  0944   WBC Thousand/uL 7 13   HEMOGLOBIN g/dL 14 1   HEMATOCRIT % 43 6   PLATELETS Thousands/uL 170   NEUTROS PCT % 72   LYMPHS PCT % 19   MONOS PCT % 9   EOS PCT % 0     Results from last 7 days   Lab Units 12/31/21  0944   SODIUM mmol/L 138   POTASSIUM mmol/L 5 3   CHLORIDE mmol/L 101   CO2 mmol/L 31   ANION GAP mmol/L 6   BUN mg/dL 19   CREATININE mg/dL 1 20   CALCIUM mg/dL 8 6   ALBUMIN g/dL 3 5   TOTAL BILIRUBIN mg/dL 0 90   ALK PHOS U/L 127*   ALT U/L 51   AST U/L 44   EGFR ml/min/1 73sq m 56   GLUCOSE RANDOM mg/dL 111     Results from last 7 days   Lab Units 12/31/21  0944   INR  0 95         Results from last 7 days   Lab Units 12/31/21  1207 12/31/21  0944   HS TNI 0HR ng/L  --  25   HS TNI 2HR ng/L 31  --      Results from last 7 days   Lab Units 12/31/21  1037   LACTIC ACID mmol/L 0 8         Results from last 7 days   Lab Units 12/31/21  0944   NT-PRO BNP pg/mL 3,609*      Results from last 7 days   Lab Units 12/31/21  1029   COLOR UA  Yellow   CLARITY UA  Clear   SPEC GRAV UA  1 020   PH UA  7 0   LEUKOCYTES UA  Negative   NITRITE UA  Negative   GLUCOSE UA mg/dl Negative   KETONES UA mg/dl Negative   BILIRUBIN UA  Negative   BLOOD UA  Trace-Intact*      Results from last 7 days   Lab Units 12/31/21  1029   RBC UA /hpf None Seen   WBC UA /hpf 0-1*   EPITHELIAL CELLS WET PREP /hpf Occasional   BACTERIA UA /hpf None Seen      Results from last 7 days   Lab Units 12/31/21  1027   SARS-COV-2  Positive*   INFLUENZA A PCR  Negative   INFLUENZA B PCR  Negative   RSV PCR  Negative       Imaging: I have personally reviewed pertinent films in PACS  XR chest 1 view portable    Result Date: 12/31/2021  Impression: Small bilateral effusions with probable left basilar atelectasis  Workstation performed: [id number]       EKG, Pathology, and Other Studies Reviewed on Admission:   EKG  Result Date: 12/31/21  Personally reviewed strips with impression of:  Sinus bradycardia 46 bpm    Allscripts/ Epic Records Reviewed: Yes    ** Please Note: This note has been constructed using a voice recognition system   **

## 2021-12-31 NOTE — NURSING NOTE
Patient appears anxious, asking RN if he "can go home" and that he has concerns that he won't be able to sleep tonight due to negative air flow machine in room  RN explained necessity of this machine and that M would be in to seem him to explain why he is admitted as inpatient  Patient still anxious  Wanting to leave  Dr Dougie Luong made aware      Yina Klein RN 12/31/2021 2:46 PM

## 2021-12-31 NOTE — ED PROVIDER NOTES
History  Chief Complaint   Patient presents with    Shortness of Breath     Pt reports increased SOB and cough, worst when laying down over the past 2 days  Pt denies sick contacts, SOB or fevers  Pt reports having Triple A surgery in october and has not felt good since then  Pt received 324mg Aspirin at urgent care  [de-identified] yo M h/o CAD s/p stenting, AAA s/p repair presenting for evaluation of 2 weeks of progressive SOB/cough  Pt c/o SOB/MEDINA and cough  Was seen at Urgent care then referred here, note states pt was c/o CP, however he denies this to myself  Was given 324 mg ASA at urgent care  No known sick contacts, lives at home with wife  COVID vaccine x2 ()  H/o CAD, no known history of CHF per pt and no home diuretics  States he took his morning Amiodarone & Metoprolol this morning  No known lung disease  No history of VTE and denies calf pain/swelling    MDM: [de-identified] yo M with SOB/cough- cardiac workup, BNP, CXR, COVID testing  Bradycardic to 40s (sinus), hypertensive to 230s- will treat                      Prior to Admission Medications   Prescriptions Last Dose Informant Patient Reported?  Taking?   acetaminophen (TYLENOL) 500 mg tablet  Self Yes No   Sig: Take 500 mg by mouth every 6 (six) hours as needed for mild pain   albuterol (PROVENTIL HFA,VENTOLIN HFA) 90 mcg/act inhaler   No No   Sig: Inhale 1 puff every 6 (six) hours as needed for wheezing   amiodarone 200 mg tablet   Yes No   Sig: Take 200 mg by mouth daily    aspirin 81 MG tablet  Self Yes No   Sig: Take 1 tablet by mouth daily   atorvastatin (LIPITOR) 80 mg tablet  Self No No   Sig: Take 1 tablet (80 mg total) by mouth daily   clopidogrel (PLAVIX) 75 mg tablet   Yes No   fluticasone (FLONASE) 50 mcg/act nasal spray   No No   Si sprays into each nostril daily   isosorbide mononitrate (IMDUR) 30 mg 24 hr tablet   Yes No   lisinopril (ZESTRIL) 20 mg tablet   No No   Sig: Take 1 tablet (20 mg total) by mouth 2 (two) times a day metoprolol succinate (TOPROL-XL) 100 mg 24 hr tablet   Yes No   Sig: Take 100 mg by mouth 2 (two) times a day 100 mg q am, 50 mg q pm    nitroglycerin (NITROSTAT) 0 4 mg SL tablet  Self No No   Sig: PLACE 1 TABLET (0 4 MG TOTAL) UNDER THE TONGUE EVERY 5 (FIVE) MINUTES AS NEEDED FOR CHEST PAIN   tamsulosin (FLOMAX) 0 4 mg   Yes No      Facility-Administered Medications Last Administration Doses Remaining   aspirin chewable tablet 324 mg 12/31/2021  8:59 AM           Past Medical History:   Diagnosis Date    AAA (abdominal aortic aneurysm) without rupture (HCC)     has annual US to check    Angina pectoris (HCC)     chronic stable angina, but it is mild and tolerates walking dogs and walking up flight os stairs without symptoms  Using Nitro no more than 2 times per mt      Ankle pain, left     Bilateral wrist pain     BPH with obstruction/lower urinary tract symptoms     Coronary artery disease     CPAP (continuous positive airway pressure) dependence     Elevated PSA     History of colon polyps     [place] (hard of hearing)     Slight    Hyperlipidemia     Hypertension     Ischemic cardiomyopathy     with apical aneurysm    Melanoma ([address] ) 12/2016    Right side of neck    Myocardial infarction ([facility] [address] ) 1996    x1    Pneumonia     x1 as a baby    Sleep apnea     Wears glasses        Past Surgical History:   Procedure Laterality Date    ARTERIAL ANEURYSM REPAIR  10/2021    CARDIAC SURGERY      CABG x6    CATARACT EXTRACTION Bilateral     COLONOSCOPY      CORONARY ANGIOPLASTY WITH STENT PLACEMENT      2 stents    CORONARY ANGIOPLASTY WITH STENT PLACEMENT      10/2021    CORONARY ARTERY BYPASS GRAFT      PROSTATE BIOPSY  2000,2001    SKIN BIOPSY      Melanoma - surgical removal    SKIN LESION EXCISION Right 2/6/2017    Procedure: WIDE EXCISION MELANOMA SCAR LATERAL NECK;  Surgeon: Rodríguez Boogie MD;  Location: [facility];  Service:     WISDOM TOOTH EXTRACTION         Family History   Problem Relation Age of Onset    Hypertension Mother     Breast cancer Mother      I have reviewed and agree with the history as documented  E-Cigarette/Vaping    E-Cigarette Use Never User      E-Cigarette/Vaping Substances    Nicotine No     THC No     CBD No     Flavoring No     Other No     Unknown No      Social History     Tobacco Use    Smoking status: Never Smoker    Smokeless tobacco: Never Used   Vaping Use    Vaping Use: Never used   Substance Use Topics    Alcohol use: Yes     Comment: 1-2 beers rarely    Drug use: No       Review of Systems   Constitutional: Negative for chills, fever and unexpected weight change  HENT: Negative for ear pain, rhinorrhea and sore throat  Eyes: Negative for pain and visual disturbance  Respiratory: Positive for cough and shortness of breath  Cardiovascular: Negative for chest pain and leg swelling  Gastrointestinal: Negative for abdominal pain, constipation, diarrhea, nausea and vomiting  Endocrine: Negative for polydipsia, polyphagia and polyuria  Genitourinary: Negative for dysuria, frequency, hematuria and urgency  Musculoskeletal: Negative for back pain, myalgias and neck pain  Skin: Negative for color change and rash  Allergic/Immunologic: Negative for environmental allergies and immunocompromised state  Neurological: Negative for dizziness, weakness, light-headedness, numbness and headaches  Hematological: Negative for adenopathy  Does not bruise/bleed easily  Psychiatric/Behavioral: Negative for agitation and confusion  All other systems reviewed and are negative  Physical Exam  Physical Exam  Vitals and nursing note reviewed  Constitutional:       Appearance: He is well-developed  He is not ill-appearing  HENT:      Head: Normocephalic and atraumatic  Nose: Nose normal    Eyes:      Conjunctiva/sclera: Conjunctivae normal    Cardiovascular:      Rate and Rhythm: Regular rhythm  Bradycardia present        Heart sounds: Normal heart sounds  Pulmonary:      Effort: Tachypnea present  Comments: Tachypnea, decreased breath sounds throughout  Chest:      Chest wall: No tenderness  Abdominal:      General: There is no distension  Palpations: Abdomen is soft  Tenderness: There is no abdominal tenderness  There is no guarding or rebound  Musculoskeletal:         General: No swelling, tenderness or deformity  Cervical back: Normal range of motion and neck supple  Right lower leg: No tenderness  No edema  Left lower leg: No tenderness  No edema  Comments: No calf swelling/ttp, no peripheral edema   Skin:     General: Skin is warm and dry  Findings: No rash  Neurological:      General: No focal deficit present  Mental Status: He is alert and oriented to person, place, and time  Motor: No abnormal muscle tone  Coordination: Coordination normal    Psychiatric:         Thought Content:  Thought content normal          Judgment: Judgment normal          Vital Signs  ED Triage Vitals   Temperature Pulse Respirations Blood Pressure SpO2   12/31/21 0946 12/31/21 0939 12/31/21 0939 12/31/21 0939 12/31/21 0939   98 2 °F (36 8 °C) 56 (!) 48 (!) 230/112 99 %      Temp Source Heart Rate Source Patient Position - Orthostatic VS BP Location FiO2 (%)   12/31/21 0946 12/31/21 1038 12/31/21 0939 12/31/21 0939 --   Oral Monitor Lying Right arm       Pain Score       12/31/21 0939       No Pain           Vitals:    12/31/21 1209 12/31/21 1232 12/31/21 1303 12/31/21 1310   BP: (!) 191/88 (!) 185/84 (!) 221/100 (!) 172/110   Pulse: (!) 49 (!) 47 (!) 53    Patient Position - Orthostatic VS: Lying Lying  Sitting         Visual Acuity      ED Medications  Medications   sodium chloride (PF) 0 9 % injection 3 mL (has no administration in time range)   nitroglycerin (NITROSTAT) SL tablet 0 4 mg (0 4 mg Sublingual Given 12/31/21 1016)   furosemide (LASIX) injection 40 mg (40 mg Intravenous Given 12/31/21 1207)       Diagnostic Studies  Results Reviewed     Procedure Component Value Units Date/Time    HS Troponin I 2hr [[id number]] Collected: 12/31/21 1207    Lab Status: Final result Specimen: Blood from Arm, Left Updated: 12/31/21 1241     hs TnI 2hr 31 ng/L      Delta 2hr hsTnI 6 ng/L     Urine Microscopic [[id number]]  (Abnormal) Collected: 12/31/21 1029    Lab Status: Final result Specimen: Urine, Other Updated: 12/31/21 1149     RBC, UA None Seen /hpf      WBC, UA 0-1 /hpf      Epithelial Cells Occasional /hpf      Bacteria, UA None Seen /hpf     UA w Reflex to Microscopic w Reflex to Culture [[id number]]  (Abnormal) Collected: 12/31/21 1029    Lab Status: Final result Specimen: Urine, Other Updated: 12/31/21 1145     Color, UA Yellow     Clarity, UA Clear     Specific [place], UA 1 020     pH, UA 7 0     Leukocytes, UA Negative     Nitrite, UA Negative     Protein, UA 30 (1+) mg/dl      Glucose, UA Negative mg/dl      Ketones, UA Negative mg/dl      Urobilinogen, UA 1 0 E U /dl      Bilirubin, UA Negative     Blood, UA Trace-Intact    HS Troponin I 4hr [[id number]]     Lab Status: No result Specimen: Blood     COVID/FLU/RSV - 2 hour TAT [[id number]]  (Abnormal) Collected: 12/31/21 1027    Lab Status: Final result Specimen: Nares from Nasopharyngeal Swab Updated: 12/31/21 1123     SARS-CoV-2 Positive     INFLUENZA A PCR Negative     INFLUENZA B PCR Negative     RSV PCR Negative    Narrative:      FOR PEDIATRIC PATIENTS - copy/paste COVID Guidelines URL to browser: [email or web address]     This test has been authorized by FDA under an EUA (Emergency Use Assay) for use by authorized laboratories  Clinical caution and judgement should be used with the interpretation of these results with consideration of the clinical impression and other laboratory testing    Testing reported as "Positive" or "Negative" has been proven to be accurate according to standard laboratory validation requirements  All testing is performed with control materials showing appropriate reactivity at standard intervals  Lactic Acid [[id number]]  (Normal) Collected: 12/31/21 1037    Lab Status: Final result Specimen: Blood from Arm, Left Updated: 12/31/21 1113     LACTIC ACID 0 8 mmol/L     Narrative:      Result may be elevated if tourniquet was used during collection      Protime-INR [[id number]]  (Normal) Collected: 12/31/21 0944    Lab Status: Final result Specimen: Blood from Arm, Left Updated: 12/31/21 1106     Protime 12 4 seconds      INR 0 95    APTT [[id number]]  (Normal) Collected: 12/31/21 0944    Lab Status: Final result Specimen: Blood from Arm, Left Updated: 12/31/21 1106     PTT 26 seconds     Comprehensive metabolic panel [[id number]]  (Abnormal) Collected: 12/31/21 0944    Lab Status: Final result Specimen: Blood from Arm, Left Updated: 12/31/21 1100     Sodium 138 mmol/L      Potassium 5 3 mmol/L      Chloride 101 mmol/L      CO2 31 mmol/L      ANION GAP 6 mmol/L      BUN 19 mg/dL      Creatinine 1 20 mg/dL      Glucose 111 mg/dL      Calcium 8 6 mg/dL      AST 44 U/L      ALT 51 U/L      Alkaline Phosphatase 127 U/L      Total Protein 7 3 g/dL      Albumin 3 5 g/dL      Total Bilirubin 0 90 mg/dL      eGFR 56 ml/min/1 73sq m     Narrative:      Skip guidelines for Chronic Kidney Disease (CKD):     Stage 1 with normal or high GFR (GFR > 90 mL/min/1 73 square meters)    Stage 2 Mild CKD (GFR = 60-89 mL/min/1 73 square meters)    Stage 3A Moderate CKD (GFR = 45-59 mL/min/1 73 square meters)    Stage 3B Moderate CKD (GFR = 30-44 mL/min/1 73 square meters)    Stage 4 Severe CKD (GFR = 15-29 mL/min/1 73 square meters)    Stage 5 End Stage CKD (GFR <15 mL/min/1 73 square meters)  Note: GFR calculation is accurate only with a steady state creatinine    NT-BNP PRO [[id number]]  (Abnormal) Collected: 12/31/21 0944    Lab Status: Final result Specimen: Blood from Arm, Left Updated: 12/31/21 1100     NT-proBNP 3,609 pg/mL     HS Troponin 0hr (reflex protocol) [[id number]]  (Normal) Collected: 12/31/21 0944    Lab Status: Final result Specimen: Blood from Arm, Left Updated: 12/31/21 1059     hs TnI 0hr 25 ng/L     Blood culture #2 [[id number]] Collected: 12/31/21 1037    Lab Status: In process Specimen: Blood from Arm, Left Updated: 12/31/21 1043    Procalcitonin with AM Reflex [[id number]] Collected: 12/31/21 1037    Lab Status: In process Specimen: Blood from Arm, Left Updated: 12/31/21 1043    CBC and differential [[id number]] Collected: 12/31/21 0944    Lab Status: Final result Specimen: Blood from Arm, Left Updated: 12/31/21 1034     WBC 7 13 Thousand/uL      RBC 4 81 Million/uL      Hemoglobin 14 1 g/dL      Hematocrit 43 6 %      MCV 91 fL      MCH 29 3 pg      MCHC 32 3 g/dL      RDW 14 3 %      MPV 11 8 fL      Platelets 685 Thousands/uL      nRBC 0 /100 WBCs      Neutrophils Relative 72 %      Immat GRANS % 0 %      Lymphocytes Relative 19 %      Monocytes Relative 9 %      Eosinophils Relative 0 %      Basophils Relative 0 %      Neutrophils Absolute 5 11 Thousands/µL      Immature Grans Absolute 0 03 Thousand/uL      Lymphocytes Absolute 1 34 Thousands/µL      Monocytes Absolute 0 61 Thousand/µL      Eosinophils Absolute 0 03 Thousand/µL      Basophils Absolute 0 01 Thousands/µL     Blood culture #1 [[id number]] Collected: 12/31/21 1017    Lab Status: In process Specimen: Blood from Arm, Right Updated: 12/31/21 1022                 XR chest 1 view portable   Final Result by Kosta Michel MD (12/31 1131)      Small bilateral effusions with probable left basilar atelectasis                    Workstation performed: [id number]                    Procedures  Procedures         ED Course  ED Course as of 12/31/21 1316   Fri Dec 31, 2021   0959 EKG: sinus bradycardia @ 47 bpm, normal axis, qtc 421, st depression II, III   1001 Blood Pressure(!): 230/112  Rechecked and same  States he took his morning BP meds    1034 SBP 180s and HR 50s after 1 SL nitro   1102 NT-proBNP(!): 3,609  No known history of CHF   1103 Echo 10/13/[facility] at [facility] found at reviewed report: Left ventricle is normal in size  Wall thickness is normal  Systolic   function is mildly decreased with an ejection fraction of 40-45%  Distal   anterior lateral, lateral dyskinetic aneurysm  Apical aneurysm also  1104 Creatinine: 1 20  Baseline per records   1104 hs TnI 0hr: 25   1112 Pt reports feeling a little better since being here, still tachypneic  Reviewed results and plan for admission, he is agreeable   1132 SARS-COV-2(!): Positive             HEART Risk Score      Most Recent Value   Heart Score Risk Calculator    History 1 Filed at: 12/31/2021 1105   ECG 2 Filed at: 12/31/2021 1105   Age 2 Filed at: 12/31/2021 1105   Risk Factors 2 Filed at: 12/31/2021 1105   Troponin 1 Filed at: 12/31/2021 1105   HEART Score 8 Filed at: 12/31/2021 1105                     Initial Sepsis Screening     Row Name 12/31/21 1117                Is the patient's history suggestive of a new or worsening infection? Yes (Proceed)  -[address]        Suspected source of infection pneumonia  -KH        Are two or more of the following signs & symptoms of infection both present and new to the patient? No  -KH        Indicate SIRS criteria --        If the answer is yes to both questions, suspicion of sepsis is present --        If severe sepsis is present AND tissue hypoperfusion perists in the hour after fluid resuscitation or lactate > 4, the patient meets criteria for SEPTIC SHOCK --        Are any of the following organ dysfunction criteria present within 6 hours of suspected infection and SIRS criteria that are NOT considered to be chronic conditions?  --        Organ dysfunction --        Date of presentation of severe sepsis --        Time of presentation of severe sepsis --        Tissue hypoperfusion persists in the hour after crystalloid fluid administration, evidenced, by either: --        Was hypotension present within one hour of the conclusion of crystalloid fluid administration? --        Date of presentation of septic shock --        Time of presentation of septic shock --              User Key  (r) = Recorded By, (t) = Taken By, (c) = Cosigned By    [address] Name Provider Type    [address] [place] [address],  Physician                              MDM  Number of Diagnoses or Management Options  CHF (congestive heart failure) ([place] [address] )  COVID-19 virus infection  Respiratory distress  Diagnosis management comments: [de-identified] yo M with SOB/cough, found to be tachypneic with O2 sat WNL on RA  Sinus bradycardia and hypertensive- sx improved with BP lowering somewhat  BNP elevated and suspect some CHF causing sx- given nitro and Lasix     COVID test resulted positive so contributing as well    Admitted to [facility] for further workup/management       Amount and/or Complexity of Data Reviewed  Clinical lab tests: ordered and reviewed  Tests in the radiology section of CPT®: ordered and reviewed  Tests in the medicine section of CPT®: ordered and reviewed  Review and summarize past medical records: yes  Independent visualization of images, tracings, or specimens: yes        Disposition  Final diagnoses:   Respiratory distress   CHF (congestive heart failure) ([place] [address] )   COVID-19 virus infection     Time reflects when diagnosis was documented in both MDM as applicable and the Disposition within this note     Time User Action Codes Description Comment    12/31/2021 11:18 AM Lannis Vamsi A Add [R06 03] Respiratory distress     12/31/2021 11:18 AM Lannis Vamsi A Add [I50 9] CHF (congestive heart failure) ([place] [address] )     12/31/2021 11:31 AM Lannis Vamsi A Add [U07 1] COVID-19 virus infection       ED Disposition     ED Disposition Condition Date/Time Comment    Admit Stable Fri Dec 31, 2021 11:18 AM Case was discussed with [facility] and the patient's admission status was agreed to be Admission Status: inpatient status to the service of Dr Adrian Henriquez   Follow-up Information    None         Current Discharge Medication List      CONTINUE these medications which have NOT CHANGED    Details   acetaminophen (TYLENOL) 500 mg tablet Take 500 mg by mouth every 6 (six) hours as needed for mild pain      albuterol (PROVENTIL HFA,VENTOLIN HFA) 90 mcg/act inhaler Inhale 1 puff every 6 (six) hours as needed for wheezing  Qty: 8 g, Refills: 1    Comments: Substitution to a formulary equivalent within the same pharmaceutical class is authorized  Associated Diagnoses: Bronchospasm      amiodarone 200 mg tablet Take 200 mg by mouth daily       aspirin 81 MG tablet Take 1 tablet by mouth daily      atorvastatin (LIPITOR) 80 mg tablet Take 1 tablet (80 mg total) by mouth daily  Qty: 90 tablet, Refills: 3    Associated Diagnoses: Hypercholesterolemia      clopidogrel (PLAVIX) 75 mg tablet       fluticasone (FLONASE) 50 mcg/act nasal spray 2 sprays into each nostril daily  Qty: 18 2 mL, Refills: 3    Associated Diagnoses: Dysfunction of left eustachian tube      isosorbide mononitrate (IMDUR) 30 mg 24 hr tablet       lisinopril (ZESTRIL) 20 mg tablet Take 1 tablet (20 mg total) by mouth 2 (two) times a day  Qty: 180 tablet, Refills: 3    Associated Diagnoses: Essential hypertension      metoprolol succinate (TOPROL-XL) 100 mg 24 hr tablet Take 100 mg by mouth 2 (two) times a day 100 mg q am, 50 mg q pm       nitroglycerin (NITROSTAT) 0 4 mg SL tablet PLACE 1 TABLET (0 4 MG TOTAL) UNDER THE TONGUE EVERY 5 (FIVE) MINUTES AS NEEDED FOR CHEST PAIN  Qty: 100 tablet, Refills: 1    Associated Diagnoses: Coronary artery disease involving native heart without angina pectoris, unspecified vessel or lesion type      tamsulosin (FLOMAX) 0 4 mg              No discharge procedures on file      PDMP Review     None          ED Provider  Electronically Signed by           Sheila Harley DO  12/31/21 6148

## 2021-12-31 NOTE — ED NOTES
[facility] [facility] 2 RN aware patient is telemetry and on his way to unit          Susannah Barrera RN  12/31/21 4009

## 2022-01-01 PROBLEM — N17.9 AKI (ACUTE KIDNEY INJURY) (HCC): Status: ACTIVE | Noted: 2022-01-01

## 2022-01-01 LAB
ALBUMIN SERPL BCP-MCNC: 3 G/DL (ref 3.5–5)
ALP SERPL-CCNC: 108 U/L (ref 46–116)
ALT SERPL W P-5'-P-CCNC: 41 U/L (ref 12–78)
ANION GAP SERPL CALCULATED.3IONS-SCNC: 8 MMOL/L (ref 4–13)
AST SERPL W P-5'-P-CCNC: 31 U/L (ref 5–45)
ATRIAL RATE: 46 BPM
ATRIAL RATE: 51 BPM
ATRIAL RATE: 51 BPM
BILIRUB SERPL-MCNC: 0.65 MG/DL (ref 0.2–1)
BUN SERPL-MCNC: 24 MG/DL (ref 5–25)
CALCIUM ALBUM COR SERPL-MCNC: 8.9 MG/DL (ref 8.3–10.1)
CALCIUM SERPL-MCNC: 8.1 MG/DL (ref 8.3–10.1)
CHLORIDE SERPL-SCNC: 100 MMOL/L (ref 100–108)
CO2 SERPL-SCNC: 29 MMOL/L (ref 21–32)
CREAT SERPL-MCNC: 1.46 MG/DL (ref 0.6–1.3)
CRP SERPL QL: 12.4 MG/L
ERYTHROCYTE [DISTWIDTH] IN BLOOD BY AUTOMATED COUNT: 14.2 % (ref 11.6–15.1)
GFR SERPL CREATININE-BSD FRML MDRD: 44 ML/MIN/1.73SQ M
GLUCOSE SERPL-MCNC: 189 MG/DL (ref 65–140)
HCT VFR BLD AUTO: 38.1 % (ref 36.5–49.3)
HGB BLD-MCNC: 12.7 G/DL (ref 12–17)
MCH RBC QN AUTO: 29.1 PG (ref 26.8–34.3)
MCHC RBC AUTO-ENTMCNC: 33.3 G/DL (ref 31.4–37.4)
MCV RBC AUTO: 87 FL (ref 82–98)
P AXIS: 50 DEGREES
P AXIS: 67 DEGREES
P AXIS: 67 DEGREES
PLATELET # BLD AUTO: 152 THOUSANDS/UL (ref 149–390)
PMV BLD AUTO: 11.8 FL (ref 8.9–12.7)
POTASSIUM SERPL-SCNC: 4.1 MMOL/L (ref 3.5–5.3)
PR INTERVAL: 196 MS
PR INTERVAL: 196 MS
PROCALCITONIN SERPL-MCNC: <0.05 NG/ML
PROT SERPL-MCNC: 6.4 G/DL (ref 6.4–8.2)
QRS AXIS: 31 DEGREES
QRS AXIS: 35 DEGREES
QRS AXIS: 35 DEGREES
QRSD INTERVAL: 94 MS
QRSD INTERVAL: 96 MS
QRSD INTERVAL: 96 MS
QT INTERVAL: 484 MS
QT INTERVAL: 490 MS
QT INTERVAL: 490 MS
QTC INTERVAL: 423 MS
QTC INTERVAL: 451 MS
QTC INTERVAL: 451 MS
RBC # BLD AUTO: 4.37 MILLION/UL (ref 3.88–5.62)
SODIUM SERPL-SCNC: 137 MMOL/L (ref 136–145)
T WAVE AXIS: -29 DEGREES
T WAVE AXIS: -74 DEGREES
T WAVE AXIS: -74 DEGREES
VENTRICULAR RATE: 46 BPM
VENTRICULAR RATE: 51 BPM
VENTRICULAR RATE: 51 BPM
WBC # BLD AUTO: 5.39 THOUSAND/UL (ref 4.31–10.16)

## 2022-01-01 PROCEDURE — 97163 PT EVAL HIGH COMPLEX 45 MIN: CPT

## 2022-01-01 PROCEDURE — 85027 COMPLETE CBC AUTOMATED: CPT | Performed by: INTERNAL MEDICINE

## 2022-01-01 PROCEDURE — 84145 PROCALCITONIN (PCT): CPT | Performed by: EMERGENCY MEDICINE

## 2022-01-01 PROCEDURE — 99233 SBSQ HOSP IP/OBS HIGH 50: CPT | Performed by: INTERNAL MEDICINE

## 2022-01-01 PROCEDURE — 86140 C-REACTIVE PROTEIN: CPT | Performed by: INTERNAL MEDICINE

## 2022-01-01 PROCEDURE — 99232 SBSQ HOSP IP/OBS MODERATE 35: CPT | Performed by: INTERNAL MEDICINE

## 2022-01-01 PROCEDURE — 93010 ELECTROCARDIOGRAM REPORT: CPT | Performed by: INTERNAL MEDICINE

## 2022-01-01 PROCEDURE — 97166 OT EVAL MOD COMPLEX 45 MIN: CPT

## 2022-01-01 PROCEDURE — 80053 COMPREHEN METABOLIC PANEL: CPT | Performed by: INTERNAL MEDICINE

## 2022-01-01 RX ADMIN — ALBUTEROL SULFATE 1 PUFF: 90 AEROSOL, METERED RESPIRATORY (INHALATION) at 12:11

## 2022-01-01 RX ADMIN — MELATONIN TAB 3 MG 6 MG: 3 TAB at 23:19

## 2022-01-01 RX ADMIN — ALBUTEROL SULFATE 1 PUFF: 90 AEROSOL, METERED RESPIRATORY (INHALATION) at 23:22

## 2022-01-01 RX ADMIN — ALBUTEROL SULFATE 1 PUFF: 90 AEROSOL, METERED RESPIRATORY (INHALATION) at 08:42

## 2022-01-01 RX ADMIN — FLUTICASONE PROPIONATE 2 SPRAY: 50 SPRAY, METERED NASAL at 08:42

## 2022-01-01 RX ADMIN — GUAIFENESIN AND DEXTROMETHORPHAN 10 ML: 100; 10 SYRUP ORAL at 23:19

## 2022-01-01 RX ADMIN — LISINOPRIL 20 MG: 20 TABLET ORAL at 08:41

## 2022-01-01 RX ADMIN — GLYCERIN, HYPROMELLOSE, POLYETHYLENE GLYCOL 1 DROP: .2; .2; 1 LIQUID OPHTHALMIC at 17:22

## 2022-01-01 RX ADMIN — HEPARIN SODIUM 5000 UNITS: 5000 INJECTION INTRAVENOUS; SUBCUTANEOUS at 23:20

## 2022-01-01 RX ADMIN — DEXAMETHASONE SODIUM PHOSPHATE 6 MG: 4 INJECTION, SOLUTION INTRA-ARTICULAR; INTRALESIONAL; INTRAMUSCULAR; INTRAVENOUS; SOFT TISSUE at 17:07

## 2022-01-01 RX ADMIN — ATORVASTATIN CALCIUM 80 MG: 80 TABLET, FILM COATED ORAL at 17:07

## 2022-01-01 RX ADMIN — REMDESIVIR 100 MG: 100 INJECTION, POWDER, LYOPHILIZED, FOR SOLUTION INTRAVENOUS at 17:06

## 2022-01-01 RX ADMIN — TAMSULOSIN HYDROCHLORIDE 0.4 MG: 0.4 CAPSULE ORAL at 17:07

## 2022-01-01 RX ADMIN — ALBUTEROL SULFATE 1 PUFF: 90 AEROSOL, METERED RESPIRATORY (INHALATION) at 17:22

## 2022-01-01 RX ADMIN — FUROSEMIDE 20 MG: 10 INJECTION, SOLUTION INTRAVENOUS at 08:41

## 2022-01-01 RX ADMIN — FUROSEMIDE 20 MG: 10 INJECTION, SOLUTION INTRAVENOUS at 17:07

## 2022-01-01 RX ADMIN — ACETAMINOPHEN 650 MG: 325 TABLET, FILM COATED ORAL at 03:30

## 2022-01-01 RX ADMIN — ISOSORBIDE MONONITRATE 30 MG: 30 TABLET, EXTENDED RELEASE ORAL at 08:41

## 2022-01-01 RX ADMIN — LISINOPRIL 20 MG: 20 TABLET ORAL at 17:07

## 2022-01-01 RX ADMIN — AMIODARONE HYDROCHLORIDE 200 MG: 200 TABLET ORAL at 08:39

## 2022-01-01 RX ADMIN — CLOPIDOGREL BISULFATE 75 MG: 75 TABLET ORAL at 08:39

## 2022-01-01 RX ADMIN — ASPIRIN 81 MG: 81 TABLET, COATED ORAL at 08:39

## 2022-01-01 RX ADMIN — HEPARIN SODIUM 5000 UNITS: 5000 INJECTION INTRAVENOUS; SUBCUTANEOUS at 05:08

## 2022-01-01 RX ADMIN — HEPARIN SODIUM 5000 UNITS: 5000 INJECTION INTRAVENOUS; SUBCUTANEOUS at 13:35

## 2022-01-01 NOTE — NURSING NOTE
Spoke with Kitty Deal concerning the patient's heart rate being in the low 50s and occasionally dipping in the high 40s  Okay to hold metoprolol 50mg XL tonight

## 2022-01-01 NOTE — ASSESSMENT & PLAN NOTE
This is an 78-year-old male with history of CAD status post CABG and recent stenting, AAA status post repair, BPH, hypertension presented with dyspnea      · Currently requiring 1 L oxygen via nasal cannula  · Chest x-ray with small bilateral pleural effusions and probable left basilar atelectasis  · Likely secondary to COVID-19/CHF

## 2022-01-01 NOTE — OCCUPATIONAL THERAPY NOTE
Occupational Therapy Evaluation(time=0809-0534)     Patient Name: Heron Yost  RRYRU'Z Date: 1/1/2022  Problem List  Principal Problem:    Acute respiratory insufficiency  Active Problems:    Coronary artery disease involving native heart without angina pectoris    Essential hypertension    Malignant melanoma of neck (HCC)    JORGE (obstructive sleep apnea)    Bradycardia    BPH with obstruction/lower urinary tract symptoms    Ischemic cardiomyopathy    AAA (abdominal aortic aneurysm) ([facility])    NSVT (nonsustained ventricular tachycardia) ([facility])    Anxiety    Pneumonia due to COVID-19 virus    Acute on chronic systolic (congestive) heart failure ([facility] )    Mixed hyperlipidemia    Past Medical History  Past Medical History:   Diagnosis Date    AAA (abdominal aortic aneurysm) without rupture ([facility] )     has annual US to check    Angina pectoris ([facility] )     chronic stable angina, but it is mild and tolerates walking dogs and walking up flight os stairs without symptoms  Using Nitro no more than 2 times per mt      Ankle pain, left     Bilateral wrist pain     BPH with obstruction/lower urinary tract symptoms     Coronary artery disease     CPAP (continuous positive airway pressure) dependence     Elevated PSA     History of colon polyps     [place]     Slight    Hyperlipidemia     Hypertension     Ischemic cardiomyopathy     with apical aneurysm    Melanoma ([facility] ) 12/2016    Right side of neck    Myocardial infarction ([facility] ) 1996    x1    Pneumonia     x1 as a baby    Sleep apnea      Past Surgical History  Past Surgical History:   Procedure Laterality Date    ARTERIAL ANEURYSM REPAIR  10/2021    CARDIAC SURGERY      CABG x6    CATARACT EXTRACTION Bilateral     COLONOSCOPY      CORONARY ANGIOPLASTY WITH STENT PLACEMENT      2 stents    CORONARY ANGIOPLASTY WITH STENT PLACEMENT      10/2021    CORONARY ARTERY BYPASS GRAFT      PROSTATE BIOPSY  2000,2001    SKIN BIOPSY      Melanoma - surgical removal    SKIN LESION EXCISION Right 2/6/2017    Procedure: WIDE EXCISION MELANOMA SCAR LATERAL NECK;  Surgeon: Jon Villarreal MD;  Location: [facility];  Service:    [facility]               01/01/22 0856   Note Type   Note type Evaluation   Restrictions/Precautions   Weight Bearing Precautions Per Order No   Other Precautions Contact/isolation; Airborne/isolation;Multiple lines; Fall Risk   Pain Assessment   Pain Assessment Tool 0-10   Pain Score No Pain   Home Living   Type of [address] One level  (2-3 caleb)   Bathroom Shower/Tub Tub/shower unit   Bathroom Toilet Standard   Bathroom Equipment Grab bars in [facility]   (walking stick)   Prior Function   Lives With Spouse   ADL Assistance Independent   Falls in the last 6 months 0   Lifestyle   Autonomy PTA pt states independence with all aspects of his ADLs, transfers, ambulation--ocassional use of "walking stick" for outside use; +, neg falls   Reciprocal Relationships supportive wife   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   Psychosocial   Psychosocial (WDL) X   Patient Behaviors/Mood Flat affect; Cooperative   Subjective   Subjective "I just down about everything "   ADL   Where Assessed Edge of bed   Eating Assistance 6  Modified independent   Grooming Assistance 6  Modified Independent   UB Bathing Assistance 6  Modified Independent   LB Bathing Assistance 6  Modified Independent   UB Dressing Assistance 6  Modified independent   LB Dressing Assistance 6  Modified independent   Bed Mobility   Supine to Sit 5  Supervision   Additional items Increased time required   Transfers   Sit to Stand 5  Supervision   Additional items Increased time required;Verbal cues   Stand to Sit 5  Supervision   Additional items Increased time required;Verbal cues   Functional Mobility   Functional Mobility 5  Supervision   Additional items   (w/o device)   Balance   Static Sitting Fair +   Dynamic Sitting Fair   Static Standing Fair +   Dynamic Standing Fair   Activity Tolerance   Activity Tolerance Patient limited by fatigue   Medical Staff Made Aware nsg, P T     RUE Assessment   RUE Assessment WFL   RUE Strength   RUE Overall Strength Within Functional Limits - strength 5/5   LUE Assessment   LUE Assessment WFL   LUE Strength   LUE Overall Strength Within Functional Limits - strength 5/5   Hand Function   Gross Motor Coordination Functional   Fine Motor Coordination Functional   Sensation   Light Touch No apparent deficits   Proprioception   Proprioception No apparent deficits   Vision-Basic Assessment   Current Vision   (glasses)   Vision - Complex Assessment   Acuity Able to read clock/calendar on wall without difficulty   Perception   Inattention/Neglect Appears intact   Cognition   Overall Cognitive Status WFL   Arousal/Participation Alert   Attention Within functional limits   Orientation Level Oriented X4   Memory Within functional limits   Following Commands Follows one step commands without difficulty   Assessment   Limitation Decreased endurance   Prognosis Good   Assessment Pt is a 82y/o male admitted to the hospital 2* symptoms of increased SOB  Pt noted with acute respiratory insufficiency 2* PNA-COVID-19  Pt with PMH AAA, ICM, CAD, cardiac stent, MI, neck melanoma, CABG, and HTN  PTA pt states independence with all aspect of his ADLs, transfers, ambulation--ocassional use of "walking stick"; +falls=1, +  During initial eval, pt demonstrated slight deficits with his functional balance, functional mobility, and activity tolerance  Pt was able to demonstrate good ADL status and states no concerns about going directly home  Pt would benefit from a restorative program on the unit to improve his overall endurance, balance, and mobility  Acute OT tx not indicated at this time 2* limited ADL deficits      Goals   Patient Goals "to go home"   Plan   OT Frequency Eval only Recommendation   OT Discharge Recommendation No rehabilitation needs   OT - OK to Discharge Yes   AM-PAC Daily Activity Inpatient   Lower Body Dressing 4   Bathing 4   Toileting 4   Upper Body Dressing 4   Grooming 4   Eating 4   Daily Activity Raw Score 24   Daily Activity Standardized Score (Calc for Raw Score >=11) 57 54   AM-PAC Applied Cognition Inpatient   Following a Speech/Presentation 4   Understanding Ordinary Conversation 4   Taking Medications 4   Remembering Where Things Are Placed or Put Away 4   Remembering List of 4-5 Errands 4   Taking Care of Complicated Tasks 4   Applied Cognition Raw Score 24   Applied Cognition Standardized Score 62 21   Aman Gatica, OT

## 2022-01-01 NOTE — PLAN OF CARE
Problem: PAIN - ADULT  Goal: Verbalizes/displays adequate comfort level or baseline comfort level  Description: Interventions:  - Encourage patient to monitor pain and request assistance  - Assess pain using appropriate pain scale  - Administer analgesics based on type and severity of pain and evaluate response  - Implement non-pharmacological measures as appropriate and evaluate response  - Consider cultural and social influences on pain and pain management  - Notify physician/advanced practitioner if interventions unsuccessful or patient reports new pain  Outcome: Progressing     Problem: INFECTION - ADULT  Goal: Absence or prevention of progression during hospitalization  Description: INTERVENTIONS:  - Assess and monitor for signs and symptoms of infection  - Monitor lab/diagnostic results  - Monitor all insertion sites, i e  indwelling lines, tubes, and drains  - Monitor endotracheal if appropriate and nasal secretions for changes in amount and color  - [place] appropriate cooling/warming therapies per order  - Administer medications as ordered  - Instruct and encourage patient and family to use good hand hygiene technique  - Identify and instruct in appropriate isolation precautions for identified infection/condition  Outcome: Progressing     Problem: SAFETY ADULT  Goal: Patient will remain free of falls  Description: INTERVENTIONS:  - Educate patient/family on patient safety including physical limitations  - Instruct patient to call for assistance with activity   - Consult OT/PT to assist with strengthening/mobility   - Keep Call bell within reach  - Keep bed low and locked with side rails adjusted as appropriate  - Keep care items and personal belongings within reach  - Initiate and maintain comfort rounds  - Make Fall Risk Sign visible to staff  - Offer Toileting every  Hours, in advance of need  - Initiate/Maintain alarm  - Obtain necessary fall risk management equipment:   - Apply yellow socks and bracelet for high fall risk patients  - Consider moving patient to room near nurses station  Outcome: Progressing  Goal: Maintain or return to baseline ADL function  Description: INTERVENTIONS:  -  Assess patient's ability to carry out ADLs; assess patient's baseline for ADL function and identify physical deficits which impact ability to perform ADLs (bathing, care of mouth/teeth, toileting, grooming, dressing, etc )  - Assess/evaluate cause of self-care deficits   - Assess range of motion  - Assess patient's mobility; develop plan if impaired  - Assess patient's need for assistive devices and provide as appropriate  - Encourage maximum independence but intervene and supervise when necessary  - Involve family in performance of ADLs  - Assess for home care needs following discharge   - Consider OT consult to assist with ADL evaluation and planning for discharge  - Provide patient education as appropriate  Outcome: Progressing  Goal: Maintains/Returns to pre admission functional level  Description: INTERVENTIONS:  - Perform BMAT or MOVE assessment daily    - Set and communicate daily mobility goal to care team and patient/family/caregiver  - Collaborate with rehabilitation services on mobility goals if consulted  - Perform Range of Motion  times a day  - Reposition patient every  hours    - Dangle patient  times a day  - Stand patient  times a day  - Ambulate patient  times a day  - Out of bed to chair  times a day   - Out of bed for meal times a day  - Out of bed for toileting  - Record patient progress and toleration of activity level   Outcome: Progressing     Problem: DISCHARGE PLANNING  Goal: Discharge to home or other facility with appropriate resources  Description: INTERVENTIONS:  - Identify barriers to discharge w/patient and caregiver  - Arrange for needed discharge resources and transportation as appropriate  - Identify discharge learning needs (meds, wound care, etc )  - Arrange for interpretive services to assist at discharge as needed  - Refer to Case Management Department for coordinating discharge planning if the patient needs post-hospital services based on physician/advanced practitioner order or complex needs related to functional status, cognitive ability, or social support system  Outcome: Progressing     Problem: Knowledge Deficit  Goal: Patient/family/caregiver demonstrates understanding of disease process, treatment plan, medications, and discharge instructions  Description: Complete learning assessment and assess knowledge base    Interventions:  - Provide teaching at level of understanding  - Provide teaching via preferred learning methods  Outcome: Progressing     Problem: CARDIOVASCULAR - ADULT  Goal: Maintains optimal cardiac output and hemodynamic stability  Description: INTERVENTIONS:  - Monitor I/O, vital signs and rhythm  - Monitor for S/S and trends of decreased cardiac output  - Administer and titrate ordered vasoactive medications to optimize hemodynamic stability  - Assess quality of pulses, skin color and temperature  - Assess for signs of decreased coronary artery perfusion  - Instruct patient to report change in severity of symptoms  Outcome: Progressing  Goal: Absence of cardiac dysrhythmias or at baseline rhythm  Description: INTERVENTIONS:  - Continuous cardiac monitoring, vital signs, obtain 12 lead EKG if ordered  - Administer antiarrhythmic and heart rate control medications as ordered  - Monitor electrolytes and administer replacement therapy as ordered  Outcome: Progressing     Problem: RESPIRATORY - ADULT  Goal: Achieves optimal ventilation and oxygenation  Description: INTERVENTIONS:  - Assess for changes in respiratory status  - Assess for changes in mentation and behavior  - Position to facilitate oxygenation and minimize respiratory effort  - Oxygen administered by appropriate delivery if ordered  - Initiate smoking cessation education as indicated  - Encourage broncho-pulmonary hygiene including cough, deep breathe, Incentive Spirometry  - Assess the need for suctioning and aspirate as needed  - Assess and instruct to report SOB or any respiratory difficulty  - Respiratory Therapy support as indicated  Outcome: Progressing     Problem: Potential for Falls  Goal: Patient will remain free of falls  Description: INTERVENTIONS:  - Educate patient/family on patient safety including physical limitations  - Instruct patient to call for assistance with activity   - Consult OT/PT to assist with strengthening/mobility   - Keep Call bell within reach  - Keep bed low and locked with side rails adjusted as appropriate  - Keep care items and personal belongings within reach  - Initiate and maintain comfort rounds  - Make Fall Risk Sign visible to staff  - Offer Toileting every  Hours, in advance of need  - Initiate/Maintain alarm  - Obtain necessary fall risk management equipment:   - Apply yellow socks and bracelet for high fall risk patients  - Consider moving patient to room near nurses station  Outcome: Progressing

## 2022-01-01 NOTE — ASSESSMENT & PLAN NOTE
· Patient hypertensive upon arrival   Will monitor with diuretics  · May be anxiety related  · Metoprolol being decreased due to bradycardia  · Continue lisinopril 20 mg b i d

## 2022-01-01 NOTE — PROGRESS NOTES
[facility]  Progress Note Maximiliano Rodriguez [facility] 1940, [de-identified] y o  male MRN: [id number]  Unit/Bed#: [place] 2 -01 Encounter: [id number]  Primary Care Provider: Javad Christina MD   Date and time admitted to hospital: 12/31/2021  9:33 AM    * Acute respiratory insufficiency  Assessment & Plan  This is an 73-year-old male with history of CAD status post CABG and recent stenting, AAA status post repair, BPH, hypertension presented with dyspnea  · Currently requiring 1 L oxygen via nasal cannula  · Chest x-ray with small bilateral pleural effusions and probable left basilar atelectasis  · Likely secondary to COVID-19/CHF    DANELLE (acute kidney injury) ([facility] )  Assessment & Plan  · Cr increased to 1 46 from 1 2  · Will monitor BMP closely while on diuretics    Acute on chronic systolic (congestive) heart failure (HCC)  Assessment & Plan  Wt Readings from Last 3 Encounters:   01/01/22 94 kg (207 lb 3 7 oz)   12/31/21 93 kg (205 lb)   12/16/21 93 kg (205 lb)     Results from last 7 days   Lab Units 12/31/21  0944   NT-PRO BNP pg/mL 3,609*     · Most recent echocardiogram LVEF 45% at [facility]  · Unfortunately now with worsening lower extremity edema shortness of breath  · Probable decompensation  Not on maintenance diuretics  · BNP 3609  · Will continue with furosemide 20 mg IV b i d , continue with metoprolol, lisinopril  · Cardiology consultation appreciated  · Monitor I&O, daily weight, fluid restriction    Pneumonia due to COVID-19 virus  Assessment & Plan  · Double vaccinated here with COVID pneumonia  · Given 1 L oxygen use per mild pathway start remdesivir and dexamethasone  · Wean oxygen when able    Lab Results   Component Value Date    SARSCOV2 Positive (A) 12/31/2021       Anxiety  Assessment & Plan  · No longer on sertraline  Patient using relaxation techniques    NSVT (nonsustained ventricular tachycardia) (HCC)  Assessment & Plan  · ZIO patch recently noted an SVT    Has been started on amiodarone by primary cardiologist    AAA (abdominal aortic aneurysm) [facility])  Assessment & Plan  · Status post recent [facility] 2021    BPH with obstruction/lower urinary tract symptoms  Assessment & Plan  · Continue tamsulosin    Bradycardia  Assessment & Plan  · Given bradycardia will decrease metoprolol from 100 mg a m /50 mg p m  to just 50 mg b i d    · Monitor on telemetry    Essential hypertension  Assessment & Plan  · Patient hypertensive upon arrival   Will monitor with diuretics  · May be anxiety related  · Metoprolol being decreased due to bradycardia  · Continue lisinopril 20 mg b i d  Coronary artery disease involving native heart without angina pectoris  Assessment & Plan  · CAD with history of CABG  and recent KYE 2021 at [facility] and follows with [facility] cardiology  · Cardiac enzymes minimally elevated  Lab Results   Component Value Date    HSTNI0 25 2021    HSTNI2 31 2021           VTE Pharmacologic Prophylaxis:   Pharmacologic: heparin    Patient Centered Rounds: I have performed bedside rounds with nursing staff today  Education and Discussions with Family / Patient: Updated spouse, Radha Ritu    Time Spent for Care: 20 minutes  More than 50% of total time spent on counseling and coordination of care as described above  Current Length of Stay: 1 day(s)    Current Patient Status: Inpatient   Certification Statement: The patient will continue to require additional inpatient hospital stay due to covid/chf    Discharge Plan / Estimated Discharge Date: TBD    Code Status: Level 1 - Full Code      Subjective:   Patient seen and examined at bedside, denies any chest pain    Objective:     Vitals:   Temp (24hrs), Av 2 °F (36 2 °C), Min:97 1 °F (36 2 °C), Max:97 2 °F (36 2 °C)    Temp:  [97 1 °F (36 2 °C)-97 2 °F (36 2 °C)] 97 2 °F (36 2 °C)  HR:  [48-61] 57  Resp:  [17-20] 20  BP: (112-158)/(60-78) 112/66  SpO2:  [97 %-100 %] 100 %  Body mass index is 29 73 kg/m²  Input and Output Summary (last 24 hours): Intake/Output Summary (Last 24 hours) at 1/1/2022 1513  Last data filed at 1/1/2022 0731  Gross per 24 hour   Intake 400 ml   Output 300 ml   Net 100 ml       Physical Exam:    Constitutional: Patient is oriented to person, place and time, no acute distress  HEENT:  Normocephalic, atraumatic  Cardiovascular: Normal S1S2, RRR, No murmurs/rubs/gallops appreciated  Pulmonary:  Decreased breath sounds bilaterally  Abdominal: Soft, Bowel sounds present, Non-tender, Non-distended  Extremities:  Bilateral lower extremity edema  Neurological: Cranial nerves II-XII grossly intact, sensation intact, otherwise no focal neurological symptoms  Skin:  Warm, dry    Additional Data:     Labs:    Results from last 7 days   Lab Units 01/01/22  0511 12/31/21  0944 12/31/21  0944   WBC Thousand/uL 5 39   < > 7 13   HEMOGLOBIN g/dL 12 7   < > 14 1   HEMATOCRIT % 38 1   < > 43 6   PLATELETS Thousands/uL 152   < > 170   NEUTROS PCT %  --   --  72   LYMPHS PCT %  --   --  19   MONOS PCT %  --   --  9   EOS PCT %  --   --  0    < > = values in this interval not displayed  Results from last 7 days   Lab Units 01/01/22  0511   POTASSIUM mmol/L 4 1   CHLORIDE mmol/L 100   CO2 mmol/L 29   BUN mg/dL 24   CREATININE mg/dL 1 46*   CALCIUM mg/dL 8 1*   ALK PHOS U/L 108   ALT U/L 41   AST U/L 31     Results from last 7 days   Lab Units 12/31/21  0944   INR  0 95        I Have Reviewed All Lab Data Listed Above  Recent Cultures (last 7 days):     Results from last 7 days   Lab Units 12/31/21  1037 12/31/21  1017   BLOOD CULTURE  Received in Microbiology Lab  Culture in Progress  Received in Microbiology Lab  Culture in Progress         Last 24 Hours Medication List:   Current Facility-Administered Medications   Medication Dose Route Frequency Provider Last Rate    acetaminophen  650 mg Oral Q6H PRN Erikaneil Chang DO      albuterol  1 puff Inhalation 4x Daily Erika Chang DO      amiodarone  200 mg Oral Daily Amy Turpin, DO      aspirin  81 mg Oral Daily Otoniel Neely, DO      atorvastatin  80 mg Oral Daily With [employer], DO      clopidogrel  75 mg Oral Daily Otoniel Neely, DO      dexamethasone  6 mg Intravenous Q24H Otoniel Neely, DO      dextromethorphan-guaiFENesin  10 mL Oral Q4H PRN Amy Sinner, DO      fluticasone  2 spray Nasal Daily Amy Turpin, DO      furosemide  20 mg Intravenous BID (diuretic) Amy Turpin, DO      heparin (porcine)  5,000 Units Subcutaneous [facility] Otoniel Neely, DO      isosorbide mononitrate  30 mg Oral Daily Otoniel Neely, DO      lisinopril  20 mg Oral BID Otoniel Neely, DO      melatonin  6 mg Oral HS Otoniel Neely, DO      metoprolol succinate  50 mg Oral BID Amy Turpin, DO      ondansetron  4 mg Intravenous Q4H PRN Amy Sinner, DO      remdesivir  100 mg Intravenous Q24H Otoniel Neely, DO      sodium chloride (PF)  3 mL Intravenous Q1H PRN Amy Sinner, DO      tamsulosin  0 4 mg Oral Daily With Dinner Amy Turpin, DO          Today, Patient Was Seen By: Gopal Mooney MD

## 2022-01-01 NOTE — ASSESSMENT & PLAN NOTE
· Status post recent EVAR at [facility] October 2021

## 2022-01-01 NOTE — ASSESSMENT & PLAN NOTE
Wt Readings from Last 3 Encounters:   12/31/21 93 kg (205 lb)   12/16/21 93 kg (205 lb)   12/03/21 94 3 kg (208 lb)     Results from last 7 days   Lab Units 12/31/21  0944   NT-PRO BNP pg/mL 3,609*     · Most recent echocardiogram LVEF 45% at [facility]  · Unfortunately now with worsening lower extremity edema shortness of breath  · Probable decompensation  Not on maintenance diuretics    · Start furosemide 20 mg IV b i d  and consult to cardiology

## 2022-01-01 NOTE — CONSULTS
Consult - Cardiology   Lola Christianson [de-identified] y o  male MRN: [id number]  Unit/Bed#: [facility] 68 2 -01 Encounter: [id number]          Reason For Consult: Suspected CHF in patient with extensive cardiac history    History Of Present Illness: The patient is an 81-year-old male with extensive cardiac history  He is followed by [facility]  He has known CAD status post remote CABG in 1996  He had a recent cardiac catheterization in October of 2021  At that time he was found to have severe native 3 vessel disease with all of his saphenous vein grafts to be occluded  His left internal mammary artery to left anterior descending artery was patent  He did have stenting of the intermediate vessel at that time  Recent echocardiogram showed an ejection fraction of 40-45% with anterior septal and anterior apical hypokinesis consistent with an ischemic cardiomyopathy  He was found to have frequent nonsustained ventricular tachycardia on a Zio patch and for this reason was placed on amiodarone suppressive therapy  He also has hypertension and hyperlipidemia  The patient was admitted for  COVID pneumonia  He was noted to have increased lower extremity edema we are being consulted for possible congestive heart failure  ProBNP is elevated 3609  This is in the face of a creatinine of 1 2  Called room  [facility] limited historian     Does have some ongoing shortness of breath  Hard to tell if this is better not  Does have a cough productive of some scant phlegm  Did not really report any edema to me but more cramping in his legs  He denies palpitations or dizziness              Past Medical History:   CAD as detailed above  Ischemic cardiomyopathy with ejection fraction 45%  Hypertension  Hyperlipidemia  Benign prosthetic hypertrophy  Abdominal aortic aneurysm status post endovascular repair recently  Nonsustained ventricular tachycardia  History cataracts  Glucose intolerance  Obstructive sleep apnea  Elevated PSA  History of malignant melanoma resection of the neck in 2017      Allergy:  Allergies   Allergen Reactions    Abciximab Other (See Comments)     rcd 9/27/01 & 5/16/03    Escitalopram Rash       Medications:  Aspirin 81 mg daily  Clopidogrel 75 mg daily  Metoprolol  mg daily in divided dosages   Amiodarone 200 mg daily  Lisinopril 10 mg daily  Atorvastatin 80 mg daily  Isosorbide mononitrate 30 mg daily   Zoloft  Flomax    Family History:  Cancer    Social History:  Very occasional alcohol  No smoking history      ROS: frequent urination  Hussain Henriquez no change in bowels      Exam:  153/78  Pulse 52  Not performed due to active COVID infection      EKG:  Sinus bradycardia with nonspecific ST T wave abnormalities  ProBNP 3609  Potassium 4 point, BUN 24, creatinine 1 46, LFTs normal  Hemoglobin 12 7, white blood count 5 39, platelets a 035059  Troponins negative  X-ray-small bilateral effusions with left sided atelectasis          ASSESSMENT AND PLAN:   1  Acute systolic CHF  Hussain Henriquez probnp 1752 and had been 863 in October    likely brought on by stress  Hussain Henriquez agree with iv lasix with close surveillance of renal function  His weight today however appears to be less than it was when he was seen in the office last in Dr Richard Salinas office 3 months ago  2  CAD status post remote CABG with more recent stenting of the intermediate branch in October  Apparently having no angina  His ruled out for myocardial infarction  Patient with occluded grafts except for left internal mammary artery to left anterior descending artery  On dual antiplatelet therapy and isosorbide as well as metoprolol  3  Ischemic cardiomyopathy with ejection fraction of 40-45% by echo with segmental wall motion abnormalities noted  Patient both on lisinopril 10 mg daily metoprolol  mg daily in divided dosages  4  Nonsustained ventricular tachycardia  Currently on amiodarone as per his primary cardiologist at 200 mg daily for suppression    Monitor appears to show no significant ventricular arrhythmias  5  Hypertension  Blood pressure somewhat elevated on  lisinopril 10 mg a day and metoprolol  mg daily in divided dosages  Also on isosorbide at 30 mg daily  Some confusion from the notes whether he is on amlodipine or not  6  Mixed hyperlipidemia  On maximal dose atorvastatin 80 mg daily   Most recent LDL cholesterol 60 with HDL cholesterol 49 and triglycerides of 76 in November 7  COVID-19 pneumonia  8   Abdominal aortic aneurysm status post EVAR recently      Zelalem Andrade MD

## 2022-01-01 NOTE — ASSESSMENT & PLAN NOTE
· Status post recent EVAR at [facility] AT THE [facility] October 2021

## 2022-01-01 NOTE — ASSESSMENT & PLAN NOTE
· CAD with history of CABG 1996 and recent KYE October 2021 at [facility] AT THE [facility] and follows with [facility] cardiology  · Cardiac enzymes minimally elevated      Lab Results   Component Value Date    HSTNI0 25 12/31/2021    HSTNI2 31 12/31/2021

## 2022-01-01 NOTE — ASSESSMENT & PLAN NOTE
· Double vaccinated here with COVID pneumonia  · Given 1 L oxygen use per mild pathway start remdesivir and dexamethasone    · Wean oxygen when able    Lab Results   Component Value Date    SARSCOV2 Positive (A) 12/31/2021

## 2022-01-01 NOTE — PLAN OF CARE
Problem: PHYSICAL THERAPY ADULT  Goal: Performs mobility at highest level of function for planned discharge setting  See evaluation for individualized goals  Description: Treatment/Interventions: Functional transfer training,LE strengthening/ROM,Elevations,Therapeutic exercise,Endurance training,Patient/family training,Equipment eval/education,Bed mobility,Gait training,Compensatory technique education,Continued evaluation,Spoke to nursing,OT          See flowsheet documentation for full assessment, interventions and recommendations  Note: Prognosis: Good  Problem List: Decreased strength,Decreased endurance,Impaired balance,Decreased mobility  Assessment: Massimo Frost is a [de-identified] y o  male with a past medical history of CAD status post CABG and recent stenting, AAA status post recent repair, BPH, hypertension who presents with worsening shortness of breath  Admitted with acute respiratory insufficiency related to COVID 19 PNA  Acute on chronic CHF  Bradycardia  PT consulted  Up as tolerated orders  Currently on RA upon evaluation  Resting O2 sats %  Prior to admission resides with spouse in ranch style home  Independent without AD in home, occasional use of walking stick in community  + drives  No hx of falls  Currently presents with functional limitations related to generalized decreased in strength, activity tolerance, balance, functional mobility and locomotion associated with illness and hospitalization  Despite is S for bed mobility, transfers and ambulation short distances  RA O2 sats 97% with activity  Gait slowed, decreased foot clearance, step length  Limited tolerance to distances  + fatigue reported  Given impairments with slight decline from PLOF will benefit from skilled PT in order to optimize outcomes and facilitate return to independence  The patient's AM-PAC Basic Mobility Inpatient Short Form Raw Score is 21   A Raw score of greater than 16 suggests the patient may benefit from discharge to home  Please also refer to the recommendation of the Physical Therapist for safe discharge planning  Anticipate with continued medical progress, will be able to return home with support of spouse  Jeff no PT needs beyond hospital at this time  PT Discharge Recommendation: No rehabilitation needs          See flowsheet documentation for full assessment

## 2022-01-01 NOTE — ASSESSMENT & PLAN NOTE
· CAD with history of CABG 1996 and recent KYE October 2021 at [address] and follows with [facility] cardiology  · Cardiac enzymes minimally elevated      Lab Results   Component Value Date    HSTNI0 25 12/31/2021    HSTNI2 31 12/31/2021

## 2022-01-01 NOTE — PHYSICAL THERAPY NOTE
PT EVALUATION 08:55-09:10 ( 15 minutes)    [de-identified] y o     [id number]    CHF (congestive heart failure) ([facility]) [I50 9]  Respiratory distress [R06 03]  Shortness of breath [R06 02]  COVID-19 virus infection [U07 1]    Past Medical History:   Diagnosis Date    AAA (abdominal aortic aneurysm) without rupture ([facility] )     has annual US to check    Angina pectoris ([place] [address] )     chronic stable angina, but it is mild and tolerates walking dogs and walking up flight os stairs without symptoms  Using Nitro no more than 2 times per mt   Ankle pain, left     Bilateral wrist pain     BPH with obstruction/lower urinary tract symptoms     Coronary artery disease     CPAP (continuous positive airway pressure) dependence     Elevated PSA     History of colon polyps     [place] (hard of hearing)     Slight    Hyperlipidemia     Hypertension     Ischemic cardiomyopathy     with apical aneurysm    Melanoma ([place] [address] ) 12/2016    Right side of neck    Myocardial infarction ([place] [address] ) 1996    x1    Pneumonia     x1 as a baby    Sleep apnea          Past Surgical History:   Procedure Laterality Date    ARTERIAL ANEURYSM REPAIR  10/2021    CARDIAC SURGERY      CABG x6    CATARACT EXTRACTION Bilateral     COLONOSCOPY      CORONARY ANGIOPLASTY WITH STENT PLACEMENT      2 stents    CORONARY ANGIOPLASTY WITH STENT PLACEMENT      10/2021    CORONARY ARTERY BYPASS GRAFT      PROSTATE BIOPSY  2000,2001    SKIN BIOPSY      Melanoma - surgical removal    SKIN LESION EXCISION Right 2/6/2017    Procedure: WIDE EXCISION MELANOMA SCAR LATERAL NECK;  Surgeon: Smith Paniagua MD;  Location: [facility];  Service:     WISDOM TOOTH EXTRACTION          01/01/22 0910   PT Last Visit   PT Visit Date 01/01/22   Note Type   Note type Evaluation   Pain Assessment   Pain Score No Pain   Restrictions/Precautions   Other Precautions Contact/isolation; Airborne/isolation;Multiple lines; Fall Risk   Home Living   Type of [address] [place] [address] One level;Stairs to enter with rails  (2-3 IVORY)   Bathroom Shower/Tub Tub/shower unit   Bathroom Toilet Standard   Bathroom Equipment Grab bars in shower   [address] Other (Comment)  (walking stick)   Additional Comments resides with wife in ranch style home with 2-3 IVORY with rail  Prior Function   Level of [place] Independent with ADLs and functional mobility   Lives With Spouse   Receives Help From Family   ADL Assistance Independent   IADLs Independent   Falls in the last 6 months 0   Vocational Retired   Comments Independent without AD in home, use of wakling stick occasionally in community  +   General   Additional Pertinent History Pt is [de-identified] y/o male admitted with increased SOB  Admitted with COVID PNA and acute on chronic CHF  + bradycardia  PT consulted  Family/Caregiver Present No   Cognition   Overall Cognitive Status WFL   Orientation Level Oriented X4   Following Commands Follows one step commands without difficulty   Comments flat affect  Subjective   Subjective Agreeable to therapy eval  Notes unsure on feet  RUE Assessment   RUE Assessment WFL   LUE Assessment   LUE Assessment WFL   RLE Assessment   RLE Assessment WFL  (grossly 4-/5)   LLE Assessment   LLE Assessment WFL  (grossly 4-/5)   Bed Mobility   Supine to Sit 5  Supervision   Additional items Increased time required   Transfers   Sit to Stand 5  Supervision   Additional items Increased time required;Verbal cues   Stand to Sit 5  Supervision   Additional items Increased time required   Additional Comments Increased time to transition  Ambulation/Elevation   Gait pattern Improper Weight shift;Decreased foot clearance; Inconsistent shelley   Gait Assistance 5  Supervision   Additional items Verbal cues   Assistive Device None  (wearing sneakers )   Distance Amb without AD 20'x1  O2 sats on RA 97%       Balance   Static Sitting Fair +   Dynamic Sitting Fair   Static Standing Fair +   Dynamic Standing Fair   Ambulatory Fair -   Endurance Deficit   Endurance Deficit Yes   Endurance Deficit Description fatigue, weakness  Activity Tolerance   Activity Tolerance Patient tolerated treatment well;Patient limited by fatigue   Medical Staff Made Aware Nurse, Elizabeth Lynn   Nurse Made Aware yes   Assessment   Prognosis Good   Problem List Decreased strength;Decreased endurance; Impaired balance;Decreased mobility   Assessment Quintin Gamble is a [de-identified] y o  male with a past medical history of CAD status post CABG and recent stenting, AAA status post recent repair, BPH, hypertension who presents with worsening shortness of breath  Admitted with acute respiratory insufficiency related to COVID 19 PNA  Acute on chronic CHF  Bradycardia  PT consulted  Up as tolerated orders  Currently on RA upon evaluation  Resting O2 sats %  Prior to admission resides with spouse in ranch style home  Independent without AD in home, occasional use of walking stick in community  + drives  No hx of falls  Currently presents with functional limitations related to generalized decreased in strength, activity tolerance, balance, functional mobility and locomotion associated with illness and hospitalization  Despite is S for bed mobility, transfers and ambulation short distances  RA O2 sats 97% with activity  Gait slowed, decreased foot clearance, step length  Limited tolerance to distances  + fatigue reported  Given impairments with slight decline from PLOF will benefit from skilled PT in order to optimize outcomes and facilitate return to independence  The patient's AM-PAC Basic Mobility Inpatient Short Form Raw Score is 21  A Raw score of greater than 16 suggests the patient may benefit from discharge to home  Please also refer to the recommendation of the Physical Therapist for safe discharge planning  Anticipate with continued medical progress, will be able to return home with support of spouse    Jeff jaeger PT needs beyond hospital at this time  Goals   Patient Goals go home   STG Expiration Date 01/11/22   Short Term Goal #1 10 days: 1)  Pt will perform bed mobility with Lissa demonstrating appropriate technique 100% of the time in order to improve function  2)  Perform all transfers with Lissa demonstrating safe and appropriate technique 100% of the time in order to improve ability to negotiate safely in home environment  3) Amb with least restrictive AD prn > 100'x1 with mod I  to I in order to demonstrate ability to negotiate in home environment  4)  Improve overall strength and balance 1/2 grade in order to optimize ability to perform functional tasks and reduce fall risk  5) Increase activity tolerance to 45 minutes in order to improve endurance to functional tasks  6)  Negotiate stairs using most appropriate technique and Lissa in order to be able to negotiate safely into home environment  7) PT for ongoing patient and family/caregiver education, DME needs and d/c planning in order to promote highest level of function in least restrictive environment  Plan   Treatment/Interventions Functional transfer training;LE strengthening/ROM; Elevations; Therapeutic exercise; Endurance training;Patient/family training;Equipment eval/education; Bed mobility;Gait training; Compensatory technique education;Continued evaluation;Spoke to nursing;OT   PT Frequency 2-3x/wk   Recommendation   PT Discharge Recommendation No rehabilitation needs   AM-PAC Basic Mobility Inpatient   Turning in Bed Without Bedrails 4   Lying on Back to Sitting on Edge of Flat Bed 3   Moving Bed to Chair 4   Standing Up From Chair 4   Walk in Room 3   Climb 3-5 Stairs 3   Basic Mobility Inpatient Raw Score 21   Basic Mobility Standardized Score 45 55   Highest Level Of Mobility   JH-HLM Goal 6: Walk 10 steps or more   JH-HLM Highest Level of Mobility 6: Walk 10 steps or more   JH-HLM Goal Achieved Yes   End of Consult   Patient Position at End of Consult Bedside chair; All needs within reach     Hx/personal factors: co-morbidities, advanced age, mutliple lines and fall risk  Examination: dec mobility, dec balance, dec endurance, dec amb, risk for falls, assessed body system, balance, endurance, amb, D/C disposition & fall risk  Clinical: unpredictable (ongoing medical status, abnormal lab values and risk for falls), continuous monitoring-HR/bradycardia  Complexity: high      Jaky Kras, PT

## 2022-01-01 NOTE — ASSESSMENT & PLAN NOTE
· Given bradycardia will decrease metoprolol from 100 mg a m /50 mg p m  to just 50 mg b i d    · Monitor on telemetry

## 2022-01-01 NOTE — UTILIZATION REVIEW
Initial Clinical Review    Admission: Date/Time/Statement:   Admission Orders (From admission, onward)     Ordered        12/31/21 1132  Inpatient Admission  Once                      Orders Placed This Encounter   Procedures    Inpatient Admission     Standing Status:   Standing     Number of Occurrences:   1     Order Specific Question:   Level of Care     Answer:   Med Surg [16]     Order Specific Question:   Estimated length of stay     Answer:   More than 2 Midnights     Order Specific Question:   Certification     Answer:   I certify that inpatient services are medically necessary for this patient for a duration of greater than two midnights  See H&P and MD Progress Notes for additional information about the patient's course of treatment  ED Arrival Information     Expected Arrival Acuity    - 12/31/2021 09:33 Emergent         Means of arrival Escorted by Service Admission type    Ambulance [facility] Emergency         Arrival complaint    Shortness of Breath        Chief Complaint   Patient presents with    Shortness of Breath     Pt reports increased SOB and cough, worst when laying down over the past 2 days  Pt denies sick contacts, SOB or fevers  Pt reports having Triple A surgery in october and has not felt good since then  Pt received 324mg Aspirin at urgent care  Initial Presentation:  [de-identified] yo male presented to ED from home via EMS as inpatient admission for (+) COVID-19  Patient c/o SOB cough MEDINA Chest pain  PMHx CAD s/p stenting CABG  AAA s/p repair , HTN,hyperlipidemia COVID vaccine x2  On exam bradycardia,tachypnea, decreased breath sounds throughout, (+) leg swelling    Plan IV diuretics, telemetry, decrease metoprolol O2 @ 1 L CN and supportive care    Date: 01-01-21 PT/OT eval  No rehab needs noted  Chest x-ray with small bilateral pleural effusions and probable left basilar atelectasis Likely secondary to COVID-19/CHF Continue IV diuretics,  on IV remdesivir and dexamethasone  Consult Cardiology   The patient is an 71-year-old male with extensive cardiac history  He has known CAD status post remote CABG in 1996  He had a recent cardiac catheterization in October of 2021  At that time he was found to have severe native 3 vessel disease with all of his saphenous vein grafts to be occluded  His left internal mammary artery to left anterior descending artery was patent  He did have stenting of the intermediate vessel at that time  Recent echocardiogram showed an ejection fraction of 40-45% with anterior septal and anterior apical hypokinesis consistent with an ischemic cardiomyopathy  He was found to have frequent nonsustained ventricular tachycardia on a Zio patch and for this reason was placed on amiodarone suppressive therapy  He also has hypertension and hyperlipidemia  The patient was admitted for  COVID pneumonia  He was noted to have increased lower extremity edema  being consulted for possible congestive heart failure  ProBNP is elevated 3609  This is in the face of a creatinine of 1 2  Does have some ongoing shortness of breath  Does have a cough productive of some scant phlegm Continue IV lasix fluid restriction and supportive care       Intake/Output Summary (Last 24 hours) at 1/1/2022 1513  Last data filed at 1/1/2022 0731      Gross per 24 hour   Intake 400 ml   Output 300 ml   Net 100 ml         ED Triage Vitals   Temperature Pulse Respirations Blood Pressure SpO2   12/31/21 0946 12/31/21 0939 12/31/21 0939 12/31/21 0939 12/31/21 0939   98 2 °F (36 8 °C) 56 (!) 48 (!) 230/112 99 %      Temp Source Heart Rate Source Patient Position - Orthostatic VS BP Location FiO2 (%)   12/31/21 0946 12/31/21 1038 12/31/21 0939 12/31/21 0939 --   Oral Monitor Lying Right arm       Pain Score       12/31/21 0939       No Pain          Wt Readings from Last 1 Encounters:   01/01/22 94 kg (207 lb 3 7 oz)     Additional Vital Signs:   Date/Time Temp Pulse Resp BP MAP (mmHg) SpO2 Calculated FIO2 (%) - Nasal Cannula Nasal Cannula O2 Flow Rate (L/min) O2 Device Patient Position - Orthostatic VS   01/01/22 14:36:21 97 2 °F (36 2 °C) Abnormal  57 20 112/66 81 100 % -- -- -- --   01/01/22 0900 -- -- -- -- -- -- -- -- None (Room air) --   01/01/22 07:31:33 -- 52 Abnormal  20 153/78 103 97 % -- -- -- --   12/31/21 21:11:24 97 1 °F (36 2 °C) Abnormal  51 Abnormal  18 120/60 80 98 % -- -- -- --   12/31/21 2100 -- -- -- -- -- -- -- -- None (Room air) --   12/31/21 1824 -- 61 -- -- -- -- -- -- -- --   12/31/21 15:48:52 97 2 °F (36 2 °C) Abnormal  48 Abnormal  17 158/72 101 99 % 24 1 L/min Nasal cannula Lying   12/31/21 1400 -- -- -- -- -- 98 % -- -- -- --   12/31/21 1310 -- -- -- 172/110 Abnormal  -- -- -- -- -- Sitting   12/31/21 13:03:03 -- 53 Abnormal  18 221/100 Abnormal  140 100 % -- -- -- --   12/31/21 1232 -- 47 Abnormal  52 Abnormal  185/84 Abnormal  -- 100 % 24 1 L/min Nasal cannula Lying   12/31/21 1209 -- 49 Abnormal  46 Abnormal  191/88 Abnormal  -- 100 % 24 1 L/min Nasal cannula Lying   12/31/21 1038 -- 48 Abnormal  40 Abnormal  148/69 -- 100 % 24 1 L/min Nasal cannula Lying     Pertinent Labs/Diagnostic Test Results:   Results from last 7 days   Lab Units 12/31/21  1027   SARS-COV-2  Positive*     Results from last 7 days   Lab Units 01/01/22  0511 12/31/21  0944   WBC Thousand/uL 5 39 7 13   HEMOGLOBIN g/dL 12 7 14 1   HEMATOCRIT % 38 1 43 6   PLATELETS Thousands/uL 152 170   NEUTROS ABS Thousands/µL  --  5 11         Results from last 7 days   Lab Units 01/01/22  0511 12/31/21  0944   SODIUM mmol/L 137 138   POTASSIUM mmol/L 4 1 5 3   CHLORIDE mmol/L 100 101   CO2 mmol/L 29 31   ANION GAP mmol/L 8 6   BUN mg/dL 24 19   CREATININE mg/dL 1 46* 1 20   EGFR ml/min/1 73sq m 44 56   CALCIUM mg/dL 8 1* 8 6     Results from last 7 days   Lab Units 01/01/22  0511 12/31/21  0944   AST U/L 31 44   ALT U/L 41 51   ALK PHOS U/L 108 127*   TOTAL PROTEIN g/dL 6 4 7 3   ALBUMIN g/dL 3 0* 3  5   TOTAL BILIRUBIN mg/dL 0 65 0 90     Results from last 7 days   Lab Units 01/01/22  0511 12/31/21  0944   GLUCOSE RANDOM mg/dL 189* 111     Results from last 7 days   Lab Units 12/31/21  1207 12/31/21  0944   HS TNI 0HR ng/L  --  25   HS TNI 2HR ng/L 31  --    HSTNI D2 ng/L 6  --          Results from last 7 days   Lab Units 12/31/21  0944   PROTIME seconds 12 4   INR  0 95   PTT seconds 26         Results from last 7 days   Lab Units 01/01/22  0511 12/31/21  1037   PROCALCITONIN ng/ml <0 05 <0 05     Results from last 7 days   Lab Units 12/31/21  1037   LACTIC ACID mmol/L 0 8             Results from last 7 days   Lab Units 12/31/21  0944   NT-PRO BNP pg/mL 3,609*     Results from last 7 days   Lab Units 01/01/22  0511   CRP mg/L 12 4*     Results from last 7 days   Lab Units 12/31/21  1029   CLARITY UA  Clear   COLOR UA  Yellow   SPEC GRAV UA  1 020   PH UA  7 0   GLUCOSE UA mg/dl Negative   KETONES UA mg/dl Negative   BLOOD UA  Trace-Intact*   PROTEIN UA mg/dl 30 (1+)*   NITRITE UA  Negative   BILIRUBIN UA  Negative   UROBILINOGEN UA E U /dl 1 0   LEUKOCYTES UA  Negative   WBC UA /hpf 0-1*   RBC UA /hpf None Seen   BACTERIA UA /hpf None Seen   EPITHELIAL CELLS WET PREP /hpf Occasional     Results from last 7 days   Lab Units 12/31/21  1027   INFLUENZA A PCR  Negative   INFLUENZA B PCR  Negative   RSV PCR  Negative     Results from last 7 days   Lab Units 12/31/21  1037 12/31/21  1017   BLOOD CULTURE  Received in Microbiology Lab  Culture in Progress  Received in Microbiology Lab  Culture in Progress  CXR 12-31-21  Small bilateral effusions with probable left basilar atelectasis       EKG 12-31-21  Sinus bradycardia with pvcs  Nonspecific T wave abnormality  Abnormal ECG    EKG   12-31-21  X 2   Marked sinus bradycardia  Nonspecific ST and T wave abnormality  Abnormal ECG        ED Treatment:   Medication Administration from 12/31/2021 0933 to 12/31/2021 1254       Date/Time Order Dose Route Action 12/31/2021 1016 nitroglycerin (NITROSTAT) SL tablet 0 4 mg 0 4 mg Sublingual Given     12/31/2021 1207 furosemide (LASIX) injection 40 mg 40 mg Intravenous Given        Past Medical History:   Diagnosis Date    AAA (abdominal aortic aneurysm) without rupture (HCC)     has annual US to check    [facility]     chronic stable angina, but it is mild and tolerates walking dogs and walking up flight os stairs without symptoms  Using Nitro no more than 2 times per mt      Ankle pain, left     Bilateral wrist pain     BPH with obstruction/lower urinary tract symptoms     Coronary artery disease     CPAP (continuous positive airway pressure) dependence     Elevated PSA     History of colon polyps     [place] (hard of hearing)     Slight    Hyperlipidemia     Hypertension     Ischemic cardiomyopathy     with apical aneurysm    Melanoma ([facility] ) 12/2016    Right side of neck    Myocardial infarction ([facility] ) 1996    x1    Pneumonia     x1 as a baby    Sleep apnea      Present on Admission:   Malignant melanoma of neck ([facility] )   Essential hypertension   Bradycardia   JORGE (obstructive sleep apnea)   BPH with obstruction/lower urinary tract symptoms   [facility]ab[facility]) [facility])   NSVT (nonsustained ventricular tachycardia) ([facility])   Anxiety   Acute respiratory insufficiency   Pneumonia due to COVID-19 virus      Admitting Diagnosis: CHF (congestive heart failure) ([facility]) [I50 9]  Respiratory distress [R06 03]  Shortness of breath [R06 02]  COVID-19 virus infection [U07 1]  Age/Sex: [de-identified] y o  male  Admission Orders:  Scheduled Medications:  albuterol, 1 puff, Inhalation, 4x Daily  amiodarone, 200 mg, Oral, Daily  aspirin, 81 mg, Oral, Daily  atorvastatin, 80 mg, Oral, Daily With Dinner  clopidogrel, 75 mg, Oral, Daily  dexamethasone, 6 mg, Intravenous, Q24H  fluticasone, 2 spray, Nasal, Daily  furosemide, 20 mg, Intravenous, BID (diuretic)  heparin (porcine), 5,000 Units, Subcutaneous, Q8H [address]  isosorbide mononitrate, 30 mg, Oral, Daily  lisinopril, 20 mg, Oral, BID  melatonin, 6 mg, Oral, HS  metoprolol succinate, 50 mg, Oral, BID  remdesivir, 100 mg, Intravenous, Q24H  tamsulosin, 0 4 mg, Oral, Daily With Dinner      Continuous IV Infusions:     PRN Meds:  acetaminophen, 650 mg, Oral, Q6H PRN  dextromethorphan-guaiFENesin, 10 mL, Oral, Q4H PRN  ondansetron, 4 mg, Intravenous, Q4H PRN  sodium chloride (PF), 3 mL, Intravenous, Q1H PRN        IP CONSULT TO NUTRITION SERVICES  IP CONSULT TO CARDIOLOGY   Telemetry  SCD      Network Utilization Review Department  ATTENTION: Please call with any questions or concerns to [phone] and carefully listen to the prompts so that you are directed to the right person  All voicemails are confidential   Arleen Grand all requests for admission clinical reviews, approved or denied determinations and any other requests to dedicated fax number below belonging to the campus where the patient is receiving treatment   List of dedicated fax numbers for the Facilities:  [address] DENIALS (Administrative/Medical Necessity) [phone]   [address] (Maternity/NICU/Pediatrics) [phone]   [address]  [address] [facility] [address] [id number] [place] [address] [address]senia [address]os [address] [facility] [facility] [phone]

## 2022-01-01 NOTE — ASSESSMENT & PLAN NOTE
Wt Readings from Last 3 Encounters:   01/01/22 94 kg (207 lb 3 7 oz)   12/31/21 93 kg (205 lb)   12/16/21 93 kg (205 lb)     Results from last 7 days   Lab Units 12/31/21  0944   NT-PRO BNP pg/mL 3,609*     · Most recent echocardiogram LVEF 45% at [facility]  · Unfortunately now with worsening lower extremity edema shortness of breath  · Probable decompensation  Not on maintenance diuretics    · BNP 3609  · Will continue with furosemide 20 mg IV b i d , continue with metoprolol, lisinopril  · Cardiology consultation appreciated  · Monitor I&O, daily weight, fluid restriction

## 2022-01-02 LAB
2HR DELTA HS TROPONIN: 14 NG/L
4HR DELTA HS TROPONIN: 25 NG/L
ANION GAP SERPL CALCULATED.3IONS-SCNC: 9 MMOL/L (ref 4–13)
APTT PPP: 28 SECONDS (ref 23–37)
APTT PPP: 71 SECONDS (ref 23–37)
ATRIAL RATE: 86 BPM
ATRIAL RATE: 87 BPM
BASOPHILS # BLD AUTO: 0.01 THOUSANDS/ΜL (ref 0–0.1)
BASOPHILS NFR BLD AUTO: 0 % (ref 0–1)
BUN SERPL-MCNC: 31 MG/DL (ref 5–25)
CALCIUM SERPL-MCNC: 8.4 MG/DL (ref 8.3–10.1)
CARDIAC TROPONIN I PNL SERPL HS: 29 NG/L
CARDIAC TROPONIN I PNL SERPL HS: 43 NG/L
CARDIAC TROPONIN I PNL SERPL HS: 54 NG/L
CHLORIDE SERPL-SCNC: 99 MMOL/L (ref 100–108)
CO2 SERPL-SCNC: 28 MMOL/L (ref 21–32)
CREAT SERPL-MCNC: 1.55 MG/DL (ref 0.6–1.3)
D DIMER PPP FEU-MCNC: 1.76 UG/ML FEU
EOSINOPHIL # BLD AUTO: 0 THOUSAND/ΜL (ref 0–0.61)
EOSINOPHIL NFR BLD AUTO: 0 % (ref 0–6)
ERYTHROCYTE [DISTWIDTH] IN BLOOD BY AUTOMATED COUNT: 14.1 % (ref 11.6–15.1)
GFR SERPL CREATININE-BSD FRML MDRD: 41 ML/MIN/1.73SQ M
GLUCOSE SERPL-MCNC: 170 MG/DL (ref 65–140)
GLUCOSE SERPL-MCNC: 177 MG/DL (ref 65–140)
HCT VFR BLD AUTO: 38.3 % (ref 36.5–49.3)
HGB BLD-MCNC: 12.8 G/DL (ref 12–17)
IMM GRANULOCYTES # BLD AUTO: 0.05 THOUSAND/UL (ref 0–0.2)
IMM GRANULOCYTES NFR BLD AUTO: 1 % (ref 0–2)
INR PPP: 1.04 (ref 0.84–1.19)
LYMPHOCYTES # BLD AUTO: 0.59 THOUSANDS/ΜL (ref 0.6–4.47)
LYMPHOCYTES NFR BLD AUTO: 7 % (ref 14–44)
MCH RBC QN AUTO: 29.4 PG (ref 26.8–34.3)
MCHC RBC AUTO-ENTMCNC: 33.4 G/DL (ref 31.4–37.4)
MCV RBC AUTO: 88 FL (ref 82–98)
MONOCYTES # BLD AUTO: 0.25 THOUSAND/ΜL (ref 0.17–1.22)
MONOCYTES NFR BLD AUTO: 3 % (ref 4–12)
NEUTROPHILS # BLD AUTO: 7.81 THOUSANDS/ΜL (ref 1.85–7.62)
NEUTS SEG NFR BLD AUTO: 89 % (ref 43–75)
NRBC BLD AUTO-RTO: 0 /100 WBCS
P AXIS: 69 DEGREES
P AXIS: 78 DEGREES
PLATELET # BLD AUTO: 158 THOUSANDS/UL (ref 149–390)
PMV BLD AUTO: 11 FL (ref 8.9–12.7)
POTASSIUM SERPL-SCNC: 4.1 MMOL/L (ref 3.5–5.3)
PR INTERVAL: 202 MS
PR INTERVAL: 206 MS
PROTHROMBIN TIME: 13.3 SECONDS (ref 11.6–14.5)
QRS AXIS: -41 DEGREES
QRS AXIS: -41 DEGREES
QRSD INTERVAL: 174 MS
QRSD INTERVAL: 174 MS
QT INTERVAL: 444 MS
QT INTERVAL: 446 MS
QTC INTERVAL: 533 MS
QTC INTERVAL: 534 MS
RBC # BLD AUTO: 4.35 MILLION/UL (ref 3.88–5.62)
SODIUM SERPL-SCNC: 136 MMOL/L (ref 136–145)
T WAVE AXIS: 151 DEGREES
T WAVE AXIS: 152 DEGREES
VENTRICULAR RATE: 86 BPM
VENTRICULAR RATE: 87 BPM
WBC # BLD AUTO: 8.71 THOUSAND/UL (ref 4.31–10.16)

## 2022-01-02 PROCEDURE — 84484 ASSAY OF TROPONIN QUANT: CPT | Performed by: NURSE PRACTITIONER

## 2022-01-02 PROCEDURE — 85379 FIBRIN DEGRADATION QUANT: CPT

## 2022-01-02 PROCEDURE — 85610 PROTHROMBIN TIME: CPT

## 2022-01-02 PROCEDURE — 93005 ELECTROCARDIOGRAM TRACING: CPT

## 2022-01-02 PROCEDURE — 85025 COMPLETE CBC W/AUTO DIFF WBC: CPT | Performed by: INTERNAL MEDICINE

## 2022-01-02 PROCEDURE — 99232 SBSQ HOSP IP/OBS MODERATE 35: CPT | Performed by: INTERNAL MEDICINE

## 2022-01-02 PROCEDURE — 80048 BASIC METABOLIC PNL TOTAL CA: CPT | Performed by: INTERNAL MEDICINE

## 2022-01-02 PROCEDURE — 82948 REAGENT STRIP/BLOOD GLUCOSE: CPT

## 2022-01-02 PROCEDURE — 85730 THROMBOPLASTIN TIME PARTIAL: CPT | Performed by: NURSE PRACTITIONER

## 2022-01-02 PROCEDURE — 93010 ELECTROCARDIOGRAM REPORT: CPT | Performed by: INTERNAL MEDICINE

## 2022-01-02 PROCEDURE — 85730 THROMBOPLASTIN TIME PARTIAL: CPT

## 2022-01-02 PROCEDURE — 99291 CRITICAL CARE FIRST HOUR: CPT | Performed by: INTERNAL MEDICINE

## 2022-01-02 RX ORDER — HEPARIN SODIUM 1000 [USP'U]/ML
4000 INJECTION, SOLUTION INTRAVENOUS; SUBCUTANEOUS
Status: DISCONTINUED | OUTPATIENT
Start: 2022-01-02 | End: 2022-01-04

## 2022-01-02 RX ORDER — METOPROLOL SUCCINATE 50 MG/1
50 TABLET, EXTENDED RELEASE ORAL EVERY EVENING
Status: DISCONTINUED | OUTPATIENT
Start: 2022-01-02 | End: 2022-01-06 | Stop reason: HOSPADM

## 2022-01-02 RX ORDER — HEPARIN SODIUM 10000 [USP'U]/100ML
3-20 INJECTION, SOLUTION INTRAVENOUS
Status: DISCONTINUED | OUTPATIENT
Start: 2022-01-02 | End: 2022-01-04

## 2022-01-02 RX ORDER — ASPIRIN 81 MG/1
81 TABLET ORAL DAILY
Status: DISCONTINUED | OUTPATIENT
Start: 2022-01-02 | End: 2022-01-06 | Stop reason: HOSPADM

## 2022-01-02 RX ORDER — NITROGLYCERIN 0.4 MG/1
0.4 TABLET SUBLINGUAL ONCE
Status: COMPLETED | OUTPATIENT
Start: 2022-01-02 | End: 2022-01-02

## 2022-01-02 RX ORDER — ISOSORBIDE MONONITRATE 30 MG/1
30 TABLET, EXTENDED RELEASE ORAL DAILY
Status: DISCONTINUED | OUTPATIENT
Start: 2022-01-02 | End: 2022-01-04

## 2022-01-02 RX ORDER — AMIODARONE HYDROCHLORIDE 200 MG/1
200 TABLET ORAL DAILY
Status: DISCONTINUED | OUTPATIENT
Start: 2022-01-02 | End: 2022-01-06 | Stop reason: HOSPADM

## 2022-01-02 RX ORDER — FLUTICASONE PROPIONATE 50 MCG
2 SPRAY, SUSPENSION (ML) NASAL DAILY
Status: DISCONTINUED | OUTPATIENT
Start: 2022-01-02 | End: 2022-01-06 | Stop reason: HOSPADM

## 2022-01-02 RX ORDER — NITROGLYCERIN 0.4 MG/1
0.4 TABLET SUBLINGUAL
Status: DISCONTINUED | OUTPATIENT
Start: 2022-01-02 | End: 2022-01-06 | Stop reason: HOSPADM

## 2022-01-02 RX ORDER — CLOPIDOGREL BISULFATE 75 MG/1
75 TABLET ORAL DAILY
Status: DISCONTINUED | OUTPATIENT
Start: 2022-01-02 | End: 2022-01-06 | Stop reason: HOSPADM

## 2022-01-02 RX ORDER — DEXAMETHASONE SODIUM PHOSPHATE 4 MG/ML
6 INJECTION, SOLUTION INTRA-ARTICULAR; INTRALESIONAL; INTRAMUSCULAR; INTRAVENOUS; SOFT TISSUE EVERY 24 HOURS
Status: DISCONTINUED | OUTPATIENT
Start: 2022-01-02 | End: 2022-01-02

## 2022-01-02 RX ORDER — HEPARIN SODIUM 1000 [USP'U]/ML
2000 INJECTION, SOLUTION INTRAVENOUS; SUBCUTANEOUS
Status: DISCONTINUED | OUTPATIENT
Start: 2022-01-02 | End: 2022-01-04

## 2022-01-02 RX ORDER — ALBUTEROL SULFATE 90 UG/1
1 AEROSOL, METERED RESPIRATORY (INHALATION) 4 TIMES DAILY
Status: DISCONTINUED | OUTPATIENT
Start: 2022-01-02 | End: 2022-01-06 | Stop reason: HOSPADM

## 2022-01-02 RX ORDER — HEPARIN SODIUM 5000 [USP'U]/ML
5000 INJECTION, SOLUTION INTRAVENOUS; SUBCUTANEOUS EVERY 8 HOURS SCHEDULED
Status: DISCONTINUED | OUTPATIENT
Start: 2022-01-02 | End: 2022-01-02

## 2022-01-02 RX ORDER — HEPARIN SODIUM 1000 [USP'U]/ML
4000 INJECTION, SOLUTION INTRAVENOUS; SUBCUTANEOUS ONCE
Status: COMPLETED | OUTPATIENT
Start: 2022-01-02 | End: 2022-01-02

## 2022-01-02 RX ORDER — METOPROLOL SUCCINATE 50 MG/1
100 TABLET, EXTENDED RELEASE ORAL DAILY
Status: DISCONTINUED | OUTPATIENT
Start: 2022-01-02 | End: 2022-01-06 | Stop reason: HOSPADM

## 2022-01-02 RX ADMIN — HEPARIN SODIUM 11.1 UNITS/KG/HR: 10000 INJECTION, SOLUTION INTRAVENOUS at 14:24

## 2022-01-02 RX ADMIN — ALBUTEROL SULFATE 1 PUFF: 90 AEROSOL, METERED RESPIRATORY (INHALATION) at 11:23

## 2022-01-02 RX ADMIN — Medication 2 SPRAY: at 11:24

## 2022-01-02 RX ADMIN — METOPROLOL SUCCINATE 100 MG: 50 TABLET, EXTENDED RELEASE ORAL at 09:07

## 2022-01-02 RX ADMIN — REMDESIVIR 100 MG: 100 INJECTION, POWDER, LYOPHILIZED, FOR SOLUTION INTRAVENOUS at 17:46

## 2022-01-02 RX ADMIN — GUAIFENESIN AND DEXTROMETHORPHAN 10 ML: 100; 10 SYRUP ORAL at 09:03

## 2022-01-02 RX ADMIN — ISOSORBIDE MONONITRATE 30 MG: 30 TABLET, EXTENDED RELEASE ORAL at 11:21

## 2022-01-02 RX ADMIN — GUAIFENESIN AND DEXTROMETHORPHAN 10 ML: 100; 10 SYRUP ORAL at 11:19

## 2022-01-02 RX ADMIN — ASPIRIN 81 MG: 81 TABLET, COATED ORAL at 11:20

## 2022-01-02 RX ADMIN — HEPARIN SODIUM 5000 UNITS: 5000 INJECTION INTRAVENOUS; SUBCUTANEOUS at 05:23

## 2022-01-02 RX ADMIN — NITROGLYCERIN 0.4 MG: 0.4 TABLET SUBLINGUAL at 05:13

## 2022-01-02 RX ADMIN — AMIODARONE HYDROCHLORIDE 200 MG: 200 TABLET ORAL at 11:21

## 2022-01-02 RX ADMIN — HEPARIN SODIUM 4000 UNITS: 1000 INJECTION INTRAVENOUS; SUBCUTANEOUS at 14:39

## 2022-01-02 RX ADMIN — ATORVASTATIN CALCIUM 80 MG: 80 TABLET, FILM COATED ORAL at 17:45

## 2022-01-02 RX ADMIN — TAMSULOSIN HYDROCHLORIDE 0.4 MG: 0.4 CAPSULE ORAL at 17:45

## 2022-01-02 RX ADMIN — NITROGLYCERIN 0.4 MG: 0.4 TABLET SUBLINGUAL at 09:02

## 2022-01-02 RX ADMIN — NITROGLYCERIN 0.4 MG: 0.4 TABLET SUBLINGUAL at 07:44

## 2022-01-02 RX ADMIN — ALBUTEROL SULFATE 1 PUFF: 90 AEROSOL, METERED RESPIRATORY (INHALATION) at 17:46

## 2022-01-02 RX ADMIN — CLOPIDOGREL BISULFATE 75 MG: 75 TABLET ORAL at 11:20

## 2022-01-02 RX ADMIN — GUAIFENESIN AND DEXTROMETHORPHAN 10 ML: 100; 10 SYRUP ORAL at 18:07

## 2022-01-02 NOTE — ASSESSMENT & PLAN NOTE
- s/p EVAR  - b/l groin sites healing well, no s/s of infection appreciated  - c/o soreness at groin sites, recommend trying PRN Tylenol not exceeding 3Gm in 24 hours  - BP well controlled

## 2022-01-02 NOTE — ASSESSMENT & PLAN NOTE
- EF 45% on last echo in 2019  - euvolemic on exam  - continue ACEi and BB  - repeat echo ordered  - reviewed s/s of fluid retention and when to call the office for weight gain

## 2022-01-02 NOTE — PROGRESS NOTES
[address] [place] [address]  Acceptance note Krish Sheikh [facility] 1940, [de-identified] y o  male MRN: [id number]  Unit/Bed#: ICU 15 Encounter: [id number]  Primary Care Provider: Tara Sanchez MD   Date and time admitted to hospital: 12/31/2021  9:33 AM    DANELLE (acute kidney injury) [facility])  Assessment & Plan  · Cr increased to 1 46 from 1 2  · Will monitor BMP closely  · Lasix currently on hold   · Lisinopril on hold  · Avoid nephrotoxins  · Avoid hypertension    Mixed hyperlipidemia  Assessment & Plan  Continue atorvastatin 80 mg daily    Acute on chronic systolic (congestive) heart failure ([facility] )  Assessment & Plan  Wt Readings from Last 3 Encounters:   01/02/22 95 1 kg (209 lb 10 5 oz)   12/31/21 93 kg (205 lb)   12/16/21 93 kg (205 lb)     Results from last 7 days   Lab Units 12/31/21  0944   NT-PRO BNP pg/mL 3,609*     · Most recent echocardiogram LVEF 45% at [facility]  · Unfortunately now with worsening lower extremity edema shortness of breath  · Probable decompensation  Not on maintenance diuretics  · BNP 3609  · Cardiology consultation appreciated  · Monitor I&O, daily weight, fluid restriction  · Lisinopril and Lasix on hold secondary to DANELLE  · Continue metoprolol    Pneumonia due to COVID-19 virus  Assessment & Plan  · Double vaccinated here with COVID pneumonia  · Given 1 L oxygen use per mild pathway start remdesivir   · Wean oxygen when able    Lab Results   Component Value Date    SARSCOV2 Positive (A) 12/31/2021   Dexamethasone discontinued     NSVT (nonsustained ventricular tachycardia) ([facility])  Assessment & Plan  · ZIO patch recently noted an SVT    Has been started on amiodarone by primary cardiologist    AAA (abdominal aortic aneurysm) ([facility] )  Assessment & Plan  - s/p EVAR  - b/l groin sites healing well, no s/s of infection appreciated  - c/o soreness at groin sites, recommend trying PRN Tylenol not exceeding 3Gm in 24 hours  - BP well controlled    Ischemic cardiomyopathy  Assessment & Plan  - EF 45% on last echo in 2019  - euvolemic on exam  - continue ACEi and BB  - repeat echo ordered  - reviewed s/s of fluid retention and when to call the office for weight gain    BPH with obstruction/lower urinary tract symptoms  Assessment & Plan  · Continue tamsulosin    Bradycardia  Assessment & Plan  · Given bradycardia will decrease metoprolol from 100 mg a m /50 mg p m  to just 50 mg b i d    · Monitor on telemetry    Essential hypertension  Assessment & Plan  · Patient hypertensive upon arrival   Will monitor with diuretics  · May be anxiety related  · Metoprolol being decreased due to bradycardia  · Continue lisinopril 20 mg b i d  Coronary artery disease involving native heart without angina pectoris  Assessment & Plan  · CAD with history of CABG 1996 and [facility] [address] [facility] [address] [facility] with [facility]  · Cardiac enzymes minimally elevated  Lab Results   Component Value Date    HSTNI0 29 01/02/2022    HSTNI2 43 01/02/2022    HSTNI4 47 (HH) 01/02/2022       * Acute respiratory insufficiency  Assessment & Plan  This is an 69-year-old male with history of CAD status post CABG and recent stenting, AAA status post repair, BPH, hypertension presented with dyspnea  · Currently requiring 1 L oxygen via nasal cannula  · Chest x-ray with small bilateral pleural effusions and probable left basilar atelectasis  · Likely secondary to COVID-19/CHF        ----------------------------------------------------------------------------------------  HPI/24hr events:  Patient complaining of left upper extremity pain reminiscent of prior ACS episodes  Patient given 1 sublingual nitroglycerin with improvement in symptoms  ST elevations noted on monitor  Patient transferred to ICU for closer monitoring      Patient appropriate for transfer out of the ICU today?: No  Disposition: Admit to Critical Care   Code Status: Level 1 - Full Code  ---------------------------------------------------------------------------------------  SUBJECTIVE  Left upper extremity pain that improved with administration of nitroglycerin sublingually      Review of Systems  Review of systems was reviewed and negative unless stated above in HPI/24-hour events   ---------------------------------------------------------------------------------------  OBJECTIVE    Vitals   Vitals:    22 0930 22 1000 22 1030 22 1100   BP: 128/64 131/65 (!) 97/45 121/55   BP Location:       Pulse: 76 60 (!) 52 58   Resp: (!) 52 (!) 39 19 (!) 50   Temp:       TempSrc:       SpO2: 97% 99% 96% 96%   Weight:       Height:         Temp (24hrs), Av 3 °F (36 3 °C), Min:97 1 °F (36 2 °C), Max:97 6 °F (36 4 °C)  Current: Temperature: (!) 97 1 °F (36 2 °C)          Respiratory:  SpO2: SpO2: 96 %  Nasal Cannula O2 Flow Rate (L/min): 2 L/min    Invasive/non-invasive ventilation settings   Respiratory  Report   Lab Data (Last 4 hours)    None         O2/Vent Data (Last 4 hours)    None                Physical Exam        Laboratory and Diagnostics:  Results from last 7 days   Lab Units 22  0518 22  0511 21  0944   WBC Thousand/uL 8 71 5 39 7 13   HEMOGLOBIN g/dL 12 8 12 7 14 1   HEMATOCRIT % 38 3 38 1 43 6   PLATELETS Thousands/uL 158 152 170   NEUTROS PCT % 89*  --  72   MONOS PCT % 3*  --  9     Results from last 7 days   Lab Units 22  0518 22  0511 21  0944   SODIUM mmol/L 136 137 138   POTASSIUM mmol/L 4 1 4 1 5 3   CHLORIDE mmol/L 99* 100 101   CO2 mmol/L 28 29 31   ANION GAP mmol/L 9 8 6   BUN mg/dL 31* 24 19   CREATININE mg/dL 1 55* 1 46* 1 20   CALCIUM mg/dL 8 4 8 1* 8 6   GLUCOSE RANDOM mg/dL 177* 189* 111   ALT U/L  --  41 51   AST U/L  --  31 44   ALK PHOS U/L  --  108 127*   ALBUMIN g/dL  --  3 0* 3 5   TOTAL BILIRUBIN mg/dL  --  0 65 0 90          Results from last 7 days   Lab Units 21  0944   INR  0 95   PTT seconds 26 Results from last 7 days   Lab Units 12/31/21  1037   LACTIC ACID mmol/L 0 8     ABG:    VBG:    Results from last 7 days   Lab Units 01/01/22  0511 12/31/21  1037   PROCALCITONIN ng/ml <0 05 <0 05       Micro  Results from last 7 days   Lab Units 12/31/21  1037 12/31/21  1017   BLOOD CULTURE  No Growth at 24 hrs  No Growth at 24 hrs  EKG:  Normal sinus rhythm, left bundle branch block, left axis deviation  Imaging: I have personally reviewed pertinent reports  and I have personally reviewed pertinent films in PACS    Intake and Output  I/O       12/31 0701  01/01 0700 01/01 0701  01/02 0700 01/02 0701  01/03 0700    P  O  760  476    Total Intake(mL/kg) 760 (8 1)  476 (5)    Urine (mL/kg/hr) 500 400 (0 2) 275 (0 5)    Total Output 500 400 275    Net +260 -400 +201                 Height and Weights   Height: 5' 10" (177 8 cm)  IBW (Ideal Body Weight): 73 kg  Body mass index is 30 08 kg/m²  Weight (last 2 days)     Date/Time Weight    01/02/22 0541 95 1 (209 66)    01/01/22 0600 94 (207 23)            Nutrition       Diet Orders   (From admission, onward)             Start     Ordered    12/31/21 1224  Diet Regular; Regular House  Diet effective now        References:    Nutrtion Support Algorithm Enteral vs  Parenteral   Question Answer Comment   Diet Type Regular    Regular Regular House    RD to adjust diet per protocol?  Yes        12/31/21 1223                  Active Medications  Scheduled Meds:  Current Facility-Administered Medications   Medication Dose Route Frequency Provider Last Rate    acetaminophen  650 mg Oral Q6H PRN Prosper Rosetta, CRHARMAN      albuterol  1 puff Inhalation 4x Daily Prosper Rosetta, CRNP      amiodarone  200 mg Oral Daily Prosper Rosetta, VISHNU      aspirin  81 mg Oral Daily Prosper Rosetta, CRHARMAN      atorvastatin  80 mg Oral Daily With Lara Lau, CRNP      clopidogrel  75 mg Oral Daily Prosper Deutslaura, CRNP      dextromethorphan-guaiFENesin  10 mL Oral Q4H PRN Jose Malvin, CARLOS ENRIQUENP      fluticasone  2 spray Nasal Daily Orrie Malvin, CARLOS ENRIQUENP      glycerin-hypromellose-  1 drop Both Eyes Q4H PRN Sreekanthie Malvin, VISHNU      heparin (porcine)  3-20 Units/kg/hr (Order-Specific) Intravenous Titrated Anastacio De La Torre MD      heparin (porcine)  2,000 Units Intravenous Q1H PRN Anastacio De La Torre MD      heparin (porcine)  4,000 Units Intravenous Once Anastacio De La Torre MD      heparin (porcine)  4,000 Units Intravenous Q1H PRN Anastacio De La Torre MD      isosorbide mononitrate  30 mg Oral Daily Orrie Malvin, CARLOS ENRIQUENP      melatonin  6 mg Oral HS Orrie Malvin, VISHNU      metoprolol succinate  100 mg Oral Daily Orrie Malvin, CRNP      metoprolol succinate  50 mg Oral QPM Orrie Malvin, VISHNU      nitroglycerin  0 4 mg Sublingual Q5 Min PRN Orrie Malvin, VISHNU      ondansetron  4 mg Intravenous Q4H PRN Sreekanthie Malvin, VISHNU      remdesivir  100 mg Intravenous Q24H Orrie Malvin, VISHNU      sodium chloride (PF)  3 mL Intravenous Q1H PRN Sreekanthie Malvin, VISHNU      tamsulosin  0 4 mg Oral Daily With VISHNU White       Continuous Infusions:  heparin (porcine), 3-20 Units/kg/hr (Order-Specific)      PRN Meds:   acetaminophen, 650 mg, Q6H PRN  dextromethorphan-guaiFENesin, 10 mL, Q4H PRN  glycerin-hypromellose-, 1 drop, Q4H PRN  heparin (porcine), 2,000 Units, Q1H PRN  heparin (porcine), 4,000 Units, Q1H PRN  nitroglycerin, 0 4 mg, Q5 Min PRN  ondansetron, 4 mg, Q4H PRN  sodium chloride (PF), 3 mL, Q1H PRN        Invasive Devices Review  Invasive Devices  Report    Peripheral Intravenous Line            Peripheral IV 12/31/21 Left Antecubital 2 days    Peripheral IV 01/02/22 Distal;Left;Ventral (anterior) Forearm <1 day    Peripheral IV 01/02/22 Dorsal (posterior); Right Forearm <1 day                ---------------------------------------------------------------------------------------  Advance Directive and Living Will:      Power of : POLST:    ---------------------------------------------------------------------------------------  Care Time Delivered:   No Critical Care time spent       Kitty Gu MD      Portions of the record may have been created with voice recognition software  Occasional wrong word or "sound a like" substitutions may have occurred due to the inherent limitations of voice recognition software    Read the chart carefully and recognize, using context, where substitutions have occurred

## 2022-01-02 NOTE — ASSESSMENT & PLAN NOTE
This is an [de-identified] male with history of CAD status post CABG and recent stenting, AAA status post repair, BPH, hypertension presented with dyspnea      · Currently requiring 1 L oxygen via nasal cannula  · Chest x-ray with small bilateral pleural effusions and probable left basilar atelectasis  · Likely secondary to COVID-19/CHF

## 2022-01-02 NOTE — PLAN OF CARE
Problem: PAIN - ADULT  Goal: Verbalizes/displays adequate comfort level or baseline comfort level  Description: Interventions:  - Encourage patient to monitor pain and request assistance  - Assess pain using appropriate pain scale  - Administer analgesics based on type and severity of pain and evaluate response  - Implement non-pharmacological measures as appropriate and evaluate response  - Consider cultural and social influences on pain and pain management  - Notify physician/advanced practitioner if interventions unsuccessful or patient reports new pain  Outcome: Progressing     Problem: INFECTION - ADULT  Goal: Absence or prevention of progression during hospitalization  Description: INTERVENTIONS:  - Assess and monitor for signs and symptoms of infection  - Monitor lab/diagnostic results  - Monitor all insertion sites, i e  indwelling lines, tubes, and drains  - Monitor endotracheal if appropriate and nasal secretions for changes in amount and color  - [place] appropriate cooling/warming therapies per order  - Administer medications as ordered  - Instruct and encourage patient and family to use good hand hygiene technique  - Identify and instruct in appropriate isolation precautions for identified infection/condition  Outcome: Progressing     Problem: DISCHARGE PLANNING  Goal: Discharge to home or other facility with appropriate resources  Description: INTERVENTIONS:  - Identify barriers to discharge w/patient and caregiver  - Arrange for needed discharge resources and transportation as appropriate  - Identify discharge learning needs (meds, wound care, etc )  - Arrange for interpretive services to assist at discharge as needed  - Refer to Case Management Department for coordinating discharge planning if the patient needs post-hospital services based on physician/advanced practitioner order or complex needs related to functional status, cognitive ability, or social support system  Outcome: Progressing Problem: Knowledge Deficit  Goal: Patient/family/caregiver demonstrates understanding of disease process, treatment plan, medications, and discharge instructions  Description: Complete learning assessment and assess knowledge base    Interventions:  - Provide teaching at level of understanding  - Provide teaching via preferred learning methods  Outcome: Progressing     Problem: CARDIOVASCULAR - ADULT  Goal: Maintains optimal cardiac output and hemodynamic stability  Description: INTERVENTIONS:  - Monitor I/O, vital signs and rhythm  - Monitor for S/S and trends of decreased cardiac output  - Administer and titrate ordered vasoactive medications to optimize hemodynamic stability  - Assess quality of pulses, skin color and temperature  - Assess for signs of decreased coronary artery perfusion  - Instruct patient to report change in severity of symptoms  Outcome: Progressing  Goal: Absence of cardiac dysrhythmias or at baseline rhythm  Description: INTERVENTIONS:  - Continuous cardiac monitoring, vital signs, obtain 12 lead EKG if ordered  - Administer antiarrhythmic and heart rate control medications as ordered  - Monitor electrolytes and administer replacement therapy as ordered  Outcome: Progressing     Problem: RESPIRATORY - ADULT  Goal: Achieves optimal ventilation and oxygenation  Description: INTERVENTIONS:  - Assess for changes in respiratory status  - Assess for changes in mentation and behavior  - Position to facilitate oxygenation and minimize respiratory effort  - Oxygen administered by appropriate delivery if ordered  - Initiate smoking cessation education as indicated  - Encourage broncho-pulmonary hygiene including cough, deep breathe, Incentive Spirometry  - Assess the need for suctioning and aspirate as needed  - Assess and instruct to report SOB or any respiratory difficulty  - Respiratory Therapy support as indicated  Outcome: Progressing

## 2022-01-02 NOTE — PROGRESS NOTES
Progress Note Provo Ripper [de-identified] y o  male MRN: [id number]    Unit/Bed#: ICU 15 Encounter: [id number]  Subjective:   Events of last nite noted    chest pain  Parveen Barragan ECG with increased HR    strips show likely rate related LBBB    I do not see afib    pain now about a 3/10    first troponin negative      Objective:   Vitals: Blood pressure 131/74, pulse 83, temperature (!) 97 1 °F (36 2 °C), temperature source Oral, resp  rate 20, height 5' 10" (1 778 m), weight 95 1 kg (209 lb 10 5 oz), SpO2 97 %  ,Body mass index is 30 08 kg/m²  CBC with diff:   Results from last 7 days   Lab Units 01/02/22  0518   WBC Thousand/uL 8 71   RBC Million/uL 4 35   HEMOGLOBIN g/dL 12 8   HEMATOCRIT % 38 3   MCV fL 88   MCH pg 29 4   MCHC g/dL 33 4   RDW % 14 1   MPV fL 11 0   PLATELETS Thousands/uL 158     CMP:   Results from last 7 days   Lab Units 01/02/22  0518 01/01/22  0511 01/01/22  0511   SODIUM mmol/L 136   < > 137   POTASSIUM mmol/L 4 1   < > 4 1   CHLORIDE mmol/L 99*   < > 100   CO2 mmol/L 28   < > 29   BUN mg/dL 31*   < > 24   CREATININE mg/dL 1 55*   < > 1 46*   CALCIUM mg/dL 8 4   < > 8 1*   AST U/L  --   --  31   ALT U/L  --   --  41   ALK PHOS U/L  --   --  108   EGFR ml/min/1 73sq m 41   < > 44    < > = values in this interval not displayed  Physical exam:  Not performed due to COVID-19 infection    Assessment:  1  Chest pain  Likely angina with known CAD  Parveen Barragan despite recent stent to the intermediate vessel is only patent circulation is left internal mammary artery to left anterior descending artery graft  This certainly gives him the substrate for ischemia  Pain getting better on nitrates  Await serial troponins  2  Acute systolic heart failure  Renal function worsened I suspect now he may be volume depleted  Will hold on diuretics  Perhaps volume depletion as lead to tachycardia which has caused a rate related left bundle branch block  3  CAD status post remote CABG with more recent stenting of the intermediate branch  All grafts occluded except for left internal mammary artery to left anterior descending artery  Patient is on dual antiplatelet therapy, metoprolol and isosorbide  4  Ischemic cardiomyopathy with ejection fraction 40-45% by relatively recent echo  Patient on lisinopril 10 mg daily and metoprolol  mg daily in divided dosages  Currently only getting 50 mg b i d  and will increase metoprolol dosage  Hold lisinopril with acute kidney injury-dosage he is taking her much higher than he was supposed to be taking at home  5  Nonsustained ventricular tachycardia seen recently on monitor  Patient on amiodarone suppressive therapy  He does not have ventricular tachycardia at this time  6  Hypertension  Blood pressure today fine on lisinopril and metoprolol XL  7  Mixed hyperlipidemia  On maximal dose atorvastatin with favorable LDL cholesterol in November 8  COVID-19 pneumonia  9  Abdominal aortic aneurysm status post EVAR recently    Plan:  Hold lisinopril  Increase metoprolol to usual dosage of  150 mg daily  Stop furosemide  Continue DAPT, nitrates and statin     IV nitro ggt as needed for chest pain  Expect troponins to increase in light of extensive CAD hx      Patrick Arenas MD

## 2022-01-02 NOTE — PROGRESS NOTES
Interval Progress Note:    Called to bedside by nursing team due to pt's report of 7/10 L arm pain and possible VT on tele  Pt has significant cardiac history including CABG in 1996, ICM, NSVT, and recent cardiac cath October 2021 that showed severe native 3 vessel disease with PCI to ramus intermedius  Pt tells me he woke from his sleep, got up and used the urinal and noted the L arm pain which he describes as his "warning" pain  EKG appears to show L BBB, prior EKGs on review show SB, no BBB on my review of MUSE  However, notes from [facility] cardiology document a history of rate related LBBB   on my exam and L arm pain which starts at shoulder and goes down to his hand which he rates 5/10  Denies nausea, chest pain, sob, or jaw pain  On review of tele pt has had periods of flipping in and out of the L BBB  EKGs sent to Dr Krista Akhtar via TT  Pt is refusing morphine for L arm pain but states he is willing to try SL NTG which he states "typically works" for him  Stat labs with troponin now   Continue BB and PO amio, Cardiology following    VISHNU Hernandez

## 2022-01-02 NOTE — RAPID RESPONSE
Rapid Response Note  Vivian Avalos [de-identified] y o  male MRN: [id number]  Unit/Bed#: ICU 15 Encounter: [id number]    Rapid Response Notifications/Interventions     Background/Situation:   Vivian Avalos is a [de-identified] y o  male who was admitted 31 December secondary to respiratory insufficiency secondary to pneumonia in the setting of COVID-19 infection  The patient carries a past medical history significant for coronary artery disease with coronary artery bypass grafting in 1996 and PTCA and stenting of an unknown vessel in October done at [facility]   Additionally, the patient carries a diagnosis of congestive heart failure  Last echo demonstrated the left ventricular ejection fraction of 45%  He was started on Lasix 20 mg twice daily on this admission  Furthermore, the patient carries diagnosis of anxiety, nonsustained ventricular tachycardia, abdominal aortic aneurysm, benign prostatic hyperplasia, bradycardia, and essential hypertension  The patient has been doing well maintaining adequate saturation of peripheral oxygen on room air  Today however, the patient reported a complaint of a 7/10 left arm pain which she acknowledges is his anginal equivalent  He was seen at that time (approximately 0400)  At that time he denied nausea, chest pain, jaw pain, radiation of the pain to back  He was given beta-blockade and sublingual nitroglycerin  He was offered morphine which she declined  Today at 07:39 a rapid response was called secondary to the patient's complaints again of left arm pain  Upon my arrival the patient is awake and alert in no acute distress  He continues to complain of left arm discomfort the pain does not go any where he again denies jaw pain, nausea, shortness of breath, chest pain  He was given a sublingual nitroglycerin which partially relieved his discomfort  He was noted to have ST-elevation on the monitor    He was subsequently moved to the intensive care unit for closer evaluation and treatment  Review of Systems   Constitutional: Negative  HENT: Negative  Eyes: Negative  Respiratory: Negative  Cardiovascular:        Patient explains his anginal equivalent is left arm pain  He is experiencing left arm pain 7/10 now  ST elevations on portable monitor  Gastrointestinal: Negative  Endocrine: Negative  Genitourinary: Negative  Musculoskeletal: Negative  Skin: Negative  Allergic/Immunologic: Negative  Neurological: Negative  Hematological: Negative  Psychiatric/Behavioral: Negative  Objective:   Vitals:    01/02/22 0541 01/02/22 0724 01/02/22 0739 01/02/22 0745   BP:  164/97 162/100 161/96   Pulse:  84 94 93   Resp:   (!) 28    Temp:  (!) 97 1 °F (36 2 °C)     TempSrc:       SpO2:  96% 97% 95%   Weight: 95 1 kg (209 lb 10 5 oz)        Physical Exam  Vitals reviewed  Constitutional:       Appearance: Normal appearance  He is normal weight  HENT:      Head: Normocephalic and atraumatic  Nose: Nose normal       Comments: Respirations are shallow and tachypneic     Mouth/Throat:      Mouth: Mucous membranes are moist    Eyes:      Extraocular Movements: Extraocular movements intact  Conjunctiva/sclera: Conjunctivae normal       Pupils: Pupils are equal, round, and reactive to light  Cardiovascular:      Rate and Rhythm: Normal rate and regular rhythm  Pulses: Normal pulses  Abdominal:      General: Abdomen is flat  Palpations: Abdomen is soft  Musculoskeletal:         General: Normal range of motion  Cervical back: Normal range of motion and neck supple  Skin:     General: Skin is warm and dry  Capillary Refill: Capillary refill takes less than 2 seconds  Neurological:      General: No focal deficit present  Mental Status: He is alert and oriented to person, place, and time  Mental status is at baseline     Psychiatric:         Mood and Affect: Mood normal          Behavior: Behavior normal  Thought Content: Thought content normal          Judgment: Judgment normal          Assessment:   · Angina  · History of coronary artery disease  · ST-elevation on lead 2    Plan:   · Monitor troponins  · Notify Cardiology  · Continuous cardiopulmonary monitoring  · Supple nitro x3 if needed     Rapid Response Outcome  Family notified of transfer:  Yes  Family member contacted: Wife Sherry Constantino of the record may have been created with voice recognition software  Occasional wrong word or "sound a like" substitutions may have occurred due to the inherent limitations of voice recognition software  Read the chart carefully and recognize, using context, where substitutions have occurred      VISHNU Irving

## 2022-01-02 NOTE — ASSESSMENT & PLAN NOTE
Wt Readings from Last 3 Encounters:   01/02/22 95 1 kg (209 lb 10 5 oz)   12/31/21 93 kg (205 lb)   12/16/21 93 kg (205 lb)     Results from last 7 days   Lab Units 12/31/21  0944   NT-PRO BNP pg/mL 3,609*     · Most recent echocardiogram LVEF 45% at [facility]  · Unfortunately now with worsening lower extremity edema shortness of breath  · Probable decompensation  Not on maintenance diuretics    · BNP 3609  · Cardiology consultation appreciated  · Monitor I&O, daily weight, fluid restriction  · Lisinopril and Lasix on hold secondary to DANELLE  · Continue metoprolol

## 2022-01-02 NOTE — NURSING NOTE
Approx 0400 an abnormal rate & rhythm was noted on tele  Shortly thereafter pt rang call deya  Pt told nurse he was having 7/10 pain in his L arm which radiated upwards but denied CP, SOB  SLIM was contacted and EKG was performed  ICU NP came over to assess the pt  Labs were drawn  Pt was given 1 dose of nitro which he stated works well for him  Pt BP dropped from 164/86 to 100/56  Pt's pain finally resolved after his one dose of nitro  VSS, pt resting comfortably w/ instructions to ring nurse immediately if he begins having any cardiac symptoms or concerns  Troponin to be repeated

## 2022-01-02 NOTE — ASSESSMENT & PLAN NOTE
· Double vaccinated here with COVID pneumonia  · Given 1 L oxygen use per mild pathway start remdesivir   · Wean oxygen when able    Lab Results   Component Value Date    SARSCOV2 Positive (A) 12/31/2021   Dexamethasone discontinued

## 2022-01-02 NOTE — ASSESSMENT & PLAN NOTE
· Cr increased to 1 46 from 1 2  · Will monitor BMP closely  · Lasix currently on hold   · Lisinopril on hold  · Avoid nephrotoxins  · Avoid hypertension

## 2022-01-02 NOTE — ASSESSMENT & PLAN NOTE
· CAD with history of CABG 1996 and recent KYE October 2021 at [facility] and follows with [facility] cardiology  · Cardiac enzymes minimally elevated      Lab Results   Component Value Date    HSTNI0 29 01/02/2022    HSTNI2 43 01/02/2022    HSTNI4 54 () 01/02/2022

## 2022-01-02 NOTE — PLAN OF CARE
Problem: PAIN - ADULT  Goal: Verbalizes/displays adequate comfort level or baseline comfort level  Description: Interventions:  - Encourage patient to monitor pain and request assistance  - Assess pain using appropriate pain scale  - Administer analgesics based on type and severity of pain and evaluate response  - Implement non-pharmacological measures as appropriate and evaluate response  - Consider cultural and social influences on pain and pain management  - Notify physician/advanced practitioner if interventions unsuccessful or patient reports new pain  Outcome: Progressing     Problem: INFECTION - ADULT  Goal: Absence or prevention of progression during hospitalization  Description: INTERVENTIONS:  - Assess and monitor for signs and symptoms of infection  - Monitor lab/diagnostic results  - Monitor all insertion sites, i e  indwelling lines, tubes, and drains  - Monitor endotracheal if appropriate and nasal secretions for changes in amount and color  - [place] appropriate cooling/warming therapies per order  - Administer medications as ordered  - Instruct and encourage patient and family to use good hand hygiene technique  - Identify and instruct in appropriate isolation precautions for identified infection/condition  Outcome: Progressing     Problem: SAFETY ADULT  Goal: Patient will remain free of falls  Description: INTERVENTIONS:  - Educate patient/family on patient safety including physical limitations  - Instruct patient to call for assistance with activity   - Consult OT/PT to assist with strengthening/mobility   - Keep Call bell within reach  - Keep bed low and locked with side rails adjusted as appropriate  - Keep care items and personal belongings within reach  - Initiate and maintain comfort rounds  - Make Fall Risk Sign visible to staff  - Offer Toileting every 2 Hours, in advance of need  - Obtain necessary fall risk management equipment:   - Apply yellow socks and bracelet for high fall risk patients  - Consider moving patient to room near nurses station  Outcome: Progressing  Goal: Maintain or return to baseline ADL function  Description: INTERVENTIONS:  -  Assess patient's ability to carry out ADLs; assess patient's baseline for ADL function and identify physical deficits which impact ability to perform ADLs (bathing, care of mouth/teeth, toileting, grooming, dressing, etc )  - Assess/evaluate cause of self-care deficits   - Assess range of motion  - Assess patient's mobility; develop plan if impaired  - Assess patient's need for assistive devices and provide as appropriate  - Encourage maximum independence but intervene and supervise when necessary  - Involve family in performance of ADLs  - Assess for home care needs following discharge   - Consider OT consult to assist with ADL evaluation and planning for discharge  - Provide patient education as appropriate  Outcome: Progressing  Goal: Maintains/Returns to pre admission functional level  Description: INTERVENTIONS:  - Perform BMAT or MOVE assessment daily    - Set and communicate daily mobility goal to care team and patient/family/caregiver     - Collaborate with rehabilitation services on mobility goals if consulted  - Stand patient 3 times a day  - Ambulate patient 3 times a day  - Out of bed to chair 2 times a day   - Out of bed for meals 2 times a day  - Out of bed for toileting  - Record patient progress and toleration of activity level   Outcome: Progressing     Problem: DISCHARGE PLANNING  Goal: Discharge to home or other facility with appropriate resources  Description: INTERVENTIONS:  - Identify barriers to discharge w/patient and caregiver  - Arrange for needed discharge resources and transportation as appropriate  - Identify discharge learning needs (meds, wound care, etc )  - Arrange for interpretive services to assist at discharge as needed  - Refer to Case Management Department for coordinating discharge planning if the patient needs post-hospital services based on physician/advanced practitioner order or complex needs related to functional status, cognitive ability, or social support system  Outcome: Progressing     Problem: Knowledge Deficit  Goal: Patient/family/caregiver demonstrates understanding of disease process, treatment plan, medications, and discharge instructions  Description: Complete learning assessment and assess knowledge base    Interventions:  - Provide teaching at level of understanding  - Provide teaching via preferred learning methods  Outcome: Progressing     Problem: CARDIOVASCULAR - ADULT  Goal: Maintains optimal cardiac output and hemodynamic stability  Description: INTERVENTIONS:  - Monitor I/O, vital signs and rhythm  - Monitor for S/S and trends of decreased cardiac output  - Administer and titrate ordered vasoactive medications to optimize hemodynamic stability  - Assess quality of pulses, skin color and temperature  - Assess for signs of decreased coronary artery perfusion  - Instruct patient to report change in severity of symptoms  Outcome: Progressing  Goal: Absence of cardiac dysrhythmias or at baseline rhythm  Description: INTERVENTIONS:  - Continuous cardiac monitoring, vital signs, obtain 12 lead EKG if ordered  - Administer antiarrhythmic and heart rate control medications as ordered  - Monitor electrolytes and administer replacement therapy as ordered  Outcome: Progressing     Problem: RESPIRATORY - ADULT  Goal: Achieves optimal ventilation and oxygenation  Description: INTERVENTIONS:  - Assess for changes in respiratory status  - Assess for changes in mentation and behavior  - Position to facilitate oxygenation and minimize respiratory effort  - Oxygen administered by appropriate delivery if ordered  - Initiate smoking cessation education as indicated  - Encourage broncho-pulmonary hygiene including cough, deep breathe, Incentive Spirometry  - Assess the need for suctioning and aspirate as needed  - Assess and instruct to report SOB or any respiratory difficulty  - Respiratory Therapy support as indicated  Outcome: Progressing     Problem: Potential for Falls  Goal: Patient will remain free of falls  Description: INTERVENTIONS:  - Educate patient/family on patient safety including physical limitations  - Instruct patient to call for assistance with activity   - Consult OT/PT to assist with strengthening/mobility   - Keep Call bell within reach  - Keep bed low and locked with side rails adjusted as appropriate  - Keep care items and personal belongings within reach  - Initiate and maintain comfort rounds  - Make Fall Risk Sign visible to staff  - Offer Toileting every 2 Hours, in advance of need  - Obtain necessary fall risk management equipment:   - Apply yellow socks and bracelet for high fall risk patients  - Consider moving patient to room near nurses station  Outcome: Progressing

## 2022-01-03 ENCOUNTER — APPOINTMENT (INPATIENT)
Dept: NON INVASIVE DIAGNOSTICS | Facility: HOSPITAL | Age: 82
DRG: 177 | End: 2022-01-03
Payer: COMMERCIAL

## 2022-01-03 LAB
ANION GAP SERPL CALCULATED.3IONS-SCNC: 6 MMOL/L (ref 4–13)
AORTIC ROOT: 3.1 CM
AORTIC VALVE MEAN VELOCITY: 13.2 M/S
APICAL FOUR CHAMBER EJECTION FRACTION: 32 %
APTT PPP: 154 SECONDS (ref 23–37)
APTT PPP: 166 SECONDS (ref 23–37)
AV AREA BY CONTINUOUS VTI: 1.3 CM2
AV AREA PEAK VELOCITY: 1.3 CM2
AV LVOT MEAN GRADIENT: 3 MMHG
AV LVOT PEAK GRADIENT: 5 MMHG
AV MEAN GRADIENT: 9 MMHG
AV PEAK GRADIENT: 17 MMHG
AV REGURGITATION PRESSURE HALF TIME: 0.54 MS
AV VALVE AREA: 1.33 CM2
BASOPHILS # BLD AUTO: 0.01 THOUSANDS/ΜL (ref 0–0.1)
BASOPHILS NFR BLD AUTO: 0 % (ref 0–1)
BUN SERPL-MCNC: 32 MG/DL (ref 5–25)
CALCIUM SERPL-MCNC: 8.1 MG/DL (ref 8.3–10.1)
CHLORIDE SERPL-SCNC: 101 MMOL/L (ref 100–108)
CO2 SERPL-SCNC: 31 MMOL/L (ref 21–32)
CREAT SERPL-MCNC: 1.41 MG/DL (ref 0.6–1.3)
DOP CALC AO VTI: 44.85 CM
DOP CALC LVOT AREA: 2.54 CM2
DOP CALC LVOT DIAMETER: 1.8 CM
DOP CALC LVOT PEAK VEL VTI: 23.41 CM
DOP CALC LVOT PEAK VEL: 1.07 M/S
DOP CALC LVOT STROKE INDEX: 29.6 ML/M2
DOP CALC LVOT STROKE VOLUME: 59.54 CM3
E WAVE DECELERATION TIME: 245 MS
EOSINOPHIL # BLD AUTO: 0 THOUSAND/ΜL (ref 0–0.61)
EOSINOPHIL NFR BLD AUTO: 0 % (ref 0–6)
ERYTHROCYTE [DISTWIDTH] IN BLOOD BY AUTOMATED COUNT: 14.4 % (ref 11.6–15.1)
FRACTIONAL SHORTENING: 21 % (ref 28–44)
GFR SERPL CREATININE-BSD FRML MDRD: 46 ML/MIN/1.73SQ M
GLUCOSE SERPL-MCNC: 117 MG/DL (ref 65–140)
HCT VFR BLD AUTO: 39 % (ref 36.5–49.3)
HGB BLD-MCNC: 12.7 G/DL (ref 12–17)
IMM GRANULOCYTES # BLD AUTO: 0.07 THOUSAND/UL (ref 0–0.2)
IMM GRANULOCYTES NFR BLD AUTO: 1 % (ref 0–2)
INTERVENTRICULAR SEPTUM IN DIASTOLE (PARASTERNAL SHORT AXIS VIEW): 1.1 CM
LEFT INTERNAL DIMENSION IN SYSTOLE: 3.8 CM (ref 2.1–4)
LEFT VENTRICULAR INTERNAL DIMENSION IN DIASTOLE: 4.8 CM (ref 6.06–9.03)
LEFT VENTRICULAR POSTERIOR WALL IN END DIASTOLE: 1.1 CM
LEFT VENTRICULAR STROKE VOLUME: 43 ML
LYMPHOCYTES # BLD AUTO: 1.01 THOUSANDS/ΜL (ref 0.6–4.47)
LYMPHOCYTES NFR BLD AUTO: 9 % (ref 14–44)
MCH RBC QN AUTO: 29.2 PG (ref 26.8–34.3)
MCHC RBC AUTO-ENTMCNC: 32.6 G/DL (ref 31.4–37.4)
MCV RBC AUTO: 90 FL (ref 82–98)
MONOCYTES # BLD AUTO: 1.05 THOUSAND/ΜL (ref 0.17–1.22)
MONOCYTES NFR BLD AUTO: 9 % (ref 4–12)
MV PEAK A VEL: 0.79 M/S
MV PEAK E VEL: 56 CM/S
NEUTROPHILS # BLD AUTO: 9.51 THOUSANDS/ΜL (ref 1.85–7.62)
NEUTS SEG NFR BLD AUTO: 81 % (ref 43–75)
NRBC BLD AUTO-RTO: 0 /100 WBCS
PLATELET # BLD AUTO: 180 THOUSANDS/UL (ref 149–390)
PMV BLD AUTO: 11.5 FL (ref 8.9–12.7)
POTASSIUM SERPL-SCNC: 4.4 MMOL/L (ref 3.5–5.3)
PULMONARY REGURGITATION LATE DIASTOLIC VELOCITY: 0.01 M/S
RBC # BLD AUTO: 4.35 MILLION/UL (ref 3.88–5.62)
SL CV AV DECELERATION TIME RETROGRADE: 1875 MS
SL CV AV PEAK GRADIENT RETROGRADE: 81 MMHG
SL CV LV EF: 45
SL CV PED ECHO LEFT VENTRICLE DIASTOLIC VOLUME (MOD BIPLANE) 2D: 105 ML
SL CV PED ECHO LEFT VENTRICLE SYSTOLIC VOLUME (MOD BIPLANE) 2D: 62 ML
SODIUM SERPL-SCNC: 138 MMOL/L (ref 136–145)
TRICUSPID VALVE PEAK REGURGITATION VELOCITY: 2.2 M/S
TRICUSPID VALVE S': 1.1 CM/S
TV PEAK GRADIENT: 19 MMHG
WBC # BLD AUTO: 11.65 THOUSAND/UL (ref 4.31–10.16)
Z-SCORE OF LEFT VENTRICULAR DIMENSION IN END SYSTOLE: -4.33

## 2022-01-03 PROCEDURE — 85025 COMPLETE CBC W/AUTO DIFF WBC: CPT | Performed by: NURSE PRACTITIONER

## 2022-01-03 PROCEDURE — 93325 DOPPLER ECHO COLOR FLOW MAPG: CPT

## 2022-01-03 PROCEDURE — 93308 TTE F-UP OR LMTD: CPT | Performed by: INTERNAL MEDICINE

## 2022-01-03 PROCEDURE — 85730 THROMBOPLASTIN TIME PARTIAL: CPT | Performed by: INTERNAL MEDICINE

## 2022-01-03 PROCEDURE — 93325 DOPPLER ECHO COLOR FLOW MAPG: CPT | Performed by: INTERNAL MEDICINE

## 2022-01-03 PROCEDURE — 80048 BASIC METABOLIC PNL TOTAL CA: CPT | Performed by: NURSE PRACTITIONER

## 2022-01-03 PROCEDURE — 93308 TTE F-UP OR LMTD: CPT

## 2022-01-03 PROCEDURE — 93321 DOPPLER ECHO F-UP/LMTD STD: CPT | Performed by: INTERNAL MEDICINE

## 2022-01-03 PROCEDURE — 93321 DOPPLER ECHO F-UP/LMTD STD: CPT

## 2022-01-03 PROCEDURE — 99232 SBSQ HOSP IP/OBS MODERATE 35: CPT | Performed by: INTERNAL MEDICINE

## 2022-01-03 RX ORDER — LIDOCAINE 50 MG/G
1 PATCH TOPICAL DAILY
Status: DISCONTINUED | OUTPATIENT
Start: 2022-01-03 | End: 2022-01-06 | Stop reason: HOSPADM

## 2022-01-03 RX ADMIN — AMIODARONE HYDROCHLORIDE 200 MG: 200 TABLET ORAL at 09:02

## 2022-01-03 RX ADMIN — Medication 2 SPRAY: at 09:02

## 2022-01-03 RX ADMIN — ALBUTEROL SULFATE 1 PUFF: 90 AEROSOL, METERED RESPIRATORY (INHALATION) at 22:16

## 2022-01-03 RX ADMIN — HEPARIN SODIUM 8 UNITS/KG/HR: 10000 INJECTION, SOLUTION INTRAVENOUS at 18:35

## 2022-01-03 RX ADMIN — TAMSULOSIN HYDROCHLORIDE 0.4 MG: 0.4 CAPSULE ORAL at 15:41

## 2022-01-03 RX ADMIN — METOPROLOL SUCCINATE 100 MG: 50 TABLET, EXTENDED RELEASE ORAL at 09:01

## 2022-01-03 RX ADMIN — ATORVASTATIN CALCIUM 80 MG: 80 TABLET, FILM COATED ORAL at 15:41

## 2022-01-03 RX ADMIN — ALBUTEROL SULFATE 1 PUFF: 90 AEROSOL, METERED RESPIRATORY (INHALATION) at 12:27

## 2022-01-03 RX ADMIN — PERFLUTREN 0.6 ML/MIN: 6.52 INJECTION, SUSPENSION INTRAVENOUS at 09:10

## 2022-01-03 RX ADMIN — MELATONIN TAB 3 MG 6 MG: 3 TAB at 22:17

## 2022-01-03 RX ADMIN — METOPROLOL SUCCINATE 50 MG: 50 TABLET, EXTENDED RELEASE ORAL at 00:55

## 2022-01-03 RX ADMIN — REMDESIVIR 100 MG: 100 INJECTION, POWDER, LYOPHILIZED, FOR SOLUTION INTRAVENOUS at 15:38

## 2022-01-03 RX ADMIN — ALBUTEROL SULFATE 1 PUFF: 90 AEROSOL, METERED RESPIRATORY (INHALATION) at 18:28

## 2022-01-03 RX ADMIN — CLOPIDOGREL BISULFATE 75 MG: 75 TABLET ORAL at 09:02

## 2022-01-03 RX ADMIN — METOPROLOL SUCCINATE 50 MG: 50 TABLET, EXTENDED RELEASE ORAL at 22:16

## 2022-01-03 RX ADMIN — ISOSORBIDE MONONITRATE 30 MG: 30 TABLET, EXTENDED RELEASE ORAL at 09:02

## 2022-01-03 RX ADMIN — ALBUTEROL SULFATE 1 PUFF: 90 AEROSOL, METERED RESPIRATORY (INHALATION) at 09:02

## 2022-01-03 RX ADMIN — ONDANSETRON 4 MG: 2 INJECTION INTRAMUSCULAR; INTRAVENOUS at 22:56

## 2022-01-03 RX ADMIN — ALBUTEROL SULFATE 1 PUFF: 90 AEROSOL, METERED RESPIRATORY (INHALATION) at 00:54

## 2022-01-03 RX ADMIN — LIDOCAINE 1 PATCH: 50 PATCH CUTANEOUS at 23:17

## 2022-01-03 RX ADMIN — MELATONIN TAB 3 MG 6 MG: 3 TAB at 00:56

## 2022-01-03 RX ADMIN — ASPIRIN 81 MG: 81 TABLET, COATED ORAL at 09:01

## 2022-01-03 NOTE — ASSESSMENT & PLAN NOTE
· Baseline around 1 1-1 2, creatinine peaked at 1 55  · Lasix on hold due to acute kidney injury  · Will monitor BMP closely  · Lisinopril on hold  · Avoid nephrotoxins  · Avoid hypotension

## 2022-01-03 NOTE — ASSESSMENT & PLAN NOTE
· Baseline around 1 1-1 2, yesterday at 1 5 now improving and has trended down to 1 4  · Will monitor BMP closely  · Lasix currently on hold   · Lisinopril on hold  · Avoid nephrotoxins  · Avoid hypertension  · Etiology of DANELLE unclear however probably a complication of possible ACS with contribution inflammatory factors associated to did oval kidney injury induced by de Giuliano cardiac injury secondary to ACS ?

## 2022-01-03 NOTE — ASSESSMENT & PLAN NOTE
· CAD with history of CABG 1996 and recent KYE October 2021 at [facility] and follows with [facility] cardiology  · Cardiac enzymes minimally elevated    · Cardiology input appreciated, will discuss whether there is a plan for percutaneous coronary intervention  · Follow-up echo done this admission, continue heparin drip, Plavix, aspirin, isosorbide mononitrate and statin  · Lisinopril held due to increasing creatinine  · Of with recurrence of chest pain, consider nitroglycerin infusion    Lab Results   Component Value Date    HSTNI0 29 01/02/2022    HSTNI2 43 01/02/2022    HSTNI4 54 ([facility]) 01/02/2022

## 2022-01-03 NOTE — PROGRESS NOTES
Cardiology         Progress Note - Cardiology   Massimo Frost [de-identified] y o  male MRN: [id number]  Unit/Bed#: ICU 15 Encounter: [id number]          Assessment/Recommendations/Discussion:   1  COVID 19 pneumonia  2  Acute systolic HF, improved  3  CAD, remote CABG, LIMA only patent graft, prior PCI to ramus 10/2021  4  ICM, LVEF 40-45%  5  Type 2 MI secondary to above (peak 4 hour HST 54 ng/L)  6  HTN  7  Dyslipidemia  8  AAA, recent EVAR  9  NSVT on Zio (placed on amio)      · Suspect minimal troponin elevation and L arm anginal pain represents type 2 MI in setting of known severe multivessel CAD (LIMA graft only one that's patent) and demand ischemia from pneumonia, tachycardia, underlying CHF  Continue DAPT, nitrates, BB, statin  If recurrent CP, would increase isosorbide to 60 mg daily (no CP since tx to ICU)  Can likely d/c heparin IV tomorrow from CAD standpoint if remains CP free  Recheck HStrop with next blood draw (will order)  · No plans for PCI at present unless recurrent/refractory angina  Would recommend optimizing antianginals and medical management for CAD  · Continue holding IV lasix  Cr improving  · Will review repeat echo        Subjective: Patient chart and weekend events reviewed  Called pt room ([phone]) and spoke w/ him  No chest pain or arm pain since he was transferred into ICU  He denies SOB, palpitations, significant edema            Physical Exam:  GEN:  NAD, on the phone, sounds fatigued, is alert/oriented          Vitals:   /59   Pulse 56   Temp (!) 96 9 °F (36 1 °C) (Temporal)   Resp (!) 31   Ht 5' 10" (1 778 m)   Wt 95 1 kg (209 lb 10 5 oz)   SpO2 97%   BMI 30 08 kg/m²   Vitals:    01/02/22 0541 01/03/22 0901   Weight: 95 1 kg (209 lb 10 5 oz) 95 1 kg (209 lb 10 5 oz)       Intake/Output Summary (Last 24 hours) at 1/3/2022 1013  Last data filed at 1/3/2022 0800  Gross per 24 hour   Intake 2296 ml   Output 1325 ml   Net 971 ml       TELEMETRY: SR  Lab Results:  Results from last 7 days   Lab Units 01/03/22  0606   WBC Thousand/uL 11 65*   HEMOGLOBIN g/dL 12 7   HEMATOCRIT % 39 0   PLATELETS Thousands/uL 180     Results from last 7 days   Lab Units 01/03/22  0606 01/02/22  0518 01/01/22  0511   POTASSIUM mmol/L 4 4   < > 4 1   CHLORIDE mmol/L 101   < > 100   CO2 mmol/L 31   < > 29   BUN mg/dL 32*   < > 24   CREATININE mg/dL 1 41*   < > 1 46*   CALCIUM mg/dL 8 1*   < > 8 1*   ALK PHOS U/L  --   --  108   ALT U/L  --   --  41   AST U/L  --   --  31    < > = values in this interval not displayed       Results from last 7 days   Lab Units 01/03/22  0606   POTASSIUM mmol/L 4 4   CHLORIDE mmol/L 101   CO2 mmol/L 31   BUN mg/dL 32*   CREATININE mg/dL 1 41*   CALCIUM mg/dL 8 1*           Medications:    Current Facility-Administered Medications:     acetaminophen (TYLENOL) tablet 650 mg, 650 mg, Oral, Q6H PRN, Olga Mcmullen, CRNP, 650 mg at 01/01/22 0330    albuterol (PROVENTIL HFA,VENTOLIN HFA) inhaler 1 puff, 1 puff, Inhalation, 4x Daily, Olga Mcmullen, CRNP, 1 puff at 01/03/22 0902    amiodarone tablet 200 mg, 200 mg, Oral, Daily, Olga Spencert, CRNP, 200 mg at 01/03/22 0902    aspirin (ECOTRIN LOW STRENGTH) EC tablet 81 mg, 81 mg, Oral, Daily, Olga Spencert, CRNP, 81 mg at 01/03/22 0901    atorvastatin (LIPITOR) tablet 80 mg, 80 mg, Oral, Daily With Marigene Pap, CRNP, 80 mg at 01/02/22 1745    clopidogrel (PLAVIX) tablet 75 mg, 75 mg, Oral, Daily, Olga Spencert, CRNP, 75 mg at 01/03/22 0902    dextromethorphan-guaiFENesin (ROBITUSSIN DM) oral syrup 10 mL, 10 mL, Oral, Q4H PRN, Olga Mcmullen CRNP, 10 mL at 01/02/22 1807    fluticasone (FLONASE) 50 mcg/act nasal spray 2 spray, 2 spray, Nasal, Daily, Olga Rist, CRNP, 2 spray at 01/03/22 0902    glycerin-hypromellose- (ARTIFICIAL TEARS) ophthalmic solution 1 drop, 1 drop, Both Eyes, Q4H PRN, Olga Rist, CRNP, 1 drop at 01/01/22 1722    heparin (porcine) 25,000 units in 0 45% NaCl 250 mL infusion (premix), 3-20 Units/kg/hr (Order-Specific), Intravenous, Titrated, Zohra More MD, Last Rate: 10 mL/hr at 22 1424, 11 1 Units/kg/hr at 22 1424    heparin (porcine) injection 2,000 Units, 2,000 Units, Intravenous, Q1H PRN, Zohra More MD    heparin (porcine) injection 4,000 Units, 4,000 Units, Intravenous, Q1H PRN, Zohra More MD    isosorbide mononitrate (IMDUR) 24 hr tablet 30 mg, 30 mg, Oral, Daily, Mallika Darnell, CRNP, 30 mg at 22 09    melatonin tablet 6 mg, 6 mg, Oral, HS, Mallika , CRNP, 6 mg at 22 0056    metoprolol succinate (TOPROL-XL) 24 hr tablet 100 mg, 100 mg, Oral, Daily, Mallika Darnell, CRNP, 100 mg at 22 0901    metoprolol succinate (TOPROL-XL) 24 hr tablet 50 mg, 50 mg, Oral, QPM, Mallika Darnell, CRNP, 50 mg at 22 0055    nitroglycerin (NITROSTAT) SL tablet 0 4 mg, 0 4 mg, Sublingual, Q5 Min PRN, Mallika Darnell, CRNP, 0 4 mg at 22 0902    ondansetron (ZOFRAN) injection 4 mg, 4 mg, Intravenous, Q4H PRN, Mallika Darnell, CRNP    [COMPLETED] remdesivir Gabby ) 200 mg in sodium chloride 0 9 % 290 mL IVPB, 200 mg, Intravenous, Q24H, 200 mg at 21 1719 **FOLLOWED BY** remdesivir (Veklury) 100 mg in sodium chloride 0 9 % 270 mL IVPB, 100 mg, Intravenous, Q24H, Mallika Darnell, CRNP, Stopped at 22 1900    sodium chloride (PF) 0 9 % injection 3 mL, 3 mL, Intravenous, Q1H PRN, Mallika Army, CRNP    tamsulosin [facility]) capsule 0 4 mg, 0 4 mg, Oral, Daily With Dinner, CARLOS ENRIQUE CrumNP, 0 4 mg at 22 3862    This note was completed in part utilizing [employer] Direct Software  Grammatical errors, random word insertions, spelling mistakes, and incomplete sentences may be an occasional consequence of this system secondary to software limitations, ambient noise, and hardware issues    If you have any questions or concerns about the content, text, or information contained within the body of this dictation, please contact the provider for clarification

## 2022-01-03 NOTE — ASSESSMENT & PLAN NOTE
· COVID positive on 12/31  · Double vaccinated   · Currently on remdesivir (today is day 5)  · Currently on room air    Lab Results   Component Value Date    SARSCOV2 Positive (A) 12/31/2021   Dexamethasone discontinued

## 2022-01-03 NOTE — ASSESSMENT & PLAN NOTE
· Baseline around 1 1-1 2, yesterday at 1 5 now improving and has trended down to 1 4  · Will monitor BMP closely  · Lasix currently on hold   · Lisinopril on hold  · Avoid nephrotoxins  · Avoid hypertension

## 2022-01-03 NOTE — PROGRESS NOTES
[facility]  Progress Note Maximiliano Rodriguez [facility] 1940, [de-identified] y o  male MRN: [id number]  Unit/Bed#: ICU 15 Encounter: [id number]  Primary Care Provider: Javad Christina MD   Date and time admitted to hospital: 12/31/2021  9:33 AM    * Acute coronary syndrome/unstable angina, to consider NSTEMI with ischemic related new left bundle branch block  Assessment & Plan  · CAD with history of CABG 1996 and recent KYE October 2021 at [facility] and follows with [facility] cardiology  · Cardiac enzymes minimally elevated  · Cardiology input appreciated, will discuss whether there is a plan for percutaneous coronary intervention  · Follow-up echo done this admission, continue heparin drip, Plavix, aspirin, isosorbide mononitrate and statin  · Lisinopril held due to increasing creatinine  · Of with recurrence of chest pain, consider nitroglycerin infusion    Lab Results   Component Value Date    HSTNI0 29 01/02/2022    HSTNI2 43 01/02/2022    HSTNI4 54 ([place]) 01/02/2022       DANELLE (acute kidney injury) ([facility] [address] )  Assessment & Plan  · Baseline around 1 1-1 2, yesterday at 1 5 now improving and has trended down to 1 4  · Will monitor BMP closely  · Lasix currently on hold   · Lisinopril on hold  · Avoid nephrotoxins  · Avoid hypertension  · Etiology of DANELLE unclear however probably a complication of possible ACS with contribution inflammatory factors associated to did oval kidney injury induced by de Giuliano cardiac injury secondary to ACS ?     COVID-19 infection  Assessment & Plan  · COVID positive on 12/31  · Double vaccinated here with COVID pneumonia  · Currently on remdesivir (today is day 4)  · Currently on room air    Lab Results   Component Value Date    SARSCOV2 Positive (A) 12/31/2021   Dexamethasone discontinued     NSVT (nonsustained ventricular tachycardia) ([facility])  Assessment & Plan  · Continue home amiodarone    Essential hypertension  Assessment & Plan  · Currently on metoprolol 50 mg in the evening  · Lisinopril held        ----------------------------------------------------------------------------------------  HPI/24hr events: This is an 81-year-old gentleman with past medical history of CAD s/p CABG () recent stenting at [facility] (2021), a s/p recent repair, BPH, hypertension who initially presented to the hospital on  due to shortness of breath  Was initially admitted under slim  Yesterday morning was noted to have nonsustained VT on monitor and later on complained of left arm pain and was a rapid in the floors with concern for STEMI on the monitor however it was not seen on printout  Regarding recent stenting in October, despite recent stent all grafts noted to be occluded except for left internal mammary artery to the left anterior descending artery graft  No overnight events, patient currently chest pain free  Currently on heparin drip isosorbide mononitrate, metoprolol, aspirin, Plavix and statin  Patient appropriate for transfer out of the ICU today?: Patient does not meet criteria for ICU Follow-up Clinic; referral has not been made  Disposition: Transfer to Med Surg with Telemetry   Code Status: Level 1 - Full Code  ---------------------------------------------------------------------------------------  SUBJECTIVE  Denies any complaints other than not being able to sleep last night      Review of Systems  Review of systems was reviewed and negative unless stated above in HPI/24-hour events   ---------------------------------------------------------------------------------------  OBJECTIVE    Vitals   Vitals:    22 0600 22 0630 22 0700 22 0901   BP:  163/58  163/58   Pulse:  68  60   Resp:  (!) 41  (!) 38   Temp: 97 8 °F (36 6 °C)  (!) 96 9 °F (36 1 °C)    TempSrc:   Temporal    SpO2:  93%  95%   Weight:    95 1 kg (209 lb 10 5 oz)   Height:    5' 10" (1 778 m)     Temp (24hrs), Av 3 °F (36 3 °C), Min:96 9 °F (36 1 °C), Max:97 8 °F (36 6 °C)  Current: Temperature: (!) 96 9 °F (36 1 °C)          Respiratory:  SpO2: SpO2: 95 %  Nasal Cannula O2 Flow Rate (L/min): 2 L/min    Invasive/non-invasive ventilation settings   Respiratory  Report   Lab Data (Last 4 hours)    None         O2/Vent Data (Last 4 hours)    None                Physical Exam  Vitals reviewed  Constitutional:       General: He is not in acute distress  Appearance: He is not ill-appearing  Eyes:      General: No scleral icterus  Cardiovascular:      Rate and Rhythm: Regular rhythm  Bradycardia present  Heart sounds: No murmur heard  Pulmonary:      Effort: No respiratory distress  Breath sounds: No wheezing or rales  Abdominal:      General: There is no distension  Palpations: There is no mass  Tenderness: There is no abdominal tenderness  Musculoskeletal:      Right lower leg: No edema  Left lower leg: No edema  Skin:     General: Skin is warm and dry  Capillary Refill: Capillary refill takes less than 2 seconds  Neurological:      General: No focal deficit present  Mental Status: He is alert               Laboratory and Diagnostics:  Results from last 7 days   Lab Units 01/03/22  0606 01/02/22  0518 01/01/22  0511 12/31/21  0944   WBC Thousand/uL 11 65* 8 71 5 39 7 13   HEMOGLOBIN g/dL 12 7 12 8 12 7 14 1   HEMATOCRIT % 39 0 38 3 38 1 43 6   PLATELETS Thousands/uL 180 158 152 170   NEUTROS PCT % 81* 89*  --  72   MONOS PCT % 9 3*  --  9     Results from last 7 days   Lab Units 01/03/22  0606 01/02/22  0518 01/01/22  0511 12/31/21  0944   SODIUM mmol/L 138 136 137 138   POTASSIUM mmol/L 4 4 4 1 4 1 5 3   CHLORIDE mmol/L 101 99* 100 101   CO2 mmol/L 31 28 29 31   ANION GAP mmol/L 6 9 8 6   BUN mg/dL 32* 31* 24 19   CREATININE mg/dL 1 41* 1 55* 1 46* 1 20   CALCIUM mg/dL 8 1* 8 4 8 1* 8 6   GLUCOSE RANDOM mg/dL 117 177* 189* 111   ALT U/L  --   --  41 51   AST U/L  --   --  31 44   ALK PHOS U/L  --   --  108 127*   ALBUMIN g/dL  --   --  3 0* 3  5   TOTAL BILIRUBIN mg/dL  --   --  0 65 0 90          Results from last 7 days   Lab Units 01/02/22 2029 01/02/22  1222 12/31/21  0944   INR   --  1 04 0 95   PTT seconds 71* 28 26          Results from last 7 days   Lab Units 12/31/21  1037   LACTIC ACID mmol/L 0 8     ABG:    VBG:    Results from last 7 days   Lab Units 01/01/22  0511 12/31/21  1037   PROCALCITONIN ng/ml <0 05 <0 05       Micro  Results from last 7 days   Lab Units 12/31/21  1037 12/31/21  1017   BLOOD CULTURE  No Growth at 48 hrs  No Growth at 48 hrs  EKG:  Sinus bradycardia  Imaging: I have personally reviewed pertinent reports  (most recent chest x-ray was on 12/31 which showed small bilateral effusions with probable left basilar atelectasis)    Intake and Output  I/O       01/01 0701 01/02 0700 01/02 0701  01/03 0700 01/03 0701  01/04 0700    P  O   1590     I V  (mL/kg)  76 (0 8)     IV Piggyback  270     Total Intake(mL/kg)  1936 (20 4)     Urine (mL/kg/hr) 400 (0 2) 1600 (0 7)     Stool  0     Total Output 400 1600     Net -400 +336            Unmeasured Stool Occurrence  1 x           Height and Weights   Height: 5' 10" (177 8 cm)  IBW (Ideal Body Weight): 73 kg  Body mass index is 30 08 kg/m²  Weight (last 2 days)     Date/Time Weight    01/03/22 0901 95 1 (209 66)    01/02/22 0541 95 1 (209 66)    01/01/22 0600 94 (207 23)            Nutrition       Diet Orders   (From admission, onward)             Start     Ordered    12/31/21 1224  Diet Regular; Regular House  Diet effective now        References:    Nutrtion Support Algorithm Enteral vs  Parenteral   Question Answer Comment   Diet Type Regular    Regular Regular House    RD to adjust diet per protocol?  Yes        12/31/21 1223                  Active Medications  Scheduled Meds:  Current Facility-Administered Medications   Medication Dose Route Frequency Provider Last Rate    acetaminophen  650 mg Oral Q6H PRN Warbranch Saran, CRNP      albuterol  1 puff Inhalation 4x Daily VISHNU Ontiveros      amiodarone  200 mg Oral Daily VISHNU Ontiveros      aspirin  81 mg Oral Daily VISHNU Ontiveros      atorvastatin  80 mg Oral Daily With VISHNU Henriquez      clopidogrel  75 mg Oral Daily VISHNU Ontiveros      dextromethorphan-guaiFENesin  10 mL Oral Q4H PRN VISHNU Ontiveros      fluticasone  2 spray Nasal Daily VISHNU Ontiveros      glycerin-hypromellose-  1 drop Both Eyes Q4H PRN VISHNU Ontiveros      heparin (porcine)  3-20 Units/kg/hr (Order-Specific) Intravenous Titrated Kamilla Mejia MD 11 1 Units/kg/hr (01/02/22 1424)    heparin (porcine)  2,000 Units Intravenous Q1H PRN Kamilla Mejia MD      heparin (porcine)  4,000 Units Intravenous Q1H PRN Kamilla Mejia MD      isosorbide mononitrate  30 mg Oral Daily VISHNU Ontiveros      melatonin  6 mg Oral HS VISHNU Ontiveros      metoprolol succinate  100 mg Oral Daily VISHNU Ontiveros      metoprolol succinate  50 mg Oral QPM VISHNU Ontiveros      nitroglycerin  0 4 mg Sublingual Q5 Min PRN VISHNU Ontiveros      ondansetron  4 mg Intravenous Q4H PRN VISHNU Ontiveros      remdesivir  100 mg Intravenous Q24H VISHNU Ontiveros Stopped (01/02/22 1900)    sodium chloride (PF)  3 mL Intravenous Q1H PRN VISHNU Ontiveros      tamsulosin  0 4 mg Oral Daily With VISHNU Henriquez       Continuous Infusions:  heparin (porcine), 3-20 Units/kg/hr (Order-Specific), Last Rate: 11 1 Units/kg/hr (01/02/22 1424)      PRN Meds:   acetaminophen, 650 mg, Q6H PRN  dextromethorphan-guaiFENesin, 10 mL, Q4H PRN  glycerin-hypromellose-, 1 drop, Q4H PRN  heparin (porcine), 2,000 Units, Q1H PRN  heparin (porcine), 4,000 Units, Q1H PRN  nitroglycerin, 0 4 mg, Q5 Min PRN  ondansetron, 4 mg, Q4H PRN  sodium chloride (PF), 3 mL, Q1H PRN        Invasive Devices Review  Invasive Devices  Report    Peripheral Intravenous Line            Peripheral IV 12/31/21 Left Antecubital 2 days    Peripheral IV 01/02/22 Distal;Left;Ventral (anterior) Forearm 1 day    Peripheral IV 01/02/22 Dorsal (posterior); Right Forearm 1 day              ---------------------------------------------------------------------------------------  Advance Directive and Living Will:      Power of :    POLST:    ---------------------------------------------------------------------------------------      Young Rees MD      Portions of the record may have been created with voice recognition software  Occasional wrong word or "sound a like" substitutions may have occurred due to the inherent limitations of voice recognition software    Read the chart carefully and recognize, using context, where substitutions have occurred

## 2022-01-03 NOTE — PLAN OF CARE
Problem: PAIN - ADULT  Goal: Verbalizes/displays adequate comfort level or baseline comfort level  Description: Interventions:  - Encourage patient to monitor pain and request assistance  - Assess pain using appropriate pain scale  - Administer analgesics based on type and severity of pain and evaluate response  - Implement non-pharmacological measures as appropriate and evaluate response  - Consider cultural and social influences on pain and pain management  - Notify physician/advanced practitioner if interventions unsuccessful or patient reports new pain  Outcome: Progressing     Problem: INFECTION - ADULT  Goal: Absence or prevention of progression during hospitalization  Description: INTERVENTIONS:  - Assess and monitor for signs and symptoms of infection  - Monitor lab/diagnostic results  - Monitor all insertion sites, i e  indwelling lines, tubes, and drains  - Monitor endotracheal if appropriate and nasal secretions for changes in amount and color  - [place] appropriate cooling/warming therapies per order  - Administer medications as ordered  - Instruct and encourage patient and family to use good hand hygiene technique  - Identify and instruct in appropriate isolation precautions for identified infection/condition  Outcome: Progressing     Problem: SAFETY ADULT  Goal: Patient will remain free of falls  Description: INTERVENTIONS:  - Educate patient/family on patient safety including physical limitations  - Instruct patient to call for assistance with activity   - Consult OT/PT to assist with strengthening/mobility   - Keep Call bell within reach  - Keep bed low and locked with side rails adjusted as appropriate  - Keep care items and personal belongings within reach  - Initiate and maintain comfort rounds  - Make Fall Risk Sign visible to staff  - Apply yellow socks and bracelet for high fall risk patients  - Consider moving patient to room near nurses station  Outcome: Progressing  Goal: Maintain or return to baseline ADL function  Description: INTERVENTIONS:  -  Assess patient's ability to carry out ADLs; assess patient's baseline for ADL function and identify physical deficits which impact ability to perform ADLs (bathing, care of mouth/teeth, toileting, grooming, dressing, etc )  - Assess/evaluate cause of self-care deficits   - Assess range of motion  - Assess patient's mobility; develop plan if impaired  - Assess patient's need for assistive devices and provide as appropriate  - Encourage maximum independence but intervene and supervise when necessary  - Involve family in performance of ADLs  - Assess for home care needs following discharge   - Consider OT consult to assist with ADL evaluation and planning for discharge  - Provide patient education as appropriate  Outcome: Progressing  Goal: Maintains/Returns to pre admission functional level  Description: INTERVENTIONS:  - Perform BMAT or MOVE assessment daily    - Set and communicate daily mobility goal to care team and patient/family/caregiver     - Collaborate with rehabilitation services on mobility goals if consulted  - Out of bed for toileting  - Record patient progress and toleration of activity level   Outcome: Progressing     Problem: DISCHARGE PLANNING  Goal: Discharge to home or other facility with appropriate resources  Description: INTERVENTIONS:  - Identify barriers to discharge w/patient and caregiver  - Arrange for needed discharge resources and transportation as appropriate  - Identify discharge learning needs (meds, wound care, etc )  - Arrange for interpretive services to assist at discharge as needed  - Refer to Case Management Department for coordinating discharge planning if the patient needs post-hospital services based on physician/advanced practitioner order or complex needs related to functional status, cognitive ability, or social support system  Outcome: Progressing     Problem: Knowledge Deficit  Goal: Patient/family/caregiver demonstrates understanding of disease process, treatment plan, medications, and discharge instructions  Description: Complete learning assessment and assess knowledge base    Interventions:  - Provide teaching at level of understanding  - Provide teaching via preferred learning methods  Outcome: Progressing     Problem: CARDIOVASCULAR - ADULT  Goal: Maintains optimal cardiac output and hemodynamic stability  Description: INTERVENTIONS:  - Monitor I/O, vital signs and rhythm  - Monitor for S/S and trends of decreased cardiac output  - Administer and titrate ordered vasoactive medications to optimize hemodynamic stability  - Assess quality of pulses, skin color and temperature  - Assess for signs of decreased coronary artery perfusion  - Instruct patient to report change in severity of symptoms  Outcome: Progressing  Goal: Absence of cardiac dysrhythmias or at baseline rhythm  Description: INTERVENTIONS:  - Continuous cardiac monitoring, vital signs, obtain 12 lead EKG if ordered  - Administer antiarrhythmic and heart rate control medications as ordered  - Monitor electrolytes and administer replacement therapy as ordered  Outcome: Progressing     Problem: RESPIRATORY - ADULT  Goal: Achieves optimal ventilation and oxygenation  Description: INTERVENTIONS:  - Assess for changes in respiratory status  - Assess for changes in mentation and behavior  - Position to facilitate oxygenation and minimize respiratory effort  - Oxygen administered by appropriate delivery if ordered  - Initiate smoking cessation education as indicated  - Encourage broncho-pulmonary hygiene including cough, deep breathe, Incentive Spirometry  - Assess the need for suctioning and aspirate as needed  - Assess and instruct to report SOB or any respiratory difficulty  - Respiratory Therapy support as indicated  Outcome: Progressing     Problem: Potential for Falls  Goal: Patient will remain free of falls  Description: INTERVENTIONS:  - Educate patient/family on patient safety including physical limitations  - Instruct patient to call for assistance with activity   - Consult OT/PT to assist with strengthening/mobility   - Keep Call bell within reach  - Keep bed low and locked with side rails adjusted as appropriate  - Keep care items and personal belongings within reach  - Initiate and maintain comfort rounds  - Make Fall Risk Sign visible to staff  - Apply yellow socks and bracelet for high fall risk patients  - Consider moving patient to room near nurses station  Outcome: Progressing

## 2022-01-03 NOTE — QUICK NOTE
Spoke with patient's wife Everett Watson over the phone, updated her regarding current status and plan/management  All questions and concerns were addressed

## 2022-01-03 NOTE — ASSESSMENT & PLAN NOTE
· CAD with history of CABG 1996 and recent KYE to ramus in October 2021 at [facility] AT THE [facility] and follows with [facility] cardiology  · Evaluated by Cardiology, appreciate recommendations    Suspect minimal troponin elevation left arm pain a type 2 MI in the setting of severe multivessel CAD, CHF, pneumonia  · Patient had chest pain overnight, were going to increase isosorbide mononitrate to 60 mg daily  · Continue aspirin, Plavix, beta-blocker, statin  · Continue heparin drip for now  · Echo showed ejection fraction 45% he grade    Lab Results   Component Value Date    HSTNI0 29 01/02/2022    HSTNI2 43 01/02/2022    HSTNI4 54 (Luis M Valiente) 01/02/2022

## 2022-01-03 NOTE — ASSESSMENT & PLAN NOTE
· COVID positive on 12/31  · Double vaccinated here with COVID pneumonia  · Currently on remdesivir (today is day 4)  · Currently on room air    Lab Results   Component Value Date    SARSCOV2 Positive (A) 12/31/2021   Dexamethasone discontinued

## 2022-01-04 LAB
ANION GAP SERPL CALCULATED.3IONS-SCNC: 2 MMOL/L (ref 4–13)
APTT PPP: 45 SECONDS (ref 23–37)
APTT PPP: 46 SECONDS (ref 23–37)
ATRIAL RATE: 66 BPM
BUN SERPL-MCNC: 35 MG/DL (ref 5–25)
CALCIUM SERPL-MCNC: 7.8 MG/DL (ref 8.3–10.1)
CHLORIDE SERPL-SCNC: 102 MMOL/L (ref 100–108)
CO2 SERPL-SCNC: 31 MMOL/L (ref 21–32)
CREAT SERPL-MCNC: 1.35 MG/DL (ref 0.6–1.3)
ERYTHROCYTE [DISTWIDTH] IN BLOOD BY AUTOMATED COUNT: 14.5 % (ref 11.6–15.1)
GFR SERPL CREATININE-BSD FRML MDRD: 49 ML/MIN/1.73SQ M
GLUCOSE SERPL-MCNC: 110 MG/DL (ref 65–140)
HCT VFR BLD AUTO: 34.2 % (ref 36.5–49.3)
HGB BLD-MCNC: 11.3 G/DL (ref 12–17)
MCH RBC QN AUTO: 29.7 PG (ref 26.8–34.3)
MCHC RBC AUTO-ENTMCNC: 33 G/DL (ref 31.4–37.4)
MCV RBC AUTO: 90 FL (ref 82–98)
P AXIS: 65 DEGREES
PLATELET # BLD AUTO: 160 THOUSANDS/UL (ref 149–390)
PMV BLD AUTO: 11.6 FL (ref 8.9–12.7)
POTASSIUM SERPL-SCNC: 4.6 MMOL/L (ref 3.5–5.3)
POTASSIUM SERPL-SCNC: 5.5 MMOL/L (ref 3.5–5.3)
PR INTERVAL: 192 MS
QRS AXIS: 51 DEGREES
QRSD INTERVAL: 100 MS
QT INTERVAL: 479 MS
QTC INTERVAL: 502 MS
RBC # BLD AUTO: 3.81 MILLION/UL (ref 3.88–5.62)
SODIUM SERPL-SCNC: 135 MMOL/L (ref 136–145)
T WAVE AXIS: 29 DEGREES
VENTRICULAR RATE: 66 BPM
WBC # BLD AUTO: 8.09 THOUSAND/UL (ref 4.31–10.16)

## 2022-01-04 PROCEDURE — 93005 ELECTROCARDIOGRAM TRACING: CPT

## 2022-01-04 PROCEDURE — 93010 ELECTROCARDIOGRAM REPORT: CPT | Performed by: INTERNAL MEDICINE

## 2022-01-04 PROCEDURE — 85027 COMPLETE CBC AUTOMATED: CPT | Performed by: NURSE PRACTITIONER

## 2022-01-04 PROCEDURE — 84132 ASSAY OF SERUM POTASSIUM: CPT | Performed by: INTERNAL MEDICINE

## 2022-01-04 PROCEDURE — 99232 SBSQ HOSP IP/OBS MODERATE 35: CPT

## 2022-01-04 PROCEDURE — 94002 VENT MGMT INPAT INIT DAY: CPT

## 2022-01-04 PROCEDURE — 97530 THERAPEUTIC ACTIVITIES: CPT

## 2022-01-04 PROCEDURE — 99232 SBSQ HOSP IP/OBS MODERATE 35: CPT | Performed by: INTERNAL MEDICINE

## 2022-01-04 PROCEDURE — 80048 BASIC METABOLIC PNL TOTAL CA: CPT | Performed by: NURSE PRACTITIONER

## 2022-01-04 PROCEDURE — 85730 THROMBOPLASTIN TIME PARTIAL: CPT | Performed by: INTERNAL MEDICINE

## 2022-01-04 RX ORDER — ISOSORBIDE MONONITRATE 30 MG/1
30 TABLET, EXTENDED RELEASE ORAL ONCE
Status: COMPLETED | OUTPATIENT
Start: 2022-01-04 | End: 2022-01-04

## 2022-01-04 RX ORDER — ISOSORBIDE MONONITRATE 60 MG/1
60 TABLET, EXTENDED RELEASE ORAL DAILY
Status: DISCONTINUED | OUTPATIENT
Start: 2022-01-05 | End: 2022-01-06 | Stop reason: HOSPADM

## 2022-01-04 RX ADMIN — NITROGLYCERIN 0.4 MG: 0.4 TABLET SUBLINGUAL at 02:32

## 2022-01-04 RX ADMIN — ALBUTEROL SULFATE 1 PUFF: 90 AEROSOL, METERED RESPIRATORY (INHALATION) at 18:00

## 2022-01-04 RX ADMIN — ALBUTEROL SULFATE 1 PUFF: 90 AEROSOL, METERED RESPIRATORY (INHALATION) at 20:52

## 2022-01-04 RX ADMIN — AMIODARONE HYDROCHLORIDE 200 MG: 200 TABLET ORAL at 08:14

## 2022-01-04 RX ADMIN — CLOPIDOGREL BISULFATE 75 MG: 75 TABLET ORAL at 08:15

## 2022-01-04 RX ADMIN — ACETAMINOPHEN 650 MG: 325 TABLET, FILM COATED ORAL at 08:15

## 2022-01-04 RX ADMIN — METOPROLOL SUCCINATE 50 MG: 50 TABLET, EXTENDED RELEASE ORAL at 21:03

## 2022-01-04 RX ADMIN — ATORVASTATIN CALCIUM 80 MG: 80 TABLET, FILM COATED ORAL at 17:00

## 2022-01-04 RX ADMIN — REMDESIVIR 100 MG: 100 INJECTION, POWDER, LYOPHILIZED, FOR SOLUTION INTRAVENOUS at 21:02

## 2022-01-04 RX ADMIN — MELATONIN TAB 3 MG 6 MG: 3 TAB at 21:00

## 2022-01-04 RX ADMIN — TAMSULOSIN HYDROCHLORIDE 0.4 MG: 0.4 CAPSULE ORAL at 17:00

## 2022-01-04 RX ADMIN — METOPROLOL SUCCINATE 100 MG: 50 TABLET, EXTENDED RELEASE ORAL at 08:11

## 2022-01-04 RX ADMIN — Medication 2 SPRAY: at 08:57

## 2022-01-04 RX ADMIN — ASPIRIN 81 MG: 81 TABLET, COATED ORAL at 08:15

## 2022-01-04 RX ADMIN — HEPARIN SODIUM 2000 UNITS: 1000 INJECTION INTRAVENOUS; SUBCUTANEOUS at 06:26

## 2022-01-04 RX ADMIN — ALBUTEROL SULFATE 1 PUFF: 90 AEROSOL, METERED RESPIRATORY (INHALATION) at 08:57

## 2022-01-04 RX ADMIN — ALBUTEROL SULFATE 1 PUFF: 90 AEROSOL, METERED RESPIRATORY (INHALATION) at 12:00

## 2022-01-04 RX ADMIN — ISOSORBIDE MONONITRATE 30 MG: 30 TABLET, EXTENDED RELEASE ORAL at 09:57

## 2022-01-04 RX ADMIN — NITROGLYCERIN 0.4 MG: 0.4 TABLET SUBLINGUAL at 02:48

## 2022-01-04 RX ADMIN — NITROGLYCERIN 0.4 MG: 0.4 TABLET SUBLINGUAL at 08:11

## 2022-01-04 RX ADMIN — NITROGLYCERIN 0.4 MG: 0.4 TABLET SUBLINGUAL at 02:19

## 2022-01-04 RX ADMIN — NITROGLYCERIN 0.4 MG: 0.4 TABLET SUBLINGUAL at 08:34

## 2022-01-04 RX ADMIN — ISOSORBIDE MONONITRATE 30 MG: 30 TABLET, EXTENDED RELEASE ORAL at 08:11

## 2022-01-04 NOTE — PHYSICAL THERAPY NOTE
PHYSICAL THERAPY NOTE  13:39-14:02 ( 23 minutes)          Patient Name: Lis AGUDELO Date: 1/4/2022 01/04/22 1402   PT Last Visit   PT Visit Date 01/04/22   Note Type   Note Type Treatment   Pain Assessment   Pain Score No Pain   Restrictions/Precautions   Weight Bearing Precautions Per Order No   Other Precautions Contact/isolation; Airborne/isolation; Fall Risk;Multiple lines;Telemetry  (RA  Cardiac)   General   Chart Reviewed Yes   Additional Pertinent History transferred out of ICU   Response to Previous Treatment Patient reporting fatigue but able to participate  Family/Caregiver Present No   Cognition   Overall Cognitive Status WFL   Orientation Level Oriented X4   Following Commands Follows one step commands without difficulty   Comments flat affect   Subjective   Subjective Just wants to go home  Not sleeping well  Bed Mobility   Supine to Sit 5  Supervision   Additional items Increased time required;HOB elevated   Additional Comments increased time   Transfers   Sit to Stand 5  Supervision   Additional items Increased time required;Verbal cues   Stand to Sit 5  Supervision   Additional items Increased time required;Verbal cues   Stand pivot 4  Minimal assistance   Additional items Assist x 1;Verbal cues   Ambulation/Elevation   Gait pattern Improper Weight shift;Decreased foot clearance; Inconsistent shelley; Short stride   Gait Assistance 4  Minimal assist  (close S/CG)   Additional items Verbal cues   Assistive Device None   Distance 3'x1 OOB to chair  /45  Balance   Static Sitting Fair +   Dynamic Sitting Fair   Static Standing Fair   Dynamic Standing Fair -   Ambulatory Fair -   Endurance Deficit   Endurance Deficit Yes   Endurance Deficit Description fatigue, weakness  RA O2 sat 96-97%    HR 51   Activity Tolerance   Activity Tolerance Patient limited by fatigue;Treatment limited secondary to medical complications (Comment)   Medical Staff Made Aware Edith Cortez   Nurse Made Aware yes   Exercises   Neuro re-ed Ed on OOB for meals, frequency of positional changes provided chest pain free  Education on pacing/energy conservation  Assessment   Prognosis Good   Problem List Decreased endurance; Impaired balance;Decreased mobility; Decreased strength   Assessment Lluvia Cosme is seen bedside  Now out of ICU  No chest pain with visit  Session limited related to fatigue  Demonstrates S for bed mobility, transfers  Close S/CG for few steps OOB to chair  Vitals stable  RA O2 sat 96-97%  HR 51  Tolerated fair  No LOB with transition OOB to chair, but slowed  Will continue to benefit from continued PT in order to optimize functional mobility and outcomes following hospitalization  The patient's AM-PAC Basic Mobility Inpatient Short Form Raw Score is 21  A Raw score of greater than 16 suggests the patient may benefit from discharge to home  Please also refer to the recommendation of the Physical Therapist for safe discharge planning  May benefit from [address] following hospitalization for conditioning and to restore to PLOF  Goals   Patient Goals go home   STG Expiration Date 01/11/22   PT Treatment Day 1   Plan   Treatment/Interventions Functional transfer training;LE strengthening/ROM; Therapeutic exercise; Endurance training;Patient/family training;Bed mobility;Gait training; Compensatory technique education;Continued evaluation;Spoke to nursing   Progress Slow progress, decreased activity tolerance   PT Frequency 3-5x/wk   Recommendation   PT Discharge Recommendation Home with home health rehabilitation   Equipment Recommended   (has RW)   AM-PAC Basic Mobility Inpatient   Turning in Bed Without Bedrails 4   Lying on Back to Sitting on Edge of Flat Bed 4   Moving Bed to Chair 3   Standing Up From Chair 4   Walk in Room 3   Climb 3-5 Stairs 3   Basic Mobility Inpatient Raw Score 21   Basic Mobility Standardized Score 45 55   Highest Level Of Mobility   -HL Goal 6: Walk 10 steps or more   -HLM Highest Level of Mobility 4: Move to chair/commode   -HL Goal Achieved No   Education   Education Provided Mobility training; Other  (pacing, energy conservation, progressive mobility ed)   Patient Demonstrates acceptance/verbal understanding;Reinforcement needed   Rolan Sutherland, PT

## 2022-01-04 NOTE — PROGRESS NOTES
Progress Note - Cardiology   Renetta Wynnr [de-identified] y o  male MRN: [id number]  Unit/Bed#: [id number] Encounter: [id number]      Assessment/Recommendations/Discussion:   1  Acute COVID-19 pneumonia   2  Acute systolic heart failure with EF 45%  3  Ischemic cardiomyopathy  4  Coronary artery disease with remote CABG, lima to LAD is the only patent graft, prior PCI to ramus in October 2021   5  Type 2 MI secondary to pneumonia, acute heart failure  6  Hypertension  7  Dyslipidemia   8  Abdominal aortic aneurysm with recent EVAR  9  NSVT on Zio patch and on telemetry here, on amiodarone    PLAN   Agree with increasing isosorbide to 60 milligrams   Otherwise continue current cardiac medications, dual antiplatelet therapy, beta-blocker, statin   No plans for inpatient ischemic evaluation  Elevated troponins are secondary to acute COVID pneumonia with heart failure   Creatinine continues to improve  Diuretics are being held   Continue amiodarone for nonsustained VT    Subjective:   HPI  He states he feels improved and wants to go home  He denies shortness of breath      Review of Systems: As noted in HPI  Rest of ROS is negative  Vitals:   /65   Pulse (!) 53   Temp 97 5 °F (36 4 °C)   Resp 16   Ht 5' 10" (1 778 m)   Wt 94 8 kg (208 lb 15 9 oz)   SpO2 96%   BMI 29 99 kg/m²   Vitals:    01/03/22 0901 01/04/22 0600   Weight: 95 1 kg (209 lb 10 5 oz) 94 8 kg (208 lb 15 9 oz)       Intake/Output Summary (Last 24 hours) at 1/4/2022 1335  Last data filed at 1/4/2022 0800  Gross per 24 hour   Intake 306 3 ml   Output --   Net 306 3 ml       Physical Exam   Physical exam was deferred today in order to save exposure and PPE during the coronavirus pandemic        TELEMETRY:  Episodes of nonsustained VT noted this morning  Lab Results:  Results from last 7 days   Lab Units 01/04/22  0526   WBC Thousand/uL 8 09   HEMOGLOBIN g/dL 11 3*   HEMATOCRIT % 34 2*   PLATELETS Thousands/uL 160     Results from last 7 days   Lab Units 01/04/22  0957 01/04/22  0526 01/04/22  0526 01/02/22  0518 01/01/22  0511   POTASSIUM mmol/L 4 6   < > 5 5*   < > 4 1   CHLORIDE mmol/L  --   --  102   < > 100   CO2 mmol/L  --   --  31   < > 29   BUN mg/dL  --   --  35*   < > 24   CREATININE mg/dL  --   --  1 35*   < > 1 46*   CALCIUM mg/dL  --   --  7 8*   < > 8 1*   ALK PHOS U/L  --   --   --   --  108   ALT U/L  --   --   --   --  41   AST U/L  --   --   --   --  31    < > = values in this interval not displayed  Results from last 7 days   Lab Units 01/04/22  0957 01/04/22  0526 01/04/22  0526   POTASSIUM mmol/L 4 6   < > 5 5*   CHLORIDE mmol/L  --   --  102   CO2 mmol/L  --   --  31   BUN mg/dL  --   --  35*   CREATININE mg/dL  --   --  1 35*   CALCIUM mg/dL  --   --  7 8*    < > = values in this interval not displayed             Medications:    Current Facility-Administered Medications:     acetaminophen (TYLENOL) tablet 650 mg, 650 mg, Oral, Q6H PRN, Krystle Mcclelland MD, 650 mg at 01/04/22 0815    albuterol (PROVENTIL HFA,VENTOLIN HFA) inhaler 1 puff, 1 puff, Inhalation, 4x Daily, Krystle Mcclelland MD, 1 puff at 01/04/22 0857    amiodarone tablet 200 mg, 200 mg, Oral, Daily, Krystle Mcclelland MD, 200 mg at 01/04/22 7115    aspirin (ECOTRIN LOW STRENGTH) EC tablet 81 mg, 81 mg, Oral, Daily, Krystle Mcclelland MD, 81 mg at 01/04/22 0815    atorvastatin (LIPITOR) tablet 80 mg, 80 mg, Oral, Daily With Ayanna Dougherty MD, 80 mg at 01/03/22 1541    clopidogrel (PLAVIX) tablet 75 mg, 75 mg, Oral, Daily, Krystle Mcclelland MD, 75 mg at 01/04/22 0815    dextromethorphan-guaiFENesin (ROBITUSSIN DM) oral syrup 10 mL, 10 mL, Oral, Q4H PRN, Krystle Mcclelland MD, 10 mL at 01/02/22 1807    fluticasone (FLONASE) 50 mcg/act nasal spray 2 spray, 2 spray, Nasal, Daily, Krystle Mcclelland MD, 2 spray at 01/04/22 0857    glycerin-hypromellose- (ARTIFICIAL TEARS) ophthalmic solution 1 drop, 1 drop, Both Eyes, Q4H PRN, Cherelle Greer MD, 1 drop at 01/01/22 1722    heparin (porcine) 25,000 units in 0 45% NaCl 250 mL infusion (premix), 3-20 Units/kg/hr (Order-Specific), Intravenous, Titrated, Cherelle Greer MD, Last Rate: 6 3 mL/hr at 01/04/22 0627, 7 Units/kg/hr at 01/04/22 0627    heparin (porcine) injection 2,000 Units, 2,000 Units, Intravenous, Q1H PRN, Cherelle Greer MD, 2,000 Units at 01/04/22 0626    heparin (porcine) injection 4,000 Units, 4,000 Units, Intravenous, Q1H PRN, Cherelle Greer MD  Dwason Luzma  [facility] Hold] isosorbide mononitrate (IMDUR) 24 hr tablet 60 mg, 60 mg, Oral, Daily, Lizzie Keen MD    [facility] Hold] lidocaine (LIDODERM) 5 % patch 1 patch, 1 patch, Topical, Daily, Alesia Slater PA-C, 1 patch at 01/03/22 2317    melatonin tablet 6 mg, 6 mg, Oral, HS, Cherelle Greer MD, 6 mg at 01/03/22 2217    metoprolol succinate (TOPROL-XL) 24 hr tablet 100 mg, 100 mg, Oral, Daily, Cherelle Greer MD, 100 mg at 01/04/22 7363    metoprolol succinate (TOPROL-XL) 24 hr tablet 50 mg, 50 mg, Oral, QPM, Cherelle Greer MD, 50 mg at 01/03/22 2216    nitroglycerin (NITROSTAT) SL tablet 0 4 mg, 0 4 mg, Sublingual, Q5 Min PRN, Cherelle Greer MD, 0 4 mg at 01/04/22 0834    ondansetron (ZOFRAN) injection 4 mg, 4 mg, Intravenous, Q4H PRN, Cherelle Greer MD, 4 mg at 01/03/22 2256    [COMPLETED] remdesivir (Veklury) 200 mg in sodium chloride 0 9 % 290 mL IVPB, 200 mg, Intravenous, Q24H, 200 mg at 12/31/21 1719 **FOLLOWED BY** remdesivir (Veklury) 100 mg in sodium chloride 0 9 % 270 mL IVPB, 100 mg, Intravenous, Q24H, Cherelle Greer MD, Last Rate: 0 mL/hr at 01/02/22 1900, 100 mg at 01/03/22 1538    sodium chloride (PF) 0 9 % injection 3 mL, 3 mL, Intravenous, Q1H PRN, Cherelle Greer MD    tamsulosin [facility]) capsule 0 4 mg, 0 4 mg, Oral, Daily With Chuy Corcoran MD, 0 4 mg at 01/03/22 1541    This note was completed in part utilizing M-Modal Fluency Direct Software  Grammatical errors, random word insertions, spelling mistakes, and incomplete sentences may be an occasional consequence of this system secondary to software limitations, ambient noise, and hardware issues  If you have any questions or concerns about the content, text, or information contained within the body of this dictation, please contact the provider for clarification      Bonnie Bowen DO, [facility]  1/4/2022 1:35 PM

## 2022-01-04 NOTE — ASSESSMENT & PLAN NOTE
This is an 72-year-old male with history of CAD status post CABG and recent stenting, AAA status post repair, BPH, hypertension presented with dyspnea      · Secondary to COVID-19 and CHF, resolved  · Currently on room air

## 2022-01-04 NOTE — PLAN OF CARE
Problem: PHYSICAL THERAPY ADULT  Goal: Performs mobility at highest level of function for planned discharge setting  See evaluation for individualized goals  Description: Treatment/Interventions: Functional transfer training,LE strengthening/ROM,Elevations,Therapeutic exercise,Endurance training,Patient/family training,Equipment eval/education,Bed mobility,Gait training,Compensatory technique education,Continued evaluation,Spoke to nursing,OT          See flowsheet documentation for full assessment, interventions and recommendations  Outcome: Progressing  Note: Prognosis: Good  Problem List: Decreased endurance,Impaired balance,Decreased mobility,Decreased strength  Assessment: Fili Roberts is seen bedside  Now out of ICU  No chest pain with visit  Session limited related to fatigue  Demonstrates S for bed mobility, transfers  Close S/CG for few steps OOB to chair  Vitals stable  RA O2 sat 96-97%  HR 51  Tolerated fair  No LOB with transition OOB to chair, but slowed  Will continue to benefit from continued PT in order to optimize functional mobility and outcomes following hospitalization  The patient's AM-PAC Basic Mobility Inpatient Short Form Raw Score is 21  A Raw score of greater than 16 suggests the patient may benefit from discharge to home  Please also refer to the recommendation of the Physical Therapist for safe discharge planning  May benefit from [address] following hospitalization for conditioning and to restore to PLOF  PT Discharge Recommendation: Home with home health rehabilitation          See flowsheet documentation for full assessment

## 2022-01-04 NOTE — PROGRESS NOTES
[address] [facility]  Progress Note Santino Graves [facility] 1940, [de-identified] y o  male MRN: [id number]  Unit/Bed#: ICU 15 Encounter: [id number]  Primary Care Provider: Bindu Crane MD   Date and time admitted to hospital: 12/31/2021  9:33 AM    * Acute coronary syndrome/unstable angina, to consider NSTEMI with ischemic related new left bundle branch block  Assessment & Plan  · CAD with history of CABG 1996 and recent KYE to ramus in October 2021 at [address] and follows with [facility] cardiology  · Evaluated by Cardiology, appreciate recommendations  Suspect minimal troponin elevation left arm pain a type 2 MI in the setting of severe multivessel CAD, CHF, pneumonia  · Patient had chest pain overnight, were going to increase isosorbide mononitrate to 60 mg daily  · Continue aspirin, Plavix, beta-blocker, statin  · Continue heparin drip for now  · Echo showed ejection fraction 45% he grade    Lab Results   Component Value Date    HSTNI0 29 01/02/2022    HSTNI2 43 01/02/2022    HSTNI4 54 ([place]) 01/02/2022         Acute on chronic systolic (congestive) heart failure (HCC)  Assessment & Plan  Wt Readings from Last 3 Encounters:   01/04/22 94 8 kg (208 lb 15 9 oz)   12/31/21 93 kg (205 lb)   12/16/21 93 kg (205 lb)     Results from last 7 days   Lab Units 12/31/21  0944   NT-PRO BNP pg/mL 3,609*     · Most recent echocardiogram LVEF 45% at [facility], repeat echo showed ejection fraction of 45% and grade 1 diastolic dysfunction  · Patient presented with worsening lower extremity edema shortness of breath, BMP 3609  · Initially started on Lasix 20 mg IV b i d  which was held due to acute kidney injury  · Probable decompensation  Not on maintenance diuretics    · Continue to hold diuretics today, will discuss with Cardiology    DANELLE (acute kidney injury) ([facility] [address] )  Assessment & Plan  · Baseline around 1 1-1 2, creatinine peaked at 1 55  · Lasix on hold due to acute kidney injury  · Will monitor BMP closely  · Lisinopril on hold  · Avoid nephrotoxins  · Avoid hypotension      COVID-19 infection  Assessment & Plan  · COVID positive on 12/31  · Double vaccinated   · Currently on remdesivir (today is day 5)  · Currently on room air    Lab Results   Component Value Date    SARSCOV2 Positive (A) 12/31/2021   Dexamethasone discontinued       Acute respiratory insufficiency  Assessment & Plan  This is an 52-year-old male with history of CAD status post CABG and recent stenting, AAA status post repair, BPH, hypertension presented with dyspnea  · Secondary to COVID-19 and CHF, resolved  · Currently on room air    Anxiety  Assessment & Plan  · No longer on sertraline  Patient using relaxation techniques    NSVT (nonsustained ventricular tachycardia) ([facility])  Assessment & Plan  · Continue home amiodarone  AAA (abdominal aortic aneurysm) ([address] )  Assessment & Plan  · Status post recent EVAR at [facility] THE [facility] October 2021    BPH with obstruction/lower urinary tract symptoms  Assessment & Plan  · Continue tamsulosin          VTE Pharmacologic Prophylaxis: VTE Score: 5 High Risk (Score >/= 5) - Pharmacological DVT Prophylaxis Ordered: heparin drip  Sequential Compression Devices Ordered  Patient Centered Rounds: I performed bedside rounds with nursing staff today  Discussions with Specialists or Other Care Team Provider:  Nursing, case management, Cardiology    Education and Discussions with Family / Patient:  Updated wife over the phone    Time Spent for Care: 30 minutes  More than 50% of total time spent on counseling and coordination of care as described above  Current Length of Stay: 4 day(s)  Current Patient Status: Inpatient   Certification Statement: The patient will continue to require additional inpatient hospital stay due to Chest pain  Discharge Plan: Anticipate discharge in 48-72 hrs to discharge location to be determined pending rehab evaluations  Code Status: Level 1 - Full Code    Subjective:   Patient was seen and evaluated bedside    He had episode of chest discomfort overnight which was relieved by nitro  Also complaining of a cough  Objective:     Vitals:   Temp (24hrs), Av 2 °F (36 2 °C), Min:96 9 °F (36 1 °C), Max:97 9 °F (36 6 °C)    Temp:  [96 9 °F (36 1 °C)-97 9 °F (36 6 °C)] 96 9 °F (36 1 °C)  HR:  [48-68] 68  Resp:  [17-56] 48  BP: (108-160)/(47-78) 127/47  SpO2:  [92 %-100 %] 95 %  Body mass index is 29 99 kg/m²  Input and Output Summary (last 24 hours): Intake/Output Summary (Last 24 hours) at 2022 0919  Last data filed at 2022 0800  Gross per 24 hour   Intake 346 3 ml   Output 100 ml   Net 246 3 ml       Physical Exam:   Physical Exam  Constitutional:       General: He is not in acute distress  HENT:      Head: Atraumatic  Cardiovascular:      Rate and Rhythm: Normal rate and regular rhythm  Heart sounds: No murmur heard  No friction rub  No gallop  Pulmonary:      Effort: Pulmonary effort is normal  No respiratory distress  Breath sounds: Normal breath sounds  No wheezing  Abdominal:      General: Bowel sounds are normal  There is no distension  Palpations: Abdomen is soft  Tenderness: There is no abdominal tenderness  Musculoskeletal:         General: No swelling  Cervical back: Neck supple  Skin:     General: Skin is warm and dry  Neurological:      General: No focal deficit present  Mental Status: He is alert  Psychiatric:         Mood and Affect: Mood normal           Additional Data:     Labs:  Results from last 7 days   Lab Units 22  0526 22  0606 22  0606   WBC Thousand/uL 8 09   < > 11 65*   HEMOGLOBIN g/dL 11 3*   < > 12 7   HEMATOCRIT % 34 2*   < > 39 0   PLATELETS Thousands/uL 160   < > 180   NEUTROS PCT %  --   --  81*   LYMPHS PCT %  --   --  9*   MONOS PCT %  --   --  9   EOS PCT %  --   --  0    < > = values in this interval not displayed       Results from last 7 days   Lab Units 22  0526 22  0518 22  0511   SODIUM mmol/L 135*   < > 137   POTASSIUM mmol/L 5 5*   < > 4 1   CHLORIDE mmol/L 102   < > 100   CO2 mmol/L 31   < > 29   BUN mg/dL 35*   < > 24   CREATININE mg/dL 1 35*   < > 1 46*   ANION GAP mmol/L 2*   < > 8   CALCIUM mg/dL 7 8*   < > 8 1*   ALBUMIN g/dL  --   --  3 0*   TOTAL BILIRUBIN mg/dL  --   --  0 65   ALK PHOS U/L  --   --  108   ALT U/L  --   --  41   AST U/L  --   --  31   GLUCOSE RANDOM mg/dL 110   < > 189*    < > = values in this interval not displayed  Results from last 7 days   Lab Units 01/02/22  1222   INR  1 04     Results from last 7 days   Lab Units 01/02/22  0738   POC GLUCOSE mg/dl 170*         Results from last 7 days   Lab Units 01/01/22  0511 12/31/21  1037   LACTIC ACID mmol/L  --  0 8   PROCALCITONIN ng/ml <0 05 <0 05       Lines/Drains:  Invasive Devices  Report    Peripheral Intravenous Line            Peripheral IV 12/31/21 Left Antecubital 3 days    Peripheral IV 01/02/22 Distal;Left;Ventral (anterior) Forearm 2 days    Peripheral IV 01/02/22 Dorsal (posterior); Right Forearm 2 days                  Telemetry:  Telemetry Orders (From admission, onward)             48 Hour Telemetry Monitoring  Continuous x 48 hours        References:    Telemetry Guidelines   Question:  Reason for 48 Hour Telemetry  Answer:  Acute MI, chest pain - R/O MI, or unstable angina                 Telemetry Reviewed: Normal Sinus Rhythm and PVCs  Indication for Continued Telemetry Use: Arrthymias requiring medical therapy             Imaging: Reviewed radiology reports from this admission including: chest xray    Recent Cultures (last 7 days):   Results from last 7 days   Lab Units 12/31/21  1037 12/31/21  1017   BLOOD CULTURE  No Growth at 72 hrs  No Growth at 72 hrs         Last 24 Hours Medication List:   Current Facility-Administered Medications   Medication Dose Route Frequency Provider Last Rate    acetaminophen  650 mg Oral Q6H PRN VISHNU Mesa      albuterol  1 puff Inhalation 4x Daily Oliver Hays VISHNU Lizarraga      amiodarone  200 mg Oral Daily Edward Moshe, VISHNU      aspirin  81 mg Oral Daily Edward Moshe, VISHNU      atorvastatin  80 mg Oral Daily With VISHNU Dodge      clopidogrel  75 mg Oral Daily Edward Moshe, VISHNU      dextromethorphan-guaiFENesin  10 mL Oral Q4H PRN Edward Makos, CRNP      fluticasone  2 spray Nasal Daily Edward Moshe, CARLOS ENRIQUENP      glycerin-hypromellose-  1 drop Both Eyes Q4H PRN Edward Deangeloos, CRNP      heparin (porcine)  3-20 Units/kg/hr (Order-Specific) Intravenous Titrated Janina Moctezuma MD 7 Units/kg/hr (01/04/22 9819)    heparin (porcine)  2,000 Units Intravenous Q1H PRN Janina Moctezuma MD      heparin (porcine)  4,000 Units Intravenous Q1H PRN Janina Moctezuma MD      isosorbide mononitrate  30 mg Oral Once Liudmila Hugo MD     [facility] ON 1/5/2022] isosorbide mononitrate  60 mg Oral Daily Liudmila Hugo MD      lidocaine  1 patch Topical Daily Alesia Slater PA-C      melatonin  6 mg Oral HS Edleonard Mesa, VISHNU      metoprolol succinate  100 mg Oral Daily Edward Moshe, VISHNU      metoprolol succinate  50 mg Oral QPM Edward VISHNU Mesa      nitroglycerin  0 4 mg Sublingual Q5 Min PRN EdVISHNU Lamar      ondansetron  4 mg Intravenous Q4H PRN Edleonard Mesa, VISHNU      remdesivir  100 mg Intravenous Q24H Edward Moshe, CARLOS ENRIQUENP 0 mg (01/02/22 1900)    sodium chloride (PF)  3 mL Intravenous Q1H PRN Edleonard Mesa, VISHNU      tamsulosin  0 4 mg Oral Daily With VISHNU Dodge          Today, Patient Was Seen By: Liudmila Hugo MD    **Please Note: This note may have been constructed using a voice recognition system  **

## 2022-01-04 NOTE — PLAN OF CARE
Problem: PAIN - ADULT  Goal: Verbalizes/displays adequate comfort level or baseline comfort level  Description: Interventions:  - Encourage patient to monitor pain and request assistance  - Assess pain using appropriate pain scale  - Administer analgesics based on type and severity of pain and evaluate response  - Implement non-pharmacological measures as appropriate and evaluate response  - Consider cultural and social influences on pain and pain management  - Notify physician/advanced practitioner if interventions unsuccessful or patient reports new pain  Outcome: Progressing     Problem: INFECTION - ADULT  Goal: Absence or prevention of progression during hospitalization  Description: INTERVENTIONS:  - Assess and monitor for signs and symptoms of infection  - Monitor lab/diagnostic results  - Monitor all insertion sites, i e  indwelling lines, tubes, and drains  - Monitor endotracheal if appropriate and nasal secretions for changes in amount and color  - [place] appropriate cooling/warming therapies per order  - Administer medications as ordered  - Instruct and encourage patient and family to use good hand hygiene technique  - Identify and instruct in appropriate isolation precautions for identified infection/condition  Outcome: Progressing     Problem: SAFETY ADULT  Goal: Patient will remain free of falls  Description: INTERVENTIONS:  - Educate patient/family on patient safety including physical limitations  - Instruct patient to call for assistance with activity   - Consult OT/PT to assist with strengthening/mobility   - Keep Call bell within reach  - Keep bed low and locked with side rails adjusted as appropriate  - Keep care items and personal belongings within reach  - Initiate and maintain comfort rounds  - Make Fall Risk Sign visible to staff  - Offer Toileting every 2 Hours, in advance of need  - Initiate/Maintain bed alarm  - Obtain necessary fall risk management equipment:   - Apply yellow socks and bracelet for high fall risk patients  - Consider moving patient to room near nurses station  Outcome: Progressing  Goal: Maintain or return to baseline ADL function  Description: INTERVENTIONS:  -  Assess patient's ability to carry out ADLs; assess patient's baseline for ADL function and identify physical deficits which impact ability to perform ADLs (bathing, care of mouth/teeth, toileting, grooming, dressing, etc )  - Assess/evaluate cause of self-care deficits   - Assess range of motion  - Assess patient's mobility; develop plan if impaired  - Assess patient's need for assistive devices and provide as appropriate  - Encourage maximum independence but intervene and supervise when necessary  - Involve family in performance of ADLs  - Assess for home care needs following discharge   - Consider OT consult to assist with ADL evaluation and planning for discharge  - Provide patient education as appropriate  Outcome: Progressing  Goal: Maintains/Returns to pre admission functional level  Description: INTERVENTIONS:  - Perform BMAT or MOVE assessment daily    - Set and communicate daily mobility goal to care team and patient/family/caregiver  - Collaborate with rehabilitation services on mobility goals if consulted  - Perform Range of Motion 2 times a day  - Reposition patient every 2 hours    - Dangle patient 3 times a day  - Stand patient 3 times a day  - Ambulate patient 3 times a day  - Out of bed to chair 3 times a day   - Out of bed for meals 3 times a day  - Out of bed for toileting  - Record patient progress and toleration of activity level   Outcome: Progressing     Problem: DISCHARGE PLANNING  Goal: Discharge to home or other facility with appropriate resources  Description: INTERVENTIONS:  - Identify barriers to discharge w/patient and caregiver  - Arrange for needed discharge resources and transportation as appropriate  - Identify discharge learning needs (meds, wound care, etc )  - Arrange for interpretive services to assist at discharge as needed  - Refer to Case Management Department for coordinating discharge planning if the patient needs post-hospital services based on physician/advanced practitioner order or complex needs related to functional status, cognitive ability, or social support system  Outcome: Progressing     Problem: Knowledge Deficit  Goal: Patient/family/caregiver demonstrates understanding of disease process, treatment plan, medications, and discharge instructions  Description: Complete learning assessment and assess knowledge base    Interventions:  - Provide teaching at level of understanding  - Provide teaching via preferred learning methods  Outcome: Progressing     Problem: CARDIOVASCULAR - ADULT  Goal: Maintains optimal cardiac output and hemodynamic stability  Description: INTERVENTIONS:  - Monitor I/O, vital signs and rhythm  - Monitor for S/S and trends of decreased cardiac output  - Administer and titrate ordered vasoactive medications to optimize hemodynamic stability  - Assess quality of pulses, skin color and temperature  - Assess for signs of decreased coronary artery perfusion  - Instruct patient to report change in severity of symptoms  Outcome: Progressing  Goal: Absence of cardiac dysrhythmias or at baseline rhythm  Description: INTERVENTIONS:  - Continuous cardiac monitoring, vital signs, obtain 12 lead EKG if ordered  - Administer antiarrhythmic and heart rate control medications as ordered  - Monitor electrolytes and administer replacement therapy as ordered  Outcome: Progressing     Problem: RESPIRATORY - ADULT  Goal: Achieves optimal ventilation and oxygenation  Description: INTERVENTIONS:  - Assess for changes in respiratory status  - Assess for changes in mentation and behavior  - Position to facilitate oxygenation and minimize respiratory effort  - Oxygen administered by appropriate delivery if ordered  - Initiate smoking cessation education as indicated  - Encourage broncho-pulmonary hygiene including cough, deep breathe, Incentive Spirometry  - Assess the need for suctioning and aspirate as needed  - Assess and instruct to report SOB or any respiratory difficulty  - Respiratory Therapy support as indicated  Outcome: Progressing     Problem: Potential for Falls  Goal: Patient will remain free of falls  Description: INTERVENTIONS:  - Educate patient/family on patient safety including physical limitations  - Instruct patient to call for assistance with activity   - Consult OT/PT to assist with strengthening/mobility   - Keep Call bell within reach  - Keep bed low and locked with side rails adjusted as appropriate  - Keep care items and personal belongings within reach  - Initiate and maintain comfort rounds  - Make Fall Risk Sign visible to staff  - Offer Toileting every 2 Hours, in advance of need  - Initiate/Maintain bed alarm  - Obtain necessary fall risk management equipment:   - Apply yellow socks and bracelet for high fall risk patients  - Consider moving patient to room near nurses station  Outcome: Progressing

## 2022-01-04 NOTE — ASSESSMENT & PLAN NOTE
Wt Readings from Last 3 Encounters:   01/04/22 94 8 kg (208 lb 15 9 oz)   12/31/21 93 kg (205 lb)   12/16/21 93 kg (205 lb)     Results from last 7 days   Lab Units 12/31/21  0944   NT-PRO BNP pg/mL 3,609*     · Most recent echocardiogram LVEF 45% at [facility], repeat echo showed ejection fraction of 45% and grade 1 diastolic dysfunction  · Patient presented with worsening lower extremity edema shortness of breath, BMP 3609  · Initially started on Lasix 20 mg IV b i d  which was held due to acute kidney injury  · Probable decompensation  Not on maintenance diuretics    · Continue to hold diuretics today, will discuss with Cardiology

## 2022-01-05 LAB
ANION GAP SERPL CALCULATED.3IONS-SCNC: 9 MMOL/L (ref 4–13)
ATRIAL RATE: 77 BPM
BACTERIA BLD CULT: NORMAL
BACTERIA BLD CULT: NORMAL
BASOPHILS # BLD AUTO: 0.01 THOUSANDS/ΜL (ref 0–0.1)
BASOPHILS NFR BLD AUTO: 0 % (ref 0–1)
BUN SERPL-MCNC: 34 MG/DL (ref 5–25)
CALCIUM SERPL-MCNC: 8.1 MG/DL (ref 8.3–10.1)
CHLORIDE SERPL-SCNC: 101 MMOL/L (ref 100–108)
CO2 SERPL-SCNC: 28 MMOL/L (ref 21–32)
CREAT SERPL-MCNC: 1.38 MG/DL (ref 0.6–1.3)
EOSINOPHIL # BLD AUTO: 0.02 THOUSAND/ΜL (ref 0–0.61)
EOSINOPHIL NFR BLD AUTO: 0 % (ref 0–6)
ERYTHROCYTE [DISTWIDTH] IN BLOOD BY AUTOMATED COUNT: 14.4 % (ref 11.6–15.1)
GFR SERPL CREATININE-BSD FRML MDRD: 47 ML/MIN/1.73SQ M
GLUCOSE SERPL-MCNC: 108 MG/DL (ref 65–140)
HCT VFR BLD AUTO: 34.4 % (ref 36.5–49.3)
HGB BLD-MCNC: 11.2 G/DL (ref 12–17)
IMM GRANULOCYTES # BLD AUTO: 0.02 THOUSAND/UL (ref 0–0.2)
IMM GRANULOCYTES NFR BLD AUTO: 0 % (ref 0–2)
LYMPHOCYTES # BLD AUTO: 0.63 THOUSANDS/ΜL (ref 0.6–4.47)
LYMPHOCYTES NFR BLD AUTO: 11 % (ref 14–44)
MAGNESIUM SERPL-MCNC: 2.1 MG/DL (ref 1.6–2.6)
MCH RBC QN AUTO: 29.2 PG (ref 26.8–34.3)
MCHC RBC AUTO-ENTMCNC: 32.6 G/DL (ref 31.4–37.4)
MCV RBC AUTO: 90 FL (ref 82–98)
MONOCYTES # BLD AUTO: 0.71 THOUSAND/ΜL (ref 0.17–1.22)
MONOCYTES NFR BLD AUTO: 12 % (ref 4–12)
NEUTROPHILS # BLD AUTO: 4.38 THOUSANDS/ΜL (ref 1.85–7.62)
NEUTS SEG NFR BLD AUTO: 77 % (ref 43–75)
NRBC BLD AUTO-RTO: 0 /100 WBCS
P AXIS: 74 DEGREES
PLATELET # BLD AUTO: 135 THOUSANDS/UL (ref 149–390)
PMV BLD AUTO: 11.3 FL (ref 8.9–12.7)
POTASSIUM SERPL-SCNC: 4.6 MMOL/L (ref 3.5–5.3)
PR INTERVAL: 158 MS
QRS AXIS: 69 DEGREES
QRSD INTERVAL: 75 MS
QT INTERVAL: 383 MS
QTC INTERVAL: 434 MS
RBC # BLD AUTO: 3.83 MILLION/UL (ref 3.88–5.62)
SODIUM SERPL-SCNC: 138 MMOL/L (ref 136–145)
T WAVE AXIS: 82 DEGREES
VENTRICULAR RATE: 77 BPM
WBC # BLD AUTO: 5.77 THOUSAND/UL (ref 4.31–10.16)

## 2022-01-05 PROCEDURE — 93010 ELECTROCARDIOGRAM REPORT: CPT | Performed by: INTERNAL MEDICINE

## 2022-01-05 PROCEDURE — 99232 SBSQ HOSP IP/OBS MODERATE 35: CPT | Performed by: INTERNAL MEDICINE

## 2022-01-05 PROCEDURE — 99232 SBSQ HOSP IP/OBS MODERATE 35: CPT

## 2022-01-05 PROCEDURE — 85025 COMPLETE CBC W/AUTO DIFF WBC: CPT | Performed by: INTERNAL MEDICINE

## 2022-01-05 PROCEDURE — 80048 BASIC METABOLIC PNL TOTAL CA: CPT | Performed by: INTERNAL MEDICINE

## 2022-01-05 PROCEDURE — 83735 ASSAY OF MAGNESIUM: CPT | Performed by: INTERNAL MEDICINE

## 2022-01-05 RX ORDER — BENZONATATE 100 MG/1
100 CAPSULE ORAL 3 TIMES DAILY PRN
Status: DISCONTINUED | OUTPATIENT
Start: 2022-01-05 | End: 2022-01-06 | Stop reason: HOSPADM

## 2022-01-05 RX ORDER — GUAIFENESIN 600 MG
600 TABLET, EXTENDED RELEASE 12 HR ORAL EVERY 12 HOURS SCHEDULED
Status: DISCONTINUED | OUTPATIENT
Start: 2022-01-05 | End: 2022-01-06 | Stop reason: HOSPADM

## 2022-01-05 RX ORDER — HEPARIN SODIUM 5000 [USP'U]/ML
5000 INJECTION, SOLUTION INTRAVENOUS; SUBCUTANEOUS EVERY 8 HOURS SCHEDULED
Status: DISCONTINUED | OUTPATIENT
Start: 2022-01-05 | End: 2022-01-06 | Stop reason: HOSPADM

## 2022-01-05 RX ADMIN — METOPROLOL SUCCINATE 100 MG: 50 TABLET, EXTENDED RELEASE ORAL at 09:13

## 2022-01-05 RX ADMIN — ATORVASTATIN CALCIUM 80 MG: 80 TABLET, FILM COATED ORAL at 17:05

## 2022-01-05 RX ADMIN — GUAIFENESIN 600 MG: 600 TABLET, EXTENDED RELEASE ORAL at 12:32

## 2022-01-05 RX ADMIN — ALBUTEROL SULFATE 1 PUFF: 90 AEROSOL, METERED RESPIRATORY (INHALATION) at 17:06

## 2022-01-05 RX ADMIN — ASPIRIN 81 MG: 81 TABLET, COATED ORAL at 09:13

## 2022-01-05 RX ADMIN — METOPROLOL SUCCINATE 50 MG: 50 TABLET, EXTENDED RELEASE ORAL at 22:02

## 2022-01-05 RX ADMIN — GUAIFENESIN 600 MG: 600 TABLET, EXTENDED RELEASE ORAL at 22:00

## 2022-01-05 RX ADMIN — ALBUTEROL SULFATE 1 PUFF: 90 AEROSOL, METERED RESPIRATORY (INHALATION) at 09:14

## 2022-01-05 RX ADMIN — ONDANSETRON 4 MG: 2 INJECTION INTRAMUSCULAR; INTRAVENOUS at 07:57

## 2022-01-05 RX ADMIN — TAMSULOSIN HYDROCHLORIDE 0.4 MG: 0.4 CAPSULE ORAL at 17:05

## 2022-01-05 RX ADMIN — ISOSORBIDE MONONITRATE 60 MG: 60 TABLET, EXTENDED RELEASE ORAL at 09:13

## 2022-01-05 RX ADMIN — CLOPIDOGREL BISULFATE 75 MG: 75 TABLET ORAL at 09:13

## 2022-01-05 RX ADMIN — LIDOCAINE 1 PATCH: 50 PATCH CUTANEOUS at 22:00

## 2022-01-05 RX ADMIN — HEPARIN SODIUM 5000 UNITS: 5000 INJECTION INTRAVENOUS; SUBCUTANEOUS at 22:00

## 2022-01-05 RX ADMIN — AMIODARONE HYDROCHLORIDE 200 MG: 200 TABLET ORAL at 09:15

## 2022-01-05 RX ADMIN — ALBUTEROL SULFATE 1 PUFF: 90 AEROSOL, METERED RESPIRATORY (INHALATION) at 22:00

## 2022-01-05 RX ADMIN — ALBUTEROL SULFATE 1 PUFF: 90 AEROSOL, METERED RESPIRATORY (INHALATION) at 12:32

## 2022-01-05 RX ADMIN — Medication 2 SPRAY: at 09:14

## 2022-01-05 RX ADMIN — MELATONIN TAB 3 MG 6 MG: 3 TAB at 22:00

## 2022-01-05 NOTE — ASSESSMENT & PLAN NOTE
· COVID positive on 12/31  · Double vaccinated   · Status post remdesivir 5 days  · Currently on room air    Lab Results   Component Value Date    SARSCOV2 Positive (A) 12/31/2021   Dexamethasone discontinued

## 2022-01-05 NOTE — ASSESSMENT & PLAN NOTE
· CAD with history of CABG 1996 and recent KYE to ramus in October 2021 at [facility] AT THE [facility] and follows with [facility] cardiology  · Evaluated by Cardiology, appreciate recommendations    Suspect minimal troponin elevation left arm pain a type 2 MI in the setting of severe multivessel CAD, CHF, pneumonia  · Patient had chest pain overnight 1/4,  increased isosorbide mononitrate to 60 mg daily  · Continue aspirin, Plavix, beta-blocker, statin  · Continue heparin drip for now  · Echo showed ejection fraction 45% he grade  · No chest pain past 24 hours    Lab Results   Component Value Date    HSTNI0 29 01/02/2022    HSTNI2 43 01/02/2022    HSTNI4 54 (Luis M Valiente) 01/02/2022

## 2022-01-05 NOTE — ASSESSMENT & PLAN NOTE
This is an 16-year-old male with history of CAD status post CABG and recent stenting, AAA status post repair, BPH, hypertension presented with dyspnea      · Secondary to COVID-19 and CHF, resolved  · Currently on room air

## 2022-01-05 NOTE — PROGRESS NOTES
Progress Note - Cardiology   Jessica Nath [de-identified] y o  male MRN: [id number]  Unit/Bed#: [id number] Encounter: [id number]      Assessment/Recommendations/Discussion:   1  Acute COVID-19 pneumonia   2  Acute systolic heart failure with EF 45%  3  Ischemic cardiomyopathy  4  Coronary artery disease with remote CABG, lima to LAD is the only patent graft, prior PCI to ramus in October 2021   5  Type 2 MI secondary to pneumonia, acute heart failure  6  Hypertension  7  Dyslipidemia   8  Abdominal aortic aneurysm with recent EVAR  9  NSVT on Zio patch and on telemetry here, on amiodarone    PLAN   No further cardiac type symptoms  He denies chest pain   Still coughing secondary to acute COVID pneumonia   Would continue current cardiac medications, isosorbide, aspirin, Plavix, Toprol XL, statin   Continue amiodarone for episodes of nonsustained VT   Renal function stable    Subjective:   HPI  No chest pain overnight, did note coughing however, he had to sleep sitting up because he was coughing  Review of Systems: As noted in HPI  Rest of ROS is negative  Vitals:   /71   Pulse 62   Temp 97 5 °F (36 4 °C)   Resp 19   Ht 5' 10" (1 778 m)   Wt 94 4 kg (208 lb 1 8 oz)   SpO2 92%   BMI 29 86 kg/m²   Vitals:    01/04/22 0600 01/05/22 0600   Weight: 94 8 kg (208 lb 15 9 oz) 94 4 kg (208 lb 1 8 oz)       Intake/Output Summary (Last 24 hours) at 1/5/2022 0932  Last data filed at 1/5/2022 0501  Gross per 24 hour   Intake 750 ml   Output --   Net 750 ml       Physical Exam   Physical exam was deferred today in order to save exposure and PPE during the coronavirus pandemic      Lab Results:  Results from last 7 days   Lab Units 01/05/22  0522   WBC Thousand/uL 5 77   HEMOGLOBIN g/dL 11 2*   HEMATOCRIT % 34 4*   PLATELETS Thousands/uL 135*     Results from last 7 days   Lab Units 01/05/22  0522 01/02/22  0518 01/01/22  0511   POTASSIUM mmol/L 4 6   < > 4 1   CHLORIDE mmol/L 101   < > 100   CO2 mmol/L 28   < > 29   BUN mg/dL 34*   < > 24   CREATININE mg/dL 1 38*   < > 1 46*   CALCIUM mg/dL 8 1*   < > 8 1*   ALK PHOS U/L  --   --  108   ALT U/L  --   --  41   AST U/L  --   --  31    < > = values in this interval not displayed       Results from last 7 days   Lab Units 01/05/22  0522   POTASSIUM mmol/L 4 6   CHLORIDE mmol/L 101   CO2 mmol/L 28   BUN mg/dL 34*   CREATININE mg/dL 1 38*   CALCIUM mg/dL 8 1*           Medications:    Current Facility-Administered Medications:     acetaminophen (TYLENOL) tablet 650 mg, 650 mg, Oral, Q6H PRN, Nitin Dover MD, 650 mg at 01/04/22 0815    albuterol (PROVENTIL HFA,VENTOLIN HFA) inhaler 1 puff, 1 puff, Inhalation, 4x Daily, Nitin Dover MD, 1 puff at 01/05/22 0914    amiodarone tablet 200 mg, 200 mg, Oral, Daily, Nina Bejarano MD, 200 mg at 01/05/22 0915    aspirin (ECOTRIN LOW STRENGTH) EC tablet 81 mg, 81 mg, Oral, Daily, Nina Bejarano MD, 81 mg at 01/05/22 0913    atorvastatin (LIPITOR) tablet 80 mg, 80 mg, Oral, Daily With Laurine Canavan, MD, 80 mg at 01/04/22 1700    clopidogrel (PLAVIX) tablet 75 mg, 75 mg, Oral, Daily, Nina Bejarano MD, 75 mg at 01/05/22 0913    dextromethorphan-guaiFENesin (ROBITUSSIN DM) oral syrup 10 mL, 10 mL, Oral, Q4H PRN, Nina Bejarano MD, 10 mL at 01/02/22 1807    fluticasone (FLONASE) 50 mcg/act nasal spray 2 spray, 2 spray, Nasal, Daily, Nina Bejarano MD, 2 spray at 01/05/22 0914    glycerin-hypromellose- (ARTIFICIAL TEARS) ophthalmic solution 1 drop, 1 drop, Both Eyes, Q4H PRN, Nitin Dover MD, 1 drop at 01/01/22 1722    isosorbide mononitrate (IMDUR) 24 hr tablet 60 mg, 60 mg, Oral, Daily, Nina Bejarano MD, 60 mg at 01/05/22 0913    lidocaine (LIDODERM) 5 % patch 1 patch, 1 patch, Topical, Daily, Nina Bejarano MD, 1 patch at 01/03/22 2317    melatonin tablet 6 mg, 6 mg, Oral, HS, Nina Bejarano MD, 6 mg at 01/04/22 2100    metoprolol succinate (TOPROL-XL) 24 hr tablet 100 mg, 100 mg, Oral, Daily, Nina Bejarano MD, 100 mg at 01/05/22 0913    metoprolol succinate (TOPROL-XL) 24 hr tablet 50 mg, 50 mg, Oral, QPM, Nina Bejarano MD, 50 mg at 01/04/22 2103    nitroglycerin (NITROSTAT) SL tablet 0 4 mg, 0 4 mg, Sublingual, Q5 Min PRN, Nina Bejarano MD, 0 4 mg at 01/04/22 0834    ondansetron (ZOFRAN) injection 4 mg, 4 mg, Intravenous, Q4H PRN, Beth Andujar MD, 4 mg at 01/05/22 0757    sodium chloride (PF) 0 9 % injection 3 mL, 3 mL, Intravenous, Q1H PRN, Beth Andujar MD    tamsulosin (FLOMAX) capsule 0 4 mg, 0 4 mg, Oral, Daily With Tae Estevez MD, 0 4 mg at 01/04/22 1700    This note was completed in part utilizing [employer] Direct Software  Grammatical errors, random word insertions, spelling mistakes, and incomplete sentences may be an occasional consequence of this system secondary to software limitations, ambient noise, and hardware issues  If you have any questions or concerns about the content, text, or information contained within the body of this dictation, please contact the provider for clarification      Tate Hoffmann DO, [facility] [place]  1/5/2022 9:32 AM

## 2022-01-05 NOTE — ASSESSMENT & PLAN NOTE
· Patient hypertensive upon arrival   Will monitor with diuretics  · May be anxiety related     · Lisinopril on hold due to acute kidney injury  · Continue metoprolol  · Blood pressure stable, borderline low blood pressure yesterday

## 2022-01-05 NOTE — ASSESSMENT & PLAN NOTE
· Status post recent EVAR at [facility] AT THE [facility] October 2021

## 2022-01-05 NOTE — PLAN OF CARE
Problem: PAIN - ADULT  Goal: Verbalizes/displays adequate comfort level or baseline comfort level  Description: Interventions:  - Encourage patient to monitor pain and request assistance  - Assess pain using appropriate pain scale  - Administer analgesics based on type and severity of pain and evaluate response  - Implement non-pharmacological measures as appropriate and evaluate response  - Consider cultural and social influences on pain and pain management  - Notify physician/advanced practitioner if interventions unsuccessful or patient reports new pain  Outcome: Progressing     Problem: INFECTION - ADULT  Goal: Absence or prevention of progression during hospitalization  Description: INTERVENTIONS:  - Assess and monitor for signs and symptoms of infection  - Monitor lab/diagnostic results  - Monitor all insertion sites, i e  indwelling lines, tubes, and drains  - Monitor endotracheal if appropriate and nasal secretions for changes in amount and color  - [place] appropriate cooling/warming therapies per order  - Administer medications as ordered  - Instruct and encourage patient and family to use good hand hygiene technique  - Identify and instruct in appropriate isolation precautions for identified infection/condition  Outcome: Progressing

## 2022-01-05 NOTE — ASSESSMENT & PLAN NOTE
Wt Readings from Last 3 Encounters:   01/05/22 94 4 kg (208 lb 1 8 oz)   12/31/21 93 kg (205 lb)   12/16/21 93 kg (205 lb)     Results from last 7 days   Lab Units 12/31/21  0944   NT-PRO BNP pg/mL 3,609*     · Most recent echocardiogram LVEF 45% at [facility], repeat echo showed ejection fraction of 45% and grade 1 diastolic dysfunction  · Patient presented with worsening lower extremity edema shortness of breath, BMP 3609  · Initially started on Lasix 20 mg IV b i d  which was held due to acute kidney injury  · Continue to hold diuretics, will discuss with Cardiology

## 2022-01-05 NOTE — PROGRESS NOTES
Patient bradys down to 31 at times  Patient rings call bell to complain that alarm wakes him up  Patient has no cardiac SS  /57  On call provider notified of change in condition and pic of telemetry sent via [address] [place] [address] Rd  TON's

## 2022-01-05 NOTE — PROGRESS NOTES
[address] [place] [address]  Progress Note Wilman Vilchis [facility] 1940, [de-identified] y o  male MRN: [id number]  Unit/Bed#: [id number] Encounter: [id number]  Primary Care Provider: Adolph Vasquez MD   Date and time admitted to hospital: 12/31/2021  9:33 AM    * Acute coronary syndrome/unstable angina, to consider NSTEMI with ischemic related new left bundle branch block  Assessment & Plan  · CAD with history of CABG 1996 and recent KYE to ramus in October 2021 at [facility] and follows with [facility] cardiology  · Evaluated by Cardiology, appreciate recommendations    Suspect minimal troponin elevation left arm pain a type 2 MI in the setting of severe multivessel CAD, CHF, pneumonia  · Patient had chest pain overnight 1/4,  increased isosorbide mononitrate to 60 mg daily  · Continue aspirin, Plavix, beta-blocker, statin  · Continue heparin drip for now  · Echo showed ejection fraction 45% he grade  · No chest pain past 24 hours    Lab Results   Component Value Date    HSTNI0 29 01/02/2022    HSTNI2 43 01/02/2022    HSTNI4 54 ([facility]) 01/02/2022         Acute on chronic systolic (congestive) heart failure (HCC)  Assessment & Plan  Wt Readings from Last 3 Encounters:   01/05/22 94 4 kg (208 lb 1 8 oz)   12/31/21 93 kg (205 lb)   12/16/21 93 kg (205 lb)     Results from last 7 days   Lab Units 12/31/21  0944   NT-PRO BNP pg/mL 3,609*     · Most recent echocardiogram LVEF 45% at [facility], repeat echo showed ejection fraction of 45% and grade 1 diastolic dysfunction  · Patient presented with worsening lower extremity edema shortness of breath, BMP 3609  · Initially started on Lasix 20 mg IV b i d  which was held due to acute kidney injury  · Continue to hold diuretics, will discuss with Cardiology    DANELLE (acute kidney injury) ([facility] [address] )  Assessment & Plan  · Baseline around 1 1-1 2, creatinine peaked at 1 55  · Lasix on hold due to acute kidney injury  · Will monitor BMP closely  · Lisinopril on hold  · Avoid nephrotoxins  · Avoid hypotension      COVID-19 infection  Assessment & Plan  · COVID positive on 12/31  · Double vaccinated   · Status post remdesivir 5 days  · Currently on room air    Lab Results   Component Value Date    SARSCOV2 Positive (A) 12/31/2021   Dexamethasone discontinued       Acute respiratory insufficiency  Assessment & Plan  This is an 71-year-old male with history of CAD status post CABG and recent stenting, AAA status post repair, BPH, hypertension presented with dyspnea  · Secondary to COVID-19 and CHF, resolved  · Currently on room air    Anxiety  Assessment & Plan  · No longer on sertraline  Patient using relaxation techniques    AAA (abdominal aortic aneurysm) ([address] )  Assessment & Plan  · Status post recent EVAR at [facility] October 2021    BPH with obstruction/lower urinary tract symptoms  Assessment & Plan  · Continue tamsulosin    Essential hypertension  Assessment & Plan  · Patient hypertensive upon arrival   Will monitor with diuretics  · May be anxiety related     · Lisinopril on hold due to acute kidney injury  · Continue metoprolol  · Blood pressure stable, borderline low blood pressure yesterday          VTE Pharmacologic Prophylaxis: VTE Score: 5 High Risk (Score >/= 5) - Pharmacological DVT Prophylaxis Ordered: heparin  Sequential Compression Devices Ordered  Patient Centered Rounds: Discussed with nursing prior to entering [facility]  Discussions with Specialists or Other Care Team Provider:  Nursing, case management    Education and Discussions with Family / Patient: Updated  (wife) via phone  Time Spent for Care: 30 minutes  More than 50% of total time spent on counseling and coordination of care as described above  Current Length of Stay: 5 day(s)  Current Patient Status: Inpatient   Certification Statement: The patient will continue to require additional inpatient hospital stay due to [facility],  Discharge Plan: Anticipate discharge tomorrow to home with home services      Code Status: Level 1 - Full Code    Subjective:   Patient was seen evaluated bedside  He is feeling well, on room air  Denies overnight chest pain  Objective:     Vitals:   Temp (24hrs), Av 8 °F (36 6 °C), Min:97 5 °F (36 4 °C), Max:98 2 °F (36 8 °C)    Temp:  [97 5 °F (36 4 °C)-98 2 °F (36 8 °C)] 98 2 °F (36 8 °C)  HR:  [31-62] 54  Resp:  [16-19] 16  BP: (110-149)/(57-71) 110/58  SpO2:  [92 %-100 %] 92 %  Body mass index is 29 86 kg/m²  Input and Output Summary (last 24 hours): Intake/Output Summary (Last 24 hours) at 2022 1821  Last data filed at 2022 1503  Gross per 24 hour   Intake 1630 ml   Output --   Net 1630 ml       Physical Exam:   Physical Exam  Constitutional:       General: He is not in acute distress  HENT:      Head: Atraumatic  Cardiovascular:      Rate and Rhythm: Normal rate and regular rhythm  Heart sounds: No murmur heard  No friction rub  No gallop  Pulmonary:      Effort: Pulmonary effort is normal  No respiratory distress  Breath sounds: Normal breath sounds  No wheezing  Abdominal:      General: Bowel sounds are normal  There is no distension  Palpations: Abdomen is soft  Tenderness: There is no abdominal tenderness  Musculoskeletal:         General: No swelling  Cervical back: Neck supple  Skin:     General: Skin is warm and dry  Neurological:      General: No focal deficit present  Mental Status: He is alert     Psychiatric:         Mood and Affect: Mood normal           Additional Data:     Labs:  Results from last 7 days   Lab Units 22  0522   WBC Thousand/uL 5 77   HEMOGLOBIN g/dL 11 2*   HEMATOCRIT % 34 4*   PLATELETS Thousands/uL 135*   NEUTROS PCT % 77*   LYMPHS PCT % 11*   MONOS PCT % 12   EOS PCT % 0     Results from last 7 days   Lab Units 22  0522 22  0518 22  0511   SODIUM mmol/L 138   < > 137   POTASSIUM mmol/L 4 6   < > 4 1   CHLORIDE mmol/L 101   < > 100   CO2 mmol/L 28   < > 29   BUN mg/dL 34*   < > 24   CREATININE mg/dL 1 38*   < > 1 46*   ANION GAP mmol/L 9   < > 8   CALCIUM mg/dL 8 1*   < > 8 1*   ALBUMIN g/dL  --   --  3 0*   TOTAL BILIRUBIN mg/dL  --   --  0 65   ALK PHOS U/L  --   --  108   ALT U/L  --   --  41   AST U/L  --   --  31   GLUCOSE RANDOM mg/dL 108   < > 189*    < > = values in this interval not displayed  Results from last 7 days   Lab Units 01/02/22  1222   INR  1 04     Results from last 7 days   Lab Units 01/02/22  0738   POC GLUCOSE mg/dl 170*         Results from last 7 days   Lab Units 01/01/22  0511 12/31/21  1037   LACTIC ACID mmol/L  --  0 8   PROCALCITONIN ng/ml <0 05 <0 05       Lines/Drains:  Invasive Devices  Report    Peripheral Intravenous Line            Peripheral IV 01/02/22 Distal;Left;Ventral (anterior) Forearm 3 days    Peripheral IV 01/02/22 Dorsal (posterior); Right Forearm 3 days                  Telemetry:  Telemetry Orders (From admission, onward)             48 Hour Telemetry Monitoring  Continuous x 48 hours        References:    Telemetry Guidelines   Question:  Reason for 48 Hour Telemetry  Answer:  Arrhythmias Requiring Medical Therapy (eg  SVT, Vtach/fib, Bradycardia, Uncontrolled A-fib)                 Telemetry Reviewed: Normal Sinus Rhythm  Indication for Continued Telemetry Use: Arrthymias requiring medical therapy             Imaging: Reviewed radiology reports from this admission including: chest xray    Recent Cultures (last 7 days):   Results from last 7 days   Lab Units 12/31/21  1037 12/31/21  1017   BLOOD CULTURE  No Growth After 5 Days  No Growth After 5 Days         Last 24 Hours Medication List:   Current Facility-Administered Medications   Medication Dose Route Frequency Provider Last Rate    acetaminophen  650 mg Oral Q6H PRN Lizzie Keen MD      albuterol  1 puff Inhalation 4x Daily Lizzie Keen MD      amiodarone  200 mg Oral Daily Lizzie Keen MD      aspirin  81 mg Oral Daily MD Dawson Cotton Pert atorvastatin  80 mg Oral Daily With Concepcion Mckeon MD      benzonatate  100 mg Oral TID PRN Patricia Corrigan MD      clopidogrel  75 mg Oral Daily Patricia Corrigan MD      fluticasone  2 spray Nasal Daily Patricia Corrigan MD      glycerin-hypromellose-  1 drop Both Eyes Q4H PRN Patricia Corrigan MD      guaiFENesin  600 mg Oral Q12H [address] Patricia Corrigan MD      isosorbide mononitrate  60 mg Oral Daily Patricia Corrigan MD      lidocaine  1 patch Topical Daily Patricia Corrigan MD      melatonin  6 mg Oral HS Patricia Corrigan MD      metoprolol succinate  100 mg Oral Daily Patricia Corrigan MD      metoprolol succinate  50 mg Oral QPM Patricia Corrigan MD      nitroglycerin  0 4 mg Sublingual Q5 Min PRN Patricia Corrigan MD      ondansetron  4 mg Intravenous Q4H PRN Patricia Corrigan MD      sodium chloride (PF)  3 mL Intravenous Q1H PRN Patricia Corrigan MD      tamsulosin  0 4 mg Oral Daily With Concepcion Mckeon MD          Today, Patient Was Seen By: Patricia Corrigan MD    **Please Note: This note may have been constructed using a voice recognition system  **

## 2022-01-06 VITALS
SYSTOLIC BLOOD PRESSURE: 131 MMHG | RESPIRATION RATE: 20 BRPM | BODY MASS INDEX: 29.57 KG/M2 | TEMPERATURE: 97.9 F | WEIGHT: 206.57 LBS | HEIGHT: 70 IN | OXYGEN SATURATION: 99 % | DIASTOLIC BLOOD PRESSURE: 74 MMHG | HEART RATE: 49 BPM

## 2022-01-06 LAB
ANION GAP SERPL CALCULATED.3IONS-SCNC: 7 MMOL/L (ref 4–13)
BUN SERPL-MCNC: 33 MG/DL (ref 5–25)
CALCIUM SERPL-MCNC: 8.3 MG/DL (ref 8.3–10.1)
CHLORIDE SERPL-SCNC: 100 MMOL/L (ref 100–108)
CO2 SERPL-SCNC: 27 MMOL/L (ref 21–32)
CREAT SERPL-MCNC: 1.21 MG/DL (ref 0.6–1.3)
GFR SERPL CREATININE-BSD FRML MDRD: 56 ML/MIN/1.73SQ M
GLUCOSE SERPL-MCNC: 110 MG/DL (ref 65–140)
POTASSIUM SERPL-SCNC: 4.8 MMOL/L (ref 3.5–5.3)
SODIUM SERPL-SCNC: 134 MMOL/L (ref 136–145)

## 2022-01-06 PROCEDURE — 94761 N-INVAS EAR/PLS OXIMETRY MLT: CPT

## 2022-01-06 PROCEDURE — 80048 BASIC METABOLIC PNL TOTAL CA: CPT | Performed by: INTERNAL MEDICINE

## 2022-01-06 PROCEDURE — 99239 HOSP IP/OBS DSCHRG MGMT >30: CPT | Performed by: INTERNAL MEDICINE

## 2022-01-06 RX ORDER — HYDROCODONE POLISTIREX AND CHLORPHENIRAMINE POLISTIREX 10; 8 MG/5ML; MG/5ML
5 SUSPENSION, EXTENDED RELEASE ORAL EVERY 12 HOURS PRN
Status: DISCONTINUED | OUTPATIENT
Start: 2022-01-06 | End: 2022-01-06 | Stop reason: HOSPADM

## 2022-01-06 RX ORDER — ISOSORBIDE MONONITRATE 60 MG/1
60 TABLET, EXTENDED RELEASE ORAL DAILY
Qty: 30 TABLET | Refills: 0 | Status: SHIPPED | OUTPATIENT
Start: 2022-01-07 | End: 2022-04-04

## 2022-01-06 RX ORDER — METOPROLOL SUCCINATE 100 MG/1
100 TABLET, EXTENDED RELEASE ORAL DAILY
Qty: 30 TABLET | Refills: 0 | Status: SHIPPED | OUTPATIENT
Start: 2022-01-07 | End: 2022-03-30 | Stop reason: SDUPTHER

## 2022-01-06 RX ORDER — METOPROLOL SUCCINATE 50 MG/1
50 TABLET, EXTENDED RELEASE ORAL EVERY EVENING
Qty: 30 TABLET | Refills: 0 | Status: SHIPPED | OUTPATIENT
Start: 2022-01-06 | End: 2022-03-30 | Stop reason: SDUPTHER

## 2022-01-06 RX ORDER — HYDROCODONE POLISTIREX AND CHLORPHENIRAMINE POLISTIREX 10; 8 MG/5ML; MG/5ML
5 SUSPENSION, EXTENDED RELEASE ORAL EVERY 12 HOURS PRN
Qty: 120 ML | Refills: 0 | Status: SHIPPED | OUTPATIENT
Start: 2022-01-06 | End: 2022-01-08

## 2022-01-06 RX ADMIN — ASPIRIN 81 MG: 81 TABLET, COATED ORAL at 07:47

## 2022-01-06 RX ADMIN — METOPROLOL SUCCINATE 100 MG: 50 TABLET, EXTENDED RELEASE ORAL at 07:47

## 2022-01-06 RX ADMIN — HEPARIN SODIUM 5000 UNITS: 5000 INJECTION INTRAVENOUS; SUBCUTANEOUS at 13:17

## 2022-01-06 RX ADMIN — ALBUTEROL SULFATE 1 PUFF: 90 AEROSOL, METERED RESPIRATORY (INHALATION) at 07:48

## 2022-01-06 RX ADMIN — ISOSORBIDE MONONITRATE 60 MG: 60 TABLET, EXTENDED RELEASE ORAL at 07:47

## 2022-01-06 RX ADMIN — BENZONATATE 100 MG: 100 CAPSULE ORAL at 13:17

## 2022-01-06 RX ADMIN — HYDROCODONE POLISTIREX AND CHLORPHENIRAMINE POLISTIREX 5 ML: 10; 8 SUSPENSION, EXTENDED RELEASE ORAL at 14:12

## 2022-01-06 RX ADMIN — ALBUTEROL SULFATE 1 PUFF: 90 AEROSOL, METERED RESPIRATORY (INHALATION) at 11:32

## 2022-01-06 RX ADMIN — GUAIFENESIN 600 MG: 600 TABLET, EXTENDED RELEASE ORAL at 07:46

## 2022-01-06 RX ADMIN — CLOPIDOGREL BISULFATE 75 MG: 75 TABLET ORAL at 07:47

## 2022-01-06 RX ADMIN — Medication 2 SPRAY: at 07:48

## 2022-01-06 RX ADMIN — AMIODARONE HYDROCHLORIDE 200 MG: 200 TABLET ORAL at 07:47

## 2022-01-06 RX ADMIN — HEPARIN SODIUM 5000 UNITS: 5000 INJECTION INTRAVENOUS; SUBCUTANEOUS at 05:00

## 2022-01-06 RX ADMIN — ACETAMINOPHEN 650 MG: 325 TABLET, FILM COATED ORAL at 05:41

## 2022-01-06 NOTE — UTILIZATION REVIEW
Initial Clinical Review    Admission: Date/Time/Statement:   Admission Orders (From admission, onward)     Ordered        12/31/21 1132  Inpatient Admission  Once                      Orders Placed This Encounter   Procedures    Inpatient Admission     Standing Status:   Standing     Number of Occurrences:   1     Order Specific Question:   Level of Care     Answer:   Med Surg [16]     Order Specific Question:   Estimated length of stay     Answer:   More than 2 Midnights     Order Specific Question:   Certification     Answer:   I certify that inpatient services are medically necessary for this patient for a duration of greater than two midnights  See H&P and MD Progress Notes for additional information about the patient's course of treatment  ED Arrival Information     Expected Arrival Acuity    - 12/31/2021 09:33 Emergent         Means of arrival Escorted by Service Admission type    Ambulance [facility] Emergency         Arrival complaint    Shortness of Breath        Chief Complaint   Patient presents with    Shortness of Breath     Pt reports increased SOB and cough, worst when laying down over the past 2 days  Pt denies sick contacts, SOB or fevers  Pt reports having Triple A surgery in october and has not felt good since then  Pt received 324mg Aspirin at urgent care  Initial Presentation:  [de-identified] yo male presented to ED from home via EMS as inpatient admission for (+) COVID-19  Patient c/o SOB cough MEDINA Chest pain  PMHx CAD s/p stenting CABG  AAA s/p repair , HTN,hyperlipidemia COVID vaccine x2  On exam bradycardia,tachypnea, decreased breath sounds throughout, (+) leg swelling    Plan IV diuretics, telemetry, decrease metoprolol O2 @ 1 L CN and supportive care    Date: 01-01-21 PT/OT eval  No rehab needs noted  Chest x-ray with small bilateral pleural effusions and probable left basilar atelectasis Likely secondary to COVID-19/CHF Continue IV diuretics,  on IV remdesivir and dexamethasone  Consult Cardiology   The patient is an 67-year-old male with extensive cardiac history  He has known CAD status post remote CABG in 1996  He had a recent cardiac catheterization in October of 2021  At that time he was found to have severe native 3 vessel disease with all of his saphenous vein grafts to be occluded  His left internal mammary artery to left anterior descending artery was patent  He did have stenting of the intermediate vessel at that time  Recent echocardiogram showed an ejection fraction of 40-45% with anterior septal and anterior apical hypokinesis consistent with an ischemic cardiomyopathy  He was found to have frequent nonsustained ventricular tachycardia on a Zio patch and for this reason was placed on amiodarone suppressive therapy  He also has hypertension and hyperlipidemia  The patient was admitted for  COVID pneumonia  He was noted to have increased lower extremity edema  being consulted for possible congestive heart failure  ProBNP is elevated 3609  This is in the face of a creatinine of 1 2  Does have some ongoing shortness of breath  Does have a cough productive of some scant phlegm Continue IV lasix fluid restriction and supportive care       Intake/Output Summary (Last 24 hours) at 1/1/2022 1513  Last data filed at 1/1/2022 0731      Gross per 24 hour   Intake 400 ml   Output 300 ml   Net 100 ml         ED Triage Vitals   Temperature Pulse Respirations Blood Pressure SpO2   12/31/21 0946 12/31/21 0939 12/31/21 0939 12/31/21 0939 12/31/21 0939   98 2 °F (36 8 °C) 56 (!) 48 (!) 230/112 99 %      Temp Source Heart Rate Source Patient Position - Orthostatic VS BP Location FiO2 (%)   12/31/21 0946 12/31/21 1038 12/31/21 0939 12/31/21 0939 --   Oral Monitor Lying Right arm       Pain Score       12/31/21 0939       No Pain          Wt Readings from Last 1 Encounters:   01/06/22 93 7 kg (206 lb 9 1 oz)     Additional Vital Signs:   Date/Time Temp Pulse Resp BP MAP (mmHg) SpO2 Calculated FIO2 (%) - Nasal Cannula Nasal Cannula O2 Flow Rate (L/min) O2 Device Patient Position - Orthostatic VS   01/01/22 14:36:21 97 2 °F (36 2 °C) Abnormal  57 20 112/66 81 100 % -- -- -- --   01/01/22 0900 -- -- -- -- -- -- -- -- None (Room air) --   01/01/22 07:31:33 -- 52 Abnormal  20 153/78 103 97 % -- -- -- --   12/31/21 21:11:24 97 1 °F (36 2 °C) Abnormal  51 Abnormal  18 120/60 80 98 % -- -- -- --   12/31/21 2100 -- -- -- -- -- -- -- -- None (Room air) --   12/31/21 1824 -- 61 -- -- -- -- -- -- -- --   12/31/21 15:48:52 97 2 °F (36 2 °C) Abnormal  48 Abnormal  17 158/72 101 99 % 24 1 L/min Nasal cannula Lying   12/31/21 1400 -- -- -- -- -- 98 % -- -- -- --   12/31/21 1310 -- -- -- 172/110 Abnormal  -- -- -- -- -- Sitting   12/31/21 13:03:03 -- 53 Abnormal  18 221/100 Abnormal  140 100 % -- -- -- --   12/31/21 1232 -- 47 Abnormal  52 Abnormal  185/84 Abnormal  -- 100 % 24 1 L/min Nasal cannula Lying   12/31/21 1209 -- 49 Abnormal  46 Abnormal  191/88 Abnormal  -- 100 % 24 1 L/min Nasal cannula Lying   12/31/21 1038 -- 48 Abnormal  40 Abnormal  148/69 -- 100 % 24 1 L/min Nasal cannula Lying     Pertinent Labs/Diagnostic Test Results:   Results from last 7 days   Lab Units 12/31/21  1027   SARS-COV-2  Positive*     Results from last 7 days   Lab Units 01/05/22  0522 01/04/22  0526 01/03/22  0606 01/02/22  0518 01/02/22  0518 01/01/22  0511 01/01/22  0511   WBC Thousand/uL 5 77 8 09 11 65*   < > 8 71   < > 5 39   HEMOGLOBIN g/dL 11 2* 11 3* 12 7  --  12 8  --  12 7   HEMATOCRIT % 34 4* 34 2* 39 0  --  38 3  --  38 1   PLATELETS Thousands/uL 135* 160 180   < > 158   < > 152   NEUTROS ABS Thousands/µL 4 38  --  9 51*  --  7 81*  --   --     < > = values in this interval not displayed           Results from last 7 days   Lab Units 01/06/22  0539 01/05/22  0522 01/04/22  0957 01/04/22  0526 01/03/22  0606 01/02/22  0518 01/02/22  0518   SODIUM mmol/L 134* 138  --  135* 138  --  136   POTASSIUM mmol/L 4 8 4 6 4 6 5 5* 4 4   < > 4 1   CHLORIDE mmol/L 100 101  --  102 101  --  99*   CO2 mmol/L 27 28  --  31 31  --  28   ANION GAP mmol/L 7 9  --  2* 6  --  9   BUN mg/dL 33* 34*  --  35* 32*  --  31*   CREATININE mg/dL 1 21 1 38*  --  1 35* 1 41*  --  1 55*   EGFR ml/min/1 73sq m 56 47  --  49 46  --  41   CALCIUM mg/dL 8 3 8 1*  --  7 8* 8 1*  --  8 4   MAGNESIUM mg/dL  --  2 1  --   --   --   --   --     < > = values in this interval not displayed  Results from last 7 days   Lab Units 01/01/22  0511 12/31/21  0944   AST U/L 31 44   ALT U/L 41 51   ALK PHOS U/L 108 127*   TOTAL PROTEIN g/dL 6 4 7 3   ALBUMIN g/dL 3 0* 3 5   TOTAL BILIRUBIN mg/dL 0 65 0 90     Results from last 7 days   Lab Units 01/06/22  0539 01/05/22  0522 01/04/22  0526 01/03/22  0606 01/02/22  0518 01/01/22  0511 12/31/21  0944   GLUCOSE RANDOM mg/dL 110 108 110 117 177* 189* 111     Results from last 7 days   Lab Units 01/02/22  1118 01/02/22  0905 01/02/22  0518 12/31/21  1207 12/31/21  0944   HS TNI 0HR ng/L  --   --  29  --  25   HS TNI 2HR ng/L  --  43  --  31  --    HSTNI D2 ng/L  --  14  --  6  --    HS TNI 4HR ng/L 54*  --   --   --   --    HSTNI D4 ng/L 25  --   --   --   --      Results from last 7 days   Lab Units 01/02/22  1222   D-DIMER QUANTITATIVE ug/ml FEU 1 76*     Results from last 7 days   Lab Units 01/04/22  1617 01/04/22  0526 01/03/22 2039 01/02/22 2029 01/02/22  1222 12/31/21  0944 12/31/21  0944   PROTIME seconds  --   --   --   --  13 3  --  12 4   INR   --   --   --   --  1 04  --  0 95   PTT seconds 46* 45* 166*   < > 28   < > 26    < > = values in this interval not displayed           Results from last 7 days   Lab Units 01/01/22  0511 12/31/21  1037   PROCALCITONIN ng/ml <0 05 <0 05     Results from last 7 days   Lab Units 12/31/21  1037   LACTIC ACID mmol/L 0 8             Results from last 7 days   Lab Units 12/31/21  0944   NT-PRO BNP pg/mL 3,609*     Results from last 7 days   Lab Units 01/01/22  0511 CRP mg/L 12 4*     Results from last 7 days   Lab Units 12/31/21  1029   CLARITY UA  Clear   COLOR UA  Yellow   SPEC GRAV UA  1 020   PH UA  7 0   GLUCOSE UA mg/dl Negative   KETONES UA mg/dl Negative   BLOOD UA  Trace-Intact*   PROTEIN UA mg/dl 30 (1+)*   NITRITE UA  Negative   BILIRUBIN UA  Negative   UROBILINOGEN UA E U /dl 1 0   LEUKOCYTES UA  Negative   WBC UA /hpf 0-1*   RBC UA /hpf None Seen   BACTERIA UA /hpf None Seen   EPITHELIAL CELLS WET PREP /hpf Occasional     Results from last 7 days   Lab Units 12/31/21  1027   INFLUENZA A PCR  Negative   INFLUENZA B PCR  Negative   RSV PCR  Negative     Results from last 7 days   Lab Units 12/31/21  1037 12/31/21  1017   BLOOD CULTURE  No Growth After 5 Days  No Growth After 5 Days  CXR 12-31-21  Small bilateral effusions with probable left basilar atelectasis  EKG 12-31-21  Sinus bradycardia with pvcs  Nonspecific T wave abnormality  Abnormal ECG    EKG   12-31-21  X 2   Marked sinus bradycardia  Nonspecific ST and T wave abnormality  Abnormal ECG        ED Treatment:   Medication Administration from 12/31/2021 0933 to 12/31/2021 1254       Date/Time Order Dose Route Action     12/31/2021 1016 nitroglycerin (NITROSTAT) SL tablet 0 4 mg 0 4 mg Sublingual Given     12/31/2021 1207 furosemide (LASIX) injection 40 mg 40 mg Intravenous Given        Past Medical History:   Diagnosis Date    AAA (abdominal aortic aneurysm) without rupture ([facility] )     has annual US to check    Angina pectoris ([facility] )     chronic stable angina, but it is mild and tolerates walking dogs and walking up flight os stairs without symptoms  Using Nitro no more than 2 times per mt      Ankle pain, left     Bilateral wrist pain     BPH with obstruction/lower urinary tract symptoms     Coronary artery disease     CPAP (continuous positive airway pressure) dependence     Elevated PSA     History of colon polyps     [place] (hard of hearing)     Slight    Hyperlipidemia     Hypertension     Ischemic cardiomyopathy     with apical aneurysm    Melanoma ([facility] ) 12/2016    Right side of neck    Myocardial infarction ([facility] ) 1996    x1    Pneumonia     x1 as a baby    Sleep apnea      Present on Admission:   Malignant melanoma of neck ([facility] )   Essential hypertension   Bradycardia   JORGE (obstructive sleep apnea)   BPH with obstruction/lower urinary tract symptoms   [facility]) [facility]   NSVT (nonsustained ventricular tachycardia) ([facility])   Anxiety   Acute respiratory insufficiency   COVID-19 infection      Admitting Diagnosis: CHF (congestive heart failure) ([facility]) [I50 9]  Respiratory distress [R06 03]  Shortness of breath [R06 02]  COVID-19 virus infection [U07 1]  Age/Sex: [de-identified] y o  male  Admission Orders:  Scheduled Medications:  albuterol, 1 puff, Inhalation, 4x Daily  amiodarone, 200 mg, Oral, Daily  aspirin, 81 mg, Oral, Daily  atorvastatin, 80 mg, Oral, Daily With Dinner  clopidogrel, 75 mg, Oral, Daily  fluticasone, 2 spray, Nasal, Daily  guaiFENesin, 600 mg, Oral, Q12H KWABENA  heparin (porcine), 5,000 Units, Subcutaneous, Q8H KWABENA  isosorbide mononitrate, 60 mg, Oral, Daily  lidocaine, 1 patch, Topical, Daily  melatonin, 6 mg, Oral, HS  metoprolol succinate, 100 mg, Oral, Daily  metoprolol succinate, 50 mg, Oral, QPM  tamsulosin, 0 4 mg, Oral, Daily With Dinner      Continuous IV Infusions:     PRN Meds:  acetaminophen, 650 mg, Oral, Q6H PRN  benzonatate, 100 mg, Oral, TID PRN  glycerin-hypromellose-, 1 drop, Both Eyes, Q4H PRN  hydrocodone-chlorpheniramine polistirex, 5 mL, Oral, Q12H PRN  nitroglycerin, 0 4 mg, Sublingual, Q5 Min PRN  ondansetron, 4 mg, Intravenous, Q4H PRN  sodium chloride (PF), 3 mL, Intravenous, Q1H PRN        IP CONSULT TO NUTRITION SERVICES  IP CONSULT TO CARDIOLOGY   Telemetry  SCD      Network Utilization Review Department  ATTENTION: Please call with any questions or concerns to [phone] and carefully listen to the prompts so that you are directed to the right person  All voicemails are confidential   Valeta Pastel all requests for admission clinical reviews, approved or denied determinations and any other requests to dedicated fax number below belonging to the campus where the patient is receiving treatment   List of dedicated fax numbers for the Facilities:  [address] DENIALS (Administrative/Medical Necessity) [phone]   1000  [facility] (Maternity/NICU/Pediatrics) [phone] [address]  [address] [facility] [place] [phone] [address] [place] [address] [facility] [facility] [phone]

## 2022-01-06 NOTE — PLAN OF CARE
Problem: PAIN - ADULT  Goal: Verbalizes/displays adequate comfort level or baseline comfort level  Description: Interventions:  - Encourage patient to monitor pain and request assistance  - Assess pain using appropriate pain scale  - Administer analgesics based on type and severity of pain and evaluate response  - Implement non-pharmacological measures as appropriate and evaluate response  - Consider cultural and social influences on pain and pain management  - Notify physician/advanced practitioner if interventions unsuccessful or patient reports new pain  Outcome: Progressing     Problem: INFECTION - ADULT  Goal: Absence or prevention of progression during hospitalization  Description: INTERVENTIONS:  - Assess and monitor for signs and symptoms of infection  - Monitor lab/diagnostic results  - Monitor all insertion sites, i e  indwelling lines, tubes, and drains  - Monitor endotracheal if appropriate and nasal secretions for changes in amount and color  - [place] appropriate cooling/warming therapies per order  - Administer medications as ordered  - Instruct and encourage patient and family to use good hand hygiene technique  - Identify and instruct in appropriate isolation precautions for identified infection/condition  Outcome: Progressing     Problem: SAFETY ADULT  Goal: Patient will remain free of falls  Description: INTERVENTIONS:  - Educate patient/family on patient safety including physical limitations  - Instruct patient to call for assistance with activity   - Consult OT/PT to assist with strengthening/mobility   - Keep Call bell within reach  - Keep bed low and locked with side rails adjusted as appropriate  - Keep care items and personal belongings within reach  - Initiate and maintain comfort rounds  - Make Fall Risk Sign visible to staff  - Offer Toileting every  Hours, in advance of need  - Initiate/Maintain alarm  - Obtain necessary fall risk management equipment:   - Apply yellow socks and bracelet for high fall risk patients  - Consider moving patient to room near nurses station  Outcome: Progressing  Goal: Maintain or return to baseline ADL function  Description: INTERVENTIONS:  -  Assess patient's ability to carry out ADLs; assess patient's baseline for ADL function and identify physical deficits which impact ability to perform ADLs (bathing, care of mouth/teeth, toileting, grooming, dressing, etc )  - Assess/evaluate cause of self-care deficits   - Assess range of motion  - Assess patient's mobility; develop plan if impaired  - Assess patient's need for assistive devices and provide as appropriate  - Encourage maximum independence but intervene and supervise when necessary  - Involve family in performance of ADLs  - Assess for home care needs following discharge   - Consider OT consult to assist with ADL evaluation and planning for discharge  - Provide patient education as appropriate  Outcome: Progressing  Goal: Maintains/Returns to pre admission functional level  Description: INTERVENTIONS:  - Perform BMAT or MOVE assessment daily    - Set and communicate daily mobility goal to care team and patient/family/caregiver  - Collaborate with rehabilitation services on mobility goals if consulted  - Perform Range of Motion  times a day  - Reposition patient every  hours    - Dangle patient  times a day  - Stand patient  times a day  - Ambulate times a day  - Out of bed to chair  times a day   - Out of bed for meals  times a day  - Out of bed for toileting  - Record patient progress and toleration of activity level   Outcome: Progressing     Problem: DISCHARGE PLANNING  Goal: Discharge to home or other facility with appropriate resources  Description: INTERVENTIONS:  - Identify barriers to discharge w/patient and caregiver  - Arrange for needed discharge resources and transportation as appropriate  - Identify discharge learning needs (meds, wound care, etc )  - Arrange for interpretive services to assist at discharge as needed  - Refer to Case Management Department for coordinating discharge planning if the patient needs post-hospital services based on physician/advanced practitioner order or complex needs related to functional status, cognitive ability, or social support system  Outcome: Progressing     Problem: Knowledge Deficit  Goal: Patient/family/caregiver demonstrates understanding of disease process, treatment plan, medications, and discharge instructions  Description: Complete learning assessment and assess knowledge base    Interventions:  - Provide teaching at level of understanding  - Provide teaching via preferred learning methods  Outcome: Progressing     Problem: CARDIOVASCULAR - ADULT  Goal: Maintains optimal cardiac output and hemodynamic stability  Description: INTERVENTIONS:  - Monitor I/O, vital signs and rhythm  - Monitor for S/S and trends of decreased cardiac output  - Administer and titrate ordered vasoactive medications to optimize hemodynamic stability  - Assess quality of pulses, skin color and temperature  - Assess for signs of decreased coronary artery perfusion  - Instruct patient to report change in severity of symptoms  Outcome: Progressing  Goal: Absence of cardiac dysrhythmias or at baseline rhythm  Description: INTERVENTIONS:  - Continuous cardiac monitoring, vital signs, obtain 12 lead EKG if ordered  - Administer antiarrhythmic and heart rate control medications as ordered  - Monitor electrolytes and administer replacement therapy as ordered  Outcome: Progressing     Problem: RESPIRATORY - ADULT  Goal: Achieves optimal ventilation and oxygenation  Description: INTERVENTIONS:  - Assess for changes in respiratory status  - Assess for changes in mentation and behavior  - Position to facilitate oxygenation and minimize respiratory effort  - Oxygen administered by appropriate delivery if ordered  - Initiate smoking cessation education as indicated  - Encourage broncho-pulmonary hygiene including cough, deep breathe, Incentive Spirometry  - Assess the need for suctioning and aspirate as needed  - Assess and instruct to report SOB or any respiratory difficulty  - Respiratory Therapy support as indicated  Outcome: Progressing     Problem: Potential for Falls  Goal: Patient will remain free of falls  Description: INTERVENTIONS:  - Educate patient/family on patient safety including physical limitations  - Instruct patient to call for assistance with activity   - Consult OT/PT to assist with strengthening/mobility   - Keep Call bell within reach  - Keep bed low and locked with side rails adjusted as appropriate  - Keep care items and personal belongings within reach  - Initiate and maintain comfort rounds  - Make Fall Risk Sign visible to staff  - Offer Toileting every  Hours, in advance of need  - Initiate/Maintain alarm  - Obtain necessary fall risk management equipment:   - Apply yellow socks and bracelet for high fall risk patients  - Consider moving patient to room near nurses station  Outcome: Progressing

## 2022-01-06 NOTE — CASE MANAGEMENT
Case Management Assessment & Discharge Planning Note    Patient name Casey Smith  [facility] Mia Hogan* MRN [id number]  : 1940 Date 2022       Current Admission Date: 2021  Current Admission Diagnosis:Acute coronary syndrome/unstable angina, to consider NSTEMI with ischemic related new left bundle branch block   Patient Active Problem List    Diagnosis Date Noted    [facility]ELLE [facility]) ([facility] ) 2022    Mixed hyperlipidemia     Anxiety 2021    Acute respiratory insufficiency 2021    COVID-19 infection 2021    Acute on chronic systolic (congestive) heart failure ([facility] ) 2021    Status post endovascular aneurysm repair (EVAR) 2021    NSVT (nonsustained ventricular tachycardia) ([facility] ) 10/15/2021    AAA (abdominal aortic aneurysm) ([facility] ) 10/11/2021    Left forearm pain 2019    Bronchospasm 2019    PLMD (periodic limb movement disorder) 2018    Obesity (BMI 30-39 9) 2018    BPH with obstruction/lower urinary tract symptoms 2018    Malignant melanoma of neck ([facility] ) 2017    Ischemic cardiomyopathy 2017    JORGE (obstructive sleep apnea) 2015    Bradycardia 2015    Impaired fasting glucose 2012    Acute coronary syndrome/unstable angina, to consider NSTEMI with ischemic related new left bundle branch block 2012    Hypercholesterolemia 2012    Essential hypertension 2012    Depression 2012    Elevated PSA 2012      LOS (days): 6  Geometric Mean LOS (GMLOS) (days): 5 40  Days to GMLOS:-0 7     OBJECTIVE:    Risk of Unplanned Readmission Score: 17         Current admission status: Inpatient       Preferred Pharmacy:   [facility] [address], [address],[place]  [address],[place]  [facility] [address]  Phone: [phone] Fax: [phone]    Primary Care Provider: Kaiden Alba MD    Primary Insurance: Elise MIGUEL  Secondary Insurance: ASSESSMENT:  Active Health Care Agents    There are no active Health Care Agents on file  DISCHARGE DETAILS:    Discharge planning discussed with[de-identified] Patient  Freedom of Choice: Yes        Were Treatment Team discharge recommendations reviewed with patient/caregiver?: Yes  Did patient/caregiver verbalize understanding of patient care needs?: Yes  Were patient/caregiver advised of the risks associated with not following Treatment Team discharge recommendations?: Yes    [address] [place] [address]         Is the patient interested in Trav Carr at discharge?: Yes  [address] requested[de-identified] [address] Name[de-identified] [facility] Provider[de-identified] PCP  Home Health Services Needed[de-identified] Gait/ADL Training,Strengthening/Theraputic Exercises to Improve Function,Evaluate Functional Status and Safety  Homebound Criteria Met[de-identified] Requires the Assistance of Another Person for Safe Ambulation or to Leave the Home,Uses an Assist Device (i e  cane, walker, etc)  Supporting Clincal Findings[de-identified] Limited Endurance    DME Referral Provided  Referral made for DME?: No    Other Referral/Resources/Interventions Provided:  Interventions: Trav Carr    Treatment Team Recommendation: Home with [address]  Discharge Destination Plan[de-identified] Home with [address]     IMM Given (Date):: 01/06/22  IMM Given to[de-identified] Patient  IMM reviewed with patient, patient agrees with discharge determination  Additional Comments: IMM reviewed  Verbal consent obtained  Signature page placed in MR bin for scanning    CM reviewed pt care coordination rounds with Dr He Kenney  Pt is medically cleared for d/c pending home O2  Eval  Pt is not qualified for O2  Will go home with [facility] following

## 2022-01-06 NOTE — PLAN OF CARE
Problem: PAIN - ADULT  Goal: Verbalizes/displays adequate comfort level or baseline comfort level  Description: Interventions:  - Encourage patient to monitor pain and request assistance  - Assess pain using appropriate pain scale  - Administer analgesics based on type and severity of pain and evaluate response  - Implement non-pharmacological measures as appropriate and evaluate response  - Consider cultural and social influences on pain and pain management  - Notify physician/advanced practitioner if interventions unsuccessful or patient reports new pain  1/6/2022 1628 by Phoebe Bernal RN  Outcome: Adequate for Discharge  1/6/2022 1018 by Phoebe Bernal RN  Outcome: Progressing     Problem: INFECTION - ADULT  Goal: Absence or prevention of progression during hospitalization  Description: INTERVENTIONS:  - Assess and monitor for signs and symptoms of infection  - Monitor lab/diagnostic results  - Monitor all insertion sites, i e  indwelling lines, tubes, and drains  - Monitor endotracheal if appropriate and nasal secretions for changes in amount and color  - [place] appropriate cooling/warming therapies per order  - Administer medications as ordered  - Instruct and encourage patient and family to use good hand hygiene technique  - Identify and instruct in appropriate isolation precautions for identified infection/condition  1/6/2022 1628 by Phoebe Bernal RN  Outcome: Adequate for Discharge  1/6/2022 1018 by Phoebe Bernal RN  Outcome: Progressing     Problem: SAFETY ADULT  Goal: Patient will remain free of falls  Description: INTERVENTIONS:  - Educate patient/family on patient safety including physical limitations  - Instruct patient to call for assistance with activity   - Consult OT/PT to assist with strengthening/mobility   - Keep Call bell within reach  - Keep bed low and locked with side rails adjusted as appropriate  - Keep care items and personal belongings within reach  - Initiate and maintain comfort rounds  - Make Fall Risk Sign visible to staff  - Offer Toileting every  Hours, in advance of need  - Initiate/Maintain alarm  - Obtain necessary fall risk management equipment:   - Apply yellow socks and bracelet for high fall risk patients  - Consider moving patient to room near nurses station  1/6/2022 1628 by Courtney Elliott RN  Outcome: Adequate for Discharge  1/6/2022 1018 by Courtney Elliott RN  Outcome: Progressing  Goal: Maintain or return to baseline ADL function  Description: INTERVENTIONS:  -  Assess patient's ability to carry out ADLs; assess patient's baseline for ADL function and identify physical deficits which impact ability to perform ADLs (bathing, care of mouth/teeth, toileting, grooming, dressing, etc )  - Assess/evaluate cause of self-care deficits   - Assess range of motion  - Assess patient's mobility; develop plan if impaired  - Assess patient's need for assistive devices and provide as appropriate  - Encourage maximum independence but intervene and supervise when necessary  - Involve family in performance of ADLs  - Assess for home care needs following discharge   - Consider OT consult to assist with ADL evaluation and planning for discharge  - Provide patient education as appropriate  1/6/2022 1628 by Courtney Elliott RN  Outcome: Adequate for Discharge  1/6/2022 1018 by Courtney Elliott RN  Outcome: Progressing  Goal: Maintains/Returns to pre admission functional level  Description: INTERVENTIONS:  - Perform BMAT or MOVE assessment daily    - Set and communicate daily mobility goal to care team and patient/family/caregiver  - Collaborate with rehabilitation services on mobility goals if consulted  - Perform Range of Motion  times a day  - Reposition patient every  hours    - Dangle patient  times a day  - Stand patient  times a day  - Ambulate patient  times a day  - Out of bed to chair times a day   - Out of bed for meals  times a day  - Out of bed for toileting  - Record patient progress and toleration of activity level   1/6/2022 1628 by Jackeline Lambert RN  Outcome: Adequate for Discharge  1/6/2022 1018 by Jackeline Lambert RN  Outcome: Progressing     Problem: DISCHARGE PLANNING  Goal: Discharge to home or other facility with appropriate resources  Description: INTERVENTIONS:  - Identify barriers to discharge w/patient and caregiver  - Arrange for needed discharge resources and transportation as appropriate  - Identify discharge learning needs (meds, wound care, etc )  - Arrange for interpretive services to assist at discharge as needed  - Refer to Case Management Department for coordinating discharge planning if the patient needs post-hospital services based on physician/advanced practitioner order or complex needs related to functional status, cognitive ability, or social support system  1/6/2022 1628 by Jackeline Lambert RN  Outcome: Adequate for Discharge  1/6/2022 1018 by Jackeline Lambert RN  Outcome: Progressing     Problem: Knowledge Deficit  Goal: Patient/family/caregiver demonstrates understanding of disease process, treatment plan, medications, and discharge instructions  Description: Complete learning assessment and assess knowledge base    Interventions:  - Provide teaching at level of understanding  - Provide teaching via preferred learning methods  1/6/2022 1628 by Jackeline Lambert RN  Outcome: Adequate for Discharge  1/6/2022 1018 by Jackeline Lambert RN  Outcome: Progressing     Problem: CARDIOVASCULAR - ADULT  Goal: Maintains optimal cardiac output and hemodynamic stability  Description: INTERVENTIONS:  - Monitor I/O, vital signs and rhythm  - Monitor for S/S and trends of decreased cardiac output  - Administer and titrate ordered vasoactive medications to optimize hemodynamic stability  - Assess quality of pulses, skin color and temperature  - Assess for signs of decreased coronary artery perfusion  - Instruct patient to report change in severity of symptoms  1/6/2022 1628 by Jackeline Lambert RN  Outcome: Adequate for Discharge  1/6/2022 1018 by Memo Maradiaga RN  Outcome: Progressing  Goal: Absence of cardiac dysrhythmias or at baseline rhythm  Description: INTERVENTIONS:  - Continuous cardiac monitoring, vital signs, obtain 12 lead EKG if ordered  - Administer antiarrhythmic and heart rate control medications as ordered  - Monitor electrolytes and administer replacement therapy as ordered  1/6/2022 1628 by Memo Maradiaga RN  Outcome: Adequate for Discharge  1/6/2022 1018 by Memo Maradiaga RN  Outcome: Progressing     Problem: RESPIRATORY - ADULT  Goal: Achieves optimal ventilation and oxygenation  Description: INTERVENTIONS:  - Assess for changes in respiratory status  - Assess for changes in mentation and behavior  - Position to facilitate oxygenation and minimize respiratory effort  - Oxygen administered by appropriate delivery if ordered  - Initiate smoking cessation education as indicated  - Encourage broncho-pulmonary hygiene including cough, deep breathe, Incentive Spirometry  - Assess the need for suctioning and aspirate as needed  - Assess and instruct to report SOB or any respiratory difficulty  - Respiratory Therapy support as indicated  1/6/2022 1628 by Memo Maradiaga RN  Outcome: Adequate for Discharge  1/6/2022 1018 by Memo Maradiaga RN  Outcome: Progressing     Problem: Potential for Falls  Goal: Patient will remain free of falls  Description: INTERVENTIONS:  - Educate patient/family on patient safety including physical limitations  - Instruct patient to call for assistance with activity   - Consult OT/PT to assist with strengthening/mobility   - Keep Call bell within reach  - Keep bed low and locked with side rails adjusted as appropriate  - Keep care items and personal belongings within reach  - Initiate and maintain comfort rounds  - Make Fall Risk Sign visible to staff  - Offer Toileting every  Hours, in advance of need  - Initiate/Maintain alarm  - Obtain necessary fall risk management equipment:   - Apply yellow socks and bracelet for high fall risk patients  - Consider moving patient to room near nurses station  1/6/2022 1628 by Phoebe Bernal RN  Outcome: Adequate for Discharge  1/6/2022 1018 by Phoebe Bernal RN  Outcome: Progressing

## 2022-01-06 NOTE — PLAN OF CARE
Problem: PAIN - ADULT  Goal: Verbalizes/displays adequate comfort level or baseline comfort level  Description: Interventions:  - Encourage patient to monitor pain and request assistance  - Assess pain using appropriate pain scale  - Administer analgesics based on type and severity of pain and evaluate response  - Implement non-pharmacological measures as appropriate and evaluate response  - Consider cultural and social influences on pain and pain management  - Notify physician/advanced practitioner if interventions unsuccessful or patient reports new pain  Outcome: Progressing     Problem: INFECTION - ADULT  Goal: Absence or prevention of progression during hospitalization  Description: INTERVENTIONS:  - Assess and monitor for signs and symptoms of infection  - Monitor lab/diagnostic results  - Monitor all insertion sites, i e  indwelling lines, tubes, and drains  - Monitor endotracheal if appropriate and nasal secretions for changes in amount and color  - [place] appropriate cooling/warming therapies per order  - Administer medications as ordered  - Instruct and encourage patient and family to use good hand hygiene technique  - Identify and instruct in appropriate isolation precautions for identified infection/condition  Outcome: Progressing     Problem: SAFETY ADULT  Goal: Patient will remain free of falls  Description: INTERVENTIONS:  - Educate patient/family on patient safety including physical limitations  - Instruct patient to call for assistance with activity   - Consult OT/PT to assist with strengthening/mobility   - Keep Call bell within reach  - Keep bed low and locked with side rails adjusted as appropriate  - Keep care items and personal belongings within reach  - Initiate and maintain comfort rounds  - Make Fall Risk Sign visible to staff  - Apply yellow socks and bracelet for high fall risk patients  - Consider moving patient to room near nurses station  Outcome: Progressing  Goal: Maintain or return to baseline ADL function  Description: INTERVENTIONS:  -  Assess patient's ability to carry out ADLs; assess patient's baseline for ADL function and identify physical deficits which impact ability to perform ADLs (bathing, care of mouth/teeth, toileting, grooming, dressing, etc )  - Assess/evaluate cause of self-care deficits   - Assess range of motion  - Assess patient's mobility; develop plan if impaired  - Assess patient's need for assistive devices and provide as appropriate  - Encourage maximum independence but intervene and supervise when necessary  - Involve family in performance of ADLs  - Assess for home care needs following discharge   - Consider OT consult to assist with ADL evaluation and planning for discharge  - Provide patient education as appropriate  Outcome: Progressing  Goal: Maintains/Returns to pre admission functional level  Description: INTERVENTIONS:  - Perform BMAT or MOVE assessment daily    - Set and communicate daily mobility goal to care team and patient/family/caregiver     - Collaborate with rehabilitation services on mobility goals if consulted  - Out of bed for toileting  - Record patient progress and toleration of activity level   Outcome: Progressing     Problem: DISCHARGE PLANNING  Goal: Discharge to home or other facility with appropriate resources  Description: INTERVENTIONS:  - Identify barriers to discharge w/patient and caregiver  - Arrange for needed discharge resources and transportation as appropriate  - Identify discharge learning needs (meds, wound care, etc )  - Arrange for interpretive services to assist at discharge as needed  - Refer to Case Management Department for coordinating discharge planning if the patient needs post-hospital services based on physician/advanced practitioner order or complex needs related to functional status, cognitive ability, or social support system  Outcome: Progressing     Problem: Knowledge Deficit  Goal: Patient/family/caregiver demonstrates understanding of disease process, treatment plan, medications, and discharge instructions  Description: Complete learning assessment and assess knowledge base    Interventions:  - Provide teaching at level of understanding  - Provide teaching via preferred learning methods  Outcome: Progressing     Problem: CARDIOVASCULAR - ADULT  Goal: Maintains optimal cardiac output and hemodynamic stability  Description: INTERVENTIONS:  - Monitor I/O, vital signs and rhythm  - Monitor for S/S and trends of decreased cardiac output  - Administer and titrate ordered vasoactive medications to optimize hemodynamic stability  - Assess quality of pulses, skin color and temperature  - Assess for signs of decreased coronary artery perfusion  - Instruct patient to report change in severity of symptoms  Outcome: Progressing  Goal: Absence of cardiac dysrhythmias or at baseline rhythm  Description: INTERVENTIONS:  - Continuous cardiac monitoring, vital signs, obtain 12 lead EKG if ordered  - Administer antiarrhythmic and heart rate control medications as ordered  - Monitor electrolytes and administer replacement therapy as ordered  Outcome: Progressing     Problem: RESPIRATORY - ADULT  Goal: Achieves optimal ventilation and oxygenation  Description: INTERVENTIONS:  - Assess for changes in respiratory status  - Assess for changes in mentation and behavior  - Position to facilitate oxygenation and minimize respiratory effort  - Oxygen administered by appropriate delivery if ordered  - Initiate smoking cessation education as indicated  - Encourage broncho-pulmonary hygiene including cough, deep breathe, Incentive Spirometry  - Assess the need for suctioning and aspirate as needed  - Assess and instruct to report SOB or any respiratory difficulty  - Respiratory Therapy support as indicated  Outcome: Progressing     Problem: Potential for Falls  Goal: Patient will remain free of falls  Description: INTERVENTIONS:  - Educate patient/family on patient safety including physical limitations  - Instruct patient to call for assistance with activity   - Consult OT/PT to assist with strengthening/mobility   - Keep Call bell within reach  - Keep bed low and locked with side rails adjusted as appropriate  - Keep care items and personal belongings within reach  - Initiate and maintain comfort rounds  - Make Fall Risk Sign visible to staff  - Apply yellow socks and bracelet for high fall risk patients  - Consider moving patient to room near nurses station  Outcome: Progressing

## 2022-01-06 NOTE — RESPIRATORY THERAPY NOTE
Home Oxygen Qualifying Test       Patient name: Mirna Callejas        : 1940   Date of Test:  2022  Diagnosis:      Home Oxygen Test:    **Medicare Guidelines require item(s) 1-5 on all ambulatory patients or 1 and 2 on non-ambulatory patients  1   Baseline SPO2 on Room Air at rest 97 %  2   SPO2 during exercise on Room Air 92 %  During exercise monitor SpO2  If SPO2 increases >=89% with ambulation do not add supplemental             oxygen  If <= 88% on room air add O2 via NC and titrate patient  Patient must be ambulated with O2 and titrated to > 88% with exertion  3   SPO2 on Oxygen at rest n/a% n/a lpm     4   SPO2 during exercise on Oxygen  N/a % a liter flow of n/a lpm     5   Exercise performed:   Walk unassisted x 70 feet           []  Supplemental Home Oxygen is indicated  [x]  Client does not qualify for home oxygen        Respiratory Additional Notes-    Godfrey Santamaria, RT

## 2022-01-06 NOTE — NURSING NOTE
Patient on 1 L for comfort  No acute distress or SOB noted, but patient states having subjective WOB due to coughing  Sophy Pore with RT notified of home O2 eval order      Ramone Canela RN 1/6/2022 11:36 AM

## 2022-01-06 NOTE — DISCHARGE INSTRUCTIONS

## 2022-01-07 ENCOUNTER — TRANSITIONAL CARE MANAGEMENT (OUTPATIENT)
Dept: INTERNAL MEDICINE CLINIC | Facility: CLINIC | Age: 82
End: 2022-01-07

## 2022-01-07 NOTE — ASSESSMENT & PLAN NOTE
· Patient hypertensive upon arrival    · Improved  · Continue metoprolol succinate 100 mg in the morning and 50 mg in the evening  · Continue to hold lisinopril on discharge, follow up with Cardiology and primary care doctor to restart if blood pressure allows

## 2022-01-07 NOTE — ASSESSMENT & PLAN NOTE
· Status post recent EVAR at [facility] THE [facility] October 2021

## 2022-01-07 NOTE — ASSESSMENT & PLAN NOTE
· CAD with history of CABG 1996 and recent KYE to ramus in October 2021 at [facility] THE [facility] and follows with [facility] cardiology  · Evaluated by Cardiology, appreciate recommendations    Suspect minimal troponin elevation left arm pain is type 2 MI in the setting of severe multivessel CAD, CHF, COVID  · Increased isosorbide mononitrate to 60 mg daily  · Continue aspirin, Plavix, metoprolol succinate 100 mg the morning and 50 mg in the evening, statin  · Echo showed ejection fraction 45%   · Chest pain resolved    Lab Results   Component Value Date    HSTNI0 29 01/02/2022    HSTNI2 43 01/02/2022    HSTNI4 54 ([facility]) 01/02/2022

## 2022-01-07 NOTE — ASSESSMENT & PLAN NOTE
· COVID positive on 12/31  · Double vaccinated   · Status post remdesivir 5 days  · On room air, does not qualify for home oxygen    Lab Results   Component Value Date    SARSCOV2 Positive (A) 12/31/2021   Dexamethasone discontinued

## 2022-01-07 NOTE — ASSESSMENT & PLAN NOTE
This is an 30-year-old male with history of CAD status post CABG and recent stenting, AAA status post repair, BPH, hypertension presented with dyspnea      · Secondary to COVID-19 and CHF, resolved  · Patient has been on room air for more than 48 hours

## 2022-01-07 NOTE — ASSESSMENT & PLAN NOTE
Wt Readings from Last 3 Encounters:   01/06/22 93 7 kg (206 lb 9 1 oz)   12/31/21 93 kg (205 lb)   12/16/21 93 kg (205 lb)     Results from last 7 days   Lab Units 12/31/21  0944   NT-PRO BNP pg/mL 3,609*     · Most recent echocardiogram LVEF 45% at [facility], repeat echo showed ejection fraction of 45% and grade 1 diastolic dysfunction  · Patient presented with worsening lower extremity edema shortness of breath, BMP 3609  · Not on maintenance diuretics  · Initially started on Lasix 20 mg IV b i d  which was held due to acute kidney injury  · On discharge patient is euvolemic, follow up with outpatient cardiology to initiate diuretics if indicated

## 2022-01-07 NOTE — DISCHARGE SUMMARY
[facility]  Discharge- Arben Harper 1940, [de-identified] y o  male MRN: [id number]  Unit/Bed#: [place] [address] [id number] Encounter: [id number]  Primary Care Provider: Tara Sanchez MD   Date and time admitted to hospital: 12/31/2021  9:33 AM    * Acute coronary syndrome/unstable angina, to consider NSTEMI with ischemic related new left bundle branch block  Assessment & Plan  · CAD with history of CABG 1996 and recent KYE to ramus in October 2021 at [address] and follows with [facility] cardiology  · Evaluated by Cardiology, appreciate recommendations    Suspect minimal troponin elevation left arm pain is type 2 MI in the setting of severe multivessel CAD, CHF, COVID  · Increased isosorbide mononitrate to 60 mg daily  · Continue aspirin, Plavix, metoprolol succinate 100 mg the morning and 50 mg in the evening, statin  · Echo showed ejection fraction 45%   · Chest pain resolved    Lab Results   Component Value Date    HSTNI0 29 01/02/2022    HSTNI2 43 01/02/2022    HSTNI4 54 ([place]) 01/02/2022         Acute on chronic systolic (congestive) heart failure (HCC)  Assessment & Plan  Wt Readings from Last 3 Encounters:   01/06/22 93 7 kg (206 lb 9 1 oz)   12/31/21 93 kg (205 lb)   12/16/21 93 kg (205 lb)     Results from last 7 days   Lab Units 12/31/21  0944   NT-PRO BNP pg/mL 3,609*     · Most recent echocardiogram LVEF 45% at [facility], repeat echo showed ejection fraction of 45% and grade 1 diastolic dysfunction  · Patient presented with worsening lower extremity edema shortness of breath, BMP 3609  · Not on maintenance diuretics  · Initially started on Lasix 20 mg IV b i d  which was held due to acute kidney injury  · On discharge patient is euvolemic, follow up with outpatient cardiology to initiate diuretics if indicated    DANELLE (acute kidney injury) ([facility] [address] )  Assessment & Plan  · Baseline around 1 1-1 2, creatinine peaked at 1 55  · Creatinine improved to 1 21      COVID-19 infection  Assessment & Plan  · COVID positive on 12/31  · Double vaccinated   · Status post remdesivir 5 days  · On room air, does not qualify for home oxygen    Lab Results   Component Value Date    SARSCOV2 Positive (A) 12/31/2021   Dexamethasone discontinued       Acute respiratory insufficiency  Assessment & Plan  This is an 41-year-old male with history of CAD status post CABG and recent stenting, AAA status post repair, BPH, hypertension presented with dyspnea  · Secondary to COVID-19 and CHF, resolved  · Patient has been on room air for more than 48 hours    Anxiety  Assessment & Plan  · No longer on sertraline  Patient using relaxation techniques    NSVT (nonsustained ventricular tachycardia) ([facility])  Assessment & Plan  · Continue home amiodarone      AAA (abdominal aortic aneurysm) ([facility] [address] )  Assessment & Plan  · Status post recent EVAR at [address] October 2021    BPH with obstruction/lower urinary tract symptoms  Assessment & Plan  · Continue tamsulosin    JORGE (obstructive sleep apnea)  Assessment & Plan  · His CPAP was recalled  · Family will call Sleep Medicine for further instructions    Essential hypertension  Assessment & Plan  · Patient hypertensive upon arrival    · Improved  · Continue metoprolol succinate 100 mg in the morning and 50 mg in the evening  · Continue to hold lisinopril on discharge, follow up with Cardiology and primary care doctor to restart if blood pressure allows      Medical Problems             Resolved Problems  Date Reviewed: 1/6/2022    None              Discharging Physician / Practitioner: Dereck Yousif MD  PCP: Tiffanie Tierney MD  Admission Date:   Admission Orders (From admission, onward)     Ordered        12/31/21 1132  Inpatient Admission  Once                      Discharge Date: 01/06/2022    Consultations During Hospital Stay:  · Cardiology    Procedures Performed:   · None    Significant Findings / Test Results:   · Chest x-ray 12/31/21  IMPRESSION:  Small bilateral effusions with probable left basilar atelectasis  · Echo 01/03/2022  Interpretation Summary         Left Ventricle: Left ventricular cavity size is normal  The left ventricular ejection fraction is 45%  Systolic function is mildly reduced  There is mild to moderate concentric hypertrophy  Diastolic function is mildly abnormal, consistent with grade I (abnormal) relaxation    Mitral Valve: There is mild annular calcification    The following segments are akinetic: apical anterior, apical inferior, apical lateral and apex    Other segments could not be evaluated    Tricuspid Valve: There is mild regurgitation    Incidental Findings:   · none    Test Results Pending at Discharge (will require follow up):   · none     Outpatient Tests Requested:  · none    Complications:  none    Reason for Admission:  Heart failure, cold    Hospital Course:   Casey Smith is a [de-identified] y o  male patient who originally presented to the hospital on 12/31/2021 due to shortness of breath secondary to CHF exacerbation and COVID-19  Patient has extensive cardiac history followed by [facility]  He has a remote history of CABG in 1996, recent cardiac catheterization in October 2021 he has severe native three-vessel disease, all of his saphenous vein grafts are occluded, left internal mammary artery to left anterior descending artery was patent, stenting to intermediate vessel  Patient was started on Lasix 20 mg IV b i d  Rapid response was called due to chest discomfort and left arm pain which was relieved by sublingual nitro  He was noted to have ST elevation on the monitor and he was moved to intensive care unit  Re-evaluated by Cardiology  Strips showed LBBB  He had minimally elevated troponin  Started on a heparin infusion  Suspect per Cardiology minimal troponin elevation is type 2 MI in the setting of known severe multivessel CAD, heart failure and COVID  Isosorbide mononitrate was increased from 30 to 60 mg  Heparin drip was discontinued  No recurrent chest pain  Continue aspirin, Plavix, metoprolol, statin  No further inpatient Cardiology workup indicated  Not on maintenance diuretics  Patient will follow-up with [facility] for further management  Lisinopril was discontinued at this admission due to borderline low blood pressure  Continue metoprolol and amiodarone due to history of NSVT  Follow-up with Cardiology and primary care doctor when blood pressure improves to restart lisinopril  Patient was evaluated by respiratory therapy, not a candidate for home oxygen  Please see above list of diagnoses and related plan for additional information  Condition at Discharge: good    Discharge Day Visit / Exam:   * Please refer to separate progress note for these details *    Discussion with Family: Updated  (wife) via phone  Discharge instructions/Information to patient and family:   See after visit summary for information provided to patient and family  Provisions for Follow-Up Care:  See after visit summary for information related to follow-up care and any pertinent home health orders  Disposition:   Home with [facility] Services (Reminder: Complete face to face encounter)    Planned Readmission: no     Discharge Statement:  I spent 45 minutes discharging the patient  This time was spent on the day of discharge  I had direct contact with the patient on the day of discharge  Greater than 50% of the total time was spent examining patient, answering all patient questions, arranging and discussing plan of care with patient as well as directly providing post-discharge instructions  Additional time then spent on discharge activities  Discharge Medications:  See after visit summary for reconciled discharge medications provided to patient and/or family        **Please Note: This note may have been constructed using a voice recognition system**

## 2022-01-08 RX ORDER — HYDROCODONE POLISTIREX AND CHLORPHENIRAMINE POLISTIREX 10; 8 MG/5ML; MG/5ML
5 SUSPENSION, EXTENDED RELEASE ORAL EVERY 12 HOURS PRN
Qty: 120 ML | Refills: 0 | Status: SHIPPED | OUTPATIENT
Start: 2022-01-08 | End: 2022-01-18

## 2022-01-10 ENCOUNTER — TELEPHONE (OUTPATIENT)
Dept: INTERNAL MEDICINE CLINIC | Facility: CLINIC | Age: 82
End: 2022-01-10

## 2022-01-10 NOTE — TELEPHONE ENCOUNTER
Ilia Horner from [address] [place] [address] called to stated that after reviewing the patient's medications, it is showing that there is a Duplicate Therapy with Imdur and Nitro    Patient was D/C on 1/6/22

## 2022-01-19 ENCOUNTER — TELEPHONE (OUTPATIENT)
Dept: INTERNAL MEDICINE CLINIC | Facility: CLINIC | Age: 82
End: 2022-01-19

## 2022-01-19 NOTE — TELEPHONE ENCOUNTER
FYI:Evi from [facility] is calling to state patient is being D/C 01/21/2022 he is doing very well  She states there is no need for him to have the scheduled visit for next week

## 2022-01-21 ENCOUNTER — OFFICE VISIT (OUTPATIENT)
Dept: SLEEP CENTER | Facility: CLINIC | Age: 82
End: 2022-01-21
Payer: COMMERCIAL

## 2022-01-21 VITALS
HEIGHT: 70 IN | WEIGHT: 206 LBS | BODY MASS INDEX: 29.49 KG/M2 | SYSTOLIC BLOOD PRESSURE: 186 MMHG | HEART RATE: 56 BPM | DIASTOLIC BLOOD PRESSURE: 91 MMHG

## 2022-01-21 DIAGNOSIS — G47.33 OSA (OBSTRUCTIVE SLEEP APNEA): Primary | ICD-10-CM

## 2022-01-21 DIAGNOSIS — I25.5 ISCHEMIC CARDIOMYOPATHY: ICD-10-CM

## 2022-01-21 DIAGNOSIS — I10 ESSENTIAL HYPERTENSION: ICD-10-CM

## 2022-01-21 DIAGNOSIS — Z71.89 CPAP USE COUNSELING: ICD-10-CM

## 2022-01-21 DIAGNOSIS — I25.10 CORONARY ARTERY DISEASE INVOLVING NATIVE CORONARY ARTERY OF NATIVE HEART WITHOUT ANGINA PECTORIS: ICD-10-CM

## 2022-01-21 DIAGNOSIS — E66.3 OVERWEIGHT (BMI 25.0-29.9): ICD-10-CM

## 2022-01-21 DIAGNOSIS — G47.61 PERIODIC LIMB MOVEMENTS OF SLEEP: ICD-10-CM

## 2022-01-21 PROCEDURE — 1111F DSCHRG MED/CURRENT MED MERGE: CPT | Performed by: INTERNAL MEDICINE

## 2022-01-21 PROCEDURE — 1160F RVW MEDS BY RX/DR IN RCRD: CPT | Performed by: INTERNAL MEDICINE

## 2022-01-21 PROCEDURE — 3080F DIAST BP >= 90 MM HG: CPT | Performed by: INTERNAL MEDICINE

## 2022-01-21 PROCEDURE — 3077F SYST BP >= 140 MM HG: CPT | Performed by: INTERNAL MEDICINE

## 2022-01-21 PROCEDURE — 99214 OFFICE O/P EST MOD 30 MIN: CPT | Performed by: INTERNAL MEDICINE

## 2022-01-21 PROCEDURE — 1036F TOBACCO NON-USER: CPT | Performed by: INTERNAL MEDICINE

## 2022-01-21 RX ORDER — FUROSEMIDE 20 MG/1
20 TABLET ORAL
COMMUNITY
Start: 2022-01-11 | End: 2023-01-11

## 2022-01-21 NOTE — PROGRESS NOTES
Follow-Up Note - Sleep Center   Brianda Montoya  [de-identified] y o  male  :1940  RAX:[id number]  IMX:    CC: I saw this patient for follow-up in clinic today for Sleep disordered breathing, Coexisting Sleep and Medical Problems  He is using a dream Station 1 machine and discontinued use upon hearing about the recall  He is here accompanied by his wife  States his daughter registered the machine with [employer] but he is asking for a prescription for a replacement  Results of prior studies in : The diagnostic study demonstrated an RDI of 11 per hour, considerably higher during REM at 33 per hour  Minimum oxygen saturation was 71% any spent 2% of the study with saturations less than 90%  During the subsequent therapeutic study, sleep disordered breathing was adequately remediated with nasal CPAP at 12 cm H2O  There were persistent moderate to severe periodic limb movements of sleep      PFSH, Problem List, Medications & Allergies were reviewed in EMR  Interval changes:  Recent hospitalization because of ground re artery disease for which she required a stent and repair of abdominal aortic aneurysm  He  has a past medical history of AAA (abdominal aortic aneurysm) without rupture ([address] ), Angina pectoris ([address] ), Ankle pain, left, Bilateral wrist pain, BPH with obstruction/lower urinary tract symptoms, Coronary artery disease, CPAP (continuous positive airway pressure) dependence, Elevated PSA, History of colon polyps, [place] (hard of hearing), Hyperlipidemia, Hypertension, Ischemic cardiomyopathy, Melanoma ([address] ) (2016), Myocardial infarction ([facility] ) (), Pneumonia, and Sleep apnea  He has a current medication list which includes the following prescription(s): albuterol, amiodarone, aspirin, atorvastatin, clopidogrel, fluticasone, furosemide, isosorbide mononitrate, metoprolol succinate, metoprolol succinate, nitroglycerin, and tamsulosin      PHYSIOLOGICAL DATA REVIEW AND INTERPRETATION:  discontinued use of PAP ; Patient has been using ozone based devices to sanitize the machine  SUBJECTIVE: Regarding use of PAP, Vidya Alvarez reports:   · He is experiencing no  adverse effects: ; has not noticed any fibres or foreign material in air line  · He is benefiting from use: sleeping better when using CPAP  · Periodic limb movements are not disturbing sleep  Sleep Routine: Vidya Alvarez reports getting 8 hrs sleep  ; he has no difficulty initiating, but reports diffculty maintaining sleep   He arises spontaneously and never feels rested  Vidya Alvarez reports episodic  Excessive Daytime Sleepiness, and may nap occasionally  He rated himself at Total score: 3 /24 on the [place] Sleepiness Scale  Habits: reports that he has never smoked  He has never used smokeless tobacco ,  reports current alcohol use ,  reports no history of drug use , Caffeine use: limited , Exercise routine: regular    ROS: reviewed & as attached  Significant for some weight reduction  EXAM: BP (!) 186/91   Pulse 56   Ht 5' 10" (1 778 m)   Wt 93 4 kg (206 lb)   BMI 29 56 kg/m²     Patient is well groomed; well appearing  CNS: Alert, orientated, clear & coherent speech  Psych: cooperativeand in no distress  Mental state:  Appears confused and could not recall details of when he got his machine or exactly what he is using  H&N: EOMI; NC/AT:no facial pressure marks, no rashes  Skin/Extrem: col & hydration normal; no edema  Resp: Respiratory effort is normal  Physical findings otherwise essentially unchanged from previous  IMPRESSION: Problem List Items & Comorbidities Addressed this Visit    1  JORGE (obstructive sleep apnea)  Cpap DME   2  CPAP use counseling     3  Periodic limb movements of sleep     4  Essential hypertension     5  Coronary artery disease involving native coronary artery of native heart without angina pectoris     6  Ischemic cardiomyopathy     7  Overweight (BMI 25 0-29  9)         PLAN:  I reviewed results of prior studies and physiologic data with the patient  Notified of [employer] devices recall and their recommendation to discontinue use  Risks of discontinuing PAP vs continuing while awaiting resolution were explained and patient indicates understanding  I discussed treatment options with risks and benefits  Instructed  to register machine with [facility] [employer] & [facility] on [address]  Patient elected to discontinue using the dream Station 1 machine Pap while awaiting remediation  Instructed not to use ozone based or other unapproved  cleaning devices  At patient's request, I provided a prescription for replacement machine  He understands he may have to pay out of pocket and may need to shop around to find 1  Pressure settin cmH2O  Rx provided to replace  supplies and Care coordinated with DME provider  No medication is needed for the periodic limb movements of sleep  Discussed strategies for weight reduction  Follow-up is advised in 1 year or sooner if needed to monitor progress, compliance and to adjust therapy  Thank you for allowing me to participate in the care of this patient  Sincerely,    Authenticated electronically by Tomas Kidd MD on    Board Certified Specialist     Portions of the record may have been created with voice recognition software  Occasional wrong word or "sound a like" substitutions may have occurred due to the inherent limitations of voice recognition software  There may also be notations and random deletions of words or characters from malfunctioning software  Read the chart carefully and recognize, using context, where substitutions/deletions have occurred

## 2022-01-21 NOTE — PATIENT INSTRUCTIONS

## 2022-01-24 ENCOUNTER — TELEPHONE (OUTPATIENT)
Dept: SLEEP CENTER | Facility: CLINIC | Age: 82
End: 2022-01-24

## 2022-03-30 DIAGNOSIS — I25.5 ISCHEMIC CARDIOMYOPATHY: ICD-10-CM

## 2022-03-30 RX ORDER — METOPROLOL SUCCINATE 100 MG/1
100 TABLET, EXTENDED RELEASE ORAL DAILY
Qty: 30 TABLET | Refills: 0 | Status: SHIPPED | OUTPATIENT
Start: 2022-03-30 | End: 2022-06-03

## 2022-03-30 RX ORDER — METOPROLOL SUCCINATE 50 MG/1
50 TABLET, EXTENDED RELEASE ORAL EVERY EVENING
Qty: 30 TABLET | Refills: 0 | Status: SHIPPED | OUTPATIENT
Start: 2022-03-30 | End: 2022-04-29

## 2022-04-04 ENCOUNTER — OFFICE VISIT (OUTPATIENT)
Dept: INTERNAL MEDICINE CLINIC | Facility: CLINIC | Age: 82
End: 2022-04-04
Payer: COMMERCIAL

## 2022-04-04 VITALS
HEIGHT: 70 IN | DIASTOLIC BLOOD PRESSURE: 60 MMHG | RESPIRATION RATE: 20 BRPM | OXYGEN SATURATION: 94 % | SYSTOLIC BLOOD PRESSURE: 134 MMHG | BODY MASS INDEX: 30.43 KG/M2 | HEART RATE: 82 BPM | TEMPERATURE: 97.8 F | WEIGHT: 212.6 LBS

## 2022-04-04 DIAGNOSIS — E87.5 HYPERKALEMIA: ICD-10-CM

## 2022-04-04 DIAGNOSIS — N13.8 BPH WITH OBSTRUCTION/LOWER URINARY TRACT SYMPTOMS: ICD-10-CM

## 2022-04-04 DIAGNOSIS — I71.4 ABDOMINAL AORTIC ANEURYSM (AAA) WITHOUT RUPTURE (HCC): ICD-10-CM

## 2022-04-04 DIAGNOSIS — N40.1 BPH WITH OBSTRUCTION/LOWER URINARY TRACT SYMPTOMS: ICD-10-CM

## 2022-04-04 DIAGNOSIS — G47.33 OSA (OBSTRUCTIVE SLEEP APNEA): Primary | ICD-10-CM

## 2022-04-04 DIAGNOSIS — I10 ESSENTIAL HYPERTENSION: ICD-10-CM

## 2022-04-04 DIAGNOSIS — I25.5 ISCHEMIC CARDIOMYOPATHY: ICD-10-CM

## 2022-04-04 DIAGNOSIS — I47.2 NSVT (NONSUSTAINED VENTRICULAR TACHYCARDIA) (HCC): ICD-10-CM

## 2022-04-04 DIAGNOSIS — J98.01 BRONCHOSPASM: ICD-10-CM

## 2022-04-04 PROCEDURE — 1036F TOBACCO NON-USER: CPT | Performed by: INTERNAL MEDICINE

## 2022-04-04 PROCEDURE — 1101F PT FALLS ASSESS-DOCD LE1/YR: CPT | Performed by: INTERNAL MEDICINE

## 2022-04-04 PROCEDURE — 3725F SCREEN DEPRESSION PERFORMED: CPT | Performed by: INTERNAL MEDICINE

## 2022-04-04 PROCEDURE — 1160F RVW MEDS BY RX/DR IN RCRD: CPT | Performed by: INTERNAL MEDICINE

## 2022-04-04 PROCEDURE — 3075F SYST BP GE 130 - 139MM HG: CPT | Performed by: INTERNAL MEDICINE

## 2022-04-04 PROCEDURE — 3288F FALL RISK ASSESSMENT DOCD: CPT | Performed by: INTERNAL MEDICINE

## 2022-04-04 PROCEDURE — 3078F DIAST BP <80 MM HG: CPT | Performed by: INTERNAL MEDICINE

## 2022-04-04 PROCEDURE — 99214 OFFICE O/P EST MOD 30 MIN: CPT | Performed by: INTERNAL MEDICINE

## 2022-04-04 RX ORDER — ISOSORBIDE MONONITRATE 30 MG/1
30 TABLET, EXTENDED RELEASE ORAL DAILY
COMMUNITY
Start: 2022-02-25

## 2022-04-04 RX ORDER — ALBUTEROL SULFATE 90 UG/1
1 AEROSOL, METERED RESPIRATORY (INHALATION) EVERY 6 HOURS PRN
Qty: 8 G | Refills: 1 | Status: SHIPPED | OUTPATIENT
Start: 2022-04-04 | End: 2022-07-11

## 2022-04-04 RX ORDER — FUROSEMIDE 40 MG/1
40 TABLET ORAL DAILY
COMMUNITY
Start: 2022-03-01

## 2022-04-05 NOTE — PROGRESS NOTES
INTERNAL MEDICINE OFFICE VISIT  [facility] Internal Medicine- [place]    NAME: Art Sharma  AGE: [de-identified] y o  SEX: male    DATE OF ENCOUNTER: 4/5/2022    Assessment and Plan     1  JORGE (obstructive sleep apnea)  -his CPAP device was recently recalled  -he requests a new script for CPAP machine  -he is established with sleep medicine  I believe a script for a new CPAP machine may have already been placed  Will clarify with the Sleep Medicine office staff    2  Essential hypertension  -improved on recheck  -currently on Toprol- mg in the morning, 50 mg in the evening, Imdur 30 mg daily    3  Ischemic cardiomyopathy  -history of CAD status post CABG in 1996, status post PCI to [facility] in 2006, PCI to ramus intermedius October 2021  -hospital admission in October 2021 for shortness of breath - status post catheterization and KYE to intermediate ramus as above  -AAA discovered this admission as below    4  NSVT (nonsustained ventricular tachycardia) ([facility] )  -had Zio patch monitoring completed November 2021 - 69 episodes of an SVT, longest lasting 26 seconds, PVC burden 3 4%  -Mildly reduced LV function, not felt to be a candidate for ICD at this time  -remains on beta-blocker    5  BPH with obstruction/lower urinary tract symptoms  -continue with tamsulosin, stable    6  Hyperkalemia  -most recent potassium of 5 9 on BMP requested by Cardiology  -lisinopril was reportedly discontinued  -renal function has remained stable, baseline around 1 3-1 5    7  Bronchospasm  - albuterol (PROVENTIL HFA,VENTOLIN HFA) 90 mcg/act inhaler; Inhale 1 puff every 6 (six) hours as needed for wheezing  Dispense: 8 g; Refill: 1    8  Abdominal aortic aneurysm (AAA) without rupture ([facility] )  -status post endovascular repair of abdominal aortic aneurysm in October 2021  -Continue f/u with vascular surgery  -Attention to BP control             No orders of the defined types were placed in this encounter        Chief Complaint     Chief Complaint Patient presents with    Follow-up    Hypertension       History of Present Illness     Kathya Quick comes in today for follow up    Past medical history of hypertension, CAD status post CABG, ischemic cardiomyopathy ( most recent EF 45%), hypertension, hyperlipidemia, AAA    The following portions of the patient's history were reviewed and updated as appropriate: allergies, current medications, past family history, past medical history, past social history, past surgical history and problem list     Review of Systems     10 point ROS negative except per HPI    Active Problem List     Patient Active Problem List   Diagnosis    Acute coronary syndrome/unstable angina, to consider NSTEMI with ischemic related new left bundle branch block    Hypercholesterolemia    Essential hypertension    Impaired fasting glucose    Malignant melanoma of neck ([address] )    JORGE (obstructive sleep apnea)    Bradycardia    Depression    Elevated PSA    BPH with obstruction/lower urinary tract symptoms    PLMD (periodic limb movement disorder)    Obesity (BMI 30-39  9)    Bronchospasm    Left forearm pain    Ischemic cardiomyopathy    AAA (abdominal aortic aneurysm) (HCC)    NSVT (nonsustained ventricular tachycardia) ([facility])    Status post endovascular aneurysm repair ([facility])    Anxiety    Acute respiratory insufficiency    COVID-19 infection    Acute on chronic systolic (congestive) heart failure (HCC)    Mixed hyperlipidemia    DANELLE (acute kidney injury) (HCC)       Objective     /60   Pulse 82   Temp 97 8 °F (36 6 °C) (Temporal)   Resp 20   Ht 5' 10" (1 778 m)   Wt 96 4 kg (212 lb 9 6 oz)   SpO2 94%   BMI 30 50 kg/m²     Physical Exam  Constitutional:       Appearance: Normal appearance  He is not ill-appearing  HENT:      Head: Normocephalic and atraumatic  Eyes:      General: No scleral icterus  Right eye: No discharge  Left eye: No discharge     Cardiovascular:      Rate and Rhythm: Normal rate and regular rhythm  Heart sounds: No murmur heard  No friction rub  Pulmonary:      Effort: Pulmonary effort is normal       Breath sounds: Normal breath sounds  No wheezing or rales  Abdominal:      General: Abdomen is flat  There is no distension  Palpations: Abdomen is soft  Tenderness: There is no abdominal tenderness  Musculoskeletal:         General: No swelling or tenderness  Skin:     General: Skin is warm and dry  Findings: No erythema  Neurological:      Mental Status: He is alert  Mental status is at baseline  Motor: No weakness  Psychiatric:         Mood and Affect: Mood normal          Behavior: Behavior normal          Pertinent Laboratory/Diagnostic Studies:  XR chest 1 view portable    Result Date: 12/31/2021  Impression: Small bilateral effusions with probable left basilar atelectasis   Workstation performed: [id number]       Images and diagnostics reviewed     Current Medications     Current Outpatient Medications:     albuterol (PROVENTIL HFA,VENTOLIN HFA) 90 mcg/act inhaler, Inhale 1 puff every 6 (six) hours as needed for wheezing, Disp: 8 g, Rfl: 1    aspirin 81 MG tablet, Take 1 tablet by mouth daily, Disp: , Rfl:     atorvastatin (LIPITOR) 80 mg tablet, Take 1 tablet (80 mg total) by mouth daily, Disp: 90 tablet, Rfl: 3    clopidogrel (PLAVIX) 75 mg tablet, Take 75 mg by mouth daily  , Disp: , Rfl:     fluticasone (FLONASE) 50 mcg/act nasal spray, 2 sprays into each nostril daily, Disp: 18 2 mL, Rfl: 3    furosemide (LASIX) 20 mg tablet, Take 20 mg by mouth, Disp: , Rfl:     isosorbide mononitrate (IMDUR) 30 mg 24 hr tablet, Take 30 mg by mouth daily, Disp: , Rfl:     metoprolol succinate (TOPROL-XL) 100 mg 24 hr tablet, Take 1 tablet (100 mg total) by mouth daily Take metoprolol succinate 100 mg daily in the morning, Disp: 30 tablet, Rfl: 0    metoprolol succinate (TOPROL-XL) 50 mg 24 hr tablet, Take 1 tablet (50 mg total) by mouth every evening, Disp: 30 tablet, Rfl: 0    nitroglycerin (NITROSTAT) 0 4 mg SL tablet, PLACE 1 TABLET (0 4 MG TOTAL) UNDER THE TONGUE EVERY 5 (FIVE) MINUTES AS NEEDED FOR CHEST PAIN, Disp: 100 tablet, Rfl: 1    tamsulosin (FLOMAX) 0 4 mg, Take 0 4 mg by mouth daily with dinner  , Disp: , Rfl:     furosemide (LASIX) 40 mg tablet, Take 40 mg by mouth daily (Patient not taking: Reported on 4/4/2022 ), Disp: , Rfl:     Health Maintenance     Health Maintenance   Topic Date Due    Colorectal Cancer Screening  04/12/2021    COVID-19 Vaccine (3 - Booster for Moderna series) 08/09/2021    Influenza Vaccine (1) 09/01/2021    BMI: Followup Plan  09/08/2022    SLP PLAN OF CARE  07/01/2022 (Originally 1940)    Medicare Annual Wellness Visit (AWV)  09/02/2022    Fall Risk  04/04/2023    BMI: Adult  04/04/2023    DTaP,Tdap,and Td Vaccines (2 - Td or Tdap) 01/03/2027    Pneumococcal Vaccine: 65+ Years  Completed    HIB Vaccine  Aged Out    Hepatitis B Vaccine  Aged Out    IPV Vaccine  Aged Out    Hepatitis A Vaccine  Aged Out    Meningococcal ACWY Vaccine  Aged Out    HPV Vaccine  Aged [employer] History   Administered Date(s) Administered    COVID-19 MODERNA VACC 0 5 ML IM 02/06/2021, 03/09/2021    INFLUENZA 11/03/2015, 09/26/2016, 09/28/2016, 11/14/2017, 11/27/2018    Influenza Quadrivalent Preservative Free 3 years and older IM 10/14/2014    Influenza Quadrivalent, 6-35 Months IM 11/03/2015    Influenza Split High Dose Preservative Free IM 09/26/2016, 11/14/2017    Influenza, high dose seasonal 0 7 mL 11/27/2018, 10/22/2019, 10/22/2020    Influenza, seasonal, injectable 09/27/2011, 10/01/2012, 12/02/2013    Pneumococcal Conjugate 13-Valent 07/28/2015    Pneumococcal Polysaccharide PPV23 09/20/2006, 01/03/2017    Tdap 01/03/2017       Max Minerva ChinleUMESH galvan  Internal Medicine [facility]  [address], [facility] [address]  [place], [address] [facility]  Office: [phone]

## 2022-05-23 ENCOUNTER — TELEPHONE (OUTPATIENT)
Dept: UROLOGY | Facility: MEDICAL CENTER | Age: 82
End: 2022-05-23

## 2022-05-23 DIAGNOSIS — N40.1 BPH WITH OBSTRUCTION/LOWER URINARY TRACT SYMPTOMS: ICD-10-CM

## 2022-05-23 DIAGNOSIS — N13.8 BPH WITH OBSTRUCTION/LOWER URINARY TRACT SYMPTOMS: ICD-10-CM

## 2022-05-23 DIAGNOSIS — R97.20 ELEVATED PSA: Primary | ICD-10-CM

## 2022-05-23 NOTE — TELEPHONE ENCOUNTER
Patient of Dr Griselda Reardon at [facility]    Patient called stating he has appointment on 06/03/22 with Dr Griselda Reardon and wants to know if he needs to do his psa  If he does please update his psa order and call him to let him know       Patient can be reached at [phone]

## 2022-05-23 NOTE — TELEPHONE ENCOUNTER
Call placed to patient and spoke with his wife  Informed her that according to last office visit note, patient should have UA and PSA completed prior to his visit  She is aware and patient will have testing completed prior to his appointment

## 2022-05-24 ENCOUNTER — APPOINTMENT (OUTPATIENT)
Dept: LAB | Age: 82
End: 2022-05-24
Payer: COMMERCIAL

## 2022-05-24 DIAGNOSIS — R97.20 ELEVATED PSA: ICD-10-CM

## 2022-05-24 LAB
BACTERIA UR QL AUTO: NORMAL /HPF
BILIRUB UR QL STRIP: NEGATIVE
CLARITY UR: CLEAR
COLOR UR: NORMAL
GLUCOSE UR STRIP-MCNC: NEGATIVE MG/DL
HGB UR QL STRIP.AUTO: NEGATIVE
KETONES UR STRIP-MCNC: NEGATIVE MG/DL
LEUKOCYTE ESTERASE UR QL STRIP: NEGATIVE
NITRITE UR QL STRIP: NEGATIVE
NON-SQ EPI CELLS URNS QL MICRO: NORMAL /HPF
PH UR STRIP.AUTO: 6 [PH]
PROT UR STRIP-MCNC: NEGATIVE MG/DL
RBC #/AREA URNS AUTO: NORMAL /HPF
SP GR UR STRIP.AUTO: 1.01 (ref 1–1.03)
UROBILINOGEN UR STRIP-ACNC: <2 MG/DL
WBC #/AREA URNS AUTO: NORMAL /HPF

## 2022-05-24 PROCEDURE — 84154 ASSAY OF PSA FREE: CPT

## 2022-05-24 PROCEDURE — 84153 ASSAY OF PSA TOTAL: CPT

## 2022-05-24 PROCEDURE — 36415 COLL VENOUS BLD VENIPUNCTURE: CPT

## 2022-05-24 PROCEDURE — 81001 URINALYSIS AUTO W/SCOPE: CPT

## 2022-05-25 LAB
PSA FREE MFR SERPL: 29.8 %
PSA FREE SERPL-MCNC: 1.64 NG/ML
PSA SERPL-MCNC: 5.5 NG/ML (ref 0–4)

## 2022-06-03 ENCOUNTER — OFFICE VISIT (OUTPATIENT)
Dept: UROLOGY | Facility: MEDICAL CENTER | Age: 82
End: 2022-06-03
Payer: COMMERCIAL

## 2022-06-03 VITALS
SYSTOLIC BLOOD PRESSURE: 124 MMHG | BODY MASS INDEX: 31.07 KG/M2 | DIASTOLIC BLOOD PRESSURE: 72 MMHG | HEIGHT: 70 IN | WEIGHT: 217 LBS

## 2022-06-03 DIAGNOSIS — R97.20 ELEVATED PSA: ICD-10-CM

## 2022-06-03 DIAGNOSIS — N40.1 BPH WITH OBSTRUCTION/LOWER URINARY TRACT SYMPTOMS: Primary | ICD-10-CM

## 2022-06-03 DIAGNOSIS — N13.8 BPH WITH OBSTRUCTION/LOWER URINARY TRACT SYMPTOMS: Primary | ICD-10-CM

## 2022-06-03 PROCEDURE — 3078F DIAST BP <80 MM HG: CPT | Performed by: UROLOGY

## 2022-06-03 PROCEDURE — 1036F TOBACCO NON-USER: CPT | Performed by: UROLOGY

## 2022-06-03 PROCEDURE — 99214 OFFICE O/P EST MOD 30 MIN: CPT | Performed by: UROLOGY

## 2022-06-03 PROCEDURE — 3074F SYST BP LT 130 MM HG: CPT | Performed by: UROLOGY

## 2022-06-03 PROCEDURE — 1160F RVW MEDS BY RX/DR IN RCRD: CPT | Performed by: UROLOGY

## 2022-06-03 RX ORDER — AMLODIPINE BESYLATE 5 MG/1
5 TABLET ORAL DAILY
COMMUNITY
Start: 2022-04-13

## 2022-06-03 RX ORDER — HYDRALAZINE HYDROCHLORIDE 10 MG/1
10 TABLET, FILM COATED ORAL 3 TIMES DAILY
COMMUNITY
Start: 2022-04-25

## 2022-06-03 NOTE — PROGRESS NOTES
Assessment/Plan:    Elevated PSA  PSA has improved to 5 5  with 30% free (n May 22, 2022)  Digital rectal examination is benign in nature  Options were discussed and will continue to follow  We will plan to recheck his PSA in 6 months  If it remains stable we will resume yearly PSA testing  BPH with obstruction/lower urinary tract symptoms  AUA symptom score is 4  He is pleased with his voiding pattern  He is no longer taking Flomax  We will continue to follow with watchful waiting  He will return in 6 months  Diagnoses and all orders for this visit:    BPH with obstruction/lower urinary tract symptoms    Elevated PSA  -     PSA, total and free; Future    Other orders  -     hydrALAZINE (APRESOLINE) 10 mg tablet; Take 10 mg by mouth 3 (three) times a day  -     amLODIPine (NORVASC) 5 mg tablet; Take 5 mg by mouth daily          Subjective:      Patient ID: Osman Dickey is a 81 y o  male  Benign Prostatic Hypertrophy  This is a chronic problem  The current episode started more than 1 year ago  The problem is unchanged  Irritative symptoms include nocturia (Nocturia x 2)  Irritative symptoms do not include frequency or urgency  Nocturia x 1  Obstructive symptoms include a slower stream  Obstructive symptoms do not include dribbling, incomplete emptying, an intermittent stream, straining or a weak stream  Pertinent negatives include no chills, dysuria, genital pain, hematuria, hesitancy, nausea or vomiting  AUA score is 0-7  His sexual activity is non-contributory to the current illness  Nothing aggravates the symptoms  Past treatments include nothing  The following portions of the patient's history were reviewed and updated as appropriate: allergies, current medications, past family history, past medical history, past social history, past surgical history and problem list     Review of Systems   Constitutional: Negative  Negative for chills, diaphoresis, fatigue and fever  HENT: Negative  Eyes: Negative  Respiratory: Negative  Cardiovascular: Negative  Gastrointestinal: Negative  Negative for nausea and vomiting  Endocrine: Negative  Genitourinary: Positive for nocturia (Nocturia x 2)  Negative for dysuria, frequency, hematuria, hesitancy, incomplete emptying and urgency  See HPI   Musculoskeletal: Negative  Skin: Negative  Allergic/Immunologic: Negative  Neurological: Negative  Hematological: Negative  Psychiatric/Behavioral: Negative  AUA SYMPTOM SCORE    Flowsheet Row Most Recent Value   AUA SYMPTOM SCORE    How often have you had a sensation of not emptying your bladder completely after you finished urinating? 1   How often have you had to urinate again less than two hours after you finished urinating? 0   How often have you found you stopped and started again several times when you urinate? 0   How often have you found it difficult to postpone urination? 0   How often have you had a weak urinary stream? 1   How often have you had to push or strain to begin urination? 0   How many times did you most typically get up to urinate from the time you went to bed at night until the time you got up in the morning? 2   Quality of Life: If you were to spend the rest of your life with your urinary condition just the way it is now, how would you feel about that? 1   AUA SYMPTOM SCORE 4            Objective:      /72   Ht 5' 10" (1 778 m)   Wt 98 4 kg (217 lb)   BMI 31 14 kg/m²          Physical Exam  Vitals reviewed  Constitutional:       General: He is not in acute distress  Appearance: Normal appearance  He is well-developed and normal weight  He is not ill-appearing, toxic-appearing or diaphoretic  HENT:      Head: Normocephalic and atraumatic  Eyes:      General: No scleral icterus  Conjunctiva/sclera: Conjunctivae normal    Cardiovascular:      Rate and Rhythm: Normal rate     Pulmonary:      Effort: Pulmonary effort is normal  Abdominal:      General: Bowel sounds are normal  There is no distension  Palpations: Abdomen is soft  There is no mass  Tenderness: There is no abdominal tenderness  There is no right CVA tenderness, left CVA tenderness, guarding or rebound  Hernia: No hernia is present  Genitourinary:     Penis: Normal  No phimosis or hypospadias  Testes: Normal          Right: Mass not present  Left: Mass not present  Comments: Prostate moderately enlarged and palpably benign  Musculoskeletal:         General: Normal range of motion  Cervical back: Normal range of motion and neck supple  Skin:     General: Skin is warm and dry  Neurological:      General: No focal deficit present  Mental Status: He is alert and oriented to person, place, and time  Psychiatric:         Mood and Affect: Mood normal          Behavior: Behavior normal          Thought Content:  Thought content normal          Judgment: Judgment normal

## 2022-06-03 NOTE — PATIENT INSTRUCTIONS
Prostate Specific Antigen   AMBULATORY CARE:   A prostate specific antigen (PSA) test  is a blood test used to screen men for prostate cancer  A PSA test is also used to monitor how well prostate cancer treatment is working  Other test that may be done with a PSA test:  A digital rectal exam is usually performed with a PSA test  Your healthcare provider will insert a gloved finger into your rectum to feel if your prostate is large, firm, or has lumps  Who may need a PSA test:  Some experts recommend a PSA test for men ages 48 to 79  They also recommend testing men with a high risk for prostate cancer at age 36 or 39  Risk factors may include being  or having a brother or father with prostate cancer  Other experts may not recommend PSA testing  Your healthcare provider can help you decide if you need a PSA test    What the results of a PSA test mean:  Most healthy men have a PSA level less than 4 ng/mL  If your PSA level is higher than 4 ng/mL you may need more tests  Examples include blood or urine tests, an ultrasound, MRI, CT scan, or a prostate biopsy  Ask your healthcare provider for more information on these tests  Other causes of a high PSA level:  A high PSA level does not always mean you have prostate cancer  Certain conditions or procedures can increase PSA levels  Examples include:  Older age    Procedures such as a prostate biopsy or a cystoscopy    An enlarged prostate    Recent sexual activity    A prostate infection    Certain exercises that put pressure on the prostate such as bicycling    Medicine such as testosterone    © Copyright [employer] 2022 Information is for End User's use only and may not be sold, redistributed or otherwise used for commercial purposes  All illustrations and images included in CareNotes® are the copyrighted property of [employer]  or [place] [employer]  The above information is an  only   It is not intended as medical advice for individual conditions or treatments  Talk to your doctor, nurse or pharmacist before following any medical regimen to see if it is safe and effective for you

## 2022-06-03 NOTE — ASSESSMENT & PLAN NOTE
AUA symptom score is 4  He is pleased with his voiding pattern  He is no longer taking Flomax  We will continue to follow with watchful waiting  He will return in 6 months

## 2022-06-03 NOTE — ASSESSMENT & PLAN NOTE
PSA has improved to 5 5  with 30% free (n May 22, 2022)  Digital rectal examination is benign in nature  Options were discussed and will continue to follow  We will plan to recheck his PSA in 6 months  If it remains stable we will resume yearly PSA testing

## 2022-07-06 ENCOUNTER — OFFICE VISIT (OUTPATIENT)
Dept: INTERNAL MEDICINE CLINIC | Facility: CLINIC | Age: 82
End: 2022-07-06
Payer: COMMERCIAL

## 2022-07-06 VITALS
DIASTOLIC BLOOD PRESSURE: 60 MMHG | SYSTOLIC BLOOD PRESSURE: 110 MMHG | OXYGEN SATURATION: 67 % | HEIGHT: 70 IN | WEIGHT: 217 LBS | BODY MASS INDEX: 31.07 KG/M2 | HEART RATE: 95 BPM | RESPIRATION RATE: 18 BRPM | TEMPERATURE: 97.5 F

## 2022-07-06 DIAGNOSIS — N13.8 BPH WITH OBSTRUCTION/LOWER URINARY TRACT SYMPTOMS: ICD-10-CM

## 2022-07-06 DIAGNOSIS — G47.33 OSA (OBSTRUCTIVE SLEEP APNEA): ICD-10-CM

## 2022-07-06 DIAGNOSIS — I25.5 ISCHEMIC CARDIOMYOPATHY: ICD-10-CM

## 2022-07-06 DIAGNOSIS — I50.22 CHRONIC SYSTOLIC CONGESTIVE HEART FAILURE (HCC): ICD-10-CM

## 2022-07-06 DIAGNOSIS — E78.00 HYPERCHOLESTEROLEMIA: ICD-10-CM

## 2022-07-06 DIAGNOSIS — N40.1 BPH WITH OBSTRUCTION/LOWER URINARY TRACT SYMPTOMS: ICD-10-CM

## 2022-07-06 DIAGNOSIS — I10 ESSENTIAL HYPERTENSION: Primary | ICD-10-CM

## 2022-07-06 DIAGNOSIS — I71.40 ABDOMINAL AORTIC ANEURYSM (AAA) WITHOUT RUPTURE: ICD-10-CM

## 2022-07-06 DIAGNOSIS — H69.82 DYSFUNCTION OF LEFT EUSTACHIAN TUBE: ICD-10-CM

## 2022-07-06 PROBLEM — R06.89 ACUTE RESPIRATORY INSUFFICIENCY: Status: RESOLVED | Noted: 2021-12-31 | Resolved: 2022-07-06

## 2022-07-06 PROBLEM — N17.9 AKI (ACUTE KIDNEY INJURY) (HCC): Status: RESOLVED | Noted: 2022-01-01 | Resolved: 2022-07-06

## 2022-07-06 PROCEDURE — 3078F DIAST BP <80 MM HG: CPT | Performed by: INTERNAL MEDICINE

## 2022-07-06 PROCEDURE — 99214 OFFICE O/P EST MOD 30 MIN: CPT | Performed by: INTERNAL MEDICINE

## 2022-07-06 PROCEDURE — 3074F SYST BP LT 130 MM HG: CPT | Performed by: INTERNAL MEDICINE

## 2022-07-06 PROCEDURE — 1160F RVW MEDS BY RX/DR IN RCRD: CPT | Performed by: INTERNAL MEDICINE

## 2022-07-06 RX ORDER — EMPAGLIFLOZIN 10 MG/1
10 TABLET, FILM COATED ORAL DAILY
COMMUNITY
Start: 2022-06-17

## 2022-07-06 RX ORDER — FLUTICASONE PROPIONATE 50 MCG
2 SPRAY, SUSPENSION (ML) NASAL DAILY
Qty: 54.6 ML | Refills: 3 | Status: SHIPPED | OUTPATIENT
Start: 2022-07-06

## 2022-07-06 NOTE — PROGRESS NOTES
[facility] [place]      NAME: Angela Turpin  AGE: 81 y o  SEX: male  : 1940   MRN: [id number]    DATE: 2022  TIME: 4:16 PM    Assessment and Plan   1  Essential hypertension  Adequately controlled  2  Ischemic cardiomyopathy  Most recent ejection fraction is 45% by echo  The patient was hospitalized in 2021 with unstable angina  He underwent successful stenting and has had no further pain since then  3  Hypercholesterolemia  Controlled on atorvastatin  4  JORGE (obstructive sleep apnea)  Tolerating CPAP  5  BPH with obstruction/lower urinary tract symptoms  Satisfactory symptom control with tamsulosin  6  Chronic systolic congestive heart failure ([address] )  Well compensated at present  The patient was just started on Jardiance  7  Rhus dermatitis  Improving with conservative measures  The patient seems to be doing pretty well  He will have a repeat BMP in the near future to evaluate the effect of Jardiance  He is being followed carefully by Cardiology  The patient is listed as being deficient colonoscopy screening  The patient had a small polyp resected 5 years ago  Follow-up colonoscopy was planned at that time  However, considering the size of his polyp and the overall state of his health, I do not think colonoscopy is currently appropriate  Return to office in:  3 months  Chief Complaint     Chief Complaint   Patient presents with    Follow-up     3M/colonoscopy/colouard       History of Present Illness     The patient returned to the office for re-evaluation of hypertension, hypercholesterolemia, coronary artery disease with previous MI and ischemic cardiomyopathy, chronic systolic congestive Heart failure, and BPH  Since his last visit he has been feeling reasonably well  He has had no chest pain, shortness of breath, PND, orthopnea, or edema  He was recently seen by Cardiology and was started on Jardiance    Thus far, he is tolerating this well  He does have a rash in his left lower leg  This is most likely poison ivy or something similar  It seems to be improving  The following portions of the patient's history were reviewed and updated as appropriate: allergies, current medications, past family history, past medical history, past social history, past surgical history and problem list     Review of Systems   Review of Systems   Constitutional: Negative  HENT: Negative for congestion, ear pain, postnasal drip, rhinorrhea, sore throat and trouble swallowing  Eyes: Negative for pain, discharge, redness and visual disturbance  Respiratory: Negative for cough, shortness of breath and wheezing  Cardiovascular: Negative  Gastrointestinal: Negative  Endocrine: Negative  Genitourinary: Negative for difficulty urinating, dysuria, frequency, hematuria and urgency  Musculoskeletal: Negative for arthralgias, gait problem, joint swelling and myalgias  Skin: Positive for rash  Neurological: Negative for dizziness, speech difficulty, weakness, light-headedness, numbness and headaches  Hematological: Negative  Psychiatric/Behavioral: Negative for confusion, decreased concentration, dysphoric mood and sleep disturbance  The patient is not nervous/anxious  Active Problem List     Patient Active Problem List   Diagnosis    Acute coronary syndrome/unstable angina, to consider NSTEMI with ischemic related new left bundle branch block    Hypercholesterolemia    Essential hypertension    Impaired fasting glucose    Malignant melanoma of neck (HCC)    JORGE (obstructive sleep apnea)    Bradycardia    Depression    Elevated PSA    BPH with obstruction/lower urinary tract symptoms    PLMD (periodic limb movement disorder)    Obesity (BMI 30-39  9)    Bronchospasm    Left forearm pain    Ischemic cardiomyopathy    [facility])    NSVT (nonsustained ventricular tachycardia) ([facility] )    Status post endovascular aneurysm repair ([facility]AR)    Anxiety    Acute respiratory insufficiency    COVID-19 infection    Chronic systolic congestive heart failure (HCC)       Objective   /60 (BP Location: Left arm, Patient Position: Sitting, Cuff Size: Standard)   Pulse 95   Temp 97 5 °F (36 4 °C)   Resp 18   Ht 5' 10" (1 778 m)   Wt 98 4 kg (217 lb)   SpO2 (!) 67%   BMI 31 14 kg/m²     Physical Exam  Constitutional:       Appearance: He is well-developed  He is not toxic-appearing  HENT:      Head: Normocephalic and atraumatic  Neck:      Thyroid: No thyromegaly  Vascular: No JVD  Trachea: No tracheal deviation  Cardiovascular:      Rate and Rhythm: Normal rate and regular rhythm  Heart sounds: Normal heart sounds  No murmur heard  No friction rub  No gallop  Pulmonary:      Effort: Pulmonary effort is normal       Breath sounds: Normal breath sounds  No wheezing or rales  Chest:      Chest wall: No tenderness  Abdominal:      General: Bowel sounds are normal  There is no distension  Palpations: Abdomen is soft  There is no mass  Tenderness: There is no abdominal tenderness  There is no rebound  Musculoskeletal:         General: No tenderness  Normal range of motion  Cervical back: Neck supple  Lymphadenopathy:      Cervical: No cervical adenopathy  Skin:     General: Skin is warm and dry  Comments: There is a vesicular rash with a few bullae on the left calf  Neurological:      Mental Status: He is alert and oriented to person, place, and time  Psychiatric:         Mood and Affect: Mood normal          Behavior: Behavior normal          Thought Content:  Thought content normal          Judgment: Judgment normal          Pertinent Laboratory/Diagnostic Studies:  Appointment on 05/24/2022   Component Date Value Ref Range Status    Prostate Specific Antigen Total 05/24/2022 5 5 (A) 0 0 - 4 0 ng/mL Final    PSA, Free 05/24/2022 1 64  N/A ng/mL Final  PSA, Free Pct 05/24/2022 29 8  % Final   Telephone on 05/23/2022   Component Date Value Ref Range Status    Color, UA 05/24/2022 Light Yellow   Final    Clarity, UA 05/24/2022 Clear   Final    Specific Gravity, UA 05/24/2022 1 010  1 003 - 1 030 Final    pH, UA 05/24/2022 6 0  4 5, 5 0, 5 5, 6 0, 6 5, 7 0, 7 5, 8 0 Final    Leukocytes, UA 05/24/2022 Negative  Negative Final    Nitrite, UA 05/24/2022 Negative  Negative Final    Protein, UA 05/24/2022 Negative  Negative mg/dl Final    Glucose, UA 05/24/2022 Negative  Negative mg/dl Final    Ketones, UA 05/24/2022 Negative  Negative mg/dl Final    Urobilinogen, UA 05/24/2022 <2 0  <2 0 mg/dl mg/dl Final    Bilirubin, UA 05/24/2022 Negative  Negative Final    Occult Blood, UA 05/24/2022 Negative  Negative Final    RBC, UA 05/24/2022 1-2  None Seen, 1-2 /hpf Final    WBC, UA 05/24/2022 1-2  None Seen, 1-2 /hpf Final    Epithelial Cells 05/24/2022 None Seen  None Seen, Occasional /hpf Final    Bacteria, UA 05/24/2022 None Seen  None Seen, Occasional /hpf Final           Current Medications     Current Outpatient Medications:     albuterol (PROVENTIL HFA,VENTOLIN HFA) 90 mcg/act inhaler, Inhale 1 puff every 6 (six) hours as needed for wheezing, Disp: 8 g, Rfl: 1    amLODIPine (NORVASC) 5 mg tablet, Take 5 mg by mouth daily, Disp: , Rfl:     aspirin 81 MG tablet, Take 1 tablet by mouth daily, Disp: , Rfl:     atorvastatin (LIPITOR) 80 mg tablet, Take 1 tablet (80 mg total) by mouth daily, Disp: 90 tablet, Rfl: 3    clopidogrel (PLAVIX) 75 mg tablet, Take 75 mg by mouth daily  , Disp: , Rfl:     fluticasone (FLONASE) 50 mcg/act nasal spray, 2 sprays into each nostril daily, Disp: 54 6 mL, Rfl: 3    furosemide (LASIX) 40 mg tablet, Take 40 mg by mouth daily, Disp: , Rfl:     hydrALAZINE (APRESOLINE) 10 mg tablet, Take 10 mg by mouth 3 (three) times a day, Disp: , Rfl:     isosorbide mononitrate (IMDUR) 30 mg 24 hr tablet, Take 30 mg by mouth daily, Disp: , Rfl:     Jardiance 10 MG TABS, Take 10 mg by mouth daily, Disp: , Rfl:     nitroglycerin (NITROSTAT) 0 4 mg SL tablet, PLACE 1 TABLET (0 4 MG TOTAL) UNDER THE TONGUE EVERY 5 (FIVE) MINUTES AS NEEDED FOR CHEST PAIN, Disp: 100 tablet, Rfl: 1    furosemide (LASIX) 20 mg tablet, Take 20 mg by mouth (Patient not taking: Reported on 7/6/2022), Disp: , Rfl:     metoprolol succinate (TOPROL-XL) 100 mg 24 hr tablet, Take 1 tablet (100 mg total) by mouth daily Take metoprolol succinate 100 mg daily in the morning, Disp: 30 tablet, Rfl: 0    metoprolol succinate (TOPROL-XL) 50 mg 24 hr tablet, Take 1 tablet (50 mg total) by mouth every evening, Disp: 30 tablet, Rfl: 0    tamsulosin (FLOMAX) 0 4 mg, Take 0 4 mg by mouth daily with dinner   (Patient not taking: No sig reported), Disp: , Rfl:       Kaiden Alba MD

## 2022-07-11 DIAGNOSIS — J98.01 BRONCHOSPASM: ICD-10-CM

## 2022-07-11 RX ORDER — ALBUTEROL SULFATE 90 UG/1
AEROSOL, METERED RESPIRATORY (INHALATION)
Qty: 18 G | Refills: 1 | Status: SHIPPED | OUTPATIENT
Start: 2022-07-11 | End: 2022-10-18

## 2022-07-14 ENCOUNTER — TELEPHONE (OUTPATIENT)
Dept: INTERNAL MEDICINE CLINIC | Facility: CLINIC | Age: 82
End: 2022-07-14

## 2022-07-14 NOTE — TELEPHONE ENCOUNTER
Patient's wife, Jazmyn Olivares calling this afternoon and stated, " I took my  to our foot Dr yordan, Dr Parker Morgan  She was concerned with his poison sumac on his legs and prescribed Cephalexin 500 mg to take 3X's a day for 10 days    I would like to know if Dr Esther River this is ok for my  to take?"

## 2022-07-25 DIAGNOSIS — E78.00 HYPERCHOLESTEROLEMIA: ICD-10-CM

## 2022-07-25 RX ORDER — ATORVASTATIN CALCIUM 80 MG/1
80 TABLET, FILM COATED ORAL DAILY
Qty: 90 TABLET | Refills: 0 | Status: SHIPPED | OUTPATIENT
Start: 2022-07-25 | End: 2022-10-07

## 2022-08-25 ENCOUNTER — OFFICE VISIT (OUTPATIENT)
Dept: URGENT CARE | Age: 82
End: 2022-08-25
Payer: COMMERCIAL

## 2022-08-25 VITALS
DIASTOLIC BLOOD PRESSURE: 74 MMHG | HEART RATE: 63 BPM | TEMPERATURE: 97.7 F | SYSTOLIC BLOOD PRESSURE: 148 MMHG | RESPIRATION RATE: 18 BRPM | OXYGEN SATURATION: 98 %

## 2022-08-25 DIAGNOSIS — L97.929 ULCERS OF BOTH LOWER LEGS (HCC): Primary | ICD-10-CM

## 2022-08-25 DIAGNOSIS — L97.919 ULCERS OF BOTH LOWER LEGS (HCC): Primary | ICD-10-CM

## 2022-08-25 PROCEDURE — S9083 URGENT CARE CENTER GLOBAL: HCPCS | Performed by: NURSE PRACTITIONER

## 2022-08-25 PROCEDURE — 99213 OFFICE O/P EST LOW 20 MIN: CPT | Performed by: NURSE PRACTITIONER

## 2022-08-25 RX ORDER — AMOXICILLIN AND CLAVULANATE POTASSIUM 500; 125 MG/1; MG/1
TABLET, FILM COATED ORAL
COMMUNITY
Start: 2022-08-11 | End: 2022-10-14

## 2022-08-25 NOTE — PROGRESS NOTES
NAME: Lis Millan is a 81 y o  male  : 1940    MRN: [id number]    /74   Pulse 63   Temp 97 7 °F (36 5 °C) (Tympanic)   Resp 18   SpO2 98%     Assessment and Plan   Ulcers of both lower legs ([address] ) [L97 919, L97 929]  1  Ulcers of both lower legs [facility])  Ambulatory Referral to [facility] was seen today for leg wound  Diagnoses and all orders for this visit:    Ulcers of both lower legs ([address] )  -     Ambulatory Referral to Wound Care; Future    called wound care today at 1220 for assistance in scheduling patient for an appt  Central Scheduling called around 3pm today and called pts wife at [phone] to assist with scheduling pt for a visit  Appt made for 815am on 2022  Pt and wife very happy for this is sooner than  that was scheduled with [facility]  Patient Instructions   Patient Instructions   Wound Care called today and left message  Continue caring for the wounds as you have  Continue follow up appts as scheduled     If worse go to the ER  Continue taking all medications as you have been  Proceed to the nearest ER if symptoms worsen, Follow up with your PCP  Continue to social distance, wash your hands, and wear your masks  Please continue to follow the CDC  gov guidelines daily for they are subject to change on COVID-19    Chief Complaint     Chief Complaint   Patient presents with    leg wound     Leg wound x one month  Started with blisters about one month presumed Poison   Sum/ac, OTC treatment  Saw podiatrist, wounds looked infected and started 2 wks of abx  Saw cards this past Tuesday and recommending wound care appt which is not until   History of Present Illness     82 yo male here today with his wife for evaluation of wounds of the lower legs, right worse than left  He has had them for the past three weeks, changing dressings and no change   Was told to see wound care immediately by their cardiologist per wife and appt not till , wife feels that is too long and came in today to get assistance in trying to be seen by [address]  Review of Systems   Review of Systems   Constitutional: Negative  HENT: Negative  Cardiovascular: Negative  Gastrointestinal: Negative  Musculoskeletal: Negative for gait problem and joint swelling  Skin: Positive for wound  Neurological: Negative  Psychiatric/Behavioral: Negative            Current Medications       Current Outpatient Medications:     albuterol (PROVENTIL HFA,VENTOLIN HFA) 90 mcg/act inhaler, INHALE 1 PUFF BY MOUTH EVERY 6 HOURS AS NEEDED FOR WHEEZE, Disp: 18 g, Rfl: 1    amLODIPine (NORVASC) 5 mg tablet, Take 5 mg by mouth daily, Disp: , Rfl:     amoxicillin-clavulanate (AUGMENTIN) 500-125 mg per tablet, TAKE 1 TABLET BY MOUTH EVERY 12 HOURS FOR 10 DAYS, Disp: , Rfl:     aspirin 81 MG tablet, Take 1 tablet by mouth daily, Disp: , Rfl:     atorvastatin (LIPITOR) 80 mg tablet, Take 1 tablet (80 mg total) by mouth daily, Disp: 90 tablet, Rfl: 0    clopidogrel (PLAVIX) 75 mg tablet, Take 75 mg by mouth daily  , Disp: , Rfl:     furosemide (LASIX) 40 mg tablet, Take 40 mg by mouth daily, Disp: , Rfl:     hydrALAZINE (APRESOLINE) 10 mg tablet, Take 10 mg by mouth 3 (three) times a day, Disp: , Rfl:     isosorbide mononitrate (IMDUR) 30 mg 24 hr tablet, Take 30 mg by mouth daily, Disp: , Rfl:     nitroglycerin (NITROSTAT) 0 4 mg SL tablet, PLACE 1 TABLET (0 4 MG TOTAL) UNDER THE TONGUE EVERY 5 (FIVE) MINUTES AS NEEDED FOR CHEST PAIN, Disp: 100 tablet, Rfl: 1    fluticasone (FLONASE) 50 mcg/act nasal spray, 2 sprays into each nostril daily (Patient not taking: Reported on 8/25/2022), Disp: 54 6 mL, Rfl: 3    furosemide (LASIX) 20 mg tablet, Take 20 mg by mouth (Patient not taking: No sig reported), Disp: , Rfl:     Jardiance 10 MG TABS, Take 10 mg by mouth daily (Patient not taking: Reported on 8/25/2022), Disp: , Rfl:     metoprolol succinate (TOPROL-XL) 100 mg 24 hr tablet, Take 1 tablet (100 mg total) by mouth daily Take metoprolol succinate 100 mg daily in the morning, Disp: 30 tablet, Rfl: 0    metoprolol succinate (TOPROL-XL) 50 mg 24 hr tablet, Take 1 tablet (50 mg total) by mouth every evening, Disp: 30 tablet, Rfl: 0    tamsulosin (FLOMAX) 0 4 mg, Take 0 4 mg by mouth daily with dinner   (Patient not taking: No sig reported), Disp: , Rfl:     Current Allergies     Allergies as of 08/25/2022 - Reviewed 08/25/2022   Allergen Reaction Noted    Abciximab Other (See Comments)     Escitalopram Rash 07/17/2012              Past Medical History:   Diagnosis Date    AAA (abdominal aortic aneurysm) without rupture ([facility] [address] )     has annual US to check    Angina pectoris ([facility] [address] )     chronic stable angina, but it is mild and tolerates walking dogs and walking up flight os stairs without symptoms  Using Nitro no more than 2 times per mt      Ankle pain, left     Bilateral wrist pain     BPH with obstruction/lower urinary tract symptoms     Coronary artery disease     CPAP (continuous positive airway pressure) dependence     Elevated PSA     History of colon polyps     [place] (hard of hearing)     Slight    Hyperlipidemia     Hypertension     Ischemic cardiomyopathy     with apical aneurysm    Melanoma ([facility] ) 12/2016    Right side of neck    Myocardial infarction ([facility] [facility] ) 1996    x1    Pneumonia     x1 as a baby    Sleep apnea        Past Surgical History:   Procedure Laterality Date    ARTERIAL ANEURYSM REPAIR  10/2021    CARDIAC SURGERY      CABG x6    CATARACT EXTRACTION Bilateral     COLONOSCOPY      CORONARY ANGIOPLASTY WITH STENT PLACEMENT      2 stents    CORONARY ANGIOPLASTY WITH STENT PLACEMENT      10/2021    CORONARY ARTERY BYPASS GRAFT      PROSTATE BIOPSY  2000,2001    SKIN BIOPSY      Melanoma - surgical removal    SKIN LESION EXCISION Right 2/6/2017    Procedure: WIDE EXCISION MELANOMA SCAR LATERAL NECK;  Surgeon: Peter Mi MD;  Location: [facility];  Service:     WISDOM TOOTH EXTRACTION         Family History   Problem Relation Age of Onset    Hypertension Mother     Breast cancer Mother          Medications have been verified  The following portions of the patient's history were reviewed and updated as appropriate: allergies, current medications, past family history, past medical history, past social history, past surgical history and problem list     Objective   /74   Pulse 63   Temp 97 7 °F (36 5 °C) (Tympanic)   Resp 18   SpO2 98%      Physical Exam     Physical Exam  Constitutional:       General: He is not in acute distress  Skin:     General: Skin is warm  Findings: Erythema and wound present  Comments: Ulcers noted on the anterior aspect of the right lower leg, and medial aspect, present for the past three weeks and worsening, pt has been put on antibiotics from another provider, changing dressing and still worsening  Clear drainage present from the one wound, unable to take picture for record for [facility] cheikh for epic is not working  Neurological:      General: No focal deficit present  Mental Status: He is alert and oriented to person, place, and time  Dressings applied to pts legs by nurse prior to leaving and tolerated well  Note: Portions of this record may have been created with voice recognition software  Occasional wrong word or "sound a like" substitutions may have occurred due to the inherent limitations of voice recognition software  Please read the chart carefully and recognize, using context, where substitutions have occurred  VISHNU Avalos

## 2022-08-25 NOTE — PATIENT INSTRUCTIONS
Wound Care called today and left message  Continue caring for the wounds as you have  Continue follow up appts as scheduled     If worse go to the ER  Continue taking all medications as you have been

## 2022-08-30 ENCOUNTER — OFFICE VISIT (OUTPATIENT)
Dept: WOUND CARE | Facility: CLINIC | Age: 82
End: 2022-08-30
Payer: COMMERCIAL

## 2022-08-30 VITALS
RESPIRATION RATE: 18 BRPM | TEMPERATURE: 96.2 F | DIASTOLIC BLOOD PRESSURE: 58 MMHG | HEART RATE: 58 BPM | SYSTOLIC BLOOD PRESSURE: 110 MMHG

## 2022-08-30 DIAGNOSIS — L97.919 VENOUS ULCER OF RIGHT LEG (HCC): Primary | ICD-10-CM

## 2022-08-30 DIAGNOSIS — I83.019 VENOUS ULCER OF RIGHT LEG (HCC): Primary | ICD-10-CM

## 2022-08-30 DIAGNOSIS — I73.9 PERIPHERAL VASCULAR DISEASE, UNSPECIFIED (HCC): ICD-10-CM

## 2022-08-30 PROCEDURE — 99213 OFFICE O/P EST LOW 20 MIN: CPT | Performed by: PODIATRIST

## 2022-08-30 NOTE — PATIENT INSTRUCTIONS
Orders Placed This Encounter   Procedures    Wound cleansing and dressings     Wash your hands with soap and water  Remove old dressing, discard into plastic bag and place in trash  Cleanse the wound with mild soap and water prior to applying a clean dressing  Do not use tissue or cotton balls  Do not scrub the wound  Pat dry using gauze  Shower yes  On days that dressing is changed  Do not get dressing wet  Remove dressing before shower  Apply adaptic to the wound bed  Cover with alginate and dry gauze  Secure with rolled gauze  Tubi  F on right lower leg  Change dressing three times a week  Elevate legs above your heart on pillows whenever sitting  Make appointment for vascular tests  Follow up at the wound center in one week  Treatment in the wound center today: Cleansed with NSS, and dressed as above       Standing Status:   Future     Standing Expiration Date:   8/30/2023

## 2022-08-30 NOTE — PROGRESS NOTES
Patient ID: Jn Nuñez is a 81 y o  male Date of Birth 1940     Diagnosis:  1  Venous ulcer of right leg ([facility])  -     Wound cleansing and dressings; Future  -     VAS lower limb arterial duplex, complete bilateral; Future; Expected date: 08/30/2022    2  Peripheral vascular disease, unspecified ([facility] [address] )  -     VAS lower limb arterial duplex, complete bilateral; Future; Expected date: 08/30/2022        Diagnosis ICD-10-CM Associated Orders   1  Venous ulcer of right leg ([facility])  I83 019 Wound cleansing and dressings    L97 919 VAS lower limb arterial duplex, complete bilateral   2  Peripheral vascular disease, unspecified ([facility])  I73 9 VAS lower limb arterial duplex, complete bilateral          Assessment & Plan:  1  Patient's leg is suggestive of venous insufficiency  He also has very poor pedal pulses  Before any serious compression we need arterial Dopplers which I ordered  He must take time to elevate throughout the day which he is doing  We will use Tubigrip stockings with Adaptic and alginate  Check in 1 week  2  Patient has had vein harvest on the left but not the right  He has stasis dermatitis on both legs  He is on a water pill  He does not wear compression stockings but this would be suggested once the wounds are healed  3  No acute cellulitis today but he should finish the Augmentin which was begun last week  Chief Complaint   Patient presents with    New Patient Visit     Right shin wound, 1 month, xeroform         Subjective:   Patient presents with a wound on his right lower leg  His history of coronary artery bypass with vein harvest, aortic aneurysm repair, peripheral vascular disease  Patient states about a month ago he developed a small blister on his right leg  His wife thought it might be poison ivy  Once the blister ruptured open up to a larger wound that came around his leg  He was put on Augmentin and still has a few days left    He denies nausea fever vomiting chills  The following portions of the patient's history were reviewed and updated as appropriate:   Patient Active Problem List   Diagnosis    Acute coronary syndrome/unstable angina, to consider NSTEMI with ischemic related new left bundle branch block    Hypercholesterolemia    Essential hypertension    Impaired fasting glucose    Malignant melanoma of neck (HCC)    JORGE (obstructive sleep apnea)    Bradycardia    Depression    Elevated PSA    BPH with obstruction/lower urinary tract symptoms    PLMD (periodic limb movement disorder)    Obesity (BMI 30-39  9)    Bronchospasm    Left forearm pain    Ischemic cardiomyopathy    AAA (abdominal aortic aneurysm) (HCC)    NSVT (nonsustained ventricular tachycardia) (HCC)    Status post endovascular aneurysm repair ([facility])    Anxiety    COVID-19 infection    Chronic systolic congestive heart failure (HCC)     Past Medical History:   Diagnosis Date    AAA (abdominal aortic aneurysm) without rupture ([facility] [address] )     has annual US to check    Angina pectoris ([facility])     chronic stable angina, but it is mild and tolerates walking dogs and walking up flight os stairs without symptoms  Using Nitro no more than 2 times per mt      Ankle pain, left     Bilateral wrist pain     BPH with obstruction/lower urinary tract symptoms     Coronary artery disease     CPAP (continuous positive airway pressure) dependence     Elevated PSA     History of colon polyps     [place] (hard of hearing)     Slight    Hyperlipidemia     Hypertension     Ischemic cardiomyopathy     with apical aneurysm    Melanoma ([facility] [address] ) 12/2016    Right side of neck    Myocardial infarction ([facility] ) 1996    x1    Pneumonia     x1 as a baby    Sleep apnea      Past Surgical History:   Procedure Laterality Date    ARTERIAL ANEURYSM REPAIR  10/2021    CARDIAC SURGERY      CABG x6    CATARACT EXTRACTION Bilateral     COLONOSCOPY      CORONARY ANGIOPLASTY WITH STENT PLACEMENT      2 stents    CORONARY ANGIOPLASTY WITH STENT PLACEMENT      10/2021    CORONARY ARTERY BYPASS GRAFT      PROSTATE BIOPSY  2000,2001    SKIN BIOPSY      Melanoma - surgical removal    SKIN LESION EXCISION Right 2/6/2017    Procedure: WIDE EXCISION MELANOMA SCAR LATERAL NECK;  Surgeon: Raj Sousa MD;  Location: [facility];  Service:     WISDOM TOOTH EXTRACTION       Social History     Socioeconomic History    Marital status: /Civil Union     Spouse name: None    Number of children: None    Years of education: None    Highest education level: None   Occupational History    None   Tobacco Use    Smoking status: Never Smoker    Smokeless tobacco: Never Used   Vaping Use    Vaping Use: Never used   Substance and Sexual Activity    Alcohol use: Yes     Comment: 1-2 beers rarely    Drug use: No    Sexual activity: Not Currently   Other Topics Concern    None   Social History Narrative    DOES NOT CONSUME CAFFEINE     Social Determinants of Health     Financial Resource Strain: Not on file   Food Insecurity: Not on file   Transportation Needs: Not on file   Physical Activity: Not on file   Stress: Not on file   Social Connections: Not on file   Intimate Partner Violence: Not on file   Housing Stability: Not on file        Current Outpatient Medications:     albuterol (PROVENTIL HFA,VENTOLIN HFA) 90 mcg/act inhaler, INHALE 1 PUFF BY MOUTH EVERY 6 HOURS AS NEEDED FOR WHEEZE, Disp: 18 g, Rfl: 1    amLODIPine (NORVASC) 5 mg tablet, Take 5 mg by mouth daily, Disp: , Rfl:     amoxicillin-clavulanate (AUGMENTIN) 500-125 mg per tablet, TAKE 1 TABLET BY MOUTH EVERY 12 HOURS FOR 10 DAYS, Disp: , Rfl:     aspirin 81 MG tablet, Take 1 tablet by mouth daily, Disp: , Rfl:     atorvastatin (LIPITOR) 80 mg tablet, Take 1 tablet (80 mg total) by mouth daily, Disp: 90 tablet, Rfl: 0    clopidogrel (PLAVIX) 75 mg tablet, Take 75 mg by mouth daily  , Disp: , Rfl:     fluticasone (FLONASE) 50 mcg/act nasal spray, 2 sprays into each nostril daily (Patient not taking: Reported on 8/25/2022), Disp: 54 6 mL, Rfl: 3    furosemide (LASIX) 20 mg tablet, Take 20 mg by mouth (Patient not taking: No sig reported), Disp: , Rfl:     furosemide (LASIX) 40 mg tablet, Take 40 mg by mouth daily, Disp: , Rfl:     hydrALAZINE (APRESOLINE) 10 mg tablet, Take 10 mg by mouth 3 (three) times a day, Disp: , Rfl:     isosorbide mononitrate (IMDUR) 30 mg 24 hr tablet, Take 30 mg by mouth daily, Disp: , Rfl:     Jardiance 10 MG TABS, Take 10 mg by mouth daily (Patient not taking: Reported on 8/25/2022), Disp: , Rfl:     metoprolol succinate (TOPROL-XL) 100 mg 24 hr tablet, Take 1 tablet (100 mg total) by mouth daily Take metoprolol succinate 100 mg daily in the morning, Disp: 30 tablet, Rfl: 0    metoprolol succinate (TOPROL-XL) 50 mg 24 hr tablet, Take 1 tablet (50 mg total) by mouth every evening, Disp: 30 tablet, Rfl: 0    nitroglycerin (NITROSTAT) 0 4 mg SL tablet, PLACE 1 TABLET (0 4 MG TOTAL) UNDER THE TONGUE EVERY 5 (FIVE) MINUTES AS NEEDED FOR CHEST PAIN, Disp: 100 tablet, Rfl: 1    tamsulosin (FLOMAX) 0 4 mg, Take 0 4 mg by mouth daily with dinner   (Patient not taking: No sig reported), Disp: , Rfl:   Family History   Problem Relation Age of Onset    Hypertension Mother     Breast cancer Mother       Review of Systems   Constitutional: Negative  HENT: Negative for sinus pressure and sinus pain  Respiratory: Negative for cough and shortness of breath  Cardiovascular: Positive for leg swelling  Negative for chest pain  Gastrointestinal: Negative for diarrhea, nausea and vomiting  Musculoskeletal: Positive for arthralgias  Skin: Positive for color change and wound  Neurological: Negative for weakness and numbness  Psychiatric/Behavioral: The patient is not nervous/anxious          Allergies  Abciximab and Escitalopram    Objective:  /58   Pulse 58   Temp (!) 96 2 °F (35 7 °C)   Resp 18     Physical Exam  Vitals reviewed  Constitutional:       General: He is not in acute distress  Appearance: He is normal weight  He is not toxic-appearing  Cardiovascular:      Rate and Rhythm: Normal rate  Pulses:           Dorsalis pedis pulses are detected w/ Doppler on the right side  Posterior tibial pulses are detected w/ Doppler on the right side  Pulmonary:      Effort: Pulmonary effort is normal  No respiratory distress  Musculoskeletal:      Right lower leg: Edema present  Left lower leg: Edema present  Right foot: Normal range of motion  Feet:    Feet:      Right foot:      Skin integrity: Ulcer (Right lower extremity stasis dermatitis with anterior medial and posterior partial-thickness wounds with moderate serous drainage) and skin breakdown present  Skin:     Capillary Refill: Capillary refill takes less than 2 seconds  Findings: Erythema and rash ( stasis dermatitis) present  Neurological:      Mental Status: He is alert and oriented to person, place, and time  Wound 08/30/22 Venous Ulcer Pretibial Right; Anterior (Active)   Wound Image   08/30/22 0820   Wound Description Pink;Yellow; Other (Comment) 08/30/22 0822   Yuki-wound Assessment Maceration; Hyperpigmented 08/30/22 0822   Wound Length (cm) 5 cm 08/30/22 0822   Wound Width (cm) 2 5 cm 08/30/22 0822   Wound Depth (cm) 0 1 cm 08/30/22 0822   Wound Surface Area (cm^2) 12 5 cm^2 08/30/22 0822   Wound Volume (cm^3) 1 25 cm^3 08/30/22 0822   Calculated Wound Volume (cm^3) 1 25 cm^3 08/30/22 0822   Drainage Amount Moderate 08/30/22 0822   Drainage Description Serosanguineous 08/30/22 0822   Non-staged Wound Description Full thickness 08/30/22 0822   Treatments Irrigation with NSS 08/30/22 0822       Wound 08/30/22 Venous Ulcer Leg Right;Medial;Lower (Active)   Wound Image   08/30/22 0820   Wound Description Pink;Slough; Necrotic 08/30/22 0823   Yuki-wound Assessment Intact; Hyperpigmented 08/30/22 0823   Wound Length (cm) 6 7 cm 08/30/22 0823   Wound Width (cm) 4 cm 08/30/22 0823   Wound Depth (cm) 0 1 cm 08/30/22 0823   Wound Surface Area (cm^2) 26 8 cm^2 08/30/22 0823   Wound Volume (cm^3) 2 68 cm^3 08/30/22 0823   Calculated Wound Volume (cm^3) 2 68 cm^3 08/30/22 0823   Drainage Amount Moderate 08/30/22 0823   Drainage Description Serosanguineous 08/30/22 0823   Non-staged Wound Description Full thickness 08/30/22 0823   Treatments Irrigation with NSS 08/30/22 0823                             Procedures           Wound Instructions:  Orders Placed This Encounter   Procedures    Wound cleansing and dressings     Wash your hands with soap and water  Remove old dressing, discard into plastic bag and place in trash  Cleanse the wound with mild soap and water prior to applying a clean dressing  Do not use tissue or cotton balls  Do not scrub the wound  Pat dry using gauze  Shower yes  On days that dressing is changed  Do not get dressing wet  Remove dressing before shower  Apply adaptic to the wound bed  Cover with alginate and dry gauze  Secure with rolled gauze  Tubi  F on right lower leg  Change dressing three times a week  Elevate legs above your heart on pillows whenever sitting  Make appointment for vascular tests  Follow up at the wound center in one week  Treatment in the wound center today: Cleansed with NSS, and dressed as above  Standing Status:   Future     Standing Expiration Date:   8/30/2023         Brandyn Norton DPM    Portions of the record may have been created with voice recognition software  Occasional wrong word or "sound a like" substitutions may have occurred due to the inherent limitations of voice recognition software  Read the chart carefully and recognize, using context, where substitutions have occurred

## 2022-09-01 ENCOUNTER — HOSPITAL ENCOUNTER (EMERGENCY)
Facility: HOSPITAL | Age: 82
Discharge: HOME | End: 2022-09-01
Attending: STUDENT IN AN ORGANIZED HEALTH CARE EDUCATION/TRAINING PROGRAM
Payer: COMMERCIAL

## 2022-09-01 VITALS
SYSTOLIC BLOOD PRESSURE: 151 MMHG | OXYGEN SATURATION: 98 % | RESPIRATION RATE: 20 BRPM | DIASTOLIC BLOOD PRESSURE: 88 MMHG | BODY MASS INDEX: 29.67 KG/M2 | WEIGHT: 206.8 LBS | TEMPERATURE: 99.8 F | HEART RATE: 58 BPM

## 2022-09-01 DIAGNOSIS — Z51.89 ENCOUNTER FOR WOUND RE-CHECK: Primary | ICD-10-CM

## 2022-09-01 PROCEDURE — 99282 EMERGENCY DEPT VISIT SF MDM: CPT

## 2022-09-01 PROCEDURE — 99282 EMERGENCY DEPT VISIT SF MDM: CPT | Performed by: STUDENT IN AN ORGANIZED HEALTH CARE EDUCATION/TRAINING PROGRAM

## 2022-09-01 NOTE — ED PROVIDER NOTES
History  Chief Complaint   Patient presents with    Wound Check     Pt came to ER with bandage on RLE  Pt is seeing wound care  Pt's wife states "It really grosses me out to change it " She states this makes her unable to perform the dressing change  27-year-old male with PMH significant for venous stasis and right lower extremity wounds here for evaluation of his right lower extremity wounds  His wife reports that they went to the Wound Care visit 2 days ago and she was told to change his dressing every 2-3 days, but then was not sure how to do this  She states that she had been relying on her daughters, who are out of town  The patient denies any fevers, increased pain, increased swelling, or increased redness from his wounds  He states he has been elevating his legs daily, and is scheduled to have arterial Dopplers next week to determine blood flow in his lower extremities  Went to an urgent care today, and were sent here to the hospital, the wife thought they were going to see the wound clinic but at wound clinic they were sent to the ED  Wound Check         Prior to Admission Medications   Prescriptions Last Dose Informant Patient Reported? Taking?    Jardiance 10 MG TABS   Yes No   Sig: Take 10 mg by mouth daily   Patient not taking: Reported on 2022   albuterol (PROVENTIL HFA,VENTOLIN HFA) 90 mcg/act inhaler   No No   Sig: INHALE 1 PUFF BY MOUTH EVERY 6 HOURS AS NEEDED FOR WHEEZE   amLODIPine (NORVASC) 5 mg tablet   Yes No   Sig: Take 5 mg by mouth daily   amoxicillin-clavulanate (AUGMENTIN) 500-125 mg per tablet   Yes No   Sig: TAKE 1 TABLET BY MOUTH EVERY 12 HOURS FOR 10 DAYS   aspirin 81 MG tablet  Self Yes No   Sig: Take 1 tablet by mouth daily   atorvastatin (LIPITOR) 80 mg tablet   No No   Sig: Take 1 tablet (80 mg total) by mouth daily   clopidogrel (PLAVIX) 75 mg tablet   Yes No   Sig: Take 75 mg by mouth daily     fluticasone (FLONASE) 50 mcg/act nasal spray   No No   Si sprays into each nostril daily   Patient not taking: Reported on 8/25/2022   furosemide (LASIX) 20 mg tablet   Yes No   Sig: Take 20 mg by mouth   Patient not taking: No sig reported   furosemide (LASIX) 40 mg tablet   Yes No   Sig: Take 40 mg by mouth daily   hydrALAZINE (APRESOLINE) 10 mg tablet   Yes No   Sig: Take 10 mg by mouth 3 (three) times a day   isosorbide mononitrate (IMDUR) 30 mg 24 hr tablet   Yes No   Sig: Take 30 mg by mouth daily   metoprolol succinate (TOPROL-XL) 100 mg 24 hr tablet   No No   Sig: Take 1 tablet (100 mg total) by mouth daily Take metoprolol succinate 100 mg daily in the morning   metoprolol succinate (TOPROL-XL) 50 mg 24 hr tablet   No No   Sig: Take 1 tablet (50 mg total) by mouth every evening   nitroglycerin (NITROSTAT) 0 4 mg SL tablet  Self No No   Sig: PLACE 1 TABLET (0 4 MG TOTAL) UNDER THE TONGUE EVERY 5 (FIVE) MINUTES AS NEEDED FOR CHEST PAIN   tamsulosin (FLOMAX) 0 4 mg   Yes No   Sig: Take 0 4 mg by mouth daily with dinner     Patient not taking: No sig reported      Facility-Administered Medications: None       Past Medical History:   Diagnosis Date    AAA (abdominal aortic aneurysm) without rupture (HCC)     has annual US to check    Angina pectoris (HCC)     chronic stable angina, but it is mild and tolerates walking dogs and walking up flight os stairs without symptoms  Using Nitro no more than 2 times per mt      Ankle pain, left     Bilateral wrist pain     BPH with obstruction/lower urinary tract symptoms     Coronary artery disease     CPAP (continuous positive airway pressure) dependence     Elevated PSA     History of colon polyps     [place] (hard of hearing)     Slight    Hyperlipidemia     Hypertension     Ischemic cardiomyopathy     with apical aneurysm    Melanoma ([facility] ) 12/2016    Right side of neck    Myocardial infarction ([facility] [address] ) 1996    x1    Pneumonia     x1 as a baby    Sleep apnea        Past Surgical History:   Procedure Laterality Date  ARTERIAL ANEURYSM REPAIR  10/2021    CARDIAC SURGERY      CABG x6    CATARACT EXTRACTION Bilateral     COLONOSCOPY      CORONARY ANGIOPLASTY WITH STENT PLACEMENT      2 stents    CORONARY ANGIOPLASTY WITH STENT PLACEMENT      10/2021    CORONARY ARTERY BYPASS GRAFT      PROSTATE BIOPSY  2000,2001    SKIN BIOPSY      Melanoma - surgical removal    SKIN LESION EXCISION Right 2/6/2017    Procedure: WIDE EXCISION MELANOMA SCAR LATERAL NECK;  Surgeon: Lionel French MD;  Location: [facility];  Service:     WISDOM TOOTH EXTRACTION         Family History   Problem Relation Age of Onset    Hypertension Mother     Breast cancer Mother      I have reviewed and agree with the history as documented  E-Cigarette/Vaping    E-Cigarette Use Never User      E-Cigarette/Vaping Substances    Nicotine No     THC No     CBD No     Flavoring No     Other No     Unknown No      Social History     Tobacco Use    Smoking status: Never Smoker    Smokeless tobacco: Never Used   Vaping Use    Vaping Use: Never used   Substance Use Topics    Alcohol use: Yes     Comment: 1-2 beers rarely    Drug use: No       Review of Systems   Constitutional: Negative for fever  HENT: Negative for congestion  Respiratory: Negative for cough  Cardiovascular: Negative for chest pain and leg swelling  Gastrointestinal: Negative for nausea and vomiting  Skin: Positive for wound  Negative for rash  Neurological: Negative for headaches  Physical Exam  Physical Exam  Vitals and nursing note reviewed  Constitutional:       General: He is not in acute distress  Appearance: He is well-developed  HENT:      Head: Normocephalic and atraumatic  Eyes:      Conjunctiva/sclera: Conjunctivae normal    Cardiovascular:      Rate and Rhythm: Normal rate and regular rhythm  Pulmonary:      Effort: Pulmonary effort is normal  No respiratory distress  Abdominal:      Palpations: Abdomen is soft        Tenderness: There is no abdominal tenderness  Musculoskeletal:      Right lower leg: No edema  Left lower leg: No edema  Skin:     General: Skin is warm and dry  Comments: 2 cm ulcer, distal right anterior shin and medial distal lower leg, mild surrounding erythema, scant serous drainage   Neurological:      Mental Status: He is alert  Vital Signs  ED Triage Vitals [09/01/22 1600]   Temperature Pulse Respirations Blood Pressure SpO2   99 8 °F (37 7 °C) 58 20 151/88 98 %      Temp Source Heart Rate Source Patient Position - Orthostatic VS BP Location FiO2 (%)   Tympanic Monitor -- -- --      Pain Score       --           Vitals:    09/01/22 1600   BP: 151/88   Pulse: 58         Visual Acuity      ED Medications  Medications - No data to display    Diagnostic Studies  Results Reviewed     None                 No orders to display              Procedures  Procedures         ED Course                                             MDM  Number of Diagnoses or Management Options  Encounter for wound re-check  Diagnosis management comments: Patient presenting with concern for wound care  Wounds overall appears stable from his wound clinic visit 2 days ago, no signs of acute infection  Reviewed wound dressing with the patient and his wife  Wound was redressed with the dressing materials the patient's wife brought with her, and she was shown how to do this dressing change  She was provided with additional supplies to care for the wound at home  Return precautions provided prior to discharge  Disposition  Final diagnoses:   None     ED Disposition     None      Follow-up Information    None         Patient's Medications   Discharge Prescriptions    No medications on file       No discharge procedures on file      PDMP Review     None          ED Provider  Electronically Signed by           Shilpa Levy MD  09/01/22 5670

## 2022-09-01 NOTE — DISCHARGE INSTRUCTIONS
You were seen in the emergency department today for a wound check  Your wound was redressed, and you are being sent with extra supplies should you need to change the dressings again  Follow-up with the wound care team as scheduled  Return to the emergency department if you develop fevers, redness that spreads up your leg, or if her symptoms worsen significantly

## 2022-09-06 ENCOUNTER — HOSPITAL ENCOUNTER (OUTPATIENT)
Dept: NON INVASIVE DIAGNOSTICS | Facility: HOSPITAL | Age: 82
Discharge: HOME | End: 2022-09-06
Attending: PODIATRIST
Payer: COMMERCIAL

## 2022-09-06 DIAGNOSIS — L97.919 VENOUS ULCER OF RIGHT LEG (HCC): ICD-10-CM

## 2022-09-06 DIAGNOSIS — I73.9 PERIPHERAL VASCULAR DISEASE, UNSPECIFIED (HCC): ICD-10-CM

## 2022-09-06 DIAGNOSIS — I83.019 VENOUS ULCER OF RIGHT LEG (HCC): ICD-10-CM

## 2022-09-06 PROCEDURE — 93925 LOWER EXTREMITY STUDY: CPT | Performed by: SURGERY

## 2022-09-06 PROCEDURE — 93922 UPR/L XTREMITY ART 2 LEVELS: CPT | Performed by: SURGERY

## 2022-09-06 PROCEDURE — 93923 UPR/LXTR ART STDY 3+ LVLS: CPT

## 2022-09-06 PROCEDURE — 93925 LOWER EXTREMITY STUDY: CPT

## 2022-09-13 ENCOUNTER — OFFICE VISIT (OUTPATIENT)
Dept: WOUND CARE | Facility: CLINIC | Age: 82
End: 2022-09-13
Payer: COMMERCIAL

## 2022-09-13 VITALS
RESPIRATION RATE: 16 BRPM | TEMPERATURE: 96.2 F | HEART RATE: 56 BPM | SYSTOLIC BLOOD PRESSURE: 144 MMHG | DIASTOLIC BLOOD PRESSURE: 60 MMHG

## 2022-09-13 DIAGNOSIS — I73.9 PERIPHERAL VASCULAR DISEASE, UNSPECIFIED (HCC): ICD-10-CM

## 2022-09-13 DIAGNOSIS — L97.919 VENOUS ULCER OF RIGHT LEG (HCC): Primary | ICD-10-CM

## 2022-09-13 DIAGNOSIS — I83.019 VENOUS ULCER OF RIGHT LEG (HCC): Primary | ICD-10-CM

## 2022-09-13 PROCEDURE — 99213 OFFICE O/P EST LOW 20 MIN: CPT | Performed by: PODIATRIST

## 2022-09-13 NOTE — PATIENT INSTRUCTIONS
Orders Placed This Encounter   Procedures    Wound cleansing and dressings     Wash your hands with soap and water  Remove old dressing, discard into plastic bag and place in trash  Cleanse the wound with mild soap and water prior to applying a clean dressing  Do not use tissue or cotton balls  Do not scrub the wound  Pat dry using gauze  Shower yes  On days that dressing is changed  Do not get dressing wet  Remove dressing before shower  Apply adaptic to the wound bed  Cover with alginate and dry gauze  Secure with rolled gauze  Tubi  F on right lower leg  Change dressing three times a week  Elevate legs above your heart on pillows whenever sitting  Referral ordered for vascular  Follow up at the wound center in 2 weeks  Treatment in the wound center today: Cleansed with NSS, and dressed as above       Standing Status:   Future     Standing Expiration Date:   9/13/2023

## 2022-09-13 NOTE — PROGRESS NOTES
Patient ID: Vivian Avalos is a 81 y o  male Date of Birth 1940     Diagnosis:  1  Venous ulcer of right leg (HCC)  -     Wound cleansing and dressings; Future    2  Peripheral vascular disease, unspecified ([facility] )  -     Ambulatory Referral to Vascular Surgery; Future       Diagnosis ICD-10-CM Associated Orders   1  Venous ulcer of right leg (HCC)  I83 019 Wound cleansing and dressings    L97 919    2  Peripheral vascular disease, unspecified ([facility] )  I73 9 Ambulatory Referral to Vascular Surgery        Assessment & Plan:  1  Wound shows some improvement on exam today  Continue Tubigrip stocking with daily elevation  Alginate and Adaptic to wound  Check in 2 weeks  2  Patient's arterial Dopplers reviewed  He does have rather significant arterial disease in that leg  I am recommending he speak to vascular surgery for possible angiogram if indicated  Chief Complaint   Patient presents with    Follow Up Wound Care Visit     RLE wounds           Subjective:   Patient returns for right lower leg wounds  He did get his arterial Dopplers and is here to go over result  He has been trying to walk for exercise that he sits and elevates his legs  He is able to tolerate the Tubigrip stocking  He thinks the wounds are looking better as does his wife      The following portions of the patient's history were reviewed and updated as appropriate:   Patient Active Problem List   Diagnosis    Acute coronary syndrome/unstable angina, to consider NSTEMI with ischemic related new left bundle branch block    Hypercholesterolemia    Essential hypertension    Impaired fasting glucose    Malignant melanoma of neck (HCC)    JORGE (obstructive sleep apnea)    Bradycardia    Depression    Elevated PSA    BPH with obstruction/lower urinary tract symptoms    PLMD (periodic limb movement disorder)    Obesity (BMI 30-39  9)    Bronchospasm    Left forearm pain    Ischemic cardiomyopathy    AAA (abdominal aortic aneurysm) ([place] [address] )    NSVT (nonsustained ventricular tachycardia) ([place] [address] )    Status post endovascular aneurysm repair (EVAR)    Anxiety    COVID-19 infection    Chronic systolic congestive heart failure (HCC)     Past Medical History:   Diagnosis Date    AAA (abdominal aortic aneurysm) without rupture ([place] [address] )     has annual US to check    Angina pectoris ([place] [address] )     chronic stable angina, but it is mild and tolerates walking dogs and walking up flight os stairs without symptoms  Using Nitro no more than 2 times per mt      Ankle pain, left     Bilateral wrist pain     BPH with obstruction/lower urinary tract symptoms     Coronary artery disease     CPAP (continuous positive airway pressure) dependence     Elevated PSA     History of colon polyps     [place] (hard of hearing)     Slight    Hyperlipidemia     Hypertension     Ischemic cardiomyopathy     with apical aneurysm    Melanoma ([place] [address] ) 12/2016    Right side of neck    Myocardial infarction ([place] [address] ) 1996    x1    Pneumonia     x1 as a baby    Sleep apnea      Past Surgical History:   Procedure Laterality Date    ARTERIAL ANEURYSM REPAIR  10/2021    CARDIAC SURGERY      CABG x6    CATARACT EXTRACTION Bilateral     COLONOSCOPY      CORONARY ANGIOPLASTY WITH STENT PLACEMENT      2 stents    CORONARY ANGIOPLASTY WITH STENT PLACEMENT      10/2021    CORONARY ARTERY BYPASS GRAFT      PROSTATE BIOPSY  2000,2001    SKIN BIOPSY      Melanoma - surgical removal    SKIN LESION EXCISION Right 2/6/2017    Procedure: WIDE EXCISION MELANOMA SCAR LATERAL NECK;  Surgeon: Malinda Diaz MD;  Location: [facility];  Service:     WISDOM TOOTH EXTRACTION       Social History     Socioeconomic History    Marital status: /Civil Union     Spouse name: None    Number of children: None    Years of education: None    Highest education level: None   Occupational History    None   Tobacco Use    Smoking status: Never Smoker    Smokeless tobacco: Never Used   Vaping Use  Vaping Use: Never used   Substance and Sexual Activity    Alcohol use: Yes     Comment: 1-2 beers rarely    Drug use: No    Sexual activity: Not Currently   Other Topics Concern    None   Social History Narrative    DOES NOT CONSUME CAFFEINE     Social Determinants of Health     Financial Resource Strain: Not on file   Food Insecurity: Not on file   Transportation Needs: Not on file   Physical Activity: Not on file   Stress: Not on file   Social Connections: Not on file   Intimate Partner Violence: Not on file   Housing Stability: Not on file        Current Outpatient Medications:     albuterol (PROVENTIL HFA,VENTOLIN HFA) 90 mcg/act inhaler, INHALE 1 PUFF BY MOUTH EVERY 6 HOURS AS NEEDED FOR WHEEZE, Disp: 18 g, Rfl: 1    amLODIPine (NORVASC) 5 mg tablet, Take 5 mg by mouth daily, Disp: , Rfl:     amoxicillin-clavulanate (AUGMENTIN) 500-125 mg per tablet, TAKE 1 TABLET BY MOUTH EVERY 12 HOURS FOR 10 DAYS, Disp: , Rfl:     aspirin 81 MG tablet, Take 1 tablet by mouth daily, Disp: , Rfl:     atorvastatin (LIPITOR) 80 mg tablet, Take 1 tablet (80 mg total) by mouth daily, Disp: 90 tablet, Rfl: 0    clopidogrel (PLAVIX) 75 mg tablet, Take 75 mg by mouth daily  , Disp: , Rfl:     fluticasone (FLONASE) 50 mcg/act nasal spray, 2 sprays into each nostril daily (Patient not taking: Reported on 8/25/2022), Disp: 54 6 mL, Rfl: 3    furosemide (LASIX) 20 mg tablet, Take 20 mg by mouth (Patient not taking: No sig reported), Disp: , Rfl:     furosemide (LASIX) 40 mg tablet, Take 40 mg by mouth daily, Disp: , Rfl:     hydrALAZINE (APRESOLINE) 10 mg tablet, Take 10 mg by mouth 3 (three) times a day, Disp: , Rfl:     isosorbide mononitrate (IMDUR) 30 mg 24 hr tablet, Take 30 mg by mouth daily, Disp: , Rfl:     Jardiance 10 MG TABS, Take 10 mg by mouth daily (Patient not taking: Reported on 8/25/2022), Disp: , Rfl:     metoprolol succinate (TOPROL-XL) 100 mg 24 hr tablet, Take 1 tablet (100 mg total) by mouth daily Take metoprolol succinate 100 mg daily in the morning, Disp: 30 tablet, Rfl: 0    metoprolol succinate (TOPROL-XL) 50 mg 24 hr tablet, Take 1 tablet (50 mg total) by mouth every evening, Disp: 30 tablet, Rfl: 0    nitroglycerin (NITROSTAT) 0 4 mg SL tablet, PLACE 1 TABLET (0 4 MG TOTAL) UNDER THE TONGUE EVERY 5 (FIVE) MINUTES AS NEEDED FOR CHEST PAIN, Disp: 100 tablet, Rfl: 1    tamsulosin (FLOMAX) 0 4 mg, Take 0 4 mg by mouth daily with dinner   (Patient not taking: No sig reported), Disp: , Rfl:   Family History   Problem Relation Age of Onset    Hypertension Mother     Breast cancer Mother       Review of Systems   Constitutional: Negative  Cardiovascular: Positive for leg swelling  Gastrointestinal: Negative for diarrhea, nausea and vomiting  Skin: Positive for color change and wound  Allergies:  Abciximab and Escitalopram      Objective:  /60   Pulse 56   Temp (!) 96 2 °F (35 7 °C)   Resp 16     Physical Exam  Vitals reviewed  Cardiovascular:      Rate and Rhythm: Normal rate  Pulmonary:      Effort: Pulmonary effort is normal  No respiratory distress  Musculoskeletal:      Right lower leg: Edema present  Skin:     Capillary Refill: Capillary refill takes 2 to 3 seconds  Findings: Rash present  Comments: Right lower leg wound shows some improvement  There is some eschar around the edges  Mild drainage  No sign of infection, see wound report below   Neurological:      Mental Status: He is alert and oriented to person, place, and time  Wound 08/30/22 Venous Ulcer Pretibial Right; Anterior (Active)   Wound Image   09/13/22 0842   Wound Description Epithelialization 09/13/22 0840   Yuki-wound Assessment Dry 09/13/22 0840   Wound Length (cm) 0 cm 09/13/22 0840   Wound Width (cm) 0 cm 09/13/22 0840   Wound Depth (cm) 0 cm 09/13/22 0840   Wound Surface Area (cm^2) 0 cm^2 09/13/22 0840   Wound Volume (cm^3) 0 cm^3 09/13/22 0840   Calculated Wound Volume (cm^3) 0 cm^3 09/13/22 0840   Change in Wound Size % 100 09/13/22 0840   Drainage Amount Scant 09/13/22 0840   Drainage Description Yellow 09/13/22 0840   Non-staged Wound Description Full thickness 09/13/22 0840   Treatments Irrigation with NSS 09/13/22 0840       Wound 08/30/22 Venous Ulcer Leg Right;Medial;Lower (Active)   Wound Image   09/13/22 0842   Wound Description Pink;Slough 09/13/22 0841   Yuki-wound Assessment Maceration 09/13/22 0841   Wound Length (cm) 2 cm 09/13/22 0841   Wound Width (cm) 3 1 cm 09/13/22 0841   Wound Depth (cm) 0 1 cm 09/13/22 0841   Wound Surface Area (cm^2) 6 2 cm^2 09/13/22 0841   Wound Volume (cm^3) 0 62 cm^3 09/13/22 0841   Calculated Wound Volume (cm^3) 0 62 cm^3 09/13/22 0841   Change in Wound Size % 76 87 09/13/22 0841   Drainage Amount Moderate 09/13/22 0841   Drainage Description Yellow 09/13/22 0841   Non-staged Wound Description Full thickness 09/13/22 0841   Treatments Irrigation with NSS 09/13/22 0841                         Procedures             Wound Instructions:  Orders Placed This Encounter   Procedures    Wound cleansing and dressings     Wash your hands with soap and water  Remove old dressing, discard into plastic bag and place in trash  Cleanse the wound with mild soap and water prior to applying a clean dressing  Do not use tissue or cotton balls  Do not scrub the wound  Pat dry using gauze  Shower yes  On days that dressing is changed  Do not get dressing wet  Remove dressing before shower          Apply adaptic to the wound bed  Cover with alginate and dry gauze  Secure with rolled gauze  Tubi  F on right lower leg  Change dressing three times a week         Elevate legs above your heart on pillows whenever sitting       Referral ordered for vascular       Follow up at the wound center in 2 weeks         Treatment in the wound center today: Cleansed with NSS, and dressed as above       Standing Status:   Future     Standing Expiration Date: 9/13/2023    Ambulatory Referral to Vascular Surgery     Standing Status:   Future     Standing Expiration Date:   9/13/2023     Referral Priority:   Routine     Referral Type:   Consult - AMB     Referral Reason:   Specialty Services Required     Requested Specialty:   Vascular Surgery     Number of Visits Requested:   1     Expiration Date:   9/13/2023         Leobardo Funes, DPM      Portions of the record may have been created with voice recognition software  Occasional wrong word or "sound a like" substitutions may have occurred due to the inherent limitations of voice recognition software  Read the chart carefully and recognize, using context, where substitutions have occurred

## 2022-09-27 ENCOUNTER — OFFICE VISIT (OUTPATIENT)
Dept: WOUND CARE | Facility: CLINIC | Age: 82
End: 2022-09-27
Payer: COMMERCIAL

## 2022-09-27 VITALS
SYSTOLIC BLOOD PRESSURE: 108 MMHG | HEART RATE: 64 BPM | RESPIRATION RATE: 18 BRPM | TEMPERATURE: 97.2 F | DIASTOLIC BLOOD PRESSURE: 68 MMHG

## 2022-09-27 DIAGNOSIS — L97.919 VENOUS ULCER OF RIGHT LEG (HCC): Primary | ICD-10-CM

## 2022-09-27 DIAGNOSIS — I83.019 VENOUS ULCER OF RIGHT LEG (HCC): Primary | ICD-10-CM

## 2022-09-27 PROCEDURE — 99213 OFFICE O/P EST LOW 20 MIN: CPT | Performed by: PODIATRIST

## 2022-09-27 RX ORDER — LIDOCAINE 40 MG/G
CREAM TOPICAL ONCE
Status: COMPLETED | OUTPATIENT
Start: 2022-09-27 | End: 2022-09-27

## 2022-09-27 RX ADMIN — LIDOCAINE: 40 CREAM TOPICAL at 08:56

## 2022-09-27 NOTE — PATIENT INSTRUCTIONS
Orders Placed This Encounter   Procedures    Wound cleansing and dressings     Wash your hands with soap and water  Remove old dressing, discard into plastic bag and place in trash  Cleanse the wound with mild soap and water prior to applying a clean dressing  Do not use tissue or cotton balls  Do not scrub the wound  Pat dry using gauze  Shower yes  On days that dressing is changed  Do not get dressing wet  Remove dressing before shower  Cover with dry gauze  Secure with rolled gauze and tape  Tubi  F on right lower leg  Change dressing three times a week  Elevate legs above your heart on pillows whenever sitting  Go to vascular appointment in October     Follow up at the wound center in 2 weeks  Treatment in the wound center today: Cleansed with NSS, and dressed as above       Standing Status:   Future     Standing Expiration Date:   9/27/2023

## 2022-09-27 NOTE — PROGRESS NOTES
Patient ID: Lennice Koyanagi is a 81 y o  male Date of Birth 1940     Diagnosis:  1  Venous ulcer of right leg (HCC)  -     lidocaine (LMX) 4 % cream  -     Wound cleansing and dressings; Future       Diagnosis ICD-10-CM Associated Orders   1  Venous ulcer of right leg (HCC)  I83 019 lidocaine (LMX) 4 % cream    L97 919 Wound cleansing and dressings        Assessment & Plan:  1  Wound is progressing well  Continue to educate patient his wife on importance of elevation and compression  Check in 2 weeks  No need for debridement today  Regarding the peripheral arterial disease he has an appointment in 2 weeks with vascular surgery  Patient's wound is slowly healing  I think the vascular surgeon can take a risk benefit consideration as to whether perform the angiogram now order monitor with repeat Dopplers in 6 months to a year  Chief Complaint   Patient presents with    Follow Up Wound Care Visit     Venous ulcer of right leg ([address] )           Subjective:   Patient returns for right lower leg venous wounds  He states they are tender  Patient cut his compression sock into a small band because he did not like that it felt tight  Patient states he is trying to walk 20 minutes a day but then elevates after exercise  The following portions of the patient's history were reviewed and updated as appropriate:   Patient Active Problem List   Diagnosis    Acute coronary syndrome/unstable angina, to consider NSTEMI with ischemic related new left bundle branch block    Hypercholesterolemia    Essential hypertension    Impaired fasting glucose    Malignant melanoma of neck (HCC)    JORGE (obstructive sleep apnea)    Bradycardia    Depression    Elevated PSA    BPH with obstruction/lower urinary tract symptoms    PLMD (periodic limb movement disorder)    Obesity (BMI 30-39  9)    Bronchospasm    Left forearm pain    Ischemic cardiomyopathy    AAA (abdominal aortic aneurysm) (HCC)    NSVT (nonsustained ventricular tachycardia) (HCC)    Status post endovascular aneurysm repair (EVAR)    Anxiety    COVID-19 infection    Chronic systolic congestive heart failure (HCC)     Past Medical History:   Diagnosis Date    AAA (abdominal aortic aneurysm) without rupture ([facility] )     has annual US to check    Ang[facility] pectoris ([facility] )     chronic stable angina, but it is mild and tolerates walking dogs and walking up flight os stairs without symptoms  Using Nitro no more than 2 times per mt      Ankle pain, left     Bilateral wrist pain     BPH with obstruction/lower urinary tract symptoms     Coronary artery disease     CPAP (continuous positive airway pressure) dependence     Elevated PSA     History of colon polyps     [place] (hard of hearing[place]     Slight    Hyperlipidemia     Hypertension     Ischemic cardiomyopathy     with apical aneurysm    Melanoma ([facility] ) 12/2016    Right side of neck    Myocardial infarction ([facility] ) 1996    x1    Pneumonia     x1 as a baby    Sleep apnea      Past Surgical History:   Procedure Laterality Date    ARTERIAL ANEURYSM REPAIR  10/2021    CARDIAC SURGERY      CABG x6    CATARACT EXTRACTION Bilateral     COLONOSCOPY      CORONARY ANGIOPLASTY WITH STENT PLACEMENT      2 stents    CORONARY ANGIOPLASTY WITH STENT PLACEMENT      10/2021    CORONARY ARTERY BYPASS GRAFT      PROSTATE BIOPSY  2000,2001    SKIN BIOPSY      Melanoma - surgical removal    SKIN LESION EXCISION Right 2/6/2017    Procedure: WIDE EXCISION MELANOMA SCAR LATERAL NECK;  Surgeon: Jane Loomis MD;  Location[facility];  Service:     WISDOM TOOTH EXTRACTION       Social History     Socioeconomic History    Marital status: /Civil Union     Spouse name: Not on file    Number of children: Not on file    Years of education: Not on file    Highest education level: Not on file   Occupational History    Not on file   Tobacco Use    Smoking status: Never Smoker    Smokeless tobacco: Never Used   Vaping Use    Vaping Use: Never used   Substance and Sexual Activity    Alcohol use: Yes     Comment: 1-2 beers rarely    Drug use: No    Sexual activity: Not Currently   Other Topics Concern    Not on file   Social History Narrative    DOES NOT CONSUME CAFFEINE     Social Determinants of Health     Financial Resource Strain: Not on file   Food Insecurity: Not on file   Transportation Needs: Not on file   Physical Activity: Not on file   Stress: Not on file   Social Connections: Not on file   Intimate Partner Violence: Not on file   Housing Stability: Not on file        Current Outpatient Medications:     albuterol (PROVENTIL HFA,VENTOLIN HFA) 90 mcg/act inhaler, INHALE 1 PUFF BY MOUTH EVERY 6 HOURS AS NEEDED FOR WHEEZE, Disp: 18 g, Rfl: 1    amLODIPine (NORVASC) 5 mg tablet, Take 5 mg by mouth daily, Disp: , Rfl:     amoxicillin-clavulanate (AUGMENTIN) 500-125 mg per tablet, TAKE 1 TABLET BY MOUTH EVERY 12 HOURS FOR 10 DAYS, Disp: , Rfl:     aspirin 81 MG tablet, Take 1 tablet by mouth daily, Disp: , Rfl:     atorvastatin (LIPITOR) 80 mg tablet, Take 1 tablet (80 mg total) by mouth daily, Disp: 90 tablet, Rfl: 0    clopidogrel (PLAVIX) 75 mg tablet, Take 75 mg by mouth daily  , Disp: , Rfl:     fluticasone (FLONASE) 50 mcg/act nasal spray, 2 sprays into each nostril daily (Patient not taking: Reported on 8/25/2022), Disp: 54 6 mL, Rfl: 3    furosemide (LASIX) 20 mg tablet, Take 20 mg by mouth (Patient not taking: No sig reported), Disp: , Rfl:     furosemide (LASIX) 40 mg tablet, Take 40 mg by mouth daily, Disp: , Rfl:     hydrALAZINE (APRESOLINE) 10 mg tablet, Take 10 mg by mouth 3 (three) times a day, Disp: , Rfl:     isosorbide mononitrate (IMDUR) 30 mg 24 hr tablet, Take 30 mg by mouth daily, Disp: , Rfl:     Jardiance 10 MG TABS, Take 10 mg by mouth daily (Patient not taking: Reported on 8/25/2022), Disp: , Rfl:     metoprolol succinate (TOPROL-XL) 100 mg 24 hr tablet, Take 1 tablet (100 mg total) by mouth daily Take metoprolol succinate 100 mg daily in the morning, Disp: 30 tablet, Rfl: 0    metoprolol succinate (TOPROL-XL) 50 mg 24 hr tablet, Take 1 tablet (50 mg total) by mouth every evening, Disp: 30 tablet, Rfl: 0    nitroglycerin (NITROSTAT) 0 4 mg SL tablet, PLACE 1 TABLET (0 4 MG TOTAL) UNDER THE TONGUE EVERY 5 (FIVE) MINUTES AS NEEDED FOR CHEST PAIN, Disp: 100 tablet, Rfl: 1    tamsulosin (FLOMAX) 0 4 mg, Take 0 4 mg by mouth daily with dinner   (Patient not taking: No sig reported), Disp: , Rfl:   No current facility-administered medications for this visit  Family History   Problem Relation Age of Onset    Hypertension Mother     Breast cancer Mother       Review of Systems   Constitutional: Negative  Cardiovascular: Positive for leg swelling  Gastrointestinal: Negative for diarrhea, nausea and vomiting  Musculoskeletal: Negative for gait problem  Skin: Positive for color change and wound  Allergies:  Abciximab and Escitalopram      Objective:  /68   Pulse 64   Temp (!) 97 2 °F (36 2 °C)   Resp 18     Physical Exam  Vitals reviewed  Constitutional:       Appearance: He is obese  He is not ill-appearing or diaphoretic  Pulmonary:      Effort: No respiratory distress  Musculoskeletal:      Right lower leg: Edema present  Left lower leg: Edema present  Skin:     Capillary Refill: Capillary refill takes 2 to 3 seconds  Findings: Rash (Stasis dermatitis right lower extremity) present  No erythema  Comments: Anterior leg wound has dry eschar  The lateral leg venous ulcer has dry eschar over 95% with a small drop of serous drainage  Neurological:      Mental Status: He is alert  Wound 08/30/22 Venous Ulcer Pretibial Right; Anterior (Active)   Wound Image   09/27/22 0832   Wound Description Epithelialization 09/27/22 0834   Yuki-wound Assessment Dry 09/27/22 0834   Wound Length (cm) 0 1 cm 09/27/22 0834   Wound Width (cm) 0 1 cm 09/27/22 0834   Wound Depth (cm) 0 1 cm 09/27/22 0834   Wound Surface Area (cm^2) 0 01 cm^2 09/27/22 0834   Wound Volume (cm^3) 0 001 cm^3 09/27/22 0834   Calculated Wound Volume (cm^3) 0 cm^3 09/27/22 0834   Change in Wound Size % 100 09/27/22 0834   Drainage Amount Scant 09/27/22 0834   Drainage Description Serous 09/27/22 0834   Non-staged Wound Description Full thickness 09/13/22 0840   Treatments Irrigation with NSS 09/13/22 0840   Patient Tolerance Tolerated well 09/27/22 0834   Dressing Status Removed 09/27/22 0834       Wound 08/30/22 Venous Ulcer Leg Right;Medial;Lower (Active)   Wound Image   09/27/22 0832   Wound Description Epithelialization 09/27/22 0834   Yuki-wound Assessment Dry;Scaly 09/27/22 0834   Wound Length (cm) 0 1 cm 09/27/22 0834   Wound Width (cm) 0 1 cm 09/27/22 0834   Wound Depth (cm) 0 1 cm 09/27/22 0834   Wound Surface Area (cm^2) 0 01 cm^2 09/27/22 0834   Wound Volume (cm^3) 0 001 cm^3 09/27/22 0834   Calculated Wound Volume (cm^3) 0 cm^3 09/27/22 0834   Change in Wound Size % 100 09/27/22 0834   Drainage Amount Scant 09/27/22 0834   Drainage Description Serous 09/27/22 0834   Non-staged Wound Description Full thickness 09/13/22 0841   Treatments Irrigation with NSS 09/13/22 0841   Patient Tolerance Tolerated well 09/27/22 0834   Dressing Status Removed 09/27/22 0834                         Procedures             Wound Instructions:  Orders Placed This Encounter   Procedures    Wound cleansing and dressings     Wash your hands with soap and water  Remove old dressing, discard into plastic bag and place in trash  Cleanse the wound with mild soap and water prior to applying a clean dressing  Do not use tissue or cotton balls  Do not scrub the wound  Pat dry using gauze  Shower yes  On days that dressing is changed  Do not get dressing wet  Remove dressing before shower       Cover with dry gauze  Secure with rolled gauze and tape  Tubi  F on right lower leg    Change dressing three times a week      Elevate legs above your heart on pillows whenever sitting       Go to vascular appointment in October     Follow up at the wound center in 2 weeks      Treatment in the wound center today: Cleansed with NSS, and dressed as above  Standing Status:   Future     Standing Expiration Date:   9/27/2023         Opal Henry DPM      Portions of the record may have been created with voice recognition software  Occasional wrong word or "sound a like" substitutions may have occurred due to the inherent limitations of voice recognition software  Read the chart carefully and recognize, using context, where substitutions have occurred

## 2022-10-07 DIAGNOSIS — E78.00 HYPERCHOLESTEROLEMIA: ICD-10-CM

## 2022-10-07 RX ORDER — ATORVASTATIN CALCIUM 80 MG/1
TABLET, FILM COATED ORAL
Qty: 90 TABLET | Refills: 0 | Status: SHIPPED | OUTPATIENT
Start: 2022-10-07

## 2022-10-11 ENCOUNTER — OFFICE VISIT (OUTPATIENT)
Dept: WOUND CARE | Facility: CLINIC | Age: 82
End: 2022-10-11
Payer: COMMERCIAL

## 2022-10-11 ENCOUNTER — CONSULT (OUTPATIENT)
Dept: VASCULAR SURGERY | Facility: CLINIC | Age: 82
End: 2022-10-11
Payer: COMMERCIAL

## 2022-10-11 VITALS
DIASTOLIC BLOOD PRESSURE: 60 MMHG | WEIGHT: 208.34 LBS | TEMPERATURE: 97.5 F | RESPIRATION RATE: 16 BRPM | HEART RATE: 56 BPM | SYSTOLIC BLOOD PRESSURE: 110 MMHG | BODY MASS INDEX: 29.89 KG/M2

## 2022-10-11 VITALS
WEIGHT: 209 LBS | HEART RATE: 64 BPM | DIASTOLIC BLOOD PRESSURE: 44 MMHG | BODY MASS INDEX: 29.92 KG/M2 | HEIGHT: 70 IN | OXYGEN SATURATION: 98 % | SYSTOLIC BLOOD PRESSURE: 118 MMHG

## 2022-10-11 DIAGNOSIS — I71.43 INFRARENAL ABDOMINAL AORTIC ANEURYSM (AAA) WITHOUT RUPTURE: Primary | ICD-10-CM

## 2022-10-11 DIAGNOSIS — I83.019 VENOUS ULCER OF RIGHT LEG (HCC): Primary | ICD-10-CM

## 2022-10-11 DIAGNOSIS — L97.919 VENOUS ULCER OF RIGHT LEG (HCC): Primary | ICD-10-CM

## 2022-10-11 DIAGNOSIS — I73.9 PERIPHERAL VASCULAR DISEASE, UNSPECIFIED (HCC): ICD-10-CM

## 2022-10-11 DIAGNOSIS — I73.9 PAD (PERIPHERAL ARTERY DISEASE) (HCC): ICD-10-CM

## 2022-10-11 PROCEDURE — 99213 OFFICE O/P EST LOW 20 MIN: CPT | Performed by: PODIATRIST

## 2022-10-11 PROCEDURE — 99204 OFFICE O/P NEW MOD 45 MIN: CPT | Performed by: SURGERY

## 2022-10-11 PROCEDURE — 99212 OFFICE O/P EST SF 10 MIN: CPT | Performed by: PODIATRIST

## 2022-10-11 RX ORDER — CETIRIZINE HYDROCHLORIDE 10 MG/1
10 TABLET ORAL DAILY
COMMUNITY
End: 2022-10-14

## 2022-10-11 NOTE — PROGRESS NOTES
Assessment/Plan:    AAA (abdominal aortic aneurysm) (Formerly [facility])  Hx 7 8cm asymptomatic AAA s/p EVAR at [address] 10/2021  Denies abdominal or back pain  Reviewed [facility] imaging records - CTAP with contrast 9/23/22 demonstrated decreased aneurysm sac size 6 5 x 7 2 cm with small endoleak     -We discussed the pathophysiology of aneurysmal disease, management and surveillance following EVAR  -Discussed risk factors including smoking, male gender, connective tissue disorders, HTN, COPD, family history and possible genetic predisposition    -Has continued surveillance with [facility]  PAD (peripheral artery disease) ([facility])  PAD with right leg wounds following history of blister formation  Has now essentially healed with local wound care at the wound care center  Referred due to findings of PAD on LEAD  Also following with [facility] vascular surgery  Denies claudication, rest pain, or tissue loss in the right foot  LEAD - >75% right profunda stenosis with popliteal occlusion and tibioperoneal disease/PT occlusion, right JUSTIN 0 59/MTP48/GTP41; left leg diffuse disease without stenosis, left JUSTIN 0 99/YRR828/GTP55    Palpable femoral pulses, nonpalp pedal pulses  Scab overlying right posterior calf wound, healed anterior shin wounds    -We discussed the pathophysiology of arterial occlusive disease, available treatment options and indications for treatment  No claudication, rest pain or tissue loss  Venous ulcers have been healing with local wound care  No surgical intervention recommended at this time   -Continue medical optimization  Currently on ASA, plavix and statin  -Recommend walking program, ambulating at least 30 minutes for 3-5 times weekly  -Recommend moisturization of the lower extremities, avoidance of injury and close observation of bilateral feet for any new wounds  -Elevate legs, gentle compression for venous insufficiency   -Follow-up in 6 months to re-evaluate symptoms with repeat LEAD vs  at [facility]   Advised patient and family to call and schedule appointment  Diagnoses and all orders for this visit:    Infrarenal abdominal aortic aneurysm (AAA) without rupture    Peripheral vascular disease, unspecified ([facility] [address] )  -     Ambulatory Referral to Vascular Surgery    PAD (peripheral artery disease) ([facility] [address] )        I have spent 45 minutes with Patient  today in which greater than 50% of this time was spent in counseling/coordination of care regarding Intructions for management, Importance of tx compliance and Impressions  Subjective:      Patient ID: Jonny Redman is a 81 y o  male  Patient is new to our office  He was referred here by Dr Noemi Díaz DPM  Patient had a HENRIK done 9/6/2022  He has a wound of his right ankle  Patient has been treating with wound care at [facility] every other week  He can walk about 5-6 blocks before he feels a discomfort in his right calf  He is s/p a EVAR done 10/11/2021 at [facility]  Patient is taking Plavix, ASA 81 mg and Atorvastatin  He is a non-smoker  HPI  Mr Mara Graham is an 82yo male with HTN, HLD, CAD s/p CABG, CHF, AAA s/p EVAR, JORGE, PAD, b/l lower extremity venous insufficiency who presents as a new referral for PAD  He has been following with [facility] since he underwent EVAR for large AAA 10/2021 for continued surveillance  He also follows for PAD  He has been caring for blister related lower extremity venous ulcers which have nearly healed with local wound care at the wound care center  He has also been using tubigrip for leg swelling  He reports minimal claudication symptoms and walks at least 30 minutes daily  He denies rest pain or tissue loss  He denies abdominal or back pain and has been obtaining serial imaging per protocol at [facility]      The following portions of the patient's history were reviewed and updated as appropriate: allergies, current medications, past family history, past medical history, past social history, past surgical history and problem list     I have reviewed and made appropriate changes to the review of systems input by the medical assistant  Vitals:    10/11/22 1348   BP: (!) 118/44   BP Location: Left arm   Patient Position: Sitting   Cuff Size: Standard   Pulse: 64   SpO2: 98%   Weight: 94 8 kg (209 lb)   Height: 5' 10" (1 778 m)       Patient Active Problem List   Diagnosis   • Acute coronary syndrome/unstable angina, to consider NSTEMI with ischemic related new left bundle branch block   • Hypercholesterolemia   • Essential hypertension   • Impaired fasting glucose   • Malignant melanoma of neck (HCC)   • JORGE (obstructive sleep apnea)   • Bradycardia   • Depression   • Elevated PSA   • BPH with obstruction/lower urinary tract symptoms   • PLMD (periodic limb movement disorder)   • Obesity (BMI 30-39  9)   • Bronchospasm   • Left forearm pain   • Ischemic cardiomyopathy   • AAA (abdominal aortic aneurysm)   • NSVT (nonsustained ventricular tachycardia)   • Status post endovascular aneurysm repair (EVAR)   • Anxiety   • COVID-19 infection   • Chronic systolic congestive heart failure (HCC)   • PAD (peripheral artery disease) ([facility])       Past Surgical History:   Procedure Laterality Date   • ARTERIAL ANEURYSM REPAIR  10/2021   • CARDIAC SURGERY      CABG x6   • CATARACT EXTRACTION Bilateral    • COLONOSCOPY     • CORONARY ANGIOPLASTY WITH STENT PLACEMENT      2 stents   • CORONARY ANGIOPLASTY WITH STENT PLACEMENT      10/2021   • CORONARY ARTERY BYPASS GRAFT     • PROSTATE BIOPSY  2000,2001   • SKIN BIOPSY      Melanoma - surgical removal   • SKIN LESION EXCISION Right 2/6/2017    Procedure: WIDE EXCISION MELANOMA SCAR LATERAL NECK;  Surgeon: Smith Paniagua MD;  Location: [facility];  Service:    • WISDOM TOOTH EXTRACTION         Family History   Problem Relation Age of Onset   • Hypertension Mother    • Breast cancer Mother        Social History     Socioeconomic History   • Marital status: /Civil Union     Spouse name: Not on file   • Number of children: Not on file   • Years of education: Not on file   • Highest education level: Not on file   Occupational History   • Not on file   Tobacco Use   • Smoking status: Never Smoker   • Smokeless tobacco: Never Used   Vaping Use   • Vaping Use: Never used   Substance and Sexual Activity   • Alcohol use: Yes     Comment: 1-2 beers rarely   • Drug use: No   • Sexual activity: Not Currently   Other Topics Concern   • Not on file   Social History Narrative    DOES NOT CONSUME CAFFEINE     Social Determinants of Health     Financial Resource Strain: Not on file   Food Insecurity: Not on file   Transportation Needs: Not on file   Physical Activity: Not on file   Stress: Not on file   Social Connections: Not on file   Intimate Partner Violence: Not on file   Housing Stability: Not on file       Allergies   Allergen Reactions   • Abciximab Other (See Comments)     rcd 9/27/01 & 5/16/03   • Escitalopram Rash         Current Outpatient Medications:   •  albuterol (PROVENTIL HFA,VENTOLIN HFA) 90 mcg/act inhaler, INHALE 1 PUFF BY MOUTH EVERY 6 HOURS AS NEEDED FOR WHEEZE, Disp: 18 g, Rfl: 1  •  amLODIPine (NORVASC) 5 mg tablet, Take 5 mg by mouth daily, Disp: , Rfl:   •  aspirin 81 MG tablet, Take 1 tablet by mouth daily, Disp: , Rfl:   •  atorvastatin (LIPITOR) 80 mg tablet, TAKE 1 TABLET BY MOUTH EVERY DAY, Disp: 90 tablet, Rfl: 0  •  cetirizine (ZyrTEC) 10 mg tablet, Take 10 mg by mouth daily, Disp: , Rfl:   •  clopidogrel (PLAVIX) 75 mg tablet, Take 75 mg by mouth daily  , Disp: , Rfl:   •  furosemide (LASIX) 40 mg tablet, Take 40 mg by mouth daily, Disp: , Rfl:   •  hydrALAZINE (APRESOLINE) 10 mg tablet, Take 10 mg by mouth 3 (three) times a day, Disp: , Rfl:   •  isosorbide mononitrate (IMDUR) 30 mg 24 hr tablet, Take 30 mg by mouth daily, Disp: , Rfl:   •  metoprolol succinate (TOPROL-XL) 100 mg 24 hr tablet, Take 1 tablet (100 mg total) by mouth daily Take metoprolol succinate 100 mg daily in the morning, Disp: 30 tablet, Rfl: 0  • nitroglycerin (NITROSTAT) 0 4 mg SL tablet, PLACE 1 TABLET (0 4 MG TOTAL) UNDER THE TONGUE EVERY 5 (FIVE) MINUTES AS NEEDED FOR CHEST PAIN, Disp: 100 tablet, Rfl: 1  •  amoxicillin-clavulanate (AUGMENTIN) 500-125 mg per tablet, TAKE 1 TABLET BY MOUTH EVERY 12 HOURS FOR 10 DAYS (Patient not taking: No sig reported), Disp: , Rfl:   •  fluticasone (FLONASE) 50 mcg/act nasal spray, 2 sprays into each nostril daily (Patient not taking: No sig reported), Disp: 54 6 mL, Rfl: 3  •  furosemide (LASIX) 20 mg tablet, Take 20 mg by mouth (Patient not taking: No sig reported), Disp: , Rfl:   •  Jardiance 10 MG TABS, Take 10 mg by mouth daily (Patient not taking: No sig reported), Disp: , Rfl:   •  metoprolol succinate (TOPROL-XL) 50 mg 24 hr tablet, Take 1 tablet (50 mg total) by mouth every evening, Disp: 30 tablet, Rfl: 0  •  tamsulosin (FLOMAX) 0 4 mg, Take 0 4 mg by mouth daily with dinner   (Patient not taking: No sig reported), Disp: , Rfl:     Review of Systems   Constitutional: Negative  HENT: Negative  Eyes: Negative  Respiratory: Positive for apnea and shortness of breath  Cardiovascular: Negative  Gastrointestinal: Negative  Endocrine: Negative  Genitourinary: Negative  Musculoskeletal:        Pain in leg when walking   Skin: Positive for wound  Allergic/Immunologic: Negative  Neurological: Negative  Hematological: Bruises/bleeds easily  Psychiatric/Behavioral: Negative  I have personally reviewed the ROS entered by MA and agree as documented  Objective:      BP (!) 118/44 (BP Location: Left arm, Patient Position: Sitting, Cuff Size: Standard)   Pulse 64   Ht 5' 10" (1 778 m)   Wt 94 8 kg (209 lb)   SpO2 98%   BMI 29 99 kg/m²          Physical Exam  Constitutional:       Appearance: Normal appearance  HENT:      Head: Normocephalic and atraumatic  Cardiovascular:      Rate and Rhythm: Normal rate        Pulses:           Femoral pulses are 2+ on the right side and 2+ on the left side  Popliteal pulses are 0 on the right side and 0 on the left side  Dorsalis pedis pulses are 0 on the right side and 0 on the left side  Posterior tibial pulses are 0 on the right side and 0 on the left side  Pulmonary:      Effort: Pulmonary effort is normal    Abdominal:      General: There is no distension  Palpations: Abdomen is soft  Tenderness: There is no abdominal tenderness  Musculoskeletal:         General: Normal range of motion  Cervical back: Normal range of motion and neck supple  Right lower leg: Edema present  Left lower leg: Edema present  Skin:     General: Skin is warm and dry  Capillary Refill: Capillary refill takes less than 2 seconds  Comments: Superficial scab over right leg posterior calf wound, healed anterior shin wounds   Neurological:      General: No focal deficit present  Mental Status: He is alert and oriented to person, place, and time  Psychiatric:         Mood and Affect: Mood normal          Behavior: Behavior normal          Thought Content:  Thought content normal          Judgment: Judgment normal

## 2022-10-11 NOTE — PATIENT INSTRUCTIONS
AAA (abdominal aortic aneurysm) ([facility])  Hx 7 8cm asymptomatic AAA s/p EVAR at [address] 10/2021  Denies abdominal or back pain  Reviewed [facility] imaging records - CTAP with contrast 9/23/22 demonstrated decreased aneurysm sac size 6 5 x 7 2 cm with small endoleak      -We discussed the pathophysiology of aneurysmal disease, management and surveillance following EVAR  -Discussed risk factors including smoking, male gender, connective tissue disorders, HTN, COPD, family history and possible genetic predisposition    -Has continued surveillance with [facility]  PAD (peripheral artery disease) ([facility])  PAD with right leg wounds following history of blister formation  Has now essentially healed with local wound care at the wound care center  Referred due to findings of PAD on LEAD  Also following with [facility] vascular surgery  Denies claudication, rest pain, or tissue loss in the right foot  LEAD - >75% right profunda stenosis with popliteal occlusion and tibioperoneal disease/PT occlusion, right JUSTIN 0 59/MTP48/GTP41; left leg diffuse disease without stenosis, left JUSTIN 0 99/RGK251/GTP55     Palpable femoral pulses, nonpalp pedal pulses  Scab overlying right posterior calf wound, healed anterior shin wounds     -We discussed the pathophysiology of arterial occlusive disease, available treatment options and indications for treatment  No claudication, rest pain or tissue loss  Venous ulcers have been healing with local wound care  No surgical intervention recommended at this time   -Continue medical optimization  Currently on ASA, plavix and statin  -Recommend walking program, ambulating at least 30 minutes for 3-5 times weekly  -Recommend moisturization of the lower extremities, avoidance of injury and close observation of bilateral feet for any new wounds  -Elevate legs, gentle compression for venous insufficiency   -Follow-up in 6 months to re-evaluate symptoms with repeat LEAD vs  at [facility]   Advised patient and family to call and schedule appointment

## 2022-10-11 NOTE — ASSESSMENT & PLAN NOTE
PAD with right leg wounds following history of blister formation  Has now essentially healed with local wound care at the wound care center  Referred due to findings of PAD on LEAD  Also following with [facility] vascular surgery  Denies claudication, rest pain, or tissue loss in the right foot  LEAD - >75% right profunda stenosis with popliteal occlusion and tibioperoneal disease/PT occlusion, right JUSTIN 0 59/MTP48/GTP41; left leg diffuse disease without stenosis, left JUSTIN 0 99/BJA759/GTP55    Palpable femoral pulses, nonpalp pedal pulses  Scab overlying right posterior calf wound, healed anterior shin wounds    -We discussed the pathophysiology of arterial occlusive disease, available treatment options and indications for treatment  No claudication, rest pain or tissue loss  Venous ulcers have been healing with local wound care  No surgical intervention recommended at this time   -Continue medical optimization  Currently on ASA, plavix and statin  -Recommend walking program, ambulating at least 30 minutes for 3-5 times weekly  -Recommend moisturization of the lower extremities, avoidance of injury and close observation of bilateral feet for any new wounds  -Elevate legs, gentle compression for venous insufficiency   -Follow-up in 6 months to re-evaluate symptoms with repeat LEAD vs  at [facility]  Advised patient and family to call and schedule appointment

## 2022-10-11 NOTE — PATIENT INSTRUCTIONS
Orders Placed This Encounter   Procedures    Wound cleansing and dressings     Right lower leg wounds are healed  Patient is discharged from the wound center  Shower yes  Wash healed area with mild soap and water  Do not scrub  Pat dry  Cover healed area until scab falls off  (to protect)  Continue wearing compression on both lower legs  Elevate legs above your heart on pillows whenever sitting  Contact the wound center if your wound reopens          Standing Status:   Future     Standing Expiration Date:   10/11/2023

## 2022-10-11 NOTE — LETTER
[address] [place] [id number] 
[phone] Patient:  Arielle Castillo MRN: [id number] :2017 A check truman indicates which time of day the medication should be taken. My Medications Notice You have not been prescribed any medications. October 11, 2022     Curly Yeung, DO  [address]    Patient: Juany Harrell   YOB: 1940   Date of Visit: 10/11/2022       Dear Dr Rufina Navarrete:    Thank you for referring Juany Harrell to me for evaluation  Below are the relevant portions of my assessment and plan of care  Diagnoses and all orders for this visit:    AAA (abdominal aortic aneurysm) ([facility] [address] )  Hx 7 8cm asymptomatic AAA s/p EVAR at [facility] 10/2021  Denies abdominal or back pain       Reviewed [facility] imaging records - CTAP with contrast 9/23/22 demonstrated decreased aneurysm sac size 6 5 x 7 2 cm with small endoleak      -We discussed the pathophysiology of aneurysmal disease, management and surveillance following EVAR  -Discussed risk factors including smoking, male gender, connective tissue disorders, HTN, COPD, family history and possible genetic predisposition    -Has continued surveillance with [facility]          PAD (peripheral artery disease) ([facility] [address] )  PAD with right leg wounds following history of blister formation  Has now essentially healed with local wound care at the wound care center  Referred due to findings of PAD on LEAD  Also following with [facility] vascular surgery  Denies claudication, rest pain, or tissue loss in the right foot      LEAD - >75% right profunda stenosis with popliteal occlusion and tibioperoneal disease/PT occlusion, right JUSTIN 0 59/MTP48/GTP41; left leg diffuse disease without stenosis, left JUSTIN 0 99/MPC542/GTP55     Palpable femoral pulses, nonpalp pedal pulses  Scab overlying right posterior calf wound, healed anterior shin wounds     -We discussed the pathophysiology of arterial occlusive disease, available treatment options and indications for treatment  No claudication, rest pain or tissue loss  Venous ulcers have been healing with local wound care  No surgical intervention recommended at this time   -Continue medical optimization  Currently on ASA, plavix and statin  -Recommend walking program, ambulating at least 30 minutes for 3-5 times weekly  -Recommend moisturization of the lower extremities, avoidance of injury and close observation of bilateral feet for any new wounds  -Elevate legs, gentle compression for venous insufficiency   -Follow-up in 6 months to re-evaluate symptoms with repeat LEAD vs  at [facility]  Advised patient and family to call and schedule appointment  If you have questions, please do not hesitate to call me  I look forward to following Jennifer Silvestre along with you           Sincerely,        Claribel Bermeo MD        CC: Juan Valente DPM

## 2022-10-11 NOTE — PROGRESS NOTES
Patient ID: Iggy Earl is a 81 y o  male Date of Birth 1940     Diagnosis:  1  Venous ulcer of right leg (HCC)  -     Wound cleansing and dressings; Future       Diagnosis ICD-10-CM Associated Orders   1  Venous ulcer of right leg (HCC)  I83 019 Wound cleansing and dressings    L97 [id number]         Assessment & Plan:  1  Wounds are healed  2  DC from wound center  3  He should wear compression stockings daily    Chief Complaint   Patient presents with   • Follow Up Wound Care Visit     RLE wounds           Subjective:   Patient returns for right leg venous wound  He has not had any drainage for over a week and it is healed  The following portions of the patient's history were reviewed and updated as appropriate:   Patient Active Problem List   Diagnosis   • Acute coronary syndrome/unstable angina, to consider NSTEMI with ischemic related new left bundle branch block   • Hypercholesterolemia   • Essential hypertension   • Impaired fasting glucose   • Malignant melanoma of neck (HCC)   • JORGE (obstructive sleep apnea)   • Bradycardia   • Depression   • Elevated PSA   • BPH with obstruction/lower urinary tract symptoms   • PLMD (periodic limb movement disorder)   • Obesity (BMI 30-39  9)   • Bronchospasm   • Left forearm pain   • Ischemic cardiomyopathy   • AAA (abdominal aortic aneurysm)   • NSVT (nonsustained ventricular tachycardia)   • Status post endovascular aneurysm repair (EVAR)   • Anxiety   • COVID-19 infection   • Chronic systolic congestive heart failure (HCC)     Past Medical History:   Diagnosis Date   • AAA (abdominal aortic aneurysm) without rupture     has annual US to check   • Angina pectoris (HCC)     chronic stable angina, but it is mild and tolerates walking dogs and walking up flight os stairs without symptoms  Using Nitro no more than 2 times per mt     • Ankle pain, left    • Bilateral wrist pain    • BPH with obstruction/lower urinary tract symptoms    • Coronary artery disease    • CPAP (continuous positive airway pressure) dependence    • Elevated PSA    • History of colon polyps    • [place] (hard of hearing)     Slight   • Hyperlipidemia    • Hypertension    • Ischemic cardiomyopathy     with apical aneurysm   • Melanoma ([facility] ) 12/2016    Right side of neck   • Myocardial infarction ([facility] ) 1996    x1   • Pneumonia     x1 as a baby   • Sleep apnea      Past Surgical History:   Procedure Laterality Date   • ARTERIAL ANEURYSM REPAIR  10/2021   • CARDIAC SURGERY      CABG x6   • CATARACT EXTRACTION Bilateral    • COLONOSCOPY     • CORONARY ANGIOPLASTY WITH STENT PLACEMENT      2 stents   • CORONARY ANGIOPLASTY WITH STENT PLACEMENT      10/2021   • CORONARY ARTERY BYPASS GRAFT     • PROSTATE BIOPSY  2000,2001   • SKIN BIOPSY      Melanoma - surgical removal   • SKIN LESION EXCISION Right 2/6/2017    Procedure: WIDE EXCISION MELANOMA SCAR LATERAL NECK;  Surgeon: Bushra Sheffield MD;  Location: [facility];  Service:    • WISDOM TOOTH EXTRACTION       Social History     Socioeconomic History   • Marital status: /Civil Union     Spouse name: None   • Number of children: None   • Years of education: None   • Highest education level: None   Occupational History   • None   Tobacco Use   • Smoking status: Never Smoker   • Smokeless tobacco: Never Used   Vaping Use   • Vaping Use: Never used   Substance and Sexual Activity   • Alcohol use: Yes     Comment: 1-2 beers rarely   • Drug use: No   • Sexual activity: Not Currently   Other Topics Concern   • None   Social History Narrative    DOES NOT CONSUME CAFFEINE     Social Determinants of Health     Financial Resource Strain: Not on file   Food Insecurity: Not on file   Transportation Needs: Not on file   Physical Activity: Not on file   Stress: Not on file   Social Connections: Not on file   Intimate Partner Violence: Not on file   Housing Stability: Not on file        Current Outpatient Medications:   •  cetirizine (ZyrTEC Allergy) 10 mg tablet, Take 10 mg by mouth daily, Disp: , Rfl:   •  albuterol (PROVENTIL HFA,VENTOLIN HFA) 90 mcg/act inhaler, INHALE 1 PUFF BY MOUTH EVERY 6 HOURS AS NEEDED FOR WHEEZE, Disp: 18 g, Rfl: 1  •  amLODIPine (NORVASC) 5 mg tablet, Take 5 mg by mouth daily, Disp: , Rfl:   •  amoxicillin-clavulanate (AUGMENTIN) 500-125 mg per tablet, TAKE 1 TABLET BY MOUTH EVERY 12 HOURS FOR 10 DAYS (Patient not taking: Reported on 10/11/2022), Disp: , Rfl:   •  aspirin 81 MG tablet, Take 1 tablet by mouth daily, Disp: , Rfl:   •  atorvastatin (LIPITOR) 80 mg tablet, TAKE 1 TABLET BY MOUTH EVERY DAY, Disp: 90 tablet, Rfl: 0  •  clopidogrel (PLAVIX) 75 mg tablet, Take 75 mg by mouth daily  , Disp: , Rfl:   •  fluticasone (FLONASE) 50 mcg/act nasal spray, 2 sprays into each nostril daily (Patient not taking: Reported on 8/25/2022), Disp: 54 6 mL, Rfl: 3  •  furosemide (LASIX) 20 mg tablet, Take 20 mg by mouth (Patient not taking: No sig reported), Disp: , Rfl:   •  furosemide (LASIX) 40 mg tablet, Take 40 mg by mouth daily, Disp: , Rfl:   •  hydrALAZINE (APRESOLINE) 10 mg tablet, Take 10 mg by mouth 3 (three) times a day, Disp: , Rfl:   •  isosorbide mononitrate (IMDUR) 30 mg 24 hr tablet, Take 30 mg by mouth daily, Disp: , Rfl:   •  Jardiance 10 MG TABS, Take 10 mg by mouth daily (Patient not taking: Reported on 8/25/2022), Disp: , Rfl:   •  metoprolol succinate (TOPROL-XL) 100 mg 24 hr tablet, Take 1 tablet (100 mg total) by mouth daily Take metoprolol succinate 100 mg daily in the morning, Disp: 30 tablet, Rfl: 0  •  metoprolol succinate (TOPROL-XL) 50 mg 24 hr tablet, Take 1 tablet (50 mg total) by mouth every evening, Disp: 30 tablet, Rfl: 0  •  nitroglycerin (NITROSTAT) 0 4 mg SL tablet, PLACE 1 TABLET (0 4 MG TOTAL) UNDER THE TONGUE EVERY 5 (FIVE) MINUTES AS NEEDED FOR CHEST PAIN, Disp: 100 tablet, Rfl: 1  •  tamsulosin (FLOMAX) 0 4 mg, Take 0 4 mg by mouth daily with dinner   (Patient not taking: No sig reported), Disp: , Rfl:   Family History Problem Relation Age of Onset   • Hypertension Mother    • Breast cancer Mother       Review of Systems   Constitutional: Negative  Skin: Negative for wound  Allergies:  Abciximab and Escitalopram      Objective:  /60   Pulse 56   Temp 97 5 °F (36 4 °C)   Resp 16   Wt 94 5 kg (208 lb 5 4 oz)   BMI 29 89 kg/m²     Physical Exam  Vitals reviewed  Musculoskeletal:      Right lower leg: Edema present  Left lower leg: Edema present  Skin:     Capillary Refill: Capillary refill takes less than 2 seconds  Comments: Ulcer right lower leg is healed  There is a small dry scab   Neurological:      Mental Status: He is alert  Wound 08/30/22 Venous Ulcer Pretibial Right; Anterior (Active)   Wound Image   10/11/22 0846   Wound Description Epithelialization 10/11/22 0846   Yuki-wound Assessment Dry 10/11/22 0846   Wound Length (cm) 0 cm 10/11/22 0846   Wound Width (cm) 0 cm 10/11/22 0846   Wound Depth (cm) 0 cm 10/11/22 0846   Wound Surface Area (cm^2) 0 cm^2 10/11/22 0846   Wound Volume (cm^3) 0 cm^3 10/11/22 0846   Calculated Wound Volume (cm^3) 0 cm^3 10/11/22 0846   Change in Wound Size % 100 10/11/22 0846   Drainage Amount None 10/11/22 0846   Drainage Description Serous 09/27/22 0834   Non-staged Wound Description Full thickness 09/13/22 0840   Treatments Irrigation with NSS 09/13/22 0840   Patient Tolerance Tolerated well 09/27/22 0834   Dressing Status Removed 09/27/22 0834       Wound 08/30/22 Venous Ulcer Leg Right;Medial;Lower (Active)   Wound Image   10/11/22 0844   Wound Description Epithelialization 10/11/22 0845   Yuki-wound Assessment Dry;Scaly 10/11/22 0845   Wound Length (cm) 0 cm 10/11/22 0845   Wound Width (cm) 0 cm 10/11/22 0845   Wound Depth (cm) 0 cm 10/11/22 0845   Wound Surface Area (cm^2) 0 cm^2 10/11/22 0845   Wound Volume (cm^3) 0 cm^3 10/11/22 0845   Calculated Wound Volume (cm^3) 0 cm^3 10/11/22 0845   Change in Wound Size % 100 10/11/22 0845   Drainage Amount None 10/11/22 0845   Drainage Description Serous 09/27/22 0834   Non-staged Wound Description Full thickness 09/13/22 0841   Treatments Irrigation with NSS 09/13/22 0841   Patient Tolerance Tolerated well 09/27/22 0834   Dressing Status Removed 09/27/22 0834                         Procedures             Wound Instructions:  Orders Placed This Encounter   Procedures   • Wound cleansing and dressings     Right lower leg wounds are healed  Patient is discharged from the wound center       Shower yes  Wash healed area with mild soap and water  Do not scrub  Pat dry      Cover healed area until scab falls off  (to protect)  Continue wearing compression on both lower legs       Elevate legs above your heart on pillows whenever sitting          Contact the wound center if your wound reopens         Standing Status:   Future     Standing Expiration Date:   10/11/2023         Casper Greenwood      Portions of the record may have been created with voice recognition software  Occasional wrong word or "sound a like" substitutions may have occurred due to the inherent limitations of voice recognition software  Read the chart carefully and recognize, using context, where substitutions have occurred

## 2022-10-11 NOTE — ASSESSMENT & PLAN NOTE
Hx 7 8cm asymptomatic AAA s/p EVAR at [address] 10/2021  Denies abdominal or back pain  Reviewed [facility] imaging records - CTAP with contrast 9/23/22 demonstrated decreased aneurysm sac size 6 5 x 7 2 cm with small endoleak     -We discussed the pathophysiology of aneurysmal disease, management and surveillance following EVAR  -Discussed risk factors including smoking, male gender, connective tissue disorders, HTN, COPD, family history and possible genetic predisposition    -Has continued surveillance with [facility]

## 2022-10-13 NOTE — ASSESSMENT & PLAN NOTE
-history of CAD status post CABG in 1996, status post PCI to [facility] in 2006, PCI to ramus intermedius October 2021  -hospital admission in October 2021 for shortness of breath - status post catheterization and KYE to intermediate ramus  -2D echo completed April 2022 - EF 45%  -goal directed medical therapy with Toprol-XL, not on ACEi/ARB was discontinued due to hyperkalemia, Jardiance  -on aspirin, Plavix, high-intensity statin

## 2022-10-13 NOTE — ASSESSMENT & PLAN NOTE
-status post endovascular repair of abdominal aortic aneurysm in October 2021  -Continue f/u with vascular surgery  -Attention to BP control

## 2022-10-13 NOTE — ASSESSMENT & PLAN NOTE
-had Zio patch monitoring completed November 2021 - 69 episodes of an SVT, longest lasting 26 seconds, PVC burden 3 4%  -Mildly reduced LV function, not felt to be a candidate for ICD at this time  -remains on beta-blocker

## 2022-10-13 NOTE — PROGRESS NOTES
INTERNAL MEDICINE OFFICE VISIT  [facility] Internal Medicine- [place]    NAME: Scarlet Correa  AGE: 81 y o  SEX: male    DATE OF ENCOUNTER: 10/14/2022    Assessment and Plan/History of Present Illness     Here today for three-month follow-up  Past medical history of hypertension, CAD status post CABG, ischemic cardiomyopathy, hypertension, hyperlipidemia, AAA, PAD, JORGE     1  JORGE (obstructive sleep apnea)  Assessment & Plan:  CPAP device was recalled, follows with sleep medicine  Follow-up appointment is scheduled for January 2  Essential hypertension  Assessment & Plan:  -adequately controlled  -currently on Toprol- mg in the morning, Imdur 30 mg daily    Orders:  -     CBC and differential; Future  -     Comprehensive metabolic panel; Future  -     Lipid panel; Future    3  Ischemic cardiomyopathy  Assessment & Plan:  -history of CAD status post CABG in 1996, status post [facility] in 2006, PCI to ramus intermedius October 2021  -hospital admission in October 2021 for shortness of breath - status post catheterization and KYE to intermediate ramus  -2D echo completed April 2022 - EF 45%  -goal directed medical therapy with Toprol-XL, not on ACEi/ARB was discontinued due to hyperkalemia, Jardiance  -on aspirin, Plavix, high-intensity statin      4  NSVT (nonsustained ventricular tachycardia)  Assessment & Plan:  -had Zio patch monitoring completed November 2021 - 69 episodes of an SVT, longest lasting 26 seconds, PVC burden 3 4%  -Mildly reduced LV function, not felt to be a candidate for ICD at this time  -remains on beta-blocker      5  BPH with obstruction/lower urinary tract symptoms  Assessment & Plan:  Symptoms appear to be stable, no longer taking Flomax      6  Infrarenal abdominal aortic aneurysm (AAA) without rupture  Assessment & Plan:  -status post endovascular repair of abdominal aortic aneurysm in October 2021  -Continue f/u with vascular surgery  -Attention to BP control        7   PAD (peripheral artery disease) (HCC)  Assessment & Plan:  Continue aspirin, statin  Managed by vascular surgery      8  Venous ulcer of right leg ([address] )  Assessment & Plan:  Appears to be healing, managed by wound care             BMI Counseling: Body mass index is 29 7 kg/m²  The BMI is above normal  Nutrition recommendations include decreasing portion sizes, encouraging healthy choices of fruits and vegetables, moderation in carbohydrate intake and increasing intake of lean protein  Exercise recommendations include moderate physical activity 150 minutes/week  Rationale for BMI follow-up plan is due to patient being overweight or obese  Orders Placed This Encounter   Procedures   • CBC and differential   • Comprehensive metabolic panel   • Lipid panel         Chief Complaint     Chief Complaint   Patient presents with   • Follow-up     3 month    • Medicare Wellness Visit       Review of Systems     10 point ROS negative except per HPI    The following portions of the patient's history were reviewed and updated as appropriate: allergies, current medications, past family history, past medical history, past social history, past surgical history and problem list     Active Problem List     Patient Active Problem List   Diagnosis   • Acute coronary syndrome/unstable angina, to consider NSTEMI with ischemic related new left bundle branch block   • Hypercholesterolemia   • Essential hypertension   • Impaired fasting glucose   • Malignant melanoma of neck (HCC)   • JORGE (obstructive sleep apnea)   • Bradycardia   • Depression   • Elevated PSA   • BPH with obstruction/lower urinary tract symptoms   • PLMD (periodic limb movement disorder)   • Obesity (BMI 30-39  9)   • Bronchospasm   • Left forearm pain   • Ischemic cardiomyopathy   • AAA (abdominal aortic aneurysm)   • NSVT (nonsustained ventricular tachycardia)   • Status post endovascular aneurysm repair (EVAR)   • Anxiety   • COVID-19 infection   • Chronic systolic congestive heart failure (HCC)   • PAD (peripheral artery disease) (HCC)   • Venous ulcer of right leg (HCC)       Objective     /80   Pulse (!) 54   Temp 97 8 °F (36 6 °C)   Resp 18   Ht 5' 10" (1 778 m)   Wt 93 9 kg (207 lb)   SpO2 97%   BMI 29 70 kg/m²     Physical Exam  Constitutional:       Appearance: Normal appearance  He is not ill-appearing  HENT:      Head: Normocephalic and atraumatic  Eyes:      General: No scleral icterus  Right eye: No discharge  Left eye: No discharge  Cardiovascular:      Rate and Rhythm: Normal rate and regular rhythm  Heart sounds: No murmur heard  No friction rub  Pulmonary:      Effort: Pulmonary effort is normal       Breath sounds: Normal breath sounds  No wheezing or rales  Abdominal:      General: Abdomen is flat  There is no distension  Palpations: Abdomen is soft  Tenderness: There is no abdominal tenderness  Musculoskeletal:         General: No swelling or tenderness  Skin:     General: Skin is warm and dry  Findings: No erythema  Comments: Healing venous stasis ulcers bilateral lower extremities   Neurological:      Mental Status: He is alert  Mental status is at baseline  Motor: No weakness  Psychiatric:         Mood and Affect: Mood normal          Behavior: Behavior normal          Pertinent Laboratory/Diagnostic Studies:  No results found      Images and diagnostics reviewed     Current Medications     Current Outpatient Medications:   •  albuterol (PROVENTIL HFA,VENTOLIN HFA) 90 mcg/act inhaler, INHALE 1 PUFF BY MOUTH EVERY 6 HOURS AS NEEDED FOR WHEEZE, Disp: 18 g, Rfl: 1  •  amLODIPine (NORVASC) 5 mg tablet, Take 5 mg by mouth daily, Disp: , Rfl:   •  aspirin 81 MG tablet, Take 1 tablet by mouth daily, Disp: , Rfl:   •  atorvastatin (LIPITOR) 80 mg tablet, TAKE 1 TABLET BY MOUTH EVERY DAY, Disp: 90 tablet, Rfl: 0  •  clopidogrel (PLAVIX) 75 mg tablet, Take 75 mg by mouth daily  , Disp: , Rfl:   •  fluticasone (FLONASE) 50 mcg/act nasal spray, 2 sprays into each nostril daily, Disp: 54 6 mL, Rfl: 3  •  furosemide (LASIX) 20 mg tablet, Take 20 mg by mouth, Disp: , Rfl:   •  furosemide (LASIX) 40 mg tablet, Take 40 mg by mouth daily, Disp: , Rfl:   •  hydrALAZINE (APRESOLINE) 10 mg tablet, Take 10 mg by mouth 3 (three) times a day, Disp: , Rfl:   •  isosorbide mononitrate (IMDUR) 30 mg 24 hr tablet, Take 30 mg by mouth daily, Disp: , Rfl:   •  Jardiance 10 MG TABS, Take 10 mg by mouth daily, Disp: , Rfl:   •  metoprolol succinate (TOPROL-XL) 100 mg 24 hr tablet, Take 1 tablet (100 mg total) by mouth daily Take metoprolol succinate 100 mg daily in the morning, Disp: 30 tablet, Rfl: 0  •  nitroglycerin (NITROSTAT) 0 4 mg SL tablet, PLACE 1 TABLET (0 4 MG TOTAL) UNDER THE TONGUE EVERY 5 (FIVE) MINUTES AS NEEDED FOR CHEST PAIN, Disp: 100 tablet, Rfl: 1  •  tamsulosin (FLOMAX) 0 4 mg, Take 0 4 mg by mouth daily with dinner, Disp: , Rfl:   •  metoprolol succinate (TOPROL-XL) 50 mg 24 hr tablet, Take 1 tablet (50 mg total) by mouth every evening, Disp: 30 tablet, Rfl: 0    Health Maintenance     Health Maintenance   Topic Date Due   • COVID-19 Vaccine (3 - Booster for Moderna series) 08/09/2021   • BMI: Followup Plan  09/08/2022   • Influenza Vaccine (1) 06/30/2023 (Originally 9/1/2022)   • [facility] PLAN OF CARE  07/06/2023 (Originally 1940)   • Colorectal Cancer Screening  07/07/2023 (Originally 5/28/1986)   • Fall Risk  10/14/2023   • Medicare Annual Wellness Visit (AWV)  10/14/2023   • BMI: Adult  10/14/2023   • Pneumococcal Vaccine: 65+ Years  Completed   • HIB Vaccine  Aged Out   • Hepatitis B Vaccine  Aged Out   • IPV Vaccine  Aged Out   • Hepatitis A Vaccine  Aged Out   • Meningococcal ACWY Vaccine  Aged Out   • HPV Vaccine  Aged Out     Immunization History   Administered Date(s) Administered   • COVID-19 MODERNA VACC 0 5 ML IM 02/06/2021, 03/09/2021   • INFLUENZA 11/03/2015, 09/26/2016, 09/28/2016, 11/14/2017, 11/27/2018   • Influenza Quadrivalent Preservative Free 3 years and older IM 10/14/2014   • Influenza Quadrivalent, 6-35 Months IM 11/03/2015   • Influenza Split High Dose Preservative Free IM 09/26/2016, 11/14/2017   • Influenza, high dose seasonal 0 7 mL 11/27/2018, 10/22/2019, 10/22/2020   • Influenza, seasonal, injectable 09/27/2011, 10/01/2012, 12/02/2013   • Pneumococcal Conjugate 13-Valent 07/28/2015   • Pneumococcal Polysaccharide PPV23 09/20/2006, 01/03/2017   • Tdap 01/03/2017       UMESH Julian  Internal Medicine [place] [address] [address], [facility] [address]  [place], [address]   Office: [phone]  Fax: [phone]

## 2022-10-13 NOTE — ASSESSMENT & PLAN NOTE
CPAP device was recalled, follows with sleep medicine    Follow-up appointment is scheduled for January

## 2022-10-14 ENCOUNTER — OFFICE VISIT (OUTPATIENT)
Dept: INTERNAL MEDICINE CLINIC | Facility: CLINIC | Age: 82
End: 2022-10-14
Payer: COMMERCIAL

## 2022-10-14 VITALS
DIASTOLIC BLOOD PRESSURE: 80 MMHG | RESPIRATION RATE: 18 BRPM | HEIGHT: 70 IN | OXYGEN SATURATION: 97 % | TEMPERATURE: 97.8 F | WEIGHT: 207 LBS | BODY MASS INDEX: 29.63 KG/M2 | HEART RATE: 54 BPM | SYSTOLIC BLOOD PRESSURE: 132 MMHG

## 2022-10-14 DIAGNOSIS — I25.5 ISCHEMIC CARDIOMYOPATHY: ICD-10-CM

## 2022-10-14 DIAGNOSIS — I83.019 VENOUS ULCER OF RIGHT LEG (HCC): ICD-10-CM

## 2022-10-14 DIAGNOSIS — N13.8 BPH WITH OBSTRUCTION/LOWER URINARY TRACT SYMPTOMS: ICD-10-CM

## 2022-10-14 DIAGNOSIS — I73.9 PAD (PERIPHERAL ARTERY DISEASE) (HCC): ICD-10-CM

## 2022-10-14 DIAGNOSIS — I10 ESSENTIAL HYPERTENSION: ICD-10-CM

## 2022-10-14 DIAGNOSIS — I71.43 INFRARENAL ABDOMINAL AORTIC ANEURYSM (AAA) WITHOUT RUPTURE: ICD-10-CM

## 2022-10-14 DIAGNOSIS — G47.33 OSA (OBSTRUCTIVE SLEEP APNEA): Primary | ICD-10-CM

## 2022-10-14 DIAGNOSIS — N40.1 BPH WITH OBSTRUCTION/LOWER URINARY TRACT SYMPTOMS: ICD-10-CM

## 2022-10-14 DIAGNOSIS — I47.29 NSVT (NONSUSTAINED VENTRICULAR TACHYCARDIA): ICD-10-CM

## 2022-10-14 DIAGNOSIS — L97.919 VENOUS ULCER OF RIGHT LEG (HCC): ICD-10-CM

## 2022-10-14 PROCEDURE — 99214 OFFICE O/P EST MOD 30 MIN: CPT | Performed by: INTERNAL MEDICINE

## 2022-10-14 PROCEDURE — G0439 PPPS, SUBSEQ VISIT: HCPCS | Performed by: INTERNAL MEDICINE

## 2022-10-14 NOTE — PROGRESS NOTES
[facility] INTERNAL MEDICINE [place]    NAME: Mirna Callejas  AGE: 81 y o  SEX: male  : 1940     DATE: 10/14/2022     Assessment and Plan:     Problem List Items Addressed This Visit        Respiratory    JORGE (obstructive sleep apnea) - Primary     Wears CPAP, follows with sleep medicine            Cardiovascular and Mediastinum    Essential hypertension     -adequately controlled  -currently on Toprol- mg in the morning, 50 mg in the evening, Imdur 30 mg daily         Relevant Orders    CBC and differential    Comprehensive metabolic panel    Lipid panel    Ischemic cardiomyopathy     -history of CAD status post CABG in , [facility] , PCI to ramus intermedius 2021  -hospital admission in 2021 for shortness of breath - status post catheterization and KYE to intermediate ramus  -2D echo completed 2022 - EF 45%  -goal directed medical therapy with Toprol-XL, not on ACEi/ARB was discontinued due to hyperkalemia, Jardiance  -on aspirin, Plavix, high-intensity statin         AAA (abdominal aortic aneurysm)     -status post endovascular repair of abdominal aortic aneurysm in 2021  -Continue f/u with vascular surgery  -Attention to BP control  on her         NSVT (nonsustained ventricular tachycardia)     -had Zio patch monitoring completed 2021 - 69 episodes of an SVT, longest lasting 26 seconds, PVC burden 3 4%  -Mildly reduced LV function, not felt to be a candidate for ICD at this time  -remains on beta-blocker         PAD (peripheral artery disease) (HCC)     Continue aspirin, statin  Managed by vascular surgery            Genitourinary    BPH with obstruction/lower urinary tract symptoms     Symptoms appear to be stable, no longer taking Flomax               Immunizations and preventive care screenings were discussed with patient today   Appropriate education was printed on patient's after visit summary  {Prostate cancer screening - consider in males between age of 43-69 depending on age, race, and risk factors  There is difference in the guidelines in regards to the optimal age for screening  USPSTF states to consider periodic screening in males between the age of 48 to 71  This text is informational and does not need to be selected but if you wish, you can insert standard documentation in your progress note by using F2 (Optional):61714}    Counseling:  · {Annual Physical; Counselin}         Return in about 3 months (around 2023) for OVL  Chief Complaint:     Chief Complaint   Patient presents with   • Follow-up     3 month    • Medicare Wellness Visit      History of Present Illness:     Adult Annual Physical   Patient here for a comprehensive physical exam  The patient reports {problems:67820}  Diet and Physical Activity  · Diet/Nutrition: {annual physical; diet:22330687}  · Exercise: {annual physical; exercise:42188670}  Depression Screening  PHQ-2/9 Depression Screening    Little interest or pleasure in doing things: 1 - several days  Feeling down, depressed, or hopeless: 1 - several days  Trouble falling or staying asleep, or sleeping too much: 0 - not at all  Feeling tired or having little energy: 1 - several days  Poor appetite or overeatin - several days  Feeling bad about yourself - or that you are a failure or have let yourself or your family down: 0 - not at all  Trouble concentrating on things, such as reading the newspaper or watching television: 0 - not at all  Moving or speaking so slowly that other people could have noticed   Or the opposite - being so fidgety or restless that you have been moving around a lot more than usual: 0 - not at all  Thoughts that you would be better off dead, or of hurting yourself in some way: 0 - not at all  PHQ-9 Score: 4   PHQ-9 Interpretation: No or Minimal depression        General Health  · Sleep: {annual physical; sleep:2102}  · Hearing: {annual physical; hearin}  · Vision: {annual physical; vision:}  · Dental: {annual physical; dental:}   Health  · Symptoms include: {annual physical; urinary symptoms:55903::"none"}     Review of Systems:     Review of Systems   Past Medical History:     Past Medical History:   Diagnosis Date   • AAA (abdominal aortic aneurysm) without rupture     has annual US to check   • [facility] )     chronic stable angina, but it is mild and tolerates walking dogs and walking up flight os stairs without symptoms  Using Nitro no more than 2 times per mt     • Ankle pain, left    • Bilateral wrist pain    • BPH with obstruction/lower urinary tract symptoms    • Coronary artery disease    • CPAP (continuous positive airway pressure) dependence    • Elevated PSA    • History of colon polyps    • [place] (hard of hearing)     Slight   • Hyperlipidemia    • Ischemic cardiomyopathy     with apical aneurysm   • Melanoma ([facility] ) 2016    Right side of neck   • Myocardial infarction ([place] [address] ) 1996    x1      Past Surgical History:     Past Surgical History:   Procedure Laterality Date   • ARTERIAL ANEURYSM REPAIR  10/2021   • CARDIAC SURGERY      CABG x6   • CATARACT EXTRACTION Bilateral    • COLONOSCOPY     • CORONARY ANGIOPLASTY WITH STENT PLACEMENT      2 stents   • CORONARY ANGIOPLASTY WITH STENT PLACEMENT      10/2021   • CORONARY ARTERY BYPASS GRAFT     • PROSTATE BIOPSY  ,   • SKIN BIOPSY      Melanoma - surgical removal   • SKIN LESION EXCISION Right 2017    Procedure: WIDE EXCISION MELANOMA SCAR LATERAL NECK;  Surgeon: Love Ugarte MD;  Location: [facility];  Service:    • WISDOM TOOTH EXTRACTION        Family History:     Family History   Problem Relation Age of Onset   • Hypertension Mother    • Breast cancer Mother       Social History:     Social History     Socioeconomic History   • Marital status: /Civil Union     Spouse name: None   • Number of children: None   • Years of education: None   • Highest education level: None   Occupational History   • None   Tobacco Use   • Smoking status: Never Smoker   • Smokeless tobacco: Never Used   Vaping Use   • Vaping Use: Never used   Substance and Sexual Activity   • Alcohol use: Yes     Comment: 1-2 beers rarely   • Drug use: No   • Sexual activity: Not Currently   Other Topics Concern   • None   Social History Narrative    DOES NOT CONSUME CAFFEINE     Social Determinants of Health     Financial Resource Strain: Low Risk    • Difficulty of Paying Living Expenses: Not very hard   Food Insecurity: Not on file   Transportation Needs: No Transportation Needs   • Lack of Transportation (Medical): No   • Lack of Transportation (Non-Medical):  No   Physical Activity: Not on file   Stress: Not on file   Social Connections: Not on file   Intimate Partner Violence: Not on file   Housing Stability: Not on file      Current Medications:     Current Outpatient Medications   Medication Sig Dispense Refill   • albuterol (PROVENTIL HFA,VENTOLIN HFA) 90 mcg/act inhaler INHALE 1 PUFF BY MOUTH EVERY 6 HOURS AS NEEDED FOR WHEEZE 18 g 1   • amLODIPine (NORVASC) 5 mg tablet Take 5 mg by mouth daily     • aspirin 81 MG tablet Take 1 tablet by mouth daily     • atorvastatin (LIPITOR) 80 mg tablet TAKE 1 TABLET BY MOUTH EVERY DAY 90 tablet 0   • clopidogrel (PLAVIX) 75 mg tablet Take 75 mg by mouth daily       • fluticasone (FLONASE) 50 mcg/act nasal spray 2 sprays into each nostril daily 54 6 mL 3   • furosemide (LASIX) 20 mg tablet Take 20 mg by mouth     • furosemide (LASIX) 40 mg tablet Take 40 mg by mouth daily     • hydrALAZINE (APRESOLINE) 10 mg tablet Take 10 mg by mouth 3 (three) times a day     • isosorbide mononitrate (IMDUR) 30 mg 24 hr tablet Take 30 mg by mouth daily     • Jardiance 10 MG TABS Take 10 mg by mouth daily     • metoprolol succinate (TOPROL-XL) 100 mg 24 hr tablet Take 1 tablet (100 mg total) by mouth daily Take metoprolol succinate 100 mg daily in the morning 30 tablet 0   • nitroglycerin (NITROSTAT) 0 4 mg SL tablet PLACE 1 TABLET (0 4 MG TOTAL) UNDER THE TONGUE EVERY 5 (FIVE) MINUTES AS NEEDED FOR CHEST PAIN 100 tablet 1   • tamsulosin (FLOMAX) 0 4 mg Take 0 4 mg by mouth daily with dinner     • metoprolol succinate (TOPROL-XL) 50 mg 24 hr tablet Take 1 tablet (50 mg total) by mouth every evening 30 tablet 0     No current facility-administered medications for this visit  Allergies:      Allergies   Allergen Reactions   • Abciximab Other (See Comments)     rcd 9/27/01 & 5/16/03   • Escitalopram Rash      Physical Exam:     /80   Pulse (!) 54   Temp 97 8 °F (36 6 °C)   Resp 18   Ht 5' 10" (1 778 m)   Wt 93 9 kg (207 lb)   SpO2 97%   BMI 29 70 kg/m²     Physical Exam     Curly Huffman DO  [facility] INTERNAL MEDICINE [place]

## 2022-10-14 NOTE — PROGRESS NOTES
Assessment and Plan:     Problem List Items Addressed This Visit        Respiratory    JORGE (obstructive sleep apnea) - Primary     CPAP device was recalled, follows with sleep medicine    Follow-up appointment is scheduled for January            Cardiovascular and Mediastinum    Essential hypertension     -adequately controlled  -currently on Toprol- mg in the morning, Imdur 30 mg daily         Relevant Orders    CBC and differential    Comprehensive metabolic panel    Lipid panel    Ischemic cardiomyopathy     -history of CAD status post CABG in 1996, status post PCI to [place] [address] in 2006, PCI to ramus intermedius October 2021  -hospital admission in October 2021 for shortness of breath - status post catheterization and KYE to intermediate ramus  -2D echo completed April 2022 - EF 45%  -goal directed medical therapy with Toprol-XL, not on ACEi/ARB was discontinued due to hyperkalemia, Jardiance  -on aspirin, Plavix, high-intensity statin         AAA (abdominal aortic aneurysm)     -status post endovascular repair of abdominal aortic aneurysm in October 2021  -Continue f/u with vascular surgery  -Attention to BP control           NSVT (nonsustained ventricular tachycardia)     -had Zio patch monitoring completed November 2021 - 69 episodes of an SVT, longest lasting 26 seconds, PVC burden 3 4%  -Mildly reduced LV function, not felt to be a candidate for ICD at this time  -remains on beta-blocker         PAD (peripheral artery disease) (HCC)     Continue aspirin, statin  Managed by vascular surgery            Musculoskeletal and Integument    Venous ulcer of right leg ([address] )     Appears to be healing, managed by wound care            Genitourinary    BPH with obstruction/lower urinary tract symptoms     Symptoms appear to be stable, no longer taking Flomax                Preventive health issues were discussed with patient, and age appropriate screening tests were ordered as noted in patient's After Visit Summary  Personalized health advice and appropriate referrals for health education or preventive services given if needed, as noted in patient's After Visit Summary  History of Present Illness:     Patient presents for a Medicare Wellness Visit    HPI   Patient Care Team:  Curly Cheng DO as PCP - General (Internal Medicine)  DO Love López MD Desmond Bon, DPM (Podiatry)  Black Fang MD (Cardiology)     Review of Systems:     Review of Systems     Problem List:     Patient Active Problem List   Diagnosis   • Acute coronary syndrome/unstable angina, to consider NSTEMI with ischemic related new left bundle branch block   • Hypercholesterolemia   • Essential hypertension   • Impaired fasting glucose   • Malignant melanoma of neck (HCC)   • JORGE (obstructive sleep apnea)   • Bradycardia   • Depression   • Elevated PSA   • BPH with obstruction/lower urinary tract symptoms   • PLMD (periodic limb movement disorder)   • Obesity (BMI 30-39  9)   • Bronchospasm   • Left forearm pain   • Ischemic cardiomyopathy   • AAA (abdominal aortic aneurysm)   • NSVT (nonsustained ventricular tachycardia)   • Status post endovascular aneurysm repair (EVAR)   • Anxiety   • COVID-19 infection   • Chronic systolic congestive heart failure (HCC)   • PAD (peripheral artery disease) (HCC)   • Venous ulcer of right leg (HCC)      Past Medical and Surgical History:     Past Medical History:   Diagnosis Date   • AAA (abdominal aortic aneurysm) without rupture     has annual US to check   • Angina pectoris ([address] )     chronic stable angina, but it is mild and tolerates walking dogs and walking up flight os stairs without symptoms  Using Nitro no more than 2 times per mt     • Ankle pain, left    • Bilateral wrist pain    • BPH with obstruction/lower urinary tract symptoms    • Coronary artery disease    • CPAP (continuous positive airway pressure) dependence    • Elevated PSA    • History of colon polyps    • [place] (hard of hearing)     Slight   • Hyperlipidemia    • Ischemic cardiomyopathy     with apical aneurysm   • Melanoma ([facility] ) 12/2016    Right side of neck   • Myocardial infarction ([facility] ) 1996    x1     Past Surgical History:   Procedure Laterality Date   • ARTERIAL ANEURYSM REPAIR  10/2021   • CARDIAC SURGERY      CABG x6   • CATARACT EXTRACTION Bilateral    • COLONOSCOPY     • CORONARY ANGIOPLASTY WITH STENT PLACEMENT      2 stents   • CORONARY ANGIOPLASTY WITH STENT PLACEMENT      10/2021   • CORONARY ARTERY BYPASS GRAFT     • PROSTATE BIOPSY  2000,2001   • SKIN BIOPSY      Melanoma - surgical removal   • SKIN LESION EXCISION Right 2/6/2017    Procedure: WIDE EXCISION MELANOMA SCAR LATERAL NECK;  Surgeon: Verna Evans MD;  Location: [facility];  Service:    • WISDOM TOOTH EXTRACTION        Family History:     Family History   Problem Relation Age of Onset   • Hypertension Mother    • Breast cancer Mother       Social History:     Social History     Socioeconomic History   • Marital status: /Civil Union     Spouse name: None   • Number of children: None   • Years of education: None   • Highest education level: None   Occupational History   • None   Tobacco Use   • Smoking status: Never Smoker   • Smokeless tobacco: Never Used   Vaping Use   • Vaping Use: Never used   Substance and Sexual Activity   • Alcohol use: Yes     Comment: 1-2 beers rarely   • Drug use: No   • Sexual activity: Not Currently   Other Topics Concern   • None   Social History Narrative    DOES NOT CONSUME CAFFEINE     Social Determinants of Health     Financial Resource Strain: Low Risk    • Difficulty of Paying Living Expenses: Not very hard   Food Insecurity: Not on file   Transportation Needs: No Transportation Needs   • Lack of Transportation (Medical): No   • Lack of Transportation (Non-Medical):  No   Physical Activity: Not on file   Stress: Not on file   Social Connections: Not on file   Intimate Partner Violence: Not on file   Housing Stability: Not on file      Medications and Allergies:     Current Outpatient Medications   Medication Sig Dispense Refill   • albuterol (PROVENTIL HFA,VENTOLIN HFA) 90 mcg/act inhaler INHALE 1 PUFF BY MOUTH EVERY 6 HOURS AS NEEDED FOR WHEEZE 18 g 1   • amLODIPine (NORVASC) 5 mg tablet Take 5 mg by mouth daily     • aspirin 81 MG tablet Take 1 tablet by mouth daily     • atorvastatin (LIPITOR) 80 mg tablet TAKE 1 TABLET BY MOUTH EVERY DAY 90 tablet 0   • clopidogrel (PLAVIX) 75 mg tablet Take 75 mg by mouth daily       • fluticasone (FLONASE) 50 mcg/act nasal spray 2 sprays into each nostril daily 54 6 mL 3   • furosemide (LASIX) 20 mg tablet Take 20 mg by mouth     • furosemide (LASIX) 40 mg tablet Take 40 mg by mouth daily     • hydrALAZINE (APRESOLINE) 10 mg tablet Take 10 mg by mouth 3 (three) times a day     • isosorbide mononitrate (IMDUR) 30 mg 24 hr tablet Take 30 mg by mouth daily     • Jardiance 10 MG TABS Take 10 mg by mouth daily     • metoprolol succinate (TOPROL-XL) 100 mg 24 hr tablet Take 1 tablet (100 mg total) by mouth daily Take metoprolol succinate 100 mg daily in the morning 30 tablet 0   • nitroglycerin (NITROSTAT) 0 4 mg SL tablet PLACE 1 TABLET (0 4 MG TOTAL) UNDER THE TONGUE EVERY 5 (FIVE) MINUTES AS NEEDED FOR CHEST PAIN 100 tablet 1   • tamsulosin (FLOMAX) 0 4 mg Take 0 4 mg by mouth daily with dinner     • metoprolol succinate (TOPROL-XL) 50 mg 24 hr tablet Take 1 tablet (50 mg total) by mouth every evening 30 tablet 0     No current facility-administered medications for this visit       Allergies   Allergen Reactions   • Abciximab Other (See Comments)     rcd 9/27/01 & 5/16/03   • Escitalopram Rash      Immunizations:     Immunization History   Administered Date(s) Administered   • COVID-19 MODERNA VACC 0 5 ML IM 02/06/2021, 03/09/2021   • INFLUENZA 11/03/2015, 09/26/2016, 09/28/2016, 11/14/2017, 11/27/2018   • Influenza Quadrivalent Preservative Free 3 years and older IM 10/14/2014   • Influenza Quadrivalent, 6-35 Months IM 11/03/2015   • Influenza Split High Dose Preservative Free IM 09/26/2016, 11/14/2017   • Influenza, high dose seasonal 0 7 mL 11/27/2018, 10/22/2019, 10/22/2020   • Influenza, seasonal, injectable 09/27/2011, 10/01/2012, 12/02/2013   • Pneumococcal Conjugate 13-Valent 07/28/2015   • Pneumococcal Polysaccharide PPV23 09/20/2006, 01/03/2017   • Tdap 01/03/2017      Health Maintenance:         Topic Date Due   • Colorectal Cancer Screening  07/07/2023 (Originally 5/28/1985)         Topic Date Due   • COVID-19 Vaccine (3 - Booster for [place] series) 08/09/2021      Medicare Screening Tests and Risk Assessments:     Justin Santos is here for his Subsequent Wellness visit  Health Risk Assessment:   Patient rates overall health as good  Patient feels that their physical health rating is much better  Patient is satisfied with their life  Eyesight was rated as slightly worse  Hearing was rated as slightly worse  Patient feels that their emotional and mental health rating is much better  Patients states they are never, rarely angry  Patient states they are sometimes unusually tired/fatigued  Pain experienced in the last 7 days has been none  Patient states that he has experienced no weight loss or gain in last 6 months  Fall Risk Screening: In the past year, patient has experienced: no history of falling in past year      Home Safety:  Patient has trouble with stairs inside or outside of their home  Patient has working smoke alarms and has working carbon monoxide detector  Home safety hazards include: none  Nutrition:   Current diet is Regular and Limited junk food  Medications:   Patient is currently taking over-the-counter supplements  OTC medications include: see medication list  Patient is able to manage medications       Activities of Daily Living (ADLs)/Instrumental Activities of Daily Living (IADLs):   Walk and transfer into and out of bed and chair?: Yes  Dress and groom yourself?: Yes    Bathe or shower yourself?: Yes    Feed yourself? Yes  Do your laundry/housekeeping?: Yes  Manage your money, pay your bills and track your expenses?: Yes  Make your own meals?: Yes    Do your own shopping?: Yes    Previous Hospitalizations:   Any hospitalizations or ED visits within the last 12 months?: No      Advance Care Planning:   Living will: Yes    Durable POA for healthcare: Yes    Advanced directive: Yes      PREVENTIVE SCREENINGS      Cardiovascular Screening:    General: Screening Not Indicated and History Lipid Disorder      Diabetes Screening:     General: Screening Current      Prostate Cancer Screening:    General: Screening Not Indicated      Abdominal Aortic Aneurysm (AAA) Screening:        General: Screening Not Indicated and History AAA      Lung Cancer Screening:     General: Screening Not Indicated    Screening, Brief Intervention, and Referral to Treatment (SBIRT)    Screening  Typical number of drinks in a day: 0  Typical number of drinks in a week: 0  Interpretation: Low risk drinking behavior      Single Item Drug Screening:  How often have you used an illegal drug (including marijuana) or a prescription medication for non-medical reasons in the past year? never    Single Item Drug Screen Score: 0  Interpretation: Negative screen for possible drug use disorder    No exam data present     Physical Exam:     /80   Pulse (!) 54   Temp 97 8 °F (36 6 °C)   Resp 18   Ht 5' 10" (1 778 m)   Wt 93 9 kg (207 lb)   SpO2 97%   BMI 29 70 kg/m²     Physical Exam     Curly Mercado DO

## 2022-10-18 DIAGNOSIS — J98.01 BRONCHOSPASM: ICD-10-CM

## 2022-10-18 RX ORDER — ALBUTEROL SULFATE 90 UG/1
AEROSOL, METERED RESPIRATORY (INHALATION)
Qty: 18 G | Refills: 1 | Status: SHIPPED | OUTPATIENT
Start: 2022-10-18

## 2022-11-10 ENCOUNTER — OFFICE VISIT (OUTPATIENT)
Dept: WOUND CARE | Facility: CLINIC | Age: 82
End: 2022-11-10

## 2022-11-10 VITALS
HEART RATE: 56 BPM | TEMPERATURE: 97.3 F | SYSTOLIC BLOOD PRESSURE: 118 MMHG | RESPIRATION RATE: 16 BRPM | DIASTOLIC BLOOD PRESSURE: 54 MMHG

## 2022-11-10 DIAGNOSIS — I73.9 PERIPHERAL VASCULAR DISEASE, UNSPECIFIED (HCC): ICD-10-CM

## 2022-11-10 DIAGNOSIS — L97.919 VENOUS ULCER OF RIGHT LEG (HCC): Primary | ICD-10-CM

## 2022-11-10 DIAGNOSIS — L97.211 NON-PRESSURE CHRONIC ULCER OF RIGHT CALF LIMITED TO BREAKDOWN OF SKIN (HCC): ICD-10-CM

## 2022-11-10 DIAGNOSIS — I83.019 VENOUS ULCER OF RIGHT LEG (HCC): Primary | ICD-10-CM

## 2022-11-10 RX ORDER — LIDOCAINE 40 MG/G
CREAM TOPICAL ONCE
Status: COMPLETED | OUTPATIENT
Start: 2022-11-10 | End: 2022-11-10

## 2022-11-10 RX ADMIN — LIDOCAINE: 40 CREAM TOPICAL at 15:29

## 2022-11-10 NOTE — PROGRESS NOTES
Patient ID: Ranjeet Coombs is a 81 y o  male Date of Birth 1940       Chief Complaint   Patient presents with   • Follow Up Wound Care Visit     RLE wound       Allergies:  Abciximab and Escitalopram    Diagnosis:      Diagnosis ICD-10-CM Associated Orders   1  Venous ulcer of right leg ([facility])  I83 019 lidocaine (LMX) 4 % cream    L97 919 Wound cleansing and dressings   2  Non-pressure chronic ulcer of right calf limited to breakdown of skin ([address] )  L97 211    3  Peripheral vascular disease, unspecified ([facility])  I73 9 Wound cleansing and dressings           Assessment & Plan:  Recurrent venous stasis ulcer of the right calf  Most likely recurred due to dry going of Tubigrip over the recently healed wound  No current signs of infection  Partial thickness  Adaptic and silver alginate with tubigrips  Changed 3 times a week and p r n     Follow up with Dr Haylee Bell next week  Subjective:   11/10/22:  Patient returns to the wound center because of reopening of venous ulcer the right lower extremity  Had been closed fairly recently under the care of Dr Haylee Bell  He noted about five days ago after applying moisturizer and his wife helping him put on the tubigrips, he believes the skin tore open again  The areas tender  He has not had any fever or chills  He has had slight increase in edema of the right leg  Patient also has a history of PAD        The following portions of the patient's history were reviewed and updated as appropriate:   Patient Active Problem List   Diagnosis   • Acute coronary syndrome/unstable angina, to consider NSTEMI with ischemic related new left bundle branch block   • Hypercholesterolemia   • Essential hypertension   • Impaired fasting glucose   • Malignant melanoma of neck (HCC)   • JORGE (obstructive sleep apnea)   • Bradycardia   • Depression   • Elevated PSA   • BPH with obstruction/lower urinary tract symptoms   • PLMD (periodic limb movement disorder)   • Obesity (BMI 30-39  9)   • Bronchospasm   • Left forearm pain   • Ischemic cardiomyopathy   • AAA (abdominal aortic aneurysm)   • NSVT (nonsustained ventricular tachycardia)   • Status post endovascular aneurysm repair ([facility])   • Anxiety   • COVID-19 infection   • Chronic systolic congestive heart failure (HCC)   • PAD (peripheral artery disease) (HCC)   • Venous ulcer of right leg (HCC)     Past Medical History:   Diagnosis Date   • AAA (abdominal aortic aneurysm) without rupture     has annual US to check   • Angina pectoris (HCC)     chronic stable angina, but it is mild and tolerates walking dogs and walking up flight os stairs without symptoms  Using Nitro no more than 2 times per mt     • Ankle pain, left    • Bilateral wrist pain    • BPH with obstruction/lower urinary tract symptoms    • Coronary artery disease    • CPAP (continuous positive airway pressure) dependence    • Elevated PSA    • History of colon polyps    • [place] (hard of hearing)     Slight   • Hyperlipidemia    • Ischemic cardiomyopathy     with apical aneurysm   • Melanoma ([facility] [address] ) 12/2016    Right side of neck   • Myocardial infarction ([facility] [address] ) 1996    x1     Past Surgical History:   Procedure Laterality Date   • ARTERIAL ANEURYSM REPAIR  10/2021   • CARDIAC SURGERY      CABG x6   • CATARACT EXTRACTION Bilateral    • COLONOSCOPY     • CORONARY ANGIOPLASTY WITH STENT PLACEMENT      2 stents   • CORONARY ANGIOPLASTY WITH STENT PLACEMENT      10/2021   • CORONARY ARTERY BYPASS GRAFT     • PROSTATE BIOPSY  2000,2001   • SKIN BIOPSY      Melanoma - surgical removal   • SKIN LESION EXCISION Right 2/6/2017    Procedure: WIDE EXCISION MELANOMA SCAR LATERAL NECK;  Surgeon: Nano Adkins MD;  Location: [facility];  Service:    • WISDOM TOOTH EXTRACTION       Family History   Problem Relation Age of Onset   • Hypertension Mother    • Breast cancer Mother       Social History     Socioeconomic History   • Marital status: /Civil Union     Spouse name: None   • Number of children: None   • Years of education: None   • Highest education level: None   Occupational History   • None   Tobacco Use   • Smoking status: Never Smoker   • Smokeless tobacco: Never Used   Vaping Use   • Vaping Use: Never used   Substance and Sexual Activity   • Alcohol use: Yes     Comment: 1-2 beers rarely   • Drug use: No   • Sexual activity: Not Currently   Other Topics Concern   • None   Social History Narrative    DOES NOT CONSUME CAFFEINE     Social Determinants of Health     Financial Resource Strain: Low Risk    • Difficulty of Paying Living Expenses: Not very hard   Food Insecurity: Not on file   Transportation Needs: No Transportation Needs   • Lack of Transportation (Medical): No   • Lack of Transportation (Non-Medical):  No   Physical Activity: Not on file   Stress: Not on file   Social Connections: Not on file   Intimate Partner Violence: Not on file   Housing Stability: Not on file        Current Outpatient Medications:   •  albuterol (PROVENTIL HFA,VENTOLIN HFA) 90 mcg/act inhaler, INHALE 1 PUFF BY MOUTH EVERY 6 HOURS AS NEEDED FOR WHEEZE, Disp: 18 g, Rfl: 1  •  amLODIPine (NORVASC) 5 mg tablet, Take 5 mg by mouth daily, Disp: , Rfl:   •  aspirin 81 MG tablet, Take 1 tablet by mouth daily, Disp: , Rfl:   •  atorvastatin (LIPITOR) 80 mg tablet, TAKE 1 TABLET BY MOUTH EVERY DAY, Disp: 90 tablet, Rfl: 0  •  clopidogrel (PLAVIX) 75 mg tablet, Take 75 mg by mouth daily  , Disp: , Rfl:   •  fluticasone (FLONASE) 50 mcg/act nasal spray, 2 sprays into each nostril daily, Disp: 54 6 mL, Rfl: 3  •  furosemide (LASIX) 20 mg tablet, Take 20 mg by mouth, Disp: , Rfl:   •  furosemide (LASIX) 40 mg tablet, Take 40 mg by mouth daily, Disp: , Rfl:   •  hydrALAZINE (APRESOLINE) 10 mg tablet, Take 10 mg by mouth 3 (three) times a day, Disp: , Rfl:   •  isosorbide mononitrate (IMDUR) 30 mg 24 hr tablet, Take 30 mg by mouth daily, Disp: , Rfl:   •  Jardiance 10 MG TABS, Take 10 mg by mouth daily, Disp: , Rfl:   •  metoprolol succinate (TOPROL-XL) 100 mg 24 hr tablet, Take 1 tablet (100 mg total) by mouth daily Take metoprolol succinate 100 mg daily in the morning, Disp: 30 tablet, Rfl: 0  •  metoprolol succinate (TOPROL-XL) 50 mg 24 hr tablet, Take 1 tablet (50 mg total) by mouth every evening, Disp: 30 tablet, Rfl: 0  •  nitroglycerin (NITROSTAT) 0 4 mg SL tablet, PLACE 1 TABLET (0 4 MG TOTAL) UNDER THE TONGUE EVERY 5 (FIVE) MINUTES AS NEEDED FOR CHEST PAIN, Disp: 100 tablet, Rfl: 1  •  tamsulosin (FLOMAX) 0 4 mg, Take 0 4 mg by mouth daily with dinner, Disp: , Rfl:   No current facility-administered medications for this visit  Review of Systems   Constitutional: Negative for appetite change, chills, fatigue, fever and unexpected weight change  HENT: Negative for congestion, hearing loss and postnasal drip  Respiratory: Negative for cough and shortness of breath  Cardiovascular: Negative for leg swelling  Musculoskeletal: Negative for gait problem  Skin: Positive for wound (Right shin)  Negative for rash  Neurological: Negative for numbness  Hematological: Does not bruise/bleed easily  Psychiatric/Behavioral: Negative  Objective:  /54   Pulse 56   Temp (!) 97 3 °F (36 3 °C)   Resp 16   Pain Score: 0-No pain     Physical Exam  Vitals and nursing note reviewed  Constitutional:       Appearance: Normal appearance  He is normal weight  Cardiovascular:      Rate and Rhythm: Normal rate  Pulmonary:      Effort: Pulmonary effort is normal    Musculoskeletal:      Right lower le+ Edema present  Left lower le+ Edema present  Skin:     Capillary Refill: Capillary refill takes less than 2 seconds  Findings: Wound present  Comments: Partial thickness venous ulcer over the pretibial area  Good granulation tissue  Small to moderate amount of serosanguineous drainage  Tender to palpation  No signs of infection     Neurological:      Mental Status: He is alert  Wound 08/30/22 Venous Ulcer Pretibial Right; Anterior (Active)   Wound Image Images linked 11/10/22 1458   Wound Description Beefy red;Granulation tissue 11/10/22 1456   Yuki-wound Assessment Intact; Erythema 11/10/22 1456   Wound Length (cm) 2 2 cm 11/10/22 1456   Wound Width (cm) 2 5 cm 11/10/22 1456   Wound Depth (cm) 0 1 cm 11/10/22 1456   Wound Surface Area (cm^2) 5 5 cm^2 11/10/22 1456   Wound Volume (cm^3) 0 55 cm^3 11/10/22 1456   Calculated Wound Volume (cm^3) 0 55 cm^3 11/10/22 1456   Change in Wound Size % 56 11/10/22 1456   Drainage Amount Small 11/10/22 1456   Drainage Description Serosanguineous 11/10/22 1456   Non-staged Wound Description Full thickness 11/10/22 1456   Treatments Irrigation with NSS 11/10/22 1456                Wound Instructions:  Orders Placed This Encounter   Procedures   • Wound cleansing and dressings     Wash your hands with soap and water  Remove old dressing, discard into plastic bag and place in trash  Cleanse the wound with mild soap and water prior to applying a clean dressing  Do not use tissue or cotton balls  Do not scrub the wound  Pat dry using gauze  Shower yes  On the days you change the bandage  Remove dressing before shower  Wash your right leg last before getting out of the shower  Right lower leg wound:  Apply adaptic to the wound bed  Cover with alginate and dry gauze  Secure with rolled gauze and tape  Wear spandigrip compression sleeve to both lower legs  Must be from base of toes to knee  Replace when elasticity wears out  (1-2 weeks)   Change dressing three times a week  Follow up at the wound center in one week with Dr Sarah Marcial  Today's treatment in the wound center: Cleansed with NSS, and dressed as above       Standing Status:   Future     Standing Expiration Date:   11/10/2023             Bulmaro Phelan MD, MD, CHT, CWS    Portions of the record may have been created with voice recognition software  Occasional wrong word or "sound alike" substitutions may have occurred due to the inherent limitations of voice recognition software  Read the chart carefully and recognize, using context, where substitutions have occurred

## 2022-11-10 NOTE — PATIENT INSTRUCTIONS
Orders Placed This Encounter   Procedures    Wound cleansing and dressings     Wash your hands with soap and water  Remove old dressing, discard into plastic bag and place in trash  Cleanse the wound with mild soap and water prior to applying a clean dressing  Do not use tissue or cotton balls  Do not scrub the wound  Pat dry using gauze  Shower yes  On the days you change the bandage  Remove dressing before shower  Wash your right leg last before getting out of the shower  Right lower leg wound:  Apply adaptic to the wound bed  Cover with alginate and dry gauze  Secure with rolled gauze and tape  Wear spandigrip compression sleeve to both lower legs  Must be from base of toes to knee  Replace when elasticity wears out  (1-2 weeks)   Change dressing three times a week  Follow up at the wound center in one week with Dr Hanna Caldera  Today's treatment in the wound center: Cleansed with NSS, and dressed as above       Standing Status:   Future     Standing Expiration Date:   11/10/2023

## 2022-11-15 ENCOUNTER — OFFICE VISIT (OUTPATIENT)
Dept: WOUND CARE | Facility: CLINIC | Age: 82
End: 2022-11-15

## 2022-11-15 VITALS
SYSTOLIC BLOOD PRESSURE: 124 MMHG | RESPIRATION RATE: 16 BRPM | TEMPERATURE: 97.2 F | HEART RATE: 46 BPM | DIASTOLIC BLOOD PRESSURE: 72 MMHG

## 2022-11-15 DIAGNOSIS — L03.115 CELLULITIS OF RIGHT LEG: ICD-10-CM

## 2022-11-15 DIAGNOSIS — I73.9 PERIPHERAL VASCULAR DISEASE, UNSPECIFIED (HCC): ICD-10-CM

## 2022-11-15 DIAGNOSIS — I83.019 VENOUS ULCER OF RIGHT LEG (HCC): Primary | ICD-10-CM

## 2022-11-15 DIAGNOSIS — L97.919 VENOUS ULCER OF RIGHT LEG (HCC): Primary | ICD-10-CM

## 2022-11-15 RX ORDER — CEPHALEXIN 500 MG/1
500 CAPSULE ORAL EVERY 6 HOURS SCHEDULED
Qty: 28 CAPSULE | Refills: 0 | Status: SHIPPED | OUTPATIENT
Start: 2022-11-15 | End: 2022-11-22

## 2022-11-15 RX ORDER — LIDOCAINE 40 MG/G
CREAM TOPICAL ONCE
Status: COMPLETED | OUTPATIENT
Start: 2022-11-15 | End: 2022-11-15

## 2022-11-15 RX ORDER — LIDOCAINE 40 MG/G
CREAM TOPICAL ONCE
Status: SHIPPED | OUTPATIENT
Start: 2022-11-15

## 2022-11-15 RX ADMIN — LIDOCAINE: 40 CREAM TOPICAL at 10:31

## 2022-11-15 NOTE — PATIENT INSTRUCTIONS
Orders Placed This Encounter   Procedures    Wound cleansing and dressings     Wash your hands with soap and water  Remove old dressing, discard into plastic bag and place in trash  Cleanse the wound with mild soap and water prior to applying a clean dressing  Do not use tissue or cotton balls  Do not scrub the wound  Pat dry using gauze  Shower yes  On the days you change the bandage  Remove dressing before shower  Wash your right leg last before getting out of the shower  Right lower leg wound:  Apply adaptic to the wound bed  Cover with alginate and dry gauze  Secure with rolled gauze and tape  Wear spandigrip compression sleeve to both lower legs  Must be from base of toes to knee  Replace when elasticity wears out  (1-2 weeks)   Change dressing three times a week  Follow up at the wound center in one week with Dr Kisha Arrington  Today's treatment in the wound center: Cleansed with NSS, and dressed as above  Standing Status:   Future     Standing Expiration Date:   11/15/2023    Wound cleansing and dressings     Wash your hands with soap and water  Remove old dressing, discard into plastic bag and place in trash  Cleanse the wound with mild soap and water prior to applying a clean dressing  Do not use tissue or cotton balls  Do not scrub the wound  Pat dry using gauze  Shower yes  On the days you change the bandage  Remove dressing before shower  Wash your right leg last before getting out of the shower  Right lower leg wound:  Apply adaptic to the wound bed  Cover with alginate and dry gauze  Secure with rolled gauze and tape  Wear spandigrip compression sleeve to both lower legs  Must be from base of toes to knee  Replace when elasticity wears out  (1-2 weeks)   Change dressing three times a week  Follow up at the wound center in one week with Dr Kisha Arrington          Today's treatment in the wound center: Cleansed with NSS, and dressed as above      Standing Status:   Future     Standing Expiration Date:   11/15/2023

## 2022-11-15 NOTE — PROGRESS NOTES
Patient has HR of 46, which was assessed manually  Patient is asymptamatic  The nurse informed Dr Rene Rosado   Advised patient to contact his cardiologist

## 2022-11-15 NOTE — PROGRESS NOTES
Patient ID: Iggy Earl is a 81 y o  male Date of Birth 1940     Diagnosis:  1  Venous ulcer of right leg ([facility])  -     lidocaine (LMX) 4 % cream  -     Wound cleansing and dressings; Future  -     lidocaine (LMX) 4 % cream  -     Wound cleansing and dressings; Future  -     cephalexin (KEFLEX) 500 mg capsule; Take 1 capsule (500 mg total) by mouth every 6 (six) hours for 7 days    2  Peripheral vascular disease, unspecified ([facility] [address] )  -     Wound cleansing and dressings; Future  -     Wound cleansing and dressings; Future    3  Cellulitis of right leg  -     cephalexin (KEFLEX) 500 mg capsule; Take 1 capsule (500 mg total) by mouth every 6 (six) hours for 7 days       Diagnosis ICD-10-CM Associated Orders   1  Venous ulcer of right leg ([facility])  I83 019 lidocaine (LMX) 4 % cream    L97 919 Wound cleansing and dressings     lidocaine (LMX) 4 % cream     Wound cleansing and dressings     cephalexin (KEFLEX) 500 mg capsule   2  Peripheral vascular disease, unspecified ([facility])  I73 9 Wound cleansing and dressings     Wound cleansing and dressings   3  Cellulitis of right leg  L03 115 cephalexin (KEFLEX) 500 mg capsule        Assessment & Plan:  1  Wound has increased pain and redness, will prescribe antibiotic, concern for early cellulitis  Elevate, compress the RLE  Adaptic and alginate directly to wound,     Chief Complaint   Patient presents with   • Follow Up Wound Care Visit     RLE wound           Subjective:   Patient was discharged over a month ago having healed venous wound  Last week a wound on his right leg reopened  He had initial evaluation last week  Compression dressings were prescribed along with local care  He states the last few days the wound has gotten much more painful and there is some more redness    He denies nausea fever vomiting or chills      The following portions of the patient's history were reviewed and updated as appropriate:   Patient Active Problem List   Diagnosis   • Acute coronary syndrome/unstable angina, to consider NSTEMI with ischemic related new left bundle branch block   • Hypercholesterolemia   • Essential hypertension   • Impaired fasting glucose   • Malignant melanoma of neck (HCC)   • JORGE (obstructive sleep apnea)   • Bradycardia   • Depression   • Elevated PSA   • BPH with obstruction/lower urinary tract symptoms   • PLMD (periodic limb movement disorder)   • Obesity (BMI 30-39  9)   • Bronchospasm   • Left forearm pain   • Ischemic cardiomyopathy   • AAA (abdominal aortic aneurysm)   • NSVT (nonsustained ventricular tachycardia)   • Status post endovascular aneurysm repair (EVAR)   • Anxiety   • COVID-19 infection   • Chronic systolic congestive heart failure (HCC)   • PAD (peripheral artery disease) (HCC)   • Venous ulcer of right leg (HCC)     Past Medical History:   Diagnosis Date   • AAA (abdominal aortic aneurysm) without rupture     has annual US to check   • Angina pectoris (HCC)     chronic stable angina, but it is mild and tolerates walking dogs and walking up flight os stairs without symptoms  Using Nitro no more than 2 times per mt     • Ankle pain, left    • Bilateral wrist pain    • BPH with obstruction/lower urinary tract symptoms    • Coronary artery disease    • CPAP (continuous positive airway pressure) dependence    • Elevated PSA    • History of colon polyps    • [place] (hard of hearing)     Slight   • Hyperlipidemia    • Ischemic cardiomyopathy     with apical aneurysm   • Melanoma ([facility] [address] ) 12/2016    Right side of neck   • Myocardial infarction ([facility] [address] ) 1996    x1     Past Surgical History:   Procedure Laterality Date   • ARTERIAL ANEURYSM REPAIR  10/2021   • CARDIAC SURGERY      CABG x6   • CATARACT EXTRACTION Bilateral    • COLONOSCOPY     • CORONARY ANGIOPLASTY WITH STENT PLACEMENT      2 stents   • CORONARY ANGIOPLASTY WITH STENT PLACEMENT      10/2021   • CORONARY ARTERY BYPASS GRAFT     • PROSTATE BIOPSY  2000,2001   • SKIN BIOPSY      Melanoma - surgical removal • SKIN LESION EXCISION Right 2/6/2017    Procedure: WIDE EXCISION MELANOMA SCAR LATERAL NECK;  Surgeon: Smith Paniagua MD;  Location: [facility];  Service:    • WISDOM TOOTH EXTRACTION       Social History     Socioeconomic History   • Marital status: /Civil Union     Spouse name: None   • Number of children: None   • Years of education: None   • Highest education level: None   Occupational History   • None   Tobacco Use   • Smoking status: Never Smoker   • Smokeless tobacco: Never Used   Vaping Use   • Vaping Use: Never used   Substance and Sexual Activity   • Alcohol use: Yes     Comment: 1-2 beers rarely   • Drug use: No   • Sexual activity: Not Currently   Other Topics Concern   • None   Social History Narrative    DOES NOT CONSUME CAFFEINE     Social Determinants of Health     Financial Resource Strain: Low Risk    • Difficulty of Paying Living Expenses: Not very hard   Food Insecurity: Not on file   Transportation Needs: No Transportation Needs   • Lack of Transportation (Medical): No   • Lack of Transportation (Non-Medical):  No   Physical Activity: Not on file   Stress: Not on file   Social Connections: Not on file   Intimate Partner Violence: Not on file   Housing Stability: Not on file        Current Outpatient Medications:   •  cephalexin (KEFLEX) 500 mg capsule, Take 1 capsule (500 mg total) by mouth every 6 (six) hours for 7 days, Disp: 28 capsule, Rfl: 0  •  albuterol (PROVENTIL HFA,VENTOLIN HFA) 90 mcg/act inhaler, INHALE 1 PUFF BY MOUTH EVERY 6 HOURS AS NEEDED FOR WHEEZE, Disp: 18 g, Rfl: 1  •  amLODIPine (NORVASC) 5 mg tablet, Take 5 mg by mouth daily, Disp: , Rfl:   •  aspirin 81 MG tablet, Take 1 tablet by mouth daily, Disp: , Rfl:   •  atorvastatin (LIPITOR) 80 mg tablet, TAKE 1 TABLET BY MOUTH EVERY DAY, Disp: 90 tablet, Rfl: 0  •  clopidogrel (PLAVIX) 75 mg tablet, Take 75 mg by mouth daily  , Disp: , Rfl:   •  fluticasone (FLONASE) 50 mcg/act nasal spray, 2 sprays into each nostril daily, Disp: 54 6 mL, Rfl: 3  •  furosemide (LASIX) 20 mg tablet, Take 20 mg by mouth, Disp: , Rfl:   •  furosemide (LASIX) 40 mg tablet, Take 40 mg by mouth daily, Disp: , Rfl:   •  hydrALAZINE (APRESOLINE) 10 mg tablet, Take 10 mg by mouth 3 (three) times a day, Disp: , Rfl:   •  isosorbide mononitrate (IMDUR) 30 mg 24 hr tablet, Take 30 mg by mouth daily, Disp: , Rfl:   •  Jardiance 10 MG TABS, Take 10 mg by mouth daily, Disp: , Rfl:   •  metoprolol succinate (TOPROL-XL) 100 mg 24 hr tablet, Take 1 tablet (100 mg total) by mouth daily Take metoprolol succinate 100 mg daily in the morning, Disp: 30 tablet, Rfl: 0  •  metoprolol succinate (TOPROL-XL) 50 mg 24 hr tablet, Take 1 tablet (50 mg total) by mouth every evening, Disp: 30 tablet, Rfl: 0  •  nitroglycerin (NITROSTAT) 0 4 mg SL tablet, PLACE 1 TABLET (0 4 MG TOTAL) UNDER THE TONGUE EVERY 5 (FIVE) MINUTES AS NEEDED FOR CHEST PAIN, Disp: 100 tablet, Rfl: 1  •  tamsulosin (FLOMAX) 0 4 mg, Take 0 4 mg by mouth daily with dinner, Disp: , Rfl:     Current Facility-Administered Medications:   •  lidocaine (LMX) 4 % cream, , Topical, Once, Hamburg Layman, DPM  Family History   Problem Relation Age of Onset   • Hypertension Mother    • Breast cancer Mother       Review of Systems   Constitutional: Negative  Cardiovascular: Positive for leg swelling  Musculoskeletal: Negative for gait problem  Skin: Positive for color change and wound  Neurological: Negative for weakness and numbness  Allergies:  Abciximab and Escitalopram      Objective:  /72   Pulse (!) 46   Temp (!) 97 2 °F (36 2 °C)   Resp 16     Physical Exam  Vitals reviewed  Pulmonary:      Effort: Pulmonary effort is normal  No respiratory distress  Musculoskeletal:         General: Swelling present  Skin:     Capillary Refill: Capillary refill takes less than 2 seconds  Findings: Erythema (Anterior right leg ulcer with erythema and tenderness  No purulence ) present  Neurological:      Mental Status: He is alert  Wound 08/30/22 Venous Ulcer Pretibial Right; Anterior (Active)   Wound Image   11/15/22 1009   Wound Description Yellow; Beefy red;Epithelialization 11/15/22 1011   Yuki-wound Assessment Intact; Erythema 11/15/22 1011   Wound Length (cm) 2 1 cm 11/15/22 1011   Wound Width (cm) 2 3 cm 11/15/22 1011   Wound Depth (cm) 0 1 cm 11/15/22 1011   Wound Surface Area (cm^2) 4 83 cm^2 11/15/22 1011   Wound Volume (cm^3) 0 483 cm^3 11/15/22 1011   Calculated Wound Volume (cm^3) 0 48 cm^3 11/15/22 1011   Change in Wound Size % 61 6 11/15/22 1011   Drainage Amount Small 11/15/22 1011   Drainage Description Serosanguineous 11/15/22 1011   Non-staged Wound Description Full thickness 11/15/22 1011   Treatments Irrigation with NSS 11/15/22 1011   Patient Tolerance Tolerated well 09/27/22 0834   Dressing Status Removed 09/27/22 0834                         Procedures             Wound Instructions:  Orders Placed This Encounter   Procedures   • Wound cleansing and dressings     Wash your hands with soap and water  Remove old dressing, discard into plastic bag and place in trash  Cleanse the wound with mild soap and water prior to applying a clean dressing  Do not use tissue or cotton balls  Do not scrub the wound  Pat dry using gauze  Shower yes  On the days you change the bandage  Remove dressing before shower  Wash your right leg last before getting out of the shower         Right lower leg wound:  Apply adaptic to the wound bed  Cover with alginate and dry gauze  Secure with rolled gauze and tape  Wear spandigrip compression sleeve to both lower legs  Must be from base of toes to knee  Replace when elasticity wears out  (1-2 weeks)   Change dressing three times a week             Follow up at the wound center in one week with Dr Benoit Vicente         Today's treatment in the wound center: Cleansed with NSS, and dressed as above       Standing Status:   Future Standing Expiration Date:   11/15/2023   • Wound cleansing and dressings     Wash your hands with soap and water  Remove old dressing, discard into plastic bag and place in trash  Cleanse the wound with mild soap and water prior to applying a clean dressing  Do not use tissue or cotton balls  Do not scrub the wound  Pat dry using gauze  Shower yes  On the days you change the bandage  Remove dressing before shower  Wash your right leg last before getting out of the shower         Right lower leg wound:  Apply adaptic to the wound bed  Cover with alginate and dry gauze  Secure with rolled gauze and tape  Wear spandigrip compression sleeve to both lower legs  Must be from base of toes to knee  Replace when elasticity wears out  (1-2 weeks)   Change dressing three times a week             Follow up at the wound center in one week with Dr Daryl Bearden         Today's treatment in the wound center: Cleansed with NSS, and dressed as above  Standing Status:   Future     Standing Expiration Date:   11/15/2023         Tomasa Valentine DPM      Portions of the record may have been created with voice recognition software  Occasional wrong word or "sound a like" substitutions may have occurred due to the inherent limitations of voice recognition software  Read the chart carefully and recognize, using context, where substitutions have occurred

## 2022-11-22 ENCOUNTER — OFFICE VISIT (OUTPATIENT)
Dept: WOUND CARE | Facility: CLINIC | Age: 82
End: 2022-11-22

## 2022-11-22 VITALS
RESPIRATION RATE: 16 BRPM | DIASTOLIC BLOOD PRESSURE: 58 MMHG | SYSTOLIC BLOOD PRESSURE: 98 MMHG | HEART RATE: 58 BPM | TEMPERATURE: 97.2 F

## 2022-11-22 DIAGNOSIS — L97.919 VENOUS ULCER OF RIGHT LEG (HCC): Primary | ICD-10-CM

## 2022-11-22 DIAGNOSIS — I83.019 VENOUS ULCER OF RIGHT LEG (HCC): Primary | ICD-10-CM

## 2022-11-22 DIAGNOSIS — I73.9 PERIPHERAL VASCULAR DISEASE, UNSPECIFIED (HCC): ICD-10-CM

## 2022-11-22 RX ORDER — LIDOCAINE 40 MG/G
CREAM TOPICAL ONCE
Status: COMPLETED | OUTPATIENT
Start: 2022-11-22 | End: 2022-11-22

## 2022-11-22 RX ADMIN — LIDOCAINE: 40 CREAM TOPICAL at 10:46

## 2022-11-22 NOTE — PATIENT INSTRUCTIONS
Orders Placed This Encounter   Procedures    Wound cleansing and dressings     Wash your hands with soap and water  Remove old dressing, discard into plastic bag and place in trash  Cleanse the wound with mild soap and water prior to applying a clean dressing  Do not use tissue or cotton balls  Do not scrub the wound  Pat dry using gauze  Shower yes  On the days you change the bandage  Remove dressing before shower  Wash your right leg last before getting out of the shower  Right lower leg wound:  Apply adaptic to the wound bed  Cover with alginate and dry gauze  Secure with rolled gauze and tape  Wear spandigrip compression sleeve to both lower legs  Must be from base of toes to knee  Replace when elasticity wears out  (1-2 weeks)   Change dressing three times a week  Follow up at the wound center in one week with Dr Indu Marcial  Today's treatment in the wound center: Cleansed with NSS, and dressed as above       Standing Status:   Future     Standing Expiration Date:   11/22/2023

## 2022-11-22 NOTE — PROGRESS NOTES
Patient ID: Jessica Rosenthal is a 81 y o  male Date of Birth 1940     Diagnosis:  1  Venous ulcer of right leg ([facility])  -     lidocaine (LMX) 4 % cream  -     Wound cleansing and dressings; Future    2  Peripheral vascular disease, unspecified ([facility] [address] )  -     Wound cleansing and dressings; Future       Diagnosis ICD-10-CM Associated Orders   1  Venous ulcer of right leg ([facility])  I83 019 lidocaine (LMX) 4 % cream    L97 919 Wound cleansing and dressings      2  Peripheral vascular disease, unspecified ([facility])  I73 9 Wound cleansing and dressings           Assessment & Plan:  1  Marked improvement this week  Finishes antibiotic today  HE accidentally was underdosing himself but at this point he seems fine, no need to refill or continue antibiotics  2  Continue adaptic, alginate, Compression  Elevate throughout the day  NO need for debridement today  3  RTC 1 week    Chief Complaint   Patient presents with   • Follow Up Wound Care Visit     RLE wound           Subjective:   Patient returns for care of right leg venous wound  He was put on antibiotics last week  He miss read the label and was only taking the antibiotic twice a day instead of every 6 hours but at this point the redness seems better and the wound is smaller  He denies any side effects from the antibiotic  Overall he feels well  He likes to take a walk and then sits in a chair with his legs elevated        The following portions of the patient's history were reviewed and updated as appropriate:   Patient Active Problem List   Diagnosis   • Acute coronary syndrome/unstable angina, to consider NSTEMI with ischemic related new left bundle branch block   • Hypercholesterolemia   • Essential hypertension   • Impaired fasting glucose   • Malignant melanoma of neck (HCC)   • JORGE (obstructive sleep apnea)   • Bradycardia   • Depression   • Elevated PSA   • BPH with obstruction/lower urinary tract symptoms   • PLMD (periodic limb movement disorder)   • Obesity (BMI 30-39  9)   • Bronchospasm   • Left forearm pain   • Ischemic cardiomyopathy   • AAA (abdominal aortic aneurysm)   • NSVT (nonsustained ventricular tachycardia)   • Status post endovascular aneurysm repair (EVAR)   • Anxiety   • COVID-19 infection   • Chronic systolic congestive heart failure (HCC)   • PAD (peripheral artery disease) (HCC)   • Venous ulcer of right leg (HCC)     Past Medical History:   Diagnosis Date   • AAA (abdominal aortic aneurysm) without rupture     has annual US to check   • Angina pectoris (HCC)     chronic stable angina, but it is mild and tolerates walking dogs and walking up flight os stairs without symptoms  Using Nitro no more than 2 times per mt     • Ankle pain, left    • Bilateral wrist pain    • BPH with obstruction/lower urinary tract symptoms    • Coronary artery disease    • CPAP (continuous positive airway pressure) dependence    • Elevated PSA    • History of colon polyps    • [place] (hard of hearing)     Slight   • Hyperlipidemia    • Ischemic cardiomyopathy     with apical aneurysm   • Melanoma ([facility] [address] ) 12/2016    Right side of neck   • Myocardial infarction ([facility] [address] ) 1996    x1     Past Surgical History:   Procedure Laterality Date   • ARTERIAL ANEURYSM REPAIR  10/2021   • CARDIAC SURGERY      CABG x6   • CATARACT EXTRACTION Bilateral    • COLONOSCOPY     • CORONARY ANGIOPLASTY WITH STENT PLACEMENT      2 stents   • CORONARY ANGIOPLASTY WITH STENT PLACEMENT      10/2021   • CORONARY ARTERY BYPASS GRAFT     • PROSTATE BIOPSY  2000,2001   • SKIN BIOPSY      Melanoma - surgical removal   • SKIN LESION EXCISION Right 2/6/2017    Procedure: WIDE EXCISION MELANOMA SCAR LATERAL NECK;  Surgeon: Nano Adkins MD;  Location: [facility];  Service:    • WISDOM TOOTH EXTRACTION       Social History     Socioeconomic History   • Marital status: /Civil Union     Spouse name: None   • Number of children: None   • Years of education: None   • Highest education level: None   Occupational History   • None   Tobacco Use   • Smoking status: Never   • Smokeless tobacco: Never   Vaping Use   • Vaping Use: Never used   Substance and Sexual Activity   • Alcohol use: Yes     Comment: 1-2 beers rarely   • Drug use: No   • Sexual activity: Not Currently   Other Topics Concern   • None   Social History Narrative    DOES NOT CONSUME CAFFEINE     Social Determinants of Health     Financial Resource Strain: Low Risk    • Difficulty of Paying Living Expenses: Not very hard   Food Insecurity: Not on file   Transportation Needs: No Transportation Needs   • Lack of Transportation (Medical): No   • Lack of Transportation (Non-Medical):  No   Physical Activity: Not on file   Stress: Not on file   Social Connections: Not on file   Intimate Partner Violence: Not on file   Housing Stability: Not on file        Current Outpatient Medications:   •  albuterol (PROVENTIL HFA,VENTOLIN HFA) 90 mcg/act inhaler, INHALE 1 PUFF BY MOUTH EVERY 6 HOURS AS NEEDED FOR WHEEZE, Disp: 18 g, Rfl: 1  •  amLODIPine (NORVASC) 5 mg tablet, Take 5 mg by mouth daily, Disp: , Rfl:   •  aspirin 81 MG tablet, Take 1 tablet by mouth daily, Disp: , Rfl:   •  atorvastatin (LIPITOR) 80 mg tablet, TAKE 1 TABLET BY MOUTH EVERY DAY, Disp: 90 tablet, Rfl: 0  •  cephalexin (KEFLEX) 500 mg capsule, Take 1 capsule (500 mg total) by mouth every 6 (six) hours for 7 days, Disp: 28 capsule, Rfl: 0  •  clopidogrel (PLAVIX) 75 mg tablet, Take 75 mg by mouth daily  , Disp: , Rfl:   •  fluticasone (FLONASE) 50 mcg/act nasal spray, 2 sprays into each nostril daily, Disp: 54 6 mL, Rfl: 3  •  furosemide (LASIX) 20 mg tablet, Take 20 mg by mouth, Disp: , Rfl:   •  furosemide (LASIX) 40 mg tablet, Take 40 mg by mouth daily, Disp: , Rfl:   •  hydrALAZINE (APRESOLINE) 10 mg tablet, Take 10 mg by mouth 3 (three) times a day, Disp: , Rfl:   •  isosorbide mononitrate (IMDUR) 30 mg 24 hr tablet, Take 30 mg by mouth daily, Disp: , Rfl:   •  Jardiance 10 MG TABS, Take 10 mg by mouth daily, Disp: , Rfl:   •  metoprolol succinate (TOPROL-XL) 100 mg 24 hr tablet, Take 1 tablet (100 mg total) by mouth daily Take metoprolol succinate 100 mg daily in the morning, Disp: 30 tablet, Rfl: 0  •  metoprolol succinate (TOPROL-XL) 50 mg 24 hr tablet, Take 1 tablet (50 mg total) by mouth every evening, Disp: 30 tablet, Rfl: 0  •  nitroglycerin (NITROSTAT) 0 4 mg SL tablet, PLACE 1 TABLET (0 4 MG TOTAL) UNDER THE TONGUE EVERY 5 (FIVE) MINUTES AS NEEDED FOR CHEST PAIN, Disp: 100 tablet, Rfl: 1  •  tamsulosin (FLOMAX) 0 4 mg, Take 0 4 mg by mouth daily with dinner, Disp: , Rfl:     Current Facility-Administered Medications:   •  lidocaine (LMX) 4 % cream, , Topical, Once, Argelia Mitra, DPM  •  lidocaine (LMX) 4 % cream, , Topical, Once, [facility], DPM  Family History   Problem Relation Age of Onset   • Hypertension Mother    • Breast cancer Mother       Review of Systems   Constitutional: Negative  Respiratory: Negative for chest tightness and shortness of breath  Cardiovascular: Positive for leg swelling  Musculoskeletal: Negative for arthralgias  Skin: Positive for wound  Neurological: Negative for weakness  Allergies:  Abciximab and Escitalopram      Objective:  BP 98/58   Pulse 58   Temp (!) 97 2 °F (36 2 °C)   Resp 16     Physical Exam  Vitals reviewed  Constitutional:       Appearance: He is not ill-appearing or diaphoretic  Cardiovascular:      Rate and Rhythm: Normal rate  Pulmonary:      Effort: No respiratory distress  Musculoskeletal:      Right lower leg: Edema present  Left lower leg: Edema present  Skin:     Findings: Rash (Mild stasis dermatitis with venous wound on right leg  No cellulitis  Wound is smaller from last week  Epithelialization noted at wound edges almost circumferentially) present  Neurological:      Mental Status: He is alert and oriented to person, place, and time        Gait: Gait normal              Wound 08/30/22 Venous Ulcer Pretibial Right; Anterior (Active)   Wound Image   11/22/22 1027   Wound Description Pink;Yellow;Epithelialization 11/22/22 1025   Yuki-wound Assessment Edema; Intact; Pink 11/22/22 1025   Wound Length (cm) 1 5 cm 11/22/22 1025   Wound Width (cm) 1 3 cm 11/22/22 1025   Wound Depth (cm) 0 1 cm 11/22/22 1025   Wound Surface Area (cm^2) 1 95 cm^2 11/22/22 1025   Wound Volume (cm^3) 0 195 cm^3 11/22/22 1025   Calculated Wound Volume (cm^3) 0 2 cm^3 11/22/22 1025   Change in Wound Size % 84 11/22/22 1025   Drainage Amount Small 11/22/22 1025   Drainage Description Serosanguineous 11/22/22 1025   Non-staged Wound Description Full thickness 11/22/22 1025   Treatments Irrigation with NSS 11/22/22 1025   Patient Tolerance Tolerated well 09/27/22 0834   Dressing Status Removed 09/27/22 0834                         Procedures             Wound Instructions:  Orders Placed This Encounter   Procedures   • Wound cleansing and dressings     Wash your hands with soap and water  Remove old dressing, discard into plastic bag and place in trash  Cleanse the wound with mild soap and water prior to applying a clean dressing  Do not use tissue or cotton balls  Do not scrub the wound  Pat dry using gauze  Shower yes  On the days you change the bandage  Remove dressing before shower  Wash your right leg last before getting out of the shower         Right lower leg wound:  Apply adaptic to the wound bed  Cover with alginate and dry gauze  Secure with rolled gauze and tape  Wear spandigrip compression sleeve to both lower legs  Must be from base of toes to knee  Replace when elasticity wears out  (1-2 weeks)   Change dressing three times a week             Follow up at the wound center in one week with Dr Josh Villar         Today's treatment in the wound center: Cleansed with NSS, and dressed as above       Standing Status:   Future     Standing Expiration Date:   11/22/2023         Jessica Estes DPM      Portions of the record may have been created with voice recognition software  Occasional wrong word or "sound a like" substitutions may have occurred due to the inherent limitations of voice recognition software  Read the chart carefully and recognize, using context, where substitutions have occurred

## 2022-11-29 ENCOUNTER — OFFICE VISIT (OUTPATIENT)
Dept: WOUND CARE | Facility: CLINIC | Age: 82
End: 2022-11-29

## 2022-11-29 VITALS
DIASTOLIC BLOOD PRESSURE: 56 MMHG | TEMPERATURE: 97.4 F | HEART RATE: 40 BPM | RESPIRATION RATE: 16 BRPM | SYSTOLIC BLOOD PRESSURE: 120 MMHG

## 2022-11-29 DIAGNOSIS — I73.9 PERIPHERAL VASCULAR DISEASE, UNSPECIFIED (HCC): ICD-10-CM

## 2022-11-29 DIAGNOSIS — I83.019 VENOUS ULCER OF RIGHT LEG (HCC): Primary | ICD-10-CM

## 2022-11-29 DIAGNOSIS — L97.919 VENOUS ULCER OF RIGHT LEG (HCC): Primary | ICD-10-CM

## 2022-11-29 RX ORDER — LIDOCAINE 40 MG/G
CREAM TOPICAL ONCE
Status: COMPLETED | OUTPATIENT
Start: 2022-11-29 | End: 2022-11-29

## 2022-11-29 RX ADMIN — LIDOCAINE: 40 CREAM TOPICAL at 11:09

## 2022-11-29 NOTE — PROGRESS NOTES
Patient ID: Gigi Simon is a 81 y o  male Date of Birth 1940     Diagnosis:  1  Venous ulcer of right leg (HCC)  -     lidocaine (LMX) 4 % cream  -     Wound cleansing and dressings; Future    2  Peripheral vascular disease, unspecified ([facility] [address] )  -     Wound cleansing and dressings; Future       Diagnosis ICD-10-CM Associated Orders   1  Venous ulcer of right leg (HCC)  I83 019 lidocaine (LMX) 4 % cream    L97 919 Wound cleansing and dressings      2  Peripheral vascular disease, unspecified (HCC)  I73 9 Wound cleansing and dressings           Assessment & Plan:  1  VSU healing well, no need for debridement today  COntinue compression, elevation and LWC  RTC 2 weeks    Of note patients HR was 38-42  HE has known irregular rhythm  I recommended he call his cardiologist and let them know about his HR  His medication may need to be adjusted  His BP is fine  HE is not dizzy or confused  hE is not SOB    Chief Complaint   Patient presents with   • Follow Up Wound Care Visit     RLE wound           Subjective:   Patient arrives for right leg venous ulcer  He has been elevating using a compression stocking  He states he feels fine  Pain is improving to his wound is well      The following portions of the patient's history were reviewed and updated as appropriate:   Patient Active Problem List   Diagnosis   • Acute coronary syndrome/unstable angina, to consider NSTEMI with ischemic related new left bundle branch block   • Hypercholesterolemia   • Essential hypertension   • Impaired fasting glucose   • Malignant melanoma of neck (HCC)   • JORGE (obstructive sleep apnea)   • Bradycardia   • Depression   • Elevated PSA   • BPH with obstruction/lower urinary tract symptoms   • PLMD (periodic limb movement disorder)   • Obesity (BMI 30-39  9)   • Bronchospasm   • Left forearm pain   • Ischemic cardiomyopathy   • AAA (abdominal aortic aneurysm)   • NSVT (nonsustained ventricular tachycardia)   • Status post endovascular aneurysm repair (EVAR)   • Anxiety   • COVID-19 infection   • Chronic systolic congestive heart failure (HCC)   • PAD (peripheral artery disease) (HCC)   • Venous ulcer of right leg (HCC)     Past Medical History:   Diagnosis Date   • AAA (abdominal aortic aneurysm) without rupture     has annual US to check   • Angina pectoris (HCC)     chronic stable angina, but it is mild and tolerates walking dogs and walking up flight os stairs without symptoms  Using Nitro no more than 2 times per mt     • Ankle pain, left    • Bilateral wrist pain    • BPH with obstruction/lower urinary tract symptoms    • Coronary artery disease    • CPAP (continuous positive airway pressure) dependence    • Elevated PSA    • History of colon polyps    • [place] (hard of hearing)     Slight   • Hyperlipidemia    • Ischemic cardiomyopathy     with apical aneurysm   • Melanoma ([facility] [address] ) 12/2016    Right side of neck   • Myocardial infarction ([facility] [address] ) 1996    x1     Past Surgical History:   Procedure Laterality Date   • ARTERIAL ANEURYSM REPAIR  10/2021   • CARDIAC SURGERY      CABG x6   • CATARACT EXTRACTION Bilateral    • COLONOSCOPY     • CORONARY ANGIOPLASTY WITH STENT PLACEMENT      2 stents   • CORONARY ANGIOPLASTY WITH STENT PLACEMENT      10/2021   • CORONARY ARTERY BYPASS GRAFT     • PROSTATE BIOPSY  2000,2001   • SKIN BIOPSY      Melanoma - surgical removal   • SKIN LESION EXCISION Right 2/6/2017    Procedure: WIDE EXCISION MELANOMA SCAR LATERAL NECK;  Surgeon: Renate Zurita MD;  Location: [facility];  Service:    • WISDOM TOOTH EXTRACTION       Social History     Socioeconomic History   • Marital status: /Civil Union     Spouse name: None   • Number of children: None   • Years of education: None   • Highest education level: None   Occupational History   • None   Tobacco Use   • Smoking status: Never   • Smokeless tobacco: Never   Vaping Use   • Vaping Use: Never used   Substance and Sexual Activity   • Alcohol use: Yes     Comment: 1-2 beers rarely   • Drug use: No   • Sexual activity: Not Currently   Other Topics Concern   • None   Social History Narrative    DOES NOT CONSUME CAFFEINE     Social Determinants of Health     Financial Resource Strain: Low Risk    • Difficulty of Paying Living Expenses: Not very hard   Food Insecurity: Not on file   Transportation Needs: No Transportation Needs   • Lack of Transportation (Medical): No   • Lack of Transportation (Non-Medical):  No   Physical Activity: Not on file   Stress: Not on file   Social Connections: Not on file   Intimate Partner Violence: Not on file   Housing Stability: Not on file        Current Outpatient Medications:   •  albuterol (PROVENTIL HFA,VENTOLIN HFA) 90 mcg/act inhaler, INHALE 1 PUFF BY MOUTH EVERY 6 HOURS AS NEEDED FOR WHEEZE, Disp: 18 g, Rfl: 1  •  amLODIPine (NORVASC) 5 mg tablet, Take 5 mg by mouth daily, Disp: , Rfl:   •  aspirin 81 MG tablet, Take 1 tablet by mouth daily, Disp: , Rfl:   •  atorvastatin (LIPITOR) 80 mg tablet, TAKE 1 TABLET BY MOUTH EVERY DAY, Disp: 90 tablet, Rfl: 0  •  clopidogrel (PLAVIX) 75 mg tablet, Take 75 mg by mouth daily  , Disp: , Rfl:   •  fluticasone (FLONASE) 50 mcg/act nasal spray, 2 sprays into each nostril daily, Disp: 54 6 mL, Rfl: 3  •  furosemide (LASIX) 20 mg tablet, Take 20 mg by mouth, Disp: , Rfl:   •  furosemide (LASIX) 40 mg tablet, Take 40 mg by mouth daily, Disp: , Rfl:   •  hydrALAZINE (APRESOLINE) 10 mg tablet, Take 10 mg by mouth 3 (three) times a day, Disp: , Rfl:   •  isosorbide mononitrate (IMDUR) 30 mg 24 hr tablet, Take 30 mg by mouth daily, Disp: , Rfl:   •  Jardiance 10 MG TABS, Take 10 mg by mouth daily, Disp: , Rfl:   •  metoprolol succinate (TOPROL-XL) 100 mg 24 hr tablet, Take 1 tablet (100 mg total) by mouth daily Take metoprolol succinate 100 mg daily in the morning, Disp: 30 tablet, Rfl: 0  •  metoprolol succinate (TOPROL-XL) 50 mg 24 hr tablet, Take 1 tablet (50 mg total) by mouth every evening, Disp: 30 tablet, Rfl: 0  •  nitroglycerin (NITROSTAT) 0 4 mg SL tablet, PLACE 1 TABLET (0 4 MG TOTAL) UNDER THE TONGUE EVERY 5 (FIVE) MINUTES AS NEEDED FOR CHEST PAIN, Disp: 100 tablet, Rfl: 1  •  tamsulosin (FLOMAX) 0 4 mg, Take 0 4 mg by mouth daily with dinner, Disp: , Rfl:     Current Facility-Administered Medications:   •  lidocaine (LMX) 4 % cream, , Topical, Once, Parveen Collins DPM  Family History   Problem Relation Age of Onset   • Hypertension Mother    • Breast cancer Mother       Review of Systems   Constitutional: Negative  Respiratory: Negative for shortness of breath  Cardiovascular: Positive for leg swelling  Negative for chest pain  Gastrointestinal: Negative for diarrhea, nausea and vomiting  Musculoskeletal: Negative for arthralgias and gait problem  Skin: Positive for wound  Negative for color change  Allergies:  Abciximab and Escitalopram      Objective:  /56   Pulse (!) 40   Temp (!) 97 4 °F (36 3 °C)   Resp 16     Physical Exam  Vitals reviewed  Cardiovascular:      Rate and Rhythm: Rhythm irregular  Pulmonary:      Effort: No respiratory distress  Skin:     Capillary Refill: Capillary refill takes less than 2 seconds  Findings: No erythema  Comments: Ulcer anterior leg measuring much smaller, Minimal serous drainage  NO SOI   Neurological:      Mental Status: He is alert  Wound 08/30/22 Venous Ulcer Pretibial Right; Anterior (Active)   Wound Image   11/29/22 1055   Wound Description Beefy red;Granulation tissue 11/29/22 1058   Yuki-wound Assessment Dry; Intact;Scaly 11/29/22 1058   Wound Length (cm) 0 4 cm 11/29/22 1058   Wound Width (cm) 0 5 cm 11/29/22 1058   Wound Depth (cm) 0 1 cm 11/29/22 1058   Wound Surface Area (cm^2) 0 2 cm^2 11/29/22 1058   Wound Volume (cm^3) 0 02 cm^3 11/29/22 1058   Calculated Wound Volume (cm^3) 0 02 cm^3 11/29/22 1058   Change in Wound Size % 98 4 11/29/22 1058   Drainage Amount Scant 11/29/22 1058   Drainage Description Serosanguineous 11/29/22 1058   Non-staged Wound Description Full thickness 11/29/22 1058   Treatments Irrigation with NSS 11/29/22 1058   Patient Tolerance Tolerated well 09/27/22 0834   Dressing Status Removed 09/27/22 0834                         Procedures             Wound Instructions:  Orders Placed This Encounter   Procedures   • Wound cleansing and dressings         Wash your hands with soap and water  Remove old dressing, discard into plastic bag and place in trash  Cleanse the wound with mild soap and water prior to applying a clean dressing  Do not use tissue or cotton balls  Do not scrub the wound  Pat dry using gauze  Shower yes  On the days you change the bandage  Remove dressing before shower  Wash your right leg last before getting out of the shower         Right lower leg wound:  Apply adaptic to the wound bed  Cover with alginate and dry gauze  Secure with rolled gauze and tape  Wear spandigrip compression sleeve to both lower legs  Must be from base of toes to knee  Replace when elasticity wears out  (1-2 weeks)   Change dressing three times a week             Follow up at the wound center in 2 weeks with Dr Alina Castellanos         Today's treatment in the wound center: Cleansed with NSS, and dressed as above  Standing Status:   Future     Standing Expiration Date:   11/29/2023         Felipa Coleman DPM      Portions of the record may have been created with voice recognition software  Occasional wrong word or "sound a like" substitutions may have occurred due to the inherent limitations of voice recognition software  Read the chart carefully and recognize, using context, where substitutions have occurred

## 2022-11-29 NOTE — PATIENT INSTRUCTIONS
Orders Placed This Encounter   Procedures    Wound cleansing and dressings         Wash your hands with soap and water  Remove old dressing, discard into plastic bag and place in trash  Cleanse the wound with mild soap and water prior to applying a clean dressing  Do not use tissue or cotton balls  Do not scrub the wound  Pat dry using gauze  Shower yes  On the days you change the bandage  Remove dressing before shower  Wash your right leg last before getting out of the shower  Right lower leg wound:  Apply adaptic to the wound bed  Cover with alginate and dry gauze  Secure with rolled gauze and tape  Wear spandigrip compression sleeve to both lower legs  Must be from base of toes to knee  Replace when elasticity wears out  (1-2 weeks)   Change dressing three times a week  Follow up at the wound center in 2 weeks with Dr Gerardo Carmona  Today's treatment in the wound center: Cleansed with NSS, and dressed as above       Standing Status:   Future     Standing Expiration Date:   11/29/2023

## 2022-12-05 ENCOUNTER — LAB (OUTPATIENT)
Dept: LAB | Age: 82
End: 2022-12-05

## 2022-12-05 DIAGNOSIS — R97.20 ELEVATED PSA: ICD-10-CM

## 2022-12-06 LAB
PSA FREE MFR SERPL: 36.1 %
PSA FREE SERPL-MCNC: 2.42 NG/ML
PSA SERPL-MCNC: 6.7 NG/ML (ref 0–4)

## 2022-12-08 NOTE — RESULT ENCOUNTER NOTE
Inform pt of abnormal test result  PSA remains elevated at 6 7  The free PSA is very high consistent with benign disease  We know he has a very big prostate based on his last MRI  I think it is safe to continue to follow  He can keep his next recommended follow-up appointment

## 2022-12-13 ENCOUNTER — OFFICE VISIT (OUTPATIENT)
Dept: WOUND CARE | Facility: CLINIC | Age: 82
End: 2022-12-13

## 2022-12-13 VITALS
TEMPERATURE: 98 F | DIASTOLIC BLOOD PRESSURE: 70 MMHG | SYSTOLIC BLOOD PRESSURE: 110 MMHG | RESPIRATION RATE: 18 BRPM | HEART RATE: 72 BPM | WEIGHT: 209 LBS | BODY MASS INDEX: 29.99 KG/M2

## 2022-12-13 DIAGNOSIS — I83.029 VENOUS ULCER OF LEFT LEG (HCC): Primary | ICD-10-CM

## 2022-12-13 DIAGNOSIS — S81.802A OPEN WOUND OF LEFT LOWER LEG, INITIAL ENCOUNTER: ICD-10-CM

## 2022-12-13 DIAGNOSIS — L97.919 VENOUS ULCER OF RIGHT LEG (HCC): ICD-10-CM

## 2022-12-13 DIAGNOSIS — L97.929 VENOUS ULCER OF LEFT LEG (HCC): Primary | ICD-10-CM

## 2022-12-13 DIAGNOSIS — I83.019 VENOUS ULCER OF RIGHT LEG (HCC): ICD-10-CM

## 2022-12-13 RX ORDER — LIDOCAINE 40 MG/G
CREAM TOPICAL ONCE
Status: CANCELLED | OUTPATIENT
Start: 2022-12-13 | End: 2022-12-13

## 2022-12-13 RX ORDER — LIDOCAINE 40 MG/G
CREAM TOPICAL ONCE
Status: COMPLETED | OUTPATIENT
Start: 2022-12-13 | End: 2022-12-13

## 2022-12-13 RX ADMIN — LIDOCAINE: 40 CREAM TOPICAL at 11:07

## 2022-12-13 NOTE — PROGRESS NOTES
Patient ID: Bennett iRley is a 81 y o  male Date of Birth 1940     Diagnosis:  1  Venous ulcer of left leg ([facility] [address] )  Comments:  new ulcer today  Dressings ordered  Elevation and compression discussed  Orders:  -     lidocaine (LMX) 4 % cream  -     Wound cleansing and dressings; Future    2  Open wound of left lower leg, initial encounter    3  Venous ulcer of right leg (HCC)  Comments:  healed       Diagnosis ICD-10-CM Associated Orders   1  Venous ulcer of left leg (HCC)  I83 029 lidocaine (LMX) 4 % cream    L97 929 Wound cleansing and dressings    new ulcer today  Dressings ordered  Elevation and compression discussed      2  Open wound of left lower leg, initial encounter  S81 [id number]       3  Venous ulcer of right leg ([facility] [address] )  I83 019     L97 919     healed           Assessment & Plan:  1  New venous wound left leg  We will approach this with the same plan that was used on his right leg ulcer which is now healed  Check in 2 weeks  No need for debridement  Chief Complaint   Patient presents with   • Follow Up Wound Care Visit     Venous ulcer of right leg ([facility] [address] )           Subjective:   Patient's visit is for right leg venous wound  That has healed however he developed a new wound on his left leg  It is similar in appearance to the wound he had on his right  There is mild clear yellow drainage  His wife's been covering it with a light bandage and he has been wearing a light compression stocking    He denies nausea fever vomiting or chills      The following portions of the patient's history were reviewed and updated as appropriate:   Patient Active Problem List   Diagnosis   • Acute coronary syndrome/unstable angina, to consider NSTEMI with ischemic related new left bundle branch block   • Hypercholesterolemia   • Essential hypertension   • Impaired fasting glucose   • Malignant melanoma of neck (HCC)   • JORGE (obstructive sleep apnea)   • Bradycardia   • Depression   • Elevated PSA   • BPH with obstruction/lower urinary tract symptoms   • PLMD (periodic limb movement disorder)   • Obesity (BMI 30-39  9)   • Bronchospasm   • Left forearm pain   • Ischemic cardiomyopathy   • AAA (abdominal aortic aneurysm)   • NSVT (nonsustained ventricular tachycardia)   • Status post endovascular aneurysm repair (EVAR)   • Anxiety   • COVID-19 infection   • Chronic systolic congestive heart failure (HCC)   • PAD (peripheral artery disease) ([facility])   • Venous ulcer of right leg (HCC)     Past Medical History:   Diagnosis Date   • AAA (abdominal aortic aneurysm) without rupture     has annual US to check   • Angina pectoris ([facility])     chronic stable angina, but it is mild and tolerates walking dogs and walking up flight os stairs without symptoms  Using Nitro no more than 2 times per mt     • Ankle pain, left    • Bilateral wrist pain    • BPH with obstruction/lower urinary tract symptoms    • Coronary artery disease    • CPAP (continuous positive airway pressure) dependence    • Elevated PSA    • History of colon polyps    • [place] (hard of hearing)     Slight   • Hyperlipidemia    • Ischemic cardiomyopathy     with apical aneurysm   • Melanoma ([facility] [address] ) 12/2016    Right side of neck   • Myocardial infarction ([facility] [address] ) 1996    x1     Past Surgical History:   Procedure Laterality Date   • ARTERIAL ANEURYSM REPAIR  10/2021   • CARDIAC SURGERY      CABG x6   • CATARACT EXTRACTION Bilateral    • COLONOSCOPY     • CORONARY ANGIOPLASTY WITH STENT PLACEMENT      2 stents   • CORONARY ANGIOPLASTY WITH STENT PLACEMENT      10/2021   • CORONARY ARTERY BYPASS GRAFT     • PROSTATE BIOPSY  2000,2001   • SKIN BIOPSY      Melanoma - surgical removal   • SKIN LESION EXCISION Right 2/6/2017    Procedure: WIDE EXCISION MELANOMA SCAR LATERAL NECK;  Surgeon: Alvaro Meyers MD;  Location: [facility];  Service:    • WISDOM TOOTH EXTRACTION       Social History     Socioeconomic History   • Marital status: /Civil Union     Spouse name: Not on file   • Number of children: Not on file   • Years of education: Not on file   • Highest education level: Not on file   Occupational History   • Not on file   Tobacco Use   • Smoking status: Never   • Smokeless tobacco: Never   Vaping Use   • Vaping Use: Never used   Substance and Sexual Activity   • Alcohol use: Yes     Comment: 1-2 beers rarely   • Drug use: No   • Sexual activity: Not Currently   Other Topics Concern   • Not on file   Social History Narrative    DOES NOT CONSUME CAFFEINE     Social Determinants of Health     Financial Resource Strain: Low Risk    • Difficulty of Paying Living Expenses: Not very hard   Food Insecurity: Not on file   Transportation Needs: No Transportation Needs   • Lack of Transportation (Medical): No   • Lack of Transportation (Non-Medical):  No   Physical Activity: Not on file   Stress: Not on file   Social Connections: Not on file   Intimate Partner Violence: Not on file   Housing Stability: Not on file        Current Outpatient Medications:   •  albuterol (PROVENTIL HFA,VENTOLIN HFA) 90 mcg/act inhaler, INHALE 1 PUFF BY MOUTH EVERY 6 HOURS AS NEEDED FOR WHEEZE, Disp: 18 g, Rfl: 1  •  amLODIPine (NORVASC) 5 mg tablet, Take 5 mg by mouth daily, Disp: , Rfl:   •  aspirin 81 MG tablet, Take 1 tablet by mouth daily, Disp: , Rfl:   •  atorvastatin (LIPITOR) 80 mg tablet, TAKE 1 TABLET BY MOUTH EVERY DAY, Disp: 90 tablet, Rfl: 0  •  clopidogrel (PLAVIX) 75 mg tablet, Take 75 mg by mouth daily  , Disp: , Rfl:   •  fluticasone (FLONASE) 50 mcg/act nasal spray, 2 sprays into each nostril daily, Disp: 54 6 mL, Rfl: 3  •  furosemide (LASIX) 20 mg tablet, Take 20 mg by mouth, Disp: , Rfl:   •  furosemide (LASIX) 40 mg tablet, Take 40 mg by mouth daily, Disp: , Rfl:   •  hydrALAZINE (APRESOLINE) 10 mg tablet, Take 10 mg by mouth 3 (three) times a day, Disp: , Rfl:   •  isosorbide mononitrate (IMDUR) 30 mg 24 hr tablet, Take 30 mg by mouth daily, Disp: , Rfl:   •  Jardiance 10 MG TABS, Take 10 mg by mouth daily, Disp: , Rfl:   •  metoprolol succinate (TOPROL-XL) 100 mg 24 hr tablet, Take 1 tablet (100 mg total) by mouth daily Take metoprolol succinate 100 mg daily in the morning, Disp: 30 tablet, Rfl: 0  •  metoprolol succinate (TOPROL-XL) 50 mg 24 hr tablet, Take 1 tablet (50 mg total) by mouth every evening, Disp: 30 tablet, Rfl: 0  •  nitroglycerin (NITROSTAT) 0 4 mg SL tablet, PLACE 1 TABLET (0 4 MG TOTAL) UNDER THE TONGUE EVERY 5 (FIVE) MINUTES AS NEEDED FOR CHEST PAIN, Disp: 100 tablet, Rfl: 1  •  tamsulosin (FLOMAX) 0 4 mg, Take 0 4 mg by mouth daily with dinner, Disp: , Rfl:     Current Facility-Administered Medications:   •  lidocaine (LMX) 4 % cream, , Topical, Once, Elizabeth Innocent, DPM  Family History   Problem Relation Age of Onset   • Hypertension Mother    • Breast cancer Mother       Review of Systems   Constitutional: Negative  Respiratory: Negative for shortness of breath  Cardiovascular: Positive for leg swelling  Gastrointestinal: Negative for diarrhea, nausea and vomiting  Musculoskeletal: Negative for arthralgias  Skin: Positive for color change and wound  Neurological: Negative for weakness and numbness  Allergies:  Abciximab and Escitalopram      Objective:  /70   Pulse 72   Temp 98 °F (36 7 °C)   Resp 18   Wt 94 8 kg (208 lb 15 9 oz)   BMI 29 99 kg/m²     Physical Exam  Vitals reviewed  Cardiovascular:      Pulses: Normal pulses  Skin:     Capillary Refill: Capillary refill takes less than 2 seconds  Findings: No erythema  Comments: Right leg ulcer is fully epithelialized and healed  There is a new venous wound on the left mid leg without sign of infection   Neurological:      Mental Status: He is alert  Sensory: No sensory deficit  Gait: Gait normal              Wound 12/13/22 Venous Ulcer Leg Left; Lower (Active)   Wound Image   12/13/22 1050   Wound Description Pink;Granulation tissue 12/13/22 1051   Yuki-wound Assessment Intact;Edema 12/13/22 1051   Wound Length (cm) 1 2 cm 12/13/22 1051   Wound Width (cm) 1 1 cm 12/13/22 1051   Wound Depth (cm) 0 1 cm 12/13/22 1051   Wound Surface Area (cm^2) 1 32 cm^2 12/13/22 1051   Wound Volume (cm^3) 0 132 cm^3 12/13/22 1051   Calculated Wound Volume (cm^3) 0 13 cm^3 12/13/22 1051   Drainage Amount Small 12/13/22 1051   Drainage Description Serosanguineous 12/13/22 1051   Non-staged Wound Description Partial thickness 12/13/22 1051   Patient Tolerance Tolerated well 12/13/22 1051   Dressing Status Removed 12/13/22 1051                         Procedures             Wound Instructions:  Orders Placed This Encounter   Procedures   • Wound cleansing and dressings     Wash your hands with soap and water  Remove old dressing, discard into plastic bag and place in trash  Cleanse the wound with mild soap and water prior to applying a clean dressing  Do not use tissue or cotton balls  Do not scrub the wound  Pat dry using gauze  Shower yes  On the days you change the bandage  Remove dressing before shower  Wash your right leg last before getting out of the shower         Left lower leg wound:  Apply adaptic to the wound bed  Cover with alginate and dry gauze  Secure with rolled gauze and tape  Wear spandigrip compression sleeve to both lower legs  Must be from base of toes to knee  Replace when elasticity wears out  (1-2 weeks)   Change dressing three times a week             Follow up at the wound center in 2 weeks with Dr Dinorah Infante         Today's treatment in the wound center: Cleansed with NSS, and dressed as above  Standing Status:   Future     Standing Expiration Date:   12/13/2023         Juan Valente DPM      Portions of the record may have been created with voice recognition software  Occasional wrong word or "sound a like" substitutions may have occurred due to the inherent limitations of voice recognition software   Read the chart carefully and recognize, using context, where substitutions have occurred

## 2022-12-13 NOTE — PATIENT INSTRUCTIONS
Orders Placed This Encounter   Procedures    Wound cleansing and dressings     Wash your hands with soap and water  Remove old dressing, discard into plastic bag and place in trash  Cleanse the wound with mild soap and water prior to applying a clean dressing  Do not use tissue or cotton balls  Do not scrub the wound  Pat dry using gauze  Shower yes  On the days you change the bandage  Remove dressing before shower  Wash your right leg last before getting out of the shower  Left lower leg wound:  Apply adaptic to the wound bed  Cover with alginate and dry gauze  Secure with rolled gauze and tape  Wear spandigrip compression sleeve to both lower legs  Must be from base of toes to knee  Replace when elasticity wears out  (1-2 weeks)   Change dressing three times a week  Follow up at the wound center in 2 weeks with Dr Milagro Welsh  Today's treatment in the wound center: Cleansed with NSS, and dressed as above       Standing Status:   Future     Standing Expiration Date:   12/13/2023

## 2022-12-16 ENCOUNTER — OFFICE VISIT (OUTPATIENT)
Dept: UROLOGY | Facility: MEDICAL CENTER | Age: 82
End: 2022-12-16

## 2022-12-16 VITALS
SYSTOLIC BLOOD PRESSURE: 120 MMHG | DIASTOLIC BLOOD PRESSURE: 60 MMHG | HEIGHT: 70 IN | HEART RATE: 70 BPM | WEIGHT: 206 LBS | BODY MASS INDEX: 29.49 KG/M2

## 2022-12-16 DIAGNOSIS — R97.20 ELEVATED PSA: Primary | ICD-10-CM

## 2022-12-16 NOTE — PROGRESS NOTES
Office Visit- Urology  Yared Zapata 1940 MRN: [id number]      Assessment/Discussion/Plan    81 y o  male managed by Dr Ryan Nunes     1  Elevated PSA    - PSA Trend:  Lab Results   Component Value Date    PSA 6 7 (H) 12/05/2022    PSA 5 5 (H) 05/24/2022    PSA 8 1 (H) 07/10/2021    PSA 3 5 07/02/2020    PSA 5 4 (H) 12/02/2019    PSA 4 6 (H) 05/24/2019     - FANNIE preformed by Dr Ryan Nunes 6 months ago was described as benign    - multiparametric MRI of the prostate  performed in 12/2019 was PIRADS 1   -will obtain one more PSA to trend  If PSA remains stable or decreases will plan on discontinuing screening given the patient's age and negative prior MRI  If PSA increases, can have discussion with patient regarding the risks/benefits of further evaluation vs watchful waiting    -follow up in 6 months with PSA    2  BPH with LUTS  -AUA: 4, symptoms are not bothersome to patient  -currently watchful waiting, no pharmacotherapy indicated at this time   -Reevaluate at follow up appointment, will check PVR at that time  Chief Complaint:   Trent Mtz is a 81 y o  male presenting to the office for a follow up visit regarding  elevated PSA  Subjective   Patient is a 81year old male with no family history for prostate cancer who presents for a follow up for elevated PSA  Most recent PSA was 6 7, up from 5 5 in May 2022  His highest PSA was 8 1  He had an MRI of the prostate performed in 2019 which was PIRADS 1  No history of biospy   Frequency but patient notes diuretic use for bilateral leg edema  He also reports nocturia x 2 which the patient does not find bothersome  He denies hematuria, sensation of incomplete bladder emptying, dysuria, urgency, urinary incontinence  AUA SYMPTOM SCORE    Flowsheet Row Most Recent Value   AUA SYMPTOM SCORE    How often have you had a sensation of not emptying your bladder completely after you finished urinating?  1   How often have you had to urinate again less than two hours after you finished urinating? 0   How often have you found you stopped and started again several times when you urinate? 1   How often have you found it difficult to postpone urination? 0   How often have you had a weak urinary stream? 0   How often have you had to push or strain to begin urination? 0   How many times did you most typically get up to urinate from the time you went to bed at night until the time you got up in the morning? 2   Quality of Life: If you were to spend the rest of your life with your urinary condition just the way it is now, how would you feel about that? 1   AUA SYMPTOM SCORE 4              ROS:   Review of Systems   Constitutional: Negative for chills and fever  HENT: Negative  Eyes: Negative  Respiratory: Negative for shortness of breath  Cardiovascular: Negative for chest pain  Gastrointestinal: Negative for abdominal distention and abdominal pain  Endocrine: Negative  Genitourinary: Negative for dysuria, flank pain, frequency, hematuria and urgency  Musculoskeletal: Negative  Neurological: Negative  Psychiatric/Behavioral: Negative  Past Medical History  Past Medical History:   Diagnosis Date   • AAA (abdominal aortic aneurysm) without rupture     has annual US to check   • Angina pectoris (HCC)     chronic stable angina, but it is mild and tolerates walking dogs and walking up flight os stairs without symptoms  Using Nitro no more than 2 times per mt     • Ankle pain, left    • Bilateral wrist pain    • BPH with obstruction/lower urinary tract symptoms    • Coronary artery disease    • CPAP (continuous positive airway pressure) dependence    • Elevated PSA    • History of colon polyps    • [place] (hard of hearing)     Slight   • Hyperlipidemia    • Ischemic cardiomyopathy     with apical aneurysm   • Melanoma ([facility] [address] ) 12/2016    Right side of neck   • Myocardial infarction ([facility] ) 1996    x1       Past Surgical History  Past Surgical History: Procedure Laterality Date   • ARTERIAL ANEURYSM REPAIR  10/2021   • CARDIAC SURGERY      CABG x6   • CATARACT EXTRACTION Bilateral    • COLONOSCOPY     • CORONARY ANGIOPLASTY WITH STENT PLACEMENT      2 stents   • CORONARY ANGIOPLASTY WITH STENT PLACEMENT      10/2021   • CORONARY ARTERY BYPASS GRAFT     • PROSTATE BIOPSY  2000,2001   • SKIN BIOPSY      Melanoma - surgical removal   • SKIN LESION EXCISION Right 2/6/2017    Procedure: WIDE EXCISION MELANOMA SCAR LATERAL NECK;  Surgeon: Seth Lucas MD;  Location: [facility];  Service:    • WISDOM TOOTH EXTRACTION         Past Family History  Family History   Problem Relation Age of Onset   • Hypertension Mother    • Breast cancer Mother        Past Social history  Social History     Socioeconomic History   • Marital status: /Civil Union     Spouse name: Not on file   • Number of children: Not on file   • Years of education: Not on file   • Highest education level: Not on file   Occupational History   • Not on file   Tobacco Use   • Smoking status: Never   • Smokeless tobacco: Never   Vaping Use   • Vaping Use: Never used   Substance and Sexual Activity   • Alcohol use: Yes     Comment: 1-2 beers rarely   • Drug use: No   • Sexual activity: Not Currently   Other Topics Concern   • Not on file   Social History Narrative    DOES NOT CONSUME CAFFEINE     Social Determinants of Health     Financial Resource Strain: Low Risk    • Difficulty of Paying Living Expenses: Not very hard   Food Insecurity: Not on file   Transportation Needs: No Transportation Needs   • Lack of Transportation (Medical): No   • Lack of Transportation (Non-Medical):  No   Physical Activity: Not on file   Stress: Not on file   Social Connections: Not on file   Intimate Partner Violence: Not on file   Housing Stability: Not on file       Current Medications  Current Outpatient Medications   Medication Sig Dispense Refill   • albuterol (PROVENTIL HFA,VENTOLIN HFA) 90 mcg/act inhaler INHALE 1 PUFF BY MOUTH EVERY 6 HOURS AS NEEDED FOR WHEEZE 18 g 1   • amLODIPine (NORVASC) 5 mg tablet Take 5 mg by mouth daily     • aspirin 81 MG tablet Take 1 tablet by mouth daily     • atorvastatin (LIPITOR) 80 mg tablet TAKE 1 TABLET BY MOUTH EVERY DAY 90 tablet 0   • clopidogrel (PLAVIX) 75 mg tablet Take 75 mg by mouth daily       • fluticasone (FLONASE) 50 mcg/act nasal spray 2 sprays into each nostril daily 54 6 mL 3   • furosemide (LASIX) 40 mg tablet Take 40 mg by mouth daily     • hydrALAZINE (APRESOLINE) 10 mg tablet Take 10 mg by mouth 3 (three) times a day     • isosorbide mononitrate (IMDUR) 30 mg 24 hr tablet Take 30 mg by mouth daily     • Jardiance 10 MG TABS Take 10 mg by mouth daily     • metoprolol succinate (TOPROL-XL) 100 mg 24 hr tablet Take 1 tablet (100 mg total) by mouth daily Take metoprolol succinate 100 mg daily in the morning 30 tablet 0   • tamsulosin (FLOMAX) 0 4 mg Take 0 4 mg by mouth daily with dinner     • furosemide (LASIX) 20 mg tablet Take 20 mg by mouth     • metoprolol succinate (TOPROL-XL) 50 mg 24 hr tablet Take 1 tablet (50 mg total) by mouth every evening 30 tablet 0   • nitroglycerin (NITROSTAT) 0 4 mg SL tablet PLACE 1 TABLET (0 4 MG TOTAL) UNDER THE TONGUE EVERY 5 (FIVE) MINUTES AS NEEDED FOR CHEST PAIN (Patient not taking: Reported on 12/16/2022) 100 tablet 1     Current Facility-Administered Medications   Medication Dose Route Frequency Provider Last Rate Last Admin   • lidocaine (LMX) 4 % cream   Topical Once Brandyn Kierstening, YOLANDA           Allergies  Allergies   Allergen Reactions   • Abciximab Other (See Comments)     rcd 9/27/01 & 5/16/03   • Escitalopram Rash       OBJECTIVE    Vitals   Vitals:    12/16/22 1253   BP: 120/60   BP Location: Right arm   Patient Position: Sitting   Cuff Size: Large   Pulse: 70   Weight: 93 4 kg (206 lb)   Height: 5' 10" (1 778 m)       Physical Exam  Constitutional:       General: He is not in acute distress       Appearance: Normal appearance  He is normal weight  He is not ill-appearing or toxic-appearing  HENT:      Head: Normocephalic and atraumatic  Cardiovascular:      Rate and Rhythm: Normal rate  Pulmonary:      Effort: Pulmonary effort is normal  No respiratory distress  Musculoskeletal:         General: Normal range of motion  Cervical back: Normal range of motion and neck supple  Skin:     General: Skin is warm and dry  Neurological:      General: No focal deficit present  Mental Status: He is alert  Cranial Nerves: No cranial nerve deficit  Psychiatric:         Mood and Affect: Mood normal          Behavior: Behavior normal          Thought Content: Thought content normal          Labs:      Lab Results   Component Value Date    PSA 6 7 (H) 12/05/2022    PSA 5 5 (H) 05/24/2022    PSA 8 1 (H) 07/10/2021    PSA 3 5 07/02/2020     Lab Results   Component Value Date    CREATININE 1 21 01/06/2022      Lab Results   Component Value Date    HGBA1C 5 5 11/30/2021     Lab Results   Component Value Date    CALCIUM 8 3 01/06/2022     07/19/2017    K 4 8 01/06/2022    CO2 27 01/06/2022     01/06/2022    BUN 33 (H) 01/06/2022    CREATININE 1 21 01/06/2022       I have personally reviewed all pertinent lab results and reviewed with patient    Imaging     MULTIPARAMETRIC PROSTATE MRI  FINDINGS:     PROSTATE:     Size (AP x TRV x CC): 5 8 x 5 1 x 6 6 = 101 mL  Post-biopsy hemorrhage:  None  Central gland enlargement (BPH): Marked      Focal lesions - No dominant lesion  Findings of BPH with round, completely encapsulated nodule (s)  PI-RADSv2 1 Category 1 -  Very low (clinically significant cancer highly unlikely)      Seminal vesicle invasion: Not present      URINARY BLADDER: Mild generalized hypertrophic changes        LYMPH NODES: No pelvic lymphadenopathy      BONES: No suspicious osseous lesion      Note: Clinically significant cancer is defined on pathology/histology as [place] score greater than or equal to 7, and/or volume of greater than or equal to 0 5 mL, and/or extraprostatic extension      IMPRESSION:     1  PI-RADSv2 1 Category 1 - Very low (clinically significant cancer is highly unlikely to be present    Jennifer Simeon PA-C  Date: 12/16/2022 Time: 1:37 PM  [facility]    This note was written using fluency dictation software  Please excuse any resulting minor grammatical errors

## 2022-12-27 ENCOUNTER — OFFICE VISIT (OUTPATIENT)
Dept: WOUND CARE | Facility: CLINIC | Age: 82
End: 2022-12-27

## 2022-12-27 VITALS
WEIGHT: 209.88 LBS | RESPIRATION RATE: 18 BRPM | HEART RATE: 62 BPM | SYSTOLIC BLOOD PRESSURE: 114 MMHG | TEMPERATURE: 97 F | BODY MASS INDEX: 30.11 KG/M2 | DIASTOLIC BLOOD PRESSURE: 66 MMHG

## 2022-12-27 DIAGNOSIS — I73.9 PERIPHERAL VASCULAR DISEASE, UNSPECIFIED (HCC): ICD-10-CM

## 2022-12-27 DIAGNOSIS — S81.802A OPEN WOUND OF LEFT LOWER LEG, INITIAL ENCOUNTER: Primary | ICD-10-CM

## 2022-12-27 NOTE — PATIENT INSTRUCTIONS
Orders Placed This Encounter   Procedures    Wound cleansing and dressings     Left lower leg wound is healed  Keep covered with a bandaid until scab falls off  Patient is discharged from the wound center  Shower yes  Continue to wear spandigrip compression sleeves to both lower legs  Can remove or keep on for sleeping  Contact the wound center if your wound reopens  Follow up at the wound center in 2 weeks with Dr Duncan Torres  Today's treatment in the wound center: Cleansed with NSS, and cosmopor applied       Standing Status:   Future     Standing Expiration Date:   12/27/2023

## 2022-12-27 NOTE — PROGRESS NOTES
Patient ID: Justina Medina is a 81 y o  male Date of Birth 1940     Diagnosis:  1  Open wound of left lower leg, initial encounter  -     Wound cleansing and dressings; Future    2  Peripheral vascular disease, unspecified ([facility] [address] )  -     Wound cleansing and dressings; Future       Diagnosis ICD-10-CM Associated Orders   1  Open wound of left lower leg, initial encounter  S81 802A Wound cleansing and dressings      2  Peripheral vascular disease, unspecified ([facility])  I73 9 Wound cleansing and dressings           Assessment & Plan:  1  Venous ulcer is healed  DC from wound center  Wearing compression socks daily and elevate throughout the day    Chief Complaint   Patient presents with   • Follow Up Wound Care Visit     RLE wound           Subjective:   Patient is here for left leg venous wound  It is scabbed over  He feels well  The following portions of the patient's history were reviewed and updated as appropriate:   Patient Active Problem List   Diagnosis   • Acute coronary syndrome/unstable angina, to consider NSTEMI with ischemic related new left bundle branch block   • Hypercholesterolemia   • Essential hypertension   • Impaired fasting glucose   • Malignant melanoma of neck ([facility])   • JORGE (obstructive sleep apnea)   • Bradycardia   • Depression   • Elevated PSA   • BPH with obstruction/lower urinary tract symptoms   • PLMD (periodic limb movement disorder)   • Obesity (BMI 30-39  9)   • Bronchospasm   • Left forearm pain   • Ischemic cardiomyopathy   • AAA (abdominal aortic aneurysm)   • NSVT (nonsustained ventricular tachycardia)   • Status post endovascular aneurysm repair (EVAR)   • Anxiety   • COVID-19 infection   • Chronic systolic congestive heart failure (HCC)   • PAD (peripheral artery disease) ([facility])   • Venous ulcer of right leg ([facility])     Past Medical History:   Diagnosis Date   • AAA (abdominal aortic aneurysm) without rupture     has annual US to check   • Angina pectoris ([facility])     chronic stable angina, but it is mild and tolerates walking dogs and walking up flight os stairs without symptoms  Using Nitro no more than 2 times per mt     • Ankle pain, left    • Bilateral wrist pain    • BPH with obstruction/lower urinary tract symptoms    • Coronary artery disease    • CPAP (continuous positive airway pressure) dependence    • Elevated PSA    • History of colon polyps    • [place] (hard of hearing)     Slight   • Hyperlipidemia    • Ischemic cardiomyopathy     with apical aneurysm   • Melanoma ([facility] [address] ) 12/2016    Right side of neck   • Myocardial infarction ([facility] ) 1996    x1     Past Surgical History:   Procedure Laterality Date   • ARTERIAL ANEURYSM REPAIR  10/2021   • CARDIAC SURGERY      CABG x6   • CATARACT EXTRACTION Bilateral    • COLONOSCOPY     • CORONARY ANGIOPLASTY WITH STENT PLACEMENT      2 stents   • CORONARY ANGIOPLASTY WITH STENT PLACEMENT      10/2021   • CORONARY ARTERY BYPASS GRAFT     • PROSTATE BIOPSY  2000,2001   • SKIN BIOPSY      Melanoma - surgical removal   • SKIN LESION EXCISION Right 2/6/2017    Procedure: WIDE EXCISION MELANOMA SCAR LATERAL NECK;  Surgeon: Laith Dejesus MD;  Location: [facility];  Service:    • WISDOM TOOTH EXTRACTION       Social History     Socioeconomic History   • Marital status: /Civil Union     Spouse name: None   • Number of children: None   • Years of education: None   • Highest education level: None   Occupational History   • None   Tobacco Use   • Smoking status: Never   • Smokeless tobacco: Never   Vaping Use   • Vaping Use: Never used   Substance and Sexual Activity   • Alcohol use: Yes     Comment: 1-2 beers rarely   • Drug use: No   • Sexual activity: Not Currently   Other Topics Concern   • None   Social History Narrative    DOES NOT CONSUME CAFFEINE     Social Determinants of Health     Financial Resource Strain: Low Risk    • Difficulty of Paying Living Expenses: Not very hard   Food Insecurity: Not on file   Transportation Needs: No Transportation Needs   • Lack of Transportation (Medical): No   • Lack of Transportation (Non-Medical):  No   Physical Activity: Not on file   Stress: Not on file   Social Connections: Not on file   Intimate Partner Violence: Not on file   Housing Stability: Not on file        Current Outpatient Medications:   •  albuterol (PROVENTIL HFA,VENTOLIN HFA) 90 mcg/act inhaler, INHALE 1 PUFF BY MOUTH EVERY 6 HOURS AS NEEDED FOR WHEEZE, Disp: 18 g, Rfl: 1  •  amLODIPine (NORVASC) 5 mg tablet, Take 5 mg by mouth daily, Disp: , Rfl:   •  aspirin 81 MG tablet, Take 1 tablet by mouth daily, Disp: , Rfl:   •  atorvastatin (LIPITOR) 80 mg tablet, TAKE 1 TABLET BY MOUTH EVERY DAY, Disp: 90 tablet, Rfl: 0  •  clopidogrel (PLAVIX) 75 mg tablet, Take 75 mg by mouth daily  , Disp: , Rfl:   •  fluticasone (FLONASE) 50 mcg/act nasal spray, 2 sprays into each nostril daily, Disp: 54 6 mL, Rfl: 3  •  furosemide (LASIX) 20 mg tablet, Take 20 mg by mouth, Disp: , Rfl:   •  furosemide (LASIX) 40 mg tablet, Take 40 mg by mouth daily, Disp: , Rfl:   •  hydrALAZINE (APRESOLINE) 10 mg tablet, Take 10 mg by mouth 3 (three) times a day, Disp: , Rfl:   •  isosorbide mononitrate (IMDUR) 30 mg 24 hr tablet, Take 30 mg by mouth daily, Disp: , Rfl:   •  Jardiance 10 MG TABS, Take 10 mg by mouth daily, Disp: , Rfl:   •  metoprolol succinate (TOPROL-XL) 100 mg 24 hr tablet, Take 1 tablet (100 mg total) by mouth daily Take metoprolol succinate 100 mg daily in the morning, Disp: 30 tablet, Rfl: 0  •  metoprolol succinate (TOPROL-XL) 50 mg 24 hr tablet, Take 1 tablet (50 mg total) by mouth every evening, Disp: 30 tablet, Rfl: 0  •  nitroglycerin (NITROSTAT) 0 4 mg SL tablet, PLACE 1 TABLET (0 4 MG TOTAL) UNDER THE TONGUE EVERY 5 (FIVE) MINUTES AS NEEDED FOR CHEST PAIN (Patient not taking: Reported on 12/16/2022), Disp: 100 tablet, Rfl: 1  •  tamsulosin (FLOMAX) 0 4 mg, Take 0 4 mg by mouth daily with dinner, Disp: , Rfl:     Current Facility-Administered Medications:   •  lidocaine (LMX) 4 % cream, , Topical, Once, Sussy Garrido DPM  Family History   Problem Relation Age of Onset   • Hypertension Mother    • Breast cancer Mother       Review of Systems   Constitutional: Negative  Allergies:  Abciximab and Escitalopram      Objective:  /66   Pulse 62   Temp (!) 97 °F (36 1 °C)   Resp 18     Physical Exam  Vitals reviewed  Cardiovascular:      Pulses: Normal pulses  Musculoskeletal:      Right lower leg: Edema present  Left lower leg: Edema present  Skin:     Capillary Refill: Capillary refill takes less than 2 seconds  Comments: Small eschar over wound but there is no drainage  The wound is essentially healed  Neurological:      Mental Status: He is alert  Wound 12/13/22 Venous Ulcer Leg Left; Lower (Active)   Wound Image   12/27/22 0939   Wound Description Other (Comment); Epithelialization 12/27/22 0940   Yuki-wound Assessment Intact 12/27/22 0940   Wound Length (cm) 0 cm 12/27/22 0940   Wound Width (cm) 0 cm 12/27/22 0940   Wound Depth (cm) 0 cm 12/27/22 0940   Wound Surface Area (cm^2) 0 cm^2 12/27/22 0940   Wound Volume (cm^3) 0 cm^3 12/27/22 0940   Calculated Wound Volume (cm^3) 0 cm^3 12/27/22 0940   Change in Wound Size % 100 12/27/22 0940   Drainage Amount None 12/27/22 0940   Drainage Description Serosanguineous 12/13/22 1051   Non-staged Wound Description Partial thickness 12/13/22 1051   Treatments Irrigation with NSS 12/27/22 0940   Patient Tolerance Tolerated well 12/13/22 1051   Dressing Status Removed 12/13/22 1051                         Procedures             Wound Instructions:  Orders Placed This Encounter   Procedures   • Wound cleansing and dressings     Left lower leg wound is healed  Keep covered with a bandaid until scab falls off  Patient is discharged from the wound center  Shower yes  Continue to wear spandigrip compression sleeves to both lower legs   Can remove or keep on for sleeping      Contact the wound center if your wound reopens                Follow up at the wound center in 2 weeks with Dr Marc Pickering         Today's treatment in the wound center: Cleansed with NSS, and cosmopor applied  Standing Status:   Future     Standing Expiration Date:   12/27/2023         Minna Galeano DPM      Portions of the record may have been created with voice recognition software  Occasional wrong word or "sound a like" substitutions may have occurred due to the inherent limitations of voice recognition software  Read the chart carefully and recognize, using context, where substitutions have occurred

## 2022-12-31 DIAGNOSIS — E78.00 HYPERCHOLESTEROLEMIA: ICD-10-CM

## 2023-01-03 RX ORDER — ATORVASTATIN CALCIUM 80 MG/1
TABLET, FILM COATED ORAL
Qty: 90 TABLET | Refills: 0 | Status: SHIPPED | OUTPATIENT
Start: 2023-01-03

## 2023-01-23 ENCOUNTER — RA CDI HCC (OUTPATIENT)
Dept: OTHER | Facility: HOSPITAL | Age: 83
End: 2023-01-23

## 2023-01-23 NOTE — PROGRESS NOTES
[facility]  coding opportunities          Chart Reviewed number of suggestions sent to Provider: 1   I13 0    Patients Insurance     Medicare Insurance: [employer] Advantage

## 2023-01-25 ENCOUNTER — OFFICE VISIT (OUTPATIENT)
Dept: INTERNAL MEDICINE CLINIC | Facility: CLINIC | Age: 83
End: 2023-01-25

## 2023-01-25 VITALS
OXYGEN SATURATION: 99 % | BODY MASS INDEX: 29.92 KG/M2 | DIASTOLIC BLOOD PRESSURE: 60 MMHG | TEMPERATURE: 97 F | HEIGHT: 70 IN | WEIGHT: 209 LBS | SYSTOLIC BLOOD PRESSURE: 110 MMHG | HEART RATE: 54 BPM

## 2023-01-25 DIAGNOSIS — I50.22 CHRONIC SYSTOLIC CONGESTIVE HEART FAILURE (HCC): ICD-10-CM

## 2023-01-25 DIAGNOSIS — C43.4 MALIGNANT MELANOMA OF SCALP AND NECK (HCC): ICD-10-CM

## 2023-01-25 DIAGNOSIS — I83.029 VENOUS ULCER OF LEFT LEG (HCC): ICD-10-CM

## 2023-01-25 DIAGNOSIS — I10 ESSENTIAL HYPERTENSION: ICD-10-CM

## 2023-01-25 DIAGNOSIS — R05.1 ACUTE COUGH: Primary | ICD-10-CM

## 2023-01-25 DIAGNOSIS — G47.33 OSA (OBSTRUCTIVE SLEEP APNEA): ICD-10-CM

## 2023-01-25 DIAGNOSIS — I25.5 ISCHEMIC CARDIOMYOPATHY: ICD-10-CM

## 2023-01-25 DIAGNOSIS — E78.00 HYPERCHOLESTEROLEMIA: ICD-10-CM

## 2023-01-25 DIAGNOSIS — L97.929 VENOUS ULCER OF LEFT LEG (HCC): ICD-10-CM

## 2023-01-25 RX ORDER — BENZONATATE 100 MG/1
100 CAPSULE ORAL 3 TIMES DAILY PRN
Qty: 30 CAPSULE | Refills: 0 | Status: SHIPPED | OUTPATIENT
Start: 2023-01-25

## 2023-01-25 NOTE — PROGRESS NOTES
INTERNAL MEDICINE OFFICE VISIT  [facility] Internal Medicine- [place]    NAME: Justina Medina  AGE: 81 y o  SEX: male    DATE OF ENCOUNTER: 1/25/2023    Assessment and Plan/History of Present Illness     Here today for follow-up  Past medical history of hypertension, CAD status post CABG, ischemic cardiomyopathy, hypertension, hyperlipidemia, AAA, PAD, JORGE , NSVT     1  Acute cough  -1 week duration of cough, intermittently productive of sputum  He denies any fevers, chills, shortness of breath  Not aware of any sick contact  Has been using Mucinex with some relief  Could be related to bronchitis/URI or allergies  -Continue with Mucinex  We will send Tessalon Perles as needed to pharmacy    -     benzonatate (TESSALON PERLES) 100 mg capsule; Take 1 capsule (100 mg total) by mouth 3 (three) times a day as needed for cough    2  Venous ulcer of left leg ([facility] [address] )  Assessment & Plan:  Managed by wound care      3  Chronic systolic congestive heart failure (HCC)  -     CBC and differential; Future  -     Comprehensive metabolic panel; Future  -     Lipid panel; Future    4  Malignant melanoma of scalp and neck ([facility] [address] )    5  JORGE (obstructive sleep apnea)  Assessment & Plan:  CPAP device previously recalled  He states he is working on obtaining new CPAP supplies      6  Ischemic cardiomyopathy  Assessment & Plan:  -history of CAD status post CABG in 1996, status post PCI to [facility] in 2006, PCI to ramus intermedius October 2021  -hospital admission in October 2021 for shortness of breath - status post catheterization and KYE to intermediate ramus  -2D echo completed April 2022 - EF 45%  Patient reports he completed follow-up echocardiogram yesterday  -goal directed medical therapy with Toprol-XL, not on ACEi/ARB was discontinued due to hyperkalemia, Jardiance  -on aspirin, Plavix, high-intensity statin      7  Hypercholesterolemia  Assessment & Plan: On atorvastatin 80 mg daily    Due for repeat lipid panel which was requested today      8  Essential hypertension  Assessment & Plan:  BP on the low side today  He was instructed to stop hydralazine by cardiology but it appears he is still taking this  I instructed him to stop the hydralazine and updated this on his medication list  -On Toprol-XL, amlodipine, Imdur                     Orders Placed This Encounter   Procedures   • CBC and differential   • Comprehensive metabolic panel   • Lipid panel       Chief Complaint     Chief Complaint   Patient presents with   • Follow-up     3 month follow up        Review of Systems     10 point ROS negative except per HPI    The following portions of the patient's history were reviewed and updated as appropriate: allergies, current medications, past family history, past medical history, past social history, past surgical history and problem list     Active Problem List     Patient Active Problem List   Diagnosis   • Acute coronary syndrome/unstable angina, to consider NSTEMI with ischemic related new left bundle branch block   • Hypercholesterolemia   • Essential hypertension   • Impaired fasting glucose   • Malignant melanoma of neck (HCC)   • JORGE (obstructive sleep apnea)   • Bradycardia   • Depression   • Elevated PSA   • BPH with obstruction/lower urinary tract symptoms   • PLMD (periodic limb movement disorder)   • Obesity (BMI 30-39  9)   • Bronchospasm   • Left forearm pain   • Ischemic cardiomyopathy   • AAA (abdominal aortic aneurysm)   • NSVT (nonsustained ventricular tachycardia)   • Status post endovascular aneurysm repair (EVAR)   • Anxiety   • COVID-19 infection   • Chronic systolic congestive heart failure (HCC)   • PAD (peripheral artery disease) (HCC)   • Venous ulcer of right leg (HCC)   • Acute cough   • Venous ulcer of left leg (HCC)       Objective     /60 (BP Location: Left arm, Patient Position: Sitting, Cuff Size: Standard)   Pulse (!) 54   Temp (!) 97 °F (36 1 °C)   Ht 5' 10" (1 778 m)   Wt 94 8 kg (209 lb)   SpO2 99%   BMI 29 99 kg/m²     Physical Exam  Constitutional:       Appearance: Normal appearance  He is not ill-appearing  HENT:      Head: Normocephalic and atraumatic  Eyes:      General: No scleral icterus  Right eye: No discharge  Left eye: No discharge  Cardiovascular:      Rate and Rhythm: Normal rate and regular rhythm  Heart sounds: No murmur heard  No friction rub  Pulmonary:      Effort: Pulmonary effort is normal       Breath sounds: Normal breath sounds  No wheezing or rales  Abdominal:      General: Abdomen is flat  There is no distension  Palpations: Abdomen is soft  Tenderness: There is no abdominal tenderness  Musculoskeletal:         General: No swelling or tenderness  Right lower leg: Edema present  Left lower leg: Edema present  Skin:     General: Skin is warm and dry  Findings: No erythema  Neurological:      Mental Status: He is alert  Mental status is at baseline  Motor: No weakness  Psychiatric:         Mood and Affect: Mood normal          Behavior: Behavior normal          Pertinent Laboratory/Diagnostic Studies:  No results found      Images and diagnostics reviewed     Current Medications     Current Outpatient Medications:   •  albuterol (PROVENTIL HFA,VENTOLIN HFA) 90 mcg/act inhaler, INHALE 1 PUFF BY MOUTH EVERY 6 HOURS AS NEEDED FOR WHEEZE, Disp: 18 g, Rfl: 1  •  aspirin 81 MG tablet, Take 1 tablet by mouth daily, Disp: , Rfl:   •  atorvastatin (LIPITOR) 80 mg tablet, TAKE 1 TABLET BY MOUTH EVERY DAY, Disp: 90 tablet, Rfl: 0  •  benzonatate (TESSALON PERLES) 100 mg capsule, Take 1 capsule (100 mg total) by mouth 3 (three) times a day as needed for cough, Disp: 30 capsule, Rfl: 0  •  clopidogrel (PLAVIX) 75 mg tablet, Take 75 mg by mouth daily  , Disp: , Rfl:   •  fluticasone (FLONASE) 50 mcg/act nasal spray, 2 sprays into each nostril daily, Disp: 54 6 mL, Rfl: 3  •  furosemide (LASIX) 40 mg tablet, Take 40 mg by mouth daily, Disp: , Rfl:   •  isosorbide mononitrate (IMDUR) 30 mg 24 hr tablet, Take 30 mg by mouth daily, Disp: , Rfl:   •  Jardiance 10 MG TABS, Take 10 mg by mouth daily, Disp: , Rfl:   •  metoprolol succinate (TOPROL-XL) 100 mg 24 hr tablet, Take 1 tablet (100 mg total) by mouth daily Take metoprolol succinate 100 mg daily in the morning, Disp: 30 tablet, Rfl: 0  •  nitroglycerin (NITROSTAT) 0 4 mg SL tablet, PLACE 1 TABLET (0 4 MG TOTAL) UNDER THE TONGUE EVERY 5 (FIVE) MINUTES AS NEEDED FOR CHEST PAIN, Disp: 100 tablet, Rfl: 1  •  amLODIPine (NORVASC) 5 mg tablet, Take 5 mg by mouth daily (Patient not taking: Reported on 1/25/2023), Disp: , Rfl:   •  metoprolol succinate (TOPROL-XL) 50 mg 24 hr tablet, Take 1 tablet (50 mg total) by mouth every evening (Patient not taking: Reported on 1/25/2023), Disp: 30 tablet, Rfl: 0    Current Facility-Administered Medications:   •  lidocaine (LMX) 4 % cream, , Topical, Once, Adriana Lopez, DPRAYNE    Health Maintenance     Health Maintenance   Topic Date Due   • COVID-19 Vaccine (3 - Booster for Moderna series) 05/04/2021   • Influenza Vaccine (1) 06/30/2023 (Originally 9/1/2022)   • [facility] PLAN OF CARE  07/06/2023 (Originally 1940)   • Colorectal Cancer Screening  07/07/2023 (Originally 5/28/1986)   • Medicare Annual Wellness Visit (AWV)  10/14/2023   • BMI: Followup Plan  10/14/2023   • Fall Risk  01/25/2024   • BMI: Adult  01/25/2024   • Pneumococcal Vaccine: 65+ Years  Completed   • HIB Vaccine  Aged Out   • IPV Vaccine  Aged Out   • Hepatitis A Vaccine  Aged Out   • Meningococcal ACWY Vaccine  Aged Out   • HPV Vaccine  Aged Out     Immunization History   Administered Date(s) Administered   • COVID-19 MODERNA VACC 0 5 ML IM 02/06/2021, 03/09/2021   • INFLUENZA 11/03/2015, 09/26/2016, 09/28/2016, 11/14/2017, 11/27/2018   • Influenza Quadrivalent Preservative Free 3 years and older IM 10/14/2014   • Influenza Quadrivalent, 6-35 Months IM 11/03/2015   • Influenza Split High Dose Preservative Free IM 09/26/2016, 11/14/2017   • Influenza, high dose seasonal 0 7 mL 11/27/2018, 10/22/2019, 10/22/2020   • Influenza, seasonal, injectable 09/27/2011, 10/01/2012, 12/02/2013   • Pneumococcal Conjugate 13-Valent 07/28/2015   • Pneumococcal Polysaccharide PPV23 09/20/2006, 01/03/2017   • Tdap 01/03/2017       UMESH Seth  Internal Medicine [address], [facility] [address]  [place], [address] [facility]  Office: [phone]  Fax: [phone]

## 2023-01-25 NOTE — ASSESSMENT & PLAN NOTE
-history of CAD status post CABG in 1996, status post PCI to [facility] 27 in 2006, PCI to ramus intermedius October 2021  -hospital admission in October 2021 for shortness of breath - status post catheterization and KYE to intermediate ramus  -2D echo completed April 2022 - EF 45%    Patient reports he completed follow-up echocardiogram yesterday  -goal directed medical therapy with Toprol-XL, not on ACEi/ARB was discontinued due to hyperkalemia, Jardiance  -on aspirin, Plavix, high-intensity statin

## 2023-01-25 NOTE — ASSESSMENT & PLAN NOTE
BP on the low side today  He was instructed to stop hydralazine by cardiology but it appears he is still taking this    I instructed him to stop the hydralazine and updated this on his medication list  -On Toprol-XL, amlodipine, Imdur

## 2023-02-02 DIAGNOSIS — J98.01 BRONCHOSPASM: ICD-10-CM

## 2023-02-02 RX ORDER — ALBUTEROL SULFATE 90 UG/1
AEROSOL, METERED RESPIRATORY (INHALATION)
Qty: 18 G | Refills: 1 | Status: SHIPPED | OUTPATIENT
Start: 2023-02-02

## 2023-03-26 PROBLEM — R05.1 ACUTE COUGH: Status: RESOLVED | Noted: 2023-01-25 | Resolved: 2023-03-26

## 2023-04-06 DIAGNOSIS — E78.00 HYPERCHOLESTEROLEMIA: ICD-10-CM

## 2023-04-06 RX ORDER — ATORVASTATIN CALCIUM 80 MG/1
TABLET, FILM COATED ORAL
Qty: 90 TABLET | Refills: 0 | Status: SHIPPED | OUTPATIENT
Start: 2023-04-06

## 2023-04-19 ENCOUNTER — APPOINTMENT (OUTPATIENT)
Dept: LAB | Age: 83
End: 2023-04-19

## 2023-04-19 DIAGNOSIS — I50.22 CHRONIC SYSTOLIC CONGESTIVE HEART FAILURE (HCC): ICD-10-CM

## 2023-04-19 LAB
ALBUMIN SERPL BCP-MCNC: 3.3 G/DL (ref 3.5–5)
ALP SERPL-CCNC: 138 U/L (ref 46–116)
ALT SERPL W P-5'-P-CCNC: 36 U/L (ref 12–78)
ANION GAP SERPL CALCULATED.3IONS-SCNC: 3 MMOL/L (ref 4–13)
AST SERPL W P-5'-P-CCNC: 28 U/L (ref 5–45)
BASOPHILS # BLD AUTO: 0.04 THOUSANDS/ΜL (ref 0–0.1)
BASOPHILS NFR BLD AUTO: 1 % (ref 0–1)
BILIRUB SERPL-MCNC: 0.82 MG/DL (ref 0.2–1)
BUN SERPL-MCNC: 39 MG/DL (ref 5–25)
CALCIUM ALBUM COR SERPL-MCNC: 10.1 MG/DL (ref 8.3–10.1)
CALCIUM SERPL-MCNC: 9.5 MG/DL (ref 8.3–10.1)
CHLORIDE SERPL-SCNC: 108 MMOL/L (ref 96–108)
CHOLEST SERPL-MCNC: 116 MG/DL
CO2 SERPL-SCNC: 28 MMOL/L (ref 21–32)
CREAT SERPL-MCNC: 1.41 MG/DL (ref 0.6–1.3)
EOSINOPHIL # BLD AUTO: 0.18 THOUSAND/ΜL (ref 0–0.61)
EOSINOPHIL NFR BLD AUTO: 3 % (ref 0–6)
ERYTHROCYTE [DISTWIDTH] IN BLOOD BY AUTOMATED COUNT: 18.9 % (ref 11.6–15.1)
GFR SERPL CREATININE-BSD FRML MDRD: 46 ML/MIN/1.73SQ M
GLUCOSE P FAST SERPL-MCNC: 91 MG/DL (ref 65–99)
HCT VFR BLD AUTO: 41.2 % (ref 36.5–49.3)
HDLC SERPL-MCNC: 46 MG/DL
HGB BLD-MCNC: 13 G/DL (ref 12–17)
IMM GRANULOCYTES # BLD AUTO: 0.01 THOUSAND/UL (ref 0–0.2)
IMM GRANULOCYTES NFR BLD AUTO: 0 % (ref 0–2)
LDLC SERPL CALC-MCNC: 54 MG/DL (ref 0–100)
LYMPHOCYTES # BLD AUTO: 1.3 THOUSANDS/ΜL (ref 0.6–4.47)
LYMPHOCYTES NFR BLD AUTO: 20 % (ref 14–44)
MCH RBC QN AUTO: 26.1 PG (ref 26.8–34.3)
MCHC RBC AUTO-ENTMCNC: 31.6 G/DL (ref 31.4–37.4)
MCV RBC AUTO: 83 FL (ref 82–98)
MONOCYTES # BLD AUTO: 0.88 THOUSAND/ΜL (ref 0.17–1.22)
MONOCYTES NFR BLD AUTO: 13 % (ref 4–12)
NEUTROPHILS # BLD AUTO: 4.25 THOUSANDS/ΜL (ref 1.85–7.62)
NEUTS SEG NFR BLD AUTO: 63 % (ref 43–75)
NONHDLC SERPL-MCNC: 70 MG/DL
NRBC BLD AUTO-RTO: 0 /100 WBCS
PLATELET # BLD AUTO: 151 THOUSANDS/UL (ref 149–390)
PMV BLD AUTO: 11.5 FL (ref 8.9–12.7)
POTASSIUM SERPL-SCNC: 4.4 MMOL/L (ref 3.5–5.3)
PROT SERPL-MCNC: 7.1 G/DL (ref 6.4–8.4)
RBC # BLD AUTO: 4.99 MILLION/UL (ref 3.88–5.62)
SODIUM SERPL-SCNC: 139 MMOL/L (ref 135–147)
TRIGL SERPL-MCNC: 78 MG/DL
WBC # BLD AUTO: 6.66 THOUSAND/UL (ref 4.31–10.16)

## 2023-04-26 ENCOUNTER — OFFICE VISIT (OUTPATIENT)
Dept: INTERNAL MEDICINE CLINIC | Facility: CLINIC | Age: 83
End: 2023-04-26

## 2023-04-26 VITALS
DIASTOLIC BLOOD PRESSURE: 64 MMHG | RESPIRATION RATE: 18 BRPM | TEMPERATURE: 97.3 F | HEART RATE: 57 BPM | BODY MASS INDEX: 30.21 KG/M2 | OXYGEN SATURATION: 99 % | SYSTOLIC BLOOD PRESSURE: 110 MMHG | WEIGHT: 211 LBS | HEIGHT: 70 IN

## 2023-04-26 DIAGNOSIS — N18.30 STAGE 3 CHRONIC KIDNEY DISEASE, UNSPECIFIED WHETHER STAGE 3A OR 3B CKD (HCC): ICD-10-CM

## 2023-04-26 DIAGNOSIS — I25.5 ISCHEMIC CARDIOMYOPATHY: ICD-10-CM

## 2023-04-26 DIAGNOSIS — G47.33 OSA (OBSTRUCTIVE SLEEP APNEA): Primary | ICD-10-CM

## 2023-04-26 DIAGNOSIS — I47.29 NSVT (NONSUSTAINED VENTRICULAR TACHYCARDIA) (HCC): ICD-10-CM

## 2023-04-26 DIAGNOSIS — I71.43 INFRARENAL ABDOMINAL AORTIC ANEURYSM (AAA) WITHOUT RUPTURE (HCC): ICD-10-CM

## 2023-04-26 DIAGNOSIS — I25.118 CORONARY ARTERY DISEASE OF NATIVE ARTERY OF NATIVE HEART WITH STABLE ANGINA PECTORIS (HCC): ICD-10-CM

## 2023-04-26 NOTE — ASSESSMENT & PLAN NOTE
-status post endovascular repair of abdominal aortic aneurysm in October 2021  -Per vascular surgery evidence of possible endoleak on recent duplex, slight enlargement and aneurysm sac    Has been referred to IR for treatment of type II endoleak with possible coil embolization  -Continue f/u with vascular surgery/IR   -Attention to BP control

## 2023-04-26 NOTE — PROGRESS NOTES
INTERNAL MEDICINE OFFICE VISIT  [facility]note 3[facility] Internal Medicine- [place]    NAME: Danna Ritchie  AGE: 81 y o  SEX: male    DATE OF ENCOUNTER: 4/26/2023    Assessment and Plan/History of Present Illness     Here today for follow-up  Past medical history of hypertension, CAD status post CABG, ischemic cardiomyopathy, hypertension, hyperlipidemia, AAA status post EVAR October 2021, PAD, JORGE, chronic kidney disease    Patient has no significant concerns today  He is going for IR consultation for possible endoleak of his AAA repair     1  JORGE (obstructive sleep apnea)  Assessment & Plan:  Established with sleep medicine  Maintained on CPAP  Started using new CPAP machine 2 weeks ago and is sleeping better      2  Infrarenal abdominal aortic aneurysm (AAA) without rupture [facility])  Assessment & Plan:  -status post endovascular repair of abdominal aortic aneurysm in October 2021  -Per vascular surgery evidence of possible endoleak on recent duplex, slight enlargement and aneurysm sac  Has been referred to IR for treatment of type II endoleak with possible coil embolization  -Continue f/u with vascular surgery/IR   -Attention to BP control        3  Coronary artery disease of native artery of native heart with stable angina pectoris (HCC)    4  Stage 3 chronic kidney disease, unspecified whether stage 3a or 3b CKD ([facility] [address] )  Assessment & Plan:  Stable  Baseline creatinine around 1 2 -1 5      5  NSVT (nonsustained ventricular tachycardia) ([facility])  Assessment & Plan:  -had Zio patch monitoring completed November 2021 - 69 episodes of an SVT, longest lasting 26 seconds, PVC burden 3 4%  -Mildly reduced LV function, not felt to be a candidate for ICD at this time  -remains on beta-blocker      6   Ischemic cardiomyopathy  Assessment & Plan:  -history of CAD status post CABG in 1996, status post PCI to [address] in 2006, PCI to ramus intermedius October 2021  -hospital admission in October 2021 for shortness of breath - status post catheterization and KYE to intermediate ramus  -2D echo completed January 2023 EF 45%  -goal directed medical therapy with Toprol-XL, not on ACEi/ARB was discontinued due to hyperkalemia, Jardiance  -on aspirin, Plavix, high-intensity statin  -LDL at goal           BMI Counseling: Body mass index is 30 28 kg/m²  The BMI is above normal  Nutrition recommendations include decreasing portion sizes, encouraging healthy choices of fruits and vegetables, moderation in carbohydrate intake and increasing intake of lean protein  Exercise recommendations include moderate physical activity 150 minutes/week  Rationale for BMI follow-up plan is due to patient being overweight or obese  No orders of the defined types were placed in this encounter  Chief Complaint     Chief Complaint   Patient presents with   • Follow-up     3 month        Review of Systems     10 point ROS negative except per HPI    The following portions of the patient's history were reviewed and updated as appropriate: allergies, current medications, past family history, past medical history, past social history, past surgical history and problem list     Active Problem List     Patient Active Problem List   Diagnosis   • Acute coronary syndrome/unstable angina, to consider NSTEMI with ischemic related new left bundle branch block   • Hypercholesterolemia   • Essential hypertension   • Impaired fasting glucose   • Malignant melanoma of neck (HCC)   • JORGE (obstructive sleep apnea)   • Bradycardia   • Depression   • Elevated PSA   • BPH with obstruction/lower urinary tract symptoms   • PLMD (periodic limb movement disorder)   • Obesity (BMI 30-39  9)   • Bronchospasm   • Left forearm pain   • Ischemic cardiomyopathy   • AAA (abdominal aortic aneurysm) ([facility])   • NSVT (nonsustained ventricular tachycardia) ([facility])   • Status post endovascular aneurysm repair (EVAR)   • Anxiety   • COVID-19 infection   • Chronic systolic congestive heart failure ([address] )   • "PAD (peripheral artery disease) (HCC)   • Venous ulcer of right leg (HCC)   • Venous ulcer of left leg (HCC)   • Stage 3 chronic kidney disease, unspecified whether stage 3a or 3b CKD (HCC)       Objective     /64   Pulse 57   Temp (!) 97 3 °F (36 3 °C)   Resp 18   Ht 5' 10\" (1 778 m)   Wt 95 7 kg (211 lb)   SpO2 99%   BMI 30 28 kg/m²     Physical Exam  Constitutional:       Appearance: Normal appearance  He is not ill-appearing  HENT:      Head: Normocephalic and atraumatic  Eyes:      General: No scleral icterus  Right eye: No discharge  Left eye: No discharge  Cardiovascular:      Rate and Rhythm: Normal rate and regular rhythm  Heart sounds: No murmur heard  No friction rub  Pulmonary:      Effort: Pulmonary effort is normal       Breath sounds: Normal breath sounds  No wheezing or rales  Musculoskeletal:         General: No swelling or tenderness  Skin:     Findings: No erythema or rash  Neurological:      Mental Status: He is alert  Mental status is at baseline  Gait: Gait normal    Psychiatric:         Mood and Affect: Mood normal          Behavior: Behavior normal          Pertinent Laboratory/Diagnostic Studies:  No results found      Images and diagnostics reviewed     Current Medications     Current Outpatient Medications:   •  albuterol (PROVENTIL HFA,VENTOLIN HFA) 90 mcg/act inhaler, INHALE 1 PUFF BY MOUTH EVERY 6 HOURS AS NEEDED FOR WHEEZE, Disp: 18 g, Rfl: 1  •  aspirin 81 MG tablet, Take 1 tablet by mouth daily, Disp: , Rfl:   •  atorvastatin (LIPITOR) 80 mg tablet, TAKE 1 TABLET BY MOUTH EVERY DAY, Disp: 90 tablet, Rfl: 0  •  clopidogrel (PLAVIX) 75 mg tablet, Take 75 mg by mouth daily  , Disp: , Rfl:   •  fluticasone (FLONASE) 50 mcg/act nasal spray, 2 sprays into each nostril daily, Disp: 54 6 mL, Rfl: 3  •  furosemide (LASIX) 40 mg tablet, Take 40 mg by mouth daily, Disp: , Rfl:   •  isosorbide mononitrate (IMDUR) 30 mg 24 hr tablet, Take 30 " mg by mouth daily, Disp: , Rfl:   •  Jardiance 10 MG TABS, Take 10 mg by mouth daily, Disp: , Rfl:   •  nitroglycerin (NITROSTAT) 0 4 mg SL tablet, PLACE 1 TABLET (0 4 MG TOTAL) UNDER THE TONGUE EVERY 5 (FIVE) MINUTES AS NEEDED FOR CHEST PAIN, Disp: 100 tablet, Rfl: 1  •  metoprolol succinate (TOPROL-XL) 100 mg 24 hr tablet, Take 1 tablet (100 mg total) by mouth daily Take metoprolol succinate 100 mg daily in the morning, Disp: 30 tablet, Rfl: 0    Current Facility-Administered Medications:   •  lidocaine (LMX) 4 % cream, , Topical, Once, Dusty Boyd DPRAYNE    Health Maintenance     Health Maintenance   Topic Date Due   • COVID-19 Vaccine (3 - Booster for Moderna series) 05/04/2021   • Influenza Vaccine (1) 06/30/2023 (Originally 9/1/2022)   • [facility] PLAN OF CARE  07/06/2023 (Originally 1940)   • Colorectal Cancer Screening  07/07/2023 (Originally 5/28/1986)   • Medicare Annual Wellness Visit (AWV)  10/14/2023   • Fall Risk  01/25/2024   • BMI: Followup Plan  04/26/2024   • BMI: Adult  04/26/2024   • Pneumococcal Vaccine: 65+ Years  Completed   • HIB Vaccine  Aged Out   • IPV Vaccine  Aged Out   • Hepatitis A Vaccine  Aged Out   • Meningococcal ACWY Vaccine  Aged Out   • HPV Vaccine  Aged Out     Immunization History   Administered Date(s) Administered   • COVID-19 MODERNA VACC 0 5 ML IM 02/06/2021, 03/09/2021   • INFLUENZA 11/03/2015, 09/26/2016, 09/28/2016, 11/14/2017, 11/27/2018   • Influenza Quadrivalent Preservative Free 3 years and older IM 10/14/2014   • Influenza Quadrivalent, 6-35 Months IM 11/03/2015   • Influenza Split High Dose Preservative Free IM 09/26/2016, 11/14/2017   • Influenza, high dose seasonal 0 7 mL 11/27/2018, 10/22/2019, 10/22/2020   • Influenza, seasonal, injectable 09/27/2011, 10/01/2012, 12/02/2013   • Pneumococcal Conjugate 13-Valent 07/28/2015   • Pneumococcal Polysaccharide PPV23 09/20/2006, 01/03/2017   • Tdap 01/03/2017       UMESH Santillan [facility] Internal Medicine - [address] [facility], [facility] [address]  [place], [address]   Office: [phone]  Fax: [phone]

## 2023-04-26 NOTE — ASSESSMENT & PLAN NOTE
Established with sleep medicine    Maintained on CPAP  Started using new CPAP machine 2 weeks ago and is sleeping better

## 2023-04-26 NOTE — ASSESSMENT & PLAN NOTE
-history of CAD status post CABG in 1996, status post PCI to [facility] 27 in 2006, PCI to ramus intermedius October 2021  -hospital admission in October 2021 for shortness of breath - status post catheterization and KYE to intermediate ramus  -2D echo completed January 2023 EF 45%  -goal directed medical therapy with Toprol-XL, not on ACEi/ARB was discontinued due to hyperkalemia, Jardiance  -on aspirin, Plavix, high-intensity statin  -LDL at goal

## 2023-06-02 ENCOUNTER — HOSPITAL ENCOUNTER (OUTPATIENT)
Dept: RADIOLOGY | Facility: HOSPITAL | Age: 83
Discharge: HOME | End: 2023-06-02

## 2023-06-02 ENCOUNTER — OFFICE VISIT (OUTPATIENT)
Dept: INTERNAL MEDICINE CLINIC | Facility: CLINIC | Age: 83
End: 2023-06-02

## 2023-06-02 VITALS
TEMPERATURE: 97.7 F | WEIGHT: 213 LBS | DIASTOLIC BLOOD PRESSURE: 70 MMHG | SYSTOLIC BLOOD PRESSURE: 118 MMHG | BODY MASS INDEX: 30.49 KG/M2 | HEIGHT: 70 IN | HEART RATE: 65 BPM | OXYGEN SATURATION: 95 %

## 2023-06-02 DIAGNOSIS — M25.561 ACUTE PAIN OF RIGHT KNEE: ICD-10-CM

## 2023-06-02 DIAGNOSIS — M25.561 ACUTE PAIN OF RIGHT KNEE: Primary | ICD-10-CM

## 2023-06-02 DIAGNOSIS — N18.30 STAGE 3 CHRONIC KIDNEY DISEASE, UNSPECIFIED WHETHER STAGE 3A OR 3B CKD (HCC): ICD-10-CM

## 2023-06-02 NOTE — PROGRESS NOTES
"INTERNAL MEDICINE OFFICE VISIT NOTE  [facility] Internal Medicine [place]    NAME: Erika Saleh  AGE: 81 y o  SEX: male    DATE OF ENCOUNTER: 6/2/2023       Chief Complaint   Patient presents with   • Knee Pain     Right knee pain and stiffness for over a week      Presents for evaluation of right knee pain    Review of Systems  10 point ROS negative except per HPI    OBJECTIVE:  Vitals:    06/02/23 0852   BP: 118/70   BP Location: Left arm   Patient Position: Sitting   Cuff Size: Standard   Pulse: 65   Temp: 97 7 °F (36 5 °C)   SpO2: 95%   Weight: 96 6 kg (213 lb)   Height: 5' 10\" (1 778 m)       Physical Exam:   GENERAL: NAD, Normal appearance  Non diaphoretic, non-toxic, not ill-appearing, well-developed, well-nourished  NEUROLOGIC:  Alert/oriented x3  HEENT:  NC/AT, EOMI, MMM, no scleral icterus  CARDIAC:  RRR, +S1/S2, no S3/S4 heard, no m/g/r  PULMONARY:  non-labored breathing, CTA B/L, no wheezing/rales/rhonci appreciated at time of encounter  ABDOMEN:  Soft, NT/ND, +BS, no rebound/guarding/rigidity  Extremities:  2+ Pulses in DP/PT  No edema, cyanosis  SKIN:  No rashes or erythema    ASSESSMENT/PLAN:    1  Acute pain of right knee  Assessment & Plan: This is a new problem  7/10 Pain started about a week ago, with associated stiffness  Unable to identify an exacerbating factor  Denies trauma to the area or significant overuse  Denies fevers, chills, systemic symptoms  -Small effusion on the medial aspect, no erythema, decreased range of motion noted on exam  -X-ray of right knee ordered  -We will refer to orthopedist  -Trial of Voltaren gel and Tylenol  P o  NSAIDs limited due to GFR of 45  Orders:  -     XR knee 3 vw right non injury; Future; Expected date: 06/02/2023  -     Diclofenac Sodium (VOLTAREN) 1 %; Apply every 6 hrs to right knee  -     Ambulatory Referral to Orthopedic Surgery; Future    2   Stage 3 chronic kidney disease, unspecified whether stage 3a or 3b CKD ([facility] [address] )        Current " Outpatient Medications:   •  albuterol (PROVENTIL HFA,VENTOLIN HFA) 90 mcg/act inhaler, INHALE 1 PUFF BY MOUTH EVERY 6 HOURS AS NEEDED FOR WHEEZE, Disp: 18 g, Rfl: 1  •  aspirin 81 MG tablet, Take 1 tablet by mouth daily, Disp: , Rfl:   •  atorvastatin (LIPITOR) 80 mg tablet, TAKE 1 TABLET BY MOUTH EVERY DAY, Disp: 90 tablet, Rfl: 0  •  clopidogrel (PLAVIX) 75 mg tablet, Take 75 mg by mouth daily  , Disp: , Rfl:   •  fluticasone (FLONASE) 50 mcg/act nasal spray, 2 sprays into each nostril daily, Disp: 54 6 mL, Rfl: 3  •  furosemide (LASIX) 40 mg tablet, Take 40 mg by mouth daily, Disp: , Rfl:   •  isosorbide mononitrate (IMDUR) 30 mg 24 hr tablet, Take 30 mg by mouth daily, Disp: , Rfl:   •  Jardiance 10 MG TABS, Take 10 mg by mouth daily, Disp: , Rfl:   •  metoprolol succinate (TOPROL-XL) 100 mg 24 hr tablet, Take 1 tablet (100 mg total) by mouth daily Take metoprolol succinate 100 mg daily in the morning, Disp: 30 tablet, Rfl: 0  •  nitroglycerin (NITROSTAT) 0 4 mg SL tablet, PLACE 1 TABLET (0 4 MG TOTAL) UNDER THE TONGUE EVERY 5 (FIVE) MINUTES AS NEEDED FOR CHEST PAIN, Disp: 100 tablet, Rfl: 1    Current Facility-Administered Medications:   •  lidocaine (LMX) 4 % cream, , Topical, Once, Paola Valdez MD  [facility] Internal Medicine [place]    * Please Note: This note was completed in part utilizing a dictation software  Grammatical errors, random word insertions, spelling mistakes, and incomplete sentences may be an occasional consequence of this system secondary to software limitations, ambient noise, and hardware issues  If you have any questions or concerns about the content, text, or information contained within the body of this dictation, please contact the provider for clarification  **

## 2023-06-02 NOTE — ASSESSMENT & PLAN NOTE
This is a new problem  7/10 Pain started about a week ago, with associated stiffness  Unable to identify an exacerbating factor  Denies trauma to the area or significant overuse  Denies fevers, chills, systemic symptoms  -Small effusion on the medial aspect, no erythema, decreased range of motion noted on exam  -X-ray of right knee ordered  -We will refer to orthopedist  -Trial of Voltaren gel and Tylenol  P o  NSAIDs limited due to GFR of 45

## 2023-06-20 ENCOUNTER — APPOINTMENT (OUTPATIENT)
Dept: RADIOLOGY | Facility: MEDICAL CENTER | Age: 83
End: 2023-06-20
Payer: COMMERCIAL

## 2023-06-20 ENCOUNTER — OFFICE VISIT (OUTPATIENT)
Dept: OBGYN CLINIC | Facility: MEDICAL CENTER | Age: 83
End: 2023-06-20
Payer: COMMERCIAL

## 2023-06-20 VITALS
HEART RATE: 56 BPM | DIASTOLIC BLOOD PRESSURE: 70 MMHG | HEIGHT: 70 IN | BODY MASS INDEX: 30.46 KG/M2 | WEIGHT: 212.8 LBS | SYSTOLIC BLOOD PRESSURE: 145 MMHG

## 2023-06-20 DIAGNOSIS — M17.11 ARTHRITIS OF RIGHT KNEE: Primary | ICD-10-CM

## 2023-06-20 DIAGNOSIS — M25.561 ACUTE PAIN OF RIGHT KNEE: ICD-10-CM

## 2023-06-20 DIAGNOSIS — M25.561 RIGHT KNEE PAIN, UNSPECIFIED CHRONICITY: ICD-10-CM

## 2023-06-20 PROCEDURE — 99203 OFFICE O/P NEW LOW 30 MIN: CPT | Performed by: ORTHOPAEDIC SURGERY

## 2023-06-20 PROCEDURE — 73560 X-RAY EXAM OF KNEE 1 OR 2: CPT

## 2023-06-20 NOTE — PROGRESS NOTES
Assessment/Plan     1  Arthritis of right knee    2  Acute pain of right knee    3  Right knee pain, unspecified chronicity      Orders Placed This Encounter   Procedures   • XR knee 1 or 2 vw right   • Ambulatory referral to Physical Therapy     • Patient presents with severe patellofemoral arthritis, mild to moderate elsewhere  • Discussed treatment options as well as risks and benefits of these treatment options  • Continue Tylenol as needed for pain  1,000 mg up to 3 times a day  Do not exceed 3,000 mg per day  • PT- ROM encouraged  • Declined steroid injection at this time  Return in about 6 weeks (around 8/1/2023)  I answered all of the patient's questions during the visit and provided education of the patient's condition during the visit  The patient verbalized understanding of the information given and agrees with the plan  This note was dictated using [employer] software  It may contain errors including improperly dictated words  Please contact physician directly for any questions  History of Present Illness   Chief complaint: No chief complaint on file  HPI: Kelli Yusuf is a 81 y o  male that c/o right knee pain  Pain has been present for 2 weeks, located medial, and described as achy  Patient denies any previous injuries or surgeries  Pain is aggravated  by sit to stand, kneeling, and weight bearing although his pain has been improving in the last couple weeks, and symptoms include pain  He denies any instability  Patient denies any radiating pain or distal paresthesias  Pain is alleviated by Voltaren gel, Tylenol, and home exercises given after open heart surgery  Patient has not initiated outpatient PT, injections, or bracing  He currently ambulates with a cane  ROS:    See HPI for musculoskeletal review     All other systems reviewed are negative     Historical Information   Past Medical History:   Diagnosis Date   • AAA (abdominal aortic aneurysm) without rupture ([facility] ) has annual US to check   • [facility] )     chronic stable angina, but it is mild and tolerates walking dogs and walking up flight os stairs without symptoms  Using Nitro no more than 2 times per mt     • Ankle pain, left    • Bilateral wrist pain    • BPH with obstruction/lower urinary tract symptoms    • Coronary artery disease    • CPAP (continuous positive airway pressure) dependence    • Elevated PSA    • History of colon polyps    • [place] (hard of hearing)     Slight   • Hyperlipidemia    • Ischemic cardiomyopathy     with apical aneurysm   • Melanoma ([facility] ) 12/2016    Right side of neck   • Myocardial infarction ([place] [address] ) 1996    x1     Past Surgical History:   Procedure Laterality Date   • ARTERIAL ANEURYSM REPAIR  10/2021   • CARDIAC SURGERY      CABG x6   • CATARACT EXTRACTION Bilateral    • COLONOSCOPY     • CORONARY ANGIOPLASTY WITH STENT PLACEMENT      2 stents   • CORONARY ANGIOPLASTY WITH STENT PLACEMENT      10/2021   • CORONARY ARTERY BYPASS GRAFT     • PROSTATE BIOPSY  2000,2001   • SKIN BIOPSY      Melanoma - surgical removal   • SKIN LESION EXCISION Right 2/6/2017    Procedure: WIDE EXCISION MELANOMA SCAR LATERAL NECK;  Surgeon: Lalita Santa MD;  Location: [facility];  Service:    • WISDOM TOOTH EXTRACTION       Social History   Social History     Substance and Sexual Activity   Alcohol Use Yes    Comment: 1-2 beers rarely     Social History     Substance and Sexual Activity   Drug Use No     Social History     Tobacco Use   Smoking Status Never   Smokeless Tobacco Never     Family History:   Family History   Problem Relation Age of Onset   • Hypertension Mother    • Breast cancer Mother        Current Outpatient Medications on File Prior to Visit   Medication Sig Dispense Refill   • albuterol (PROVENTIL HFA,VENTOLIN HFA) 90 mcg/act inhaler INHALE 1 PUFF BY MOUTH EVERY 6 HOURS AS NEEDED FOR WHEEZE 18 g 1   • aspirin 81 MG tablet Take 1 tablet by mouth daily     • atorvastatin (LIPITOR) 80 mg tablet TAKE 1 TABLET BY MOUTH EVERY DAY 90 tablet 0   • clopidogrel (PLAVIX) 75 mg tablet Take 75 mg by mouth daily       • Diclofenac Sodium (VOLTAREN) 1 % Apply every 6 hrs to right knee 150 g 1   • fluticasone (FLONASE) 50 mcg/act nasal spray 2 sprays into each nostril daily 54 6 mL 3   • furosemide (LASIX) 40 mg tablet Take 40 mg by mouth daily     • isosorbide mononitrate (IMDUR) 30 mg 24 hr tablet Take 30 mg by mouth daily     • Jardiance 10 MG TABS Take 10 mg by mouth daily     • metoprolol succinate (TOPROL-XL) 100 mg 24 hr tablet Take 1 tablet (100 mg total) by mouth daily Take metoprolol succinate 100 mg daily in the morning 30 tablet 0   • nitroglycerin (NITROSTAT) 0 4 mg SL tablet PLACE 1 TABLET (0 4 MG TOTAL) UNDER THE TONGUE EVERY 5 (FIVE) MINUTES AS NEEDED FOR CHEST PAIN 100 tablet 1     Current Facility-Administered Medications on File Prior to Visit   Medication Dose Route Frequency Provider Last Rate Last Admin   • lidocaine (LMX) 4 % cream   Topical Once Viraj Preciado DPM         Allergies   Allergen Reactions   • Abciximab Other (See Comments)     rcd 9/27/01 & 5/16/03   • Escitalopram Rash       Current Outpatient Medications on File Prior to Visit   Medication Sig Dispense Refill   • albuterol (PROVENTIL HFA,VENTOLIN HFA) 90 mcg/act inhaler INHALE 1 PUFF BY MOUTH EVERY 6 HOURS AS NEEDED FOR WHEEZE 18 g 1   • aspirin 81 MG tablet Take 1 tablet by mouth daily     • atorvastatin (LIPITOR) 80 mg tablet TAKE 1 TABLET BY MOUTH EVERY DAY 90 tablet 0   • clopidogrel (PLAVIX) 75 mg tablet Take 75 mg by mouth daily       • Diclofenac Sodium (VOLTAREN) 1 % Apply every 6 hrs to right knee 150 g 1   • fluticasone (FLONASE) 50 mcg/act nasal spray 2 sprays into each nostril daily 54 6 mL 3   • furosemide (LASIX) 40 mg tablet Take 40 mg by mouth daily     • isosorbide mononitrate (IMDUR) 30 mg 24 hr tablet Take 30 mg by mouth daily     • Jardiance 10 MG TABS Take 10 mg by mouth daily     • metoprolol "succinate (TOPROL-XL) 100 mg 24 hr tablet Take 1 tablet (100 mg total) by mouth daily Take metoprolol succinate 100 mg daily in the morning 30 tablet 0   • nitroglycerin (NITROSTAT) 0 4 mg SL tablet PLACE 1 TABLET (0 4 MG TOTAL) UNDER THE TONGUE EVERY 5 (FIVE) MINUTES AS NEEDED FOR CHEST PAIN 100 tablet 1     Current Facility-Administered Medications on File Prior to Visit   Medication Dose Route Frequency Provider Last Rate Last Admin   • lidocaine (LMX) 4 % cream   Topical Once Nahomy Sol DPM           Objective   Vitals: Blood pressure 145/70, pulse 56, height 5' 10\" (1 778 m), weight 96 5 kg (212 lb 12 8 oz)  ,Body mass index is 30 53 kg/m²      PE:  AAOx 3  WDWN  Hearing intact, no drainage from eyes  Regular rate  no audible wheezing  no abdominal distension  LE compartments soft, skin intact    rightknee:    Appearance:  Mild generalized swelling   No ecchymosis  no obvious joint deformity   mild effusion  Right lower leg discoloration present   Adhesive residue over medial right knee present   Palpation/Tenderness:  No TTP over medial joint line  No TTP over lateral joint line   No TTP over patella  No TTP over patellar tendon  + TTP over pes anserine bursa  Active Range of Motion:  AROM: 10-80  PROM: 5-95    No ipsilateral hip pain with ROM    Patient guarding, exam limited    rightLE:    Sensation grossly intact  Palpable pulse  AT/GS/EHL intact    RLE:  EHL/AT/GS/quads/hamstrings/iliopsoas 5/5, sensation grossly intact L4, L5, S1, palpable pedal pulse  LLE:  EHL/AT/GS/quads/hamstrings/iliopsoas 5/5, sensation grossly intact L4, L5, S1, palpable pedal pulse      Imaging Studies: I have personally reviewed pertinent films in PACS  XR rightknee:    Severe patellofemoral arthritis, mild to moderate arthritis in medial and lateral compartments with joint space narrowing and osteophyte formation      Scribe Attestation    I,:  Juany James am acting as a scribe while in the presence of the attending " physician :       I,:  Shaye Dallas DO personally performed the services described in this documentation    as scribed in my presence :

## 2023-06-20 NOTE — LETTER
June 20, 2023     Curly Marques Limb, DO  [address]    Patient: Zeny Huff   YOB: 1940   Date of Visit: 6/20/2023       Dear Dr Yuan Presume:    Thank you for referring Zeny Huff to me for evaluation  Below are my notes for this consultation  If you have questions, please do not hesitate to call me  I look forward to following your patient along with you  Sincerely,        Jaskaran Rather, DO        CC: MD Jaskaran Mullen, DO  6/20/2023 11:46 AM  Sign when Signing Visit   Assessment/Plan     1  Arthritis of right knee    2  Acute pain of right knee    3  Right knee pain, unspecified chronicity      Orders Placed This Encounter   Procedures   • XR knee 1 or 2 vw right   • Ambulatory referral to Physical Therapy     Patient presents with severe patellofemoral arthritis, mild to moderate elsewhere  Discussed treatment options as well as risks and benefits of these treatment options  Continue Tylenol as needed for pain  1,000 mg up to 3 times a day  Do not exceed 3,000 mg per day  PT- ROM encouraged  Declined steroid injection at this time  Return in about 6 weeks (around 8/1/2023)  I answered all of the patient's questions during the visit and provided education of the patient's condition during the visit  The patient verbalized understanding of the information given and agrees with the plan  This note was dictated using [employer] software  It may contain errors including improperly dictated words  Please contact physician directly for any questions  History of Present Illness   Chief complaint: No chief complaint on file  HPI: Zeny Huff is a 81 y o  male that c/o right knee pain  Pain has been present for 2 weeks, located medial, and described as achy  Patient denies any previous injuries or surgeries   Pain is aggravated  by sit to stand, kneeling, and weight bearing although his pain has been improving in the last couple weeks, and symptoms include pain  He denies any instability  Patient denies any radiating pain or distal paresthesias  Pain is alleviated by Voltaren gel, Tylenol, and home exercises given after open heart surgery  Patient has not initiated outpatient PT, injections, or bracing  He currently ambulates with a cane  ROS:    See HPI for musculoskeletal review  All other systems reviewed are negative     Historical Information   Past Medical History:   Diagnosis Date   • AAA (abdominal aortic aneurysm) without rupture ([facility] )     has annual US to check   • Angina pectoris ([facility] )     chronic stable angina, but it is mild and tolerates walking dogs and walking up flight os stairs without symptoms  Using Nitro no more than 2 times per mt     • Ankle pain, left    • Bilateral wrist pain    • BPH with obstruction/lower urinary tract symptoms    • Coronary artery disease    • CPAP (continuous positive airway pressure) dependence    • Elevated PSA    • History of colon polyps    • [place] (hard of hearing)     Slight   • Hyperlipidemia    • Ischemic cardiomyopathy     with apical aneurysm   • Melanoma ([place] [address] ) 12/2016    Right side of neck   • Myocardial infarction ([place] [address] ) 1996    x1     Past Surgical History:   Procedure Laterality Date   • ARTERIAL ANEURYSM REPAIR  10/2021   • CARDIAC SURGERY      CABG x6   • CATARACT EXTRACTION Bilateral    • COLONOSCOPY     • CORONARY ANGIOPLASTY WITH STENT PLACEMENT      2 stents   • CORONARY ANGIOPLASTY WITH STENT PLACEMENT      10/2021   • CORONARY ARTERY BYPASS GRAFT     • PROSTATE BIOPSY  2000,2001   • SKIN BIOPSY      Melanoma - surgical removal   • SKIN LESION EXCISION Right 2/6/2017    Procedure: WIDE EXCISION MELANOMA SCAR LATERAL NECK;  Surgeon: Hugo Lopez MD;  Location: [facility];  Service:    • WISDOM TOOTH EXTRACTION       Social History   Social History     Substance and Sexual Activity   Alcohol Use Yes    Comment: 1-2 beers rarely Social History     Substance and Sexual Activity   Drug Use No     Social History     Tobacco Use   Smoking Status Never   Smokeless Tobacco Never     Family History:   Family History   Problem Relation Age of Onset   • Hypertension Mother    • Breast cancer Mother        Current Outpatient Medications on File Prior to Visit   Medication Sig Dispense Refill   • albuterol (PROVENTIL HFA,VENTOLIN HFA) 90 mcg/act inhaler INHALE 1 PUFF BY MOUTH EVERY 6 HOURS AS NEEDED FOR WHEEZE 18 g 1   • aspirin 81 MG tablet Take 1 tablet by mouth daily     • atorvastatin (LIPITOR) 80 mg tablet TAKE 1 TABLET BY MOUTH EVERY DAY 90 tablet 0   • clopidogrel (PLAVIX) 75 mg tablet Take 75 mg by mouth daily       • Diclofenac Sodium (VOLTAREN) 1 % Apply every 6 hrs to right knee 150 g 1   • fluticasone (FLONASE) 50 mcg/act nasal spray 2 sprays into each nostril daily 54 6 mL 3   • furosemide (LASIX) 40 mg tablet Take 40 mg by mouth daily     • isosorbide mononitrate (IMDUR) 30 mg 24 hr tablet Take 30 mg by mouth daily     • Jardiance 10 MG TABS Take 10 mg by mouth daily     • metoprolol succinate (TOPROL-XL) 100 mg 24 hr tablet Take 1 tablet (100 mg total) by mouth daily Take metoprolol succinate 100 mg daily in the morning 30 tablet 0   • nitroglycerin (NITROSTAT) 0 4 mg SL tablet PLACE 1 TABLET (0 4 MG TOTAL) UNDER THE TONGUE EVERY 5 (FIVE) MINUTES AS NEEDED FOR CHEST PAIN 100 tablet 1     Current Facility-Administered Medications on File Prior to Visit   Medication Dose Route Frequency Provider Last Rate Last Admin   • lidocaine (LMX) 4 % cream   Topical Once Rina Cramer DPM         Allergies   Allergen Reactions   • Abciximab Other (See Comments)     rcd 9/27/01 & 5/16/03   • Escitalopram Rash       Current Outpatient Medications on File Prior to Visit   Medication Sig Dispense Refill   • albuterol (PROVENTIL HFA,VENTOLIN HFA) 90 mcg/act inhaler INHALE 1 PUFF BY MOUTH EVERY 6 HOURS AS NEEDED FOR WHEEZE 18 g 1   • aspirin 81 "MG tablet Take 1 tablet by mouth daily     • atorvastatin (LIPITOR) 80 mg tablet TAKE 1 TABLET BY MOUTH EVERY DAY 90 tablet 0   • clopidogrel (PLAVIX) 75 mg tablet Take 75 mg by mouth daily       • Diclofenac Sodium (VOLTAREN) 1 % Apply every 6 hrs to right knee 150 g 1   • fluticasone (FLONASE) 50 mcg/act nasal spray 2 sprays into each nostril daily 54 6 mL 3   • furosemide (LASIX) 40 mg tablet Take 40 mg by mouth daily     • isosorbide mononitrate (IMDUR) 30 mg 24 hr tablet Take 30 mg by mouth daily     • Jardiance 10 MG TABS Take 10 mg by mouth daily     • metoprolol succinate (TOPROL-XL) 100 mg 24 hr tablet Take 1 tablet (100 mg total) by mouth daily Take metoprolol succinate 100 mg daily in the morning 30 tablet 0   • nitroglycerin (NITROSTAT) 0 4 mg SL tablet PLACE 1 TABLET (0 4 MG TOTAL) UNDER THE TONGUE EVERY 5 (FIVE) MINUTES AS NEEDED FOR CHEST PAIN 100 tablet 1     Current Facility-Administered Medications on File Prior to Visit   Medication Dose Route Frequency Provider Last Rate Last Admin   • lidocaine (LMX) 4 % cream   Topical Once Eric Granado DPM           Objective   Vitals: Blood pressure 145/70, pulse 56, height 5' 10\" (1 778 m), weight 96 5 kg (212 lb 12 8 oz)  ,Body mass index is 30 53 kg/m²      PE:  AAOx 3  WDWN  Hearing intact, no drainage from eyes  Regular rate  no audible wheezing  no abdominal distension  LE compartments soft, skin intact    rightknee:    Appearance:  Mild generalized swelling   No ecchymosis  no obvious joint deformity   mild effusion  Right lower leg discoloration present   Adhesive residue over medial right knee present   Palpation/Tenderness:  No TTP over medial joint line  No TTP over lateral joint line   No TTP over patella  No TTP over patellar tendon  + TTP over pes anserine bursa  Active Range of Motion:  AROM: 10-80  PROM: 5-95    No ipsilateral hip pain with ROM    Patient guarding, exam limited    rightLE:    Sensation grossly intact  Palpable " pulse  AT/GS/EHL intact    RLE:  EHL/AT/GS/quads/hamstrings/iliopsoas 5/5, sensation grossly intact L4, L5, S1, palpable pedal pulse  LLE:  EHL/AT/GS/quads/hamstrings/iliopsoas 5/5, sensation grossly intact L4, L5, S1, palpable pedal pulse      Imaging Studies: I have personally reviewed pertinent films in PACS  XR rightknee:    Severe patellofemoral arthritis, mild to moderate arthritis in medial and lateral compartments with joint space narrowing and osteophyte formation      Scribe Attestation      I,:  Karo Stover am acting as a scribe while in the presence of the attending physician :       I,:  Mallika Valero, DO personally performed the services described in this documentation    as scribed in my presence :

## 2023-06-22 ENCOUNTER — APPOINTMENT (OUTPATIENT)
Dept: LAB | Age: 83
End: 2023-06-22
Payer: COMMERCIAL

## 2023-06-22 DIAGNOSIS — R97.20 ELEVATED PSA: ICD-10-CM

## 2023-06-22 LAB — PSA SERPL-MCNC: 5.69 NG/ML (ref 0–4)

## 2023-06-22 PROCEDURE — 36415 COLL VENOUS BLD VENIPUNCTURE: CPT

## 2023-06-22 PROCEDURE — 84153 ASSAY OF PSA TOTAL: CPT

## 2023-06-27 ENCOUNTER — EVALUATION (OUTPATIENT)
Dept: PHYSICAL THERAPY | Facility: REHABILITATION | Age: 83
End: 2023-06-27
Payer: COMMERCIAL

## 2023-06-27 DIAGNOSIS — M25.561 ACUTE PAIN OF RIGHT KNEE: ICD-10-CM

## 2023-06-27 DIAGNOSIS — M17.11 ARTHRITIS OF RIGHT KNEE: ICD-10-CM

## 2023-06-27 DIAGNOSIS — M25.561 RIGHT KNEE PAIN, UNSPECIFIED CHRONICITY: ICD-10-CM

## 2023-06-27 PROCEDURE — 97161 PT EVAL LOW COMPLEX 20 MIN: CPT

## 2023-06-27 PROCEDURE — 97110 THERAPEUTIC EXERCISES: CPT

## 2023-06-27 NOTE — PROGRESS NOTES
PT Evaluation     Today's date: 2023  Patient name: Ann Hannah  : 1940  MRN: [id number]  Referring provider: Emanuel Leija  Dx:   Encounter Diagnosis     ICD-10-CM    1  Acute pain of right knee  M25 561 Ambulatory referral to Physical Therapy      2  Right knee pain, unspecified chronicity  M25 561 Ambulatory referral to Physical Therapy      3  Arthritis of right knee  M17 11 Ambulatory referral to Physical Therapy                     Assessment  Assessment details: Problem List:  1) R knee pain     Ann Hannah is a pleasant 81 y o  male who presents with R knee pain  he has decreased ROM, decreased RLE strength, and overall limitations to functional activities due to stiffness in his knee resulting in the pain @HIS@ is experiencing, worry over not knowing what's wrong, wanting to avoid painkillers, wanting to avoid surgery and fear of not being able to keep active  No further referral appears necessary at this time based upon examination results  Positive prognostic indicators include positive attitude toward recovery, good understanding of diagnosis and treatment plan options and absence of observed red flags  Negative prognostic indicators include chronicity of symptoms, hypertension and multiple concurrent orthopedic problems  Pt does have extensive cardiac history and was educated on importance of not doing valsalva during exercises  At this time, pt would benefit from OP PT to improve ROM, strength, and tolerance to functional activities and also improve safety with ambulation to decrease risk for future injuries and falls  POC was discussed with pt, pt verbalized understanding and is in agreement         Comparable signs:  1) difficulty standing up from chair   2) difficulty ambulating without cane   Impairments: abnormal gait, abnormal or restricted ROM, activity intolerance, impaired balance, impaired physical strength, lacks appropriate home exercise program, pain with function and poor body mechanics    Goals  STG:   Pt will report 25% decrease in pain in 2 weeks to demonstrate improved tolerance to functional activities   Pt will demonstrate 25% improvements in ROM in 4 weeks   Pt will be I with initial HEP to progress towards independence with exercise     LTG:   Pt will be able to complete 5x STS without increased pain by d/c   Pt will improve R knee ROM to [facility] by d/c for increased ease and safety with ADLs and overall functional mobility   Pt will improve RLE strength to 4+/5 or greater by d/c for increased ease and safety with functional mobility and activities   Pt will be I with modified/updated HEP and demonstrate ability to complete with confidence and proper mechanics/form to safely be d/c from skilled OP PT and continue with exercises on their own     Plan  Patient would benefit from: PT eval and skilled physical therapy  Planned modality interventions: cryotherapy  Planned therapy interventions: balance/weight bearing training, balance, body mechanics training, functional ROM exercises, gait training, home exercise program, therapeutic exercise, therapeutic activities, stretching, strengthening, patient education, postural training, neuromuscular re-education, manual therapy, joint mobilization and IADL retraining  Frequency: 2x week  Duration in visits: 12  Duration in weeks: 6  Treatment plan discussed with: patient        Subjective Evaluation    History of Present Illness  Mechanism of injury: Pt reports that he is having R knee pain  Pt reports that he was given cream for his knee  He reports that he was offered therapy or an injection and he wants to try therapy first  Pt reports that he has no trouble walking but getting up and down seems to bother him the most  He reports that he is still doing exercises given to him after he had an aneurysm last year and they seem to help his knee a little bit   Pt reports that getting his foot in/out of the car is a challenge for him  Pt reports no issues sleeping because of his knee  Pt reports that he is able to ambulate without the cane but he uses it for stability  Pt denies any ERIC for his new knee pain  Pain  Quality: tight, dull ache and discomfort  Alleviating factors: deep blue, voltaren cream   Aggravating factors: standing, stair climbing and walking    Social Support  Steps to enter house: yes  Stairs in house: yes   Lives in: multiple-level home  Lives with: spouse    Patient Goals  Patient goals for therapy: decreased pain, improved balance, increased motion, increased strength, independence with ADLs/IADLs and return to sport/leisure activities          Objective     Observations   Left Knee   Positive for trophic changes  Right Knee   Positive for trophic changes  Additional Observation Details  (+) distal color change in RLE with wound on anterior shin with scab coverage     Active Range of Motion   Left Knee   Flexion: 110 degrees   Extension: 0 degrees     Right Knee   Flexion: 75 degrees   Extension: -12 degrees     Mobility   Patellar Mobility:   Left Knee   WFL: medial, lateral, superior and inferior       Right Knee   Hypomobile: medial, lateral, superior and inferior     Strength/Myotome Testing     Left Hip   Planes of Motion   Flexion: 4    Right Hip   Planes of Motion   Flexion: 4-    Left Knee   Flexion: 4+  Extension: 4+  Quadriceps contraction: good    Right Knee   Flexion: 4-  Extension: 4-  Quadriceps contraction: fair    Left Ankle/Foot   Dorsiflexion: 5    Right Ankle/Foot   Dorsiflexion: 5             Precautions: JORGE, HTN, AAA, ischemic cardiomyopathy, NSVT, CAD, angina pectoris, PAD, stage 3 CKD, hx malignant melanoma of neck, Hx MI, venous ulcers b/l legs      6/27            FOTO X            IE/RE IE              Manuals             R knee patellar mobility              R knee PROM                                       Neuro Re-[facility] [place] walk over                                                                  Ther Ex             Bike ROM-Full              Mini squats  demo            Side stepping              Step up              Marching              SA knee flex             SA knee ext              Leg press                                        Modalities

## 2023-06-30 ENCOUNTER — OFFICE VISIT (OUTPATIENT)
Dept: UROLOGY | Facility: MEDICAL CENTER | Age: 83
End: 2023-06-30
Payer: COMMERCIAL

## 2023-06-30 VITALS
BODY MASS INDEX: 30.78 KG/M2 | SYSTOLIC BLOOD PRESSURE: 122 MMHG | OXYGEN SATURATION: 99 % | DIASTOLIC BLOOD PRESSURE: 82 MMHG | HEIGHT: 70 IN | HEART RATE: 102 BPM | WEIGHT: 215 LBS

## 2023-06-30 DIAGNOSIS — R97.20 ELEVATED PSA: Primary | ICD-10-CM

## 2023-06-30 NOTE — PROGRESS NOTES
6/30/2023      Chief Complaint   Patient presents with   • Elevated PSA     Assessment and Plan    81 y o  male managed by our office  1   Elevated PSA  -PSA performed 6/22/2023 resulted 5 69  - FANNIE preformed by Dr Bryn Walsh 6 months ago was described as benign    - Status post multiparametric MRI of the prostate  performed in 12/2019 with PIRADS 1 and prostate size of 101 ml  -follow up in 6 months with PSA    2  Benign prostatic hyperplasia with lower urinary tract symptoms  -Continue monitor/progression of lower urinary tract symptoms  -Follow-up in the office in 6 months with PVR      History of Present Illness  Jose Alonzo is a 81 y o  male here for follow up evaluation of  elevated PSA  Most recent PSA was 6 7, up from 5 5 in May 2022  His highest PSA was 8 1  He had an MRI of the prostate performed in 2019 which was PIRADS 1  No history of biospy   Frequency but patient notes diuretic use for bilateral leg edema  He also reports nocturia x 2 which the patient does not find bothersome  He denies hematuria, sensation of incomplete bladder emptying, dysuria, urgency, urinary incontinence  Component       PSA, Total   Latest Ref Rng & Units       0 00 - 4 00 ng/mL   5/24/2019      10:12 AM 4 6 (H)   12/2/2019      10:42 AM 5 4 (H)   7/2/2020      11:26 AM 3 5   7/10/2021      9:58 AM 8 1 (H)   5/24/2022      10:33 AM 5 5 (H)   12/5/2022      2:01 PM 6 7 (H)   6/22/2023      12:00 PM 5 69 (H)     Review of Systems   Constitutional: Negative for chills and fever  Respiratory: Negative for cough and shortness of breath  Cardiovascular: Negative for chest pain  Gastrointestinal: Negative for abdominal distention, abdominal pain, blood in stool, nausea and vomiting  Genitourinary: Negative for difficulty urinating, dysuria, enuresis, flank pain, frequency, hematuria and urgency  Skin: Negative for rash             AUA SYMPTOM SCORE    Flowsheet Row Most Recent Value   AUA SYMPTOM SCORE    How often have you had a sensation of not emptying your bladder completely after you finished urinating? 0   How often have you had to urinate again less than two hours after you finished urinating? 1   How often have you found you stopped and started again several times when you urinate? 1   How often have you found it difficult to postpone urination? 0   How often have you had a weak urinary stream? 0   How often have you had to push or strain to begin urination? 0   How many times did you most typically get up to urinate from the time you went to bed at night until the time you got up in the morning? 1   Quality of Life: If you were to spend the rest of your life with your urinary condition just the way it is now, how would you feel about that? 1   AUA SYMPTOM SCORE 3             Past Medical History  Past Medical History:   Diagnosis Date   • AAA (abdominal aortic aneurysm) without rupture ([facility] )     has annual US to check   • Angina pectoris ([facility] )     chronic stable angina, but it is mild and tolerates walking dogs and walking up flight os stairs without symptoms  Using Nitro no more than 2 times per mt     • Ankle pain, left    • Bilateral wrist pain    • BPH with obstruction/lower urinary tract symptoms    • Coronary artery disease    • CPAP (continuous positive airway pressure) dependence    • Elevated PSA    • History of colon polyps    • [place] (hard of hearing)     Slight   • Hyperlipidemia    • Ischemic cardiomyopathy     with apical aneurysm   • Melanoma ([facility] ) 12/2016    Right side of neck   • Myocardial infarction [facility]) 1996    x1       Past Social History  Past Surgical History:   Procedure Laterality Date   • ARTERIAL ANEURYSM REPAIR  10/2021   • CARDIAC SURGERY      CABG x6   • CATARACT EXTRACTION Bilateral    • COLONOSCOPY     • CORONARY ANGIOPLASTY WITH STENT PLACEMENT      2 stents   • CORONARY ANGIOPLASTY WITH STENT PLACEMENT      10/2021   • CORONARY ARTERY BYPASS GRAFT     • PROSTATE BIOPSY  2000,2001 • SKIN BIOPSY      Melanoma - surgical removal   • SKIN LESION EXCISION Right 2/6/2017    Procedure: WIDE EXCISION MELANOMA SCAR LATERAL NECK;  Surgeon: Levada Dandy, MD;  Location: [facility];  Service:    • WISDOM TOOTH EXTRACTION       Social History     Tobacco Use   Smoking Status Never   Smokeless Tobacco Never       Past Family History  Family History   Problem Relation Age of Onset   • Hypertension Mother    • Breast cancer Mother        Past Social history  Social History     Socioeconomic History   • Marital status: /Civil Union     Spouse name: Not on file   • Number of children: Not on file   • Years of education: Not on file   • Highest education level: Not on file   Occupational History   • Not on file   Tobacco Use   • Smoking status: Never   • Smokeless tobacco: Never   Vaping Use   • Vaping Use: Never used   Substance and Sexual Activity   • Alcohol use: Yes     Comment: 1-2 beers rarely   • Drug use: No   • Sexual activity: Not Currently   Other Topics Concern   • Not on file   Social History Narrative    DOES NOT CONSUME CAFFEINE     Social Determinants of Health     Financial Resource Strain: Low Risk  (10/14/2022)    Overall Financial Resource Strain (CARDIA)    • Difficulty of Paying Living Expenses: Not very hard   Food Insecurity: Not on file   Transportation Needs: No Transportation Needs (10/14/2022)    PRAPARE - Transportation    • Lack of Transportation (Medical): No    • Lack of Transportation (Non-Medical):  No   Physical Activity: Not on file   Stress: Not on file   Social Connections: Not on file   Intimate Partner Violence: Not on file   Housing Stability: Not on file       Current Medications  Current Outpatient Medications   Medication Sig Dispense Refill   • albuterol (PROVENTIL HFA,VENTOLIN HFA) 90 mcg/act inhaler INHALE 1 PUFF BY MOUTH EVERY 6 HOURS AS NEEDED FOR WHEEZE 18 g 1   • aspirin 81 MG tablet Take 1 tablet by mouth daily     • atorvastatin (LIPITOR) 80 mg "tablet TAKE 1 TABLET BY MOUTH EVERY DAY 90 tablet 0   • clopidogrel (PLAVIX) 75 mg tablet Take 75 mg by mouth daily       • Diclofenac Sodium (VOLTAREN) 1 % Apply every 6 hrs to right knee 150 g 1   • fluticasone (FLONASE) 50 mcg/act nasal spray 2 sprays into each nostril daily 54 6 mL 3   • furosemide (LASIX) 40 mg tablet Take 40 mg by mouth daily     • isosorbide mononitrate (IMDUR) 30 mg 24 hr tablet Take 30 mg by mouth daily     • Jardiance 10 MG TABS Take 10 mg by mouth daily     • metoprolol succinate (TOPROL-XL) 100 mg 24 hr tablet Take 1 tablet (100 mg total) by mouth daily Take metoprolol succinate 100 mg daily in the morning 30 tablet 0   • nitroglycerin (NITROSTAT) 0 4 mg SL tablet PLACE 1 TABLET (0 4 MG TOTAL) UNDER THE TONGUE EVERY 5 (FIVE) MINUTES AS NEEDED FOR CHEST PAIN 100 tablet 1     Current Facility-Administered Medications   Medication Dose Route Frequency Provider Last Rate Last Admin   • lidocaine (LMX) 4 % cream   Topical Once [place] Sab[facility], DPM           Allergies  Allergies   Allergen Reactions   • Abciximab Other (See Comments)     rcd 9/27/01 & 5/16/03   • Escitalopram Rash         The following portions of the patient's history were reviewed and updated as appropriate: allergies, current medications, past medical history, past social history, past surgical history and problem list       Vitals  Vitals:    06/30/23 1341   BP: 122/82   Pulse: 102   SpO2: 99%   Weight: 97 5 kg (215 lb)   Height: 5' 10\" (1 778 m)           Physical Exam  Physical Exam  Vitals reviewed  Constitutional:       General: He is not in acute distress  Appearance: Normal appearance  He is normal weight  HENT:      Head: Normocephalic  Pulmonary:      Effort: No respiratory distress  Breath sounds: Normal breath sounds  Skin:     General: Skin is warm and dry  Neurological:      General: No focal deficit present  Mental Status: He is alert and oriented to person, place, and time   " Psychiatric:         Mood and Affect: Mood normal          Behavior: Behavior normal            Results  No results found for this or any previous visit (from the past 1 hour(s)) ]  Lab Results   Component Value Date    PSA 5 69 (H) 06/22/2023    PSA 6 7 (H) 12/05/2022    PSA 5 5 (H) 05/24/2022     Lab Results   Component Value Date    CALCIUM 9 5 04/19/2023     07/19/2017    K 4 4 04/19/2023    CO2 28 04/19/2023     04/19/2023    BUN 39 (H) 04/19/2023    CREATININE 1 41 (H) 04/19/2023     Lab Results   Component Value Date    WBC 6 66 04/19/2023    HGB 13 0 04/19/2023    HCT 41 2 04/19/2023    MCV 83 04/19/2023     04/19/2023           Orders  Orders Placed This Encounter   Procedures   • PSA Total, Diagnostic     Standing Status:   Future     Standing Expiration Date:   6/30/2024       VISHNU Murillo

## 2023-07-04 DIAGNOSIS — E78.00 HYPERCHOLESTEROLEMIA: ICD-10-CM

## 2023-07-05 RX ORDER — ATORVASTATIN CALCIUM 80 MG/1
TABLET, FILM COATED ORAL
Qty: 90 TABLET | Refills: 0 | Status: SHIPPED | OUTPATIENT
Start: 2023-07-05

## 2023-07-06 ENCOUNTER — OFFICE VISIT (OUTPATIENT)
Dept: PHYSICAL THERAPY | Facility: REHABILITATION | Age: 83
End: 2023-07-06
Payer: COMMERCIAL

## 2023-07-06 DIAGNOSIS — M25.561 ACUTE PAIN OF RIGHT KNEE: Primary | ICD-10-CM

## 2023-07-06 DIAGNOSIS — M17.11 ARTHRITIS OF RIGHT KNEE: ICD-10-CM

## 2023-07-06 DIAGNOSIS — M25.561 RIGHT KNEE PAIN, UNSPECIFIED CHRONICITY: ICD-10-CM

## 2023-07-06 PROCEDURE — 97140 MANUAL THERAPY 1/> REGIONS: CPT

## 2023-07-06 PROCEDURE — 97112 NEUROMUSCULAR REEDUCATION: CPT

## 2023-07-06 PROCEDURE — 97110 THERAPEUTIC EXERCISES: CPT

## 2023-07-06 NOTE — PROGRESS NOTES
Daily Note     Today's date: 2023  Patient name: Britany Murillo  : 1940  MRN: [id number]  Referring provider: Vidya Cline  Dx:   Encounter Diagnosis     ICD-10-CM    1. Acute pain of right knee  M25.561       2. Right knee pain, unspecified chronicity  M25.561       3. Arthritis of right knee  M17.11                      Subjective: Pt reports he is feeling good today, no complaints since IE and has been compliant with HEP. Objective: See treatment diary below      Assessment: Pt with good tolerance to initiation of POC as listed below. Manual and verbal cues provided with good pt carryover. Pt demonstrates appropriate challenge and fatigue without adverse response. Notes moderate fatigue following treatment. Will continue to progress as able. Pt would benefit from continued skilled PT to improve current deficits and maximize function. Plan: Continue per plan of care.       Precautions: JORGE, HTN, AAA, ischemic cardiomyopathy, NSVT, CAD, angina pectoris, PAD, stage 3 CKD, hx malignant melanoma of neck, Hx MI, venous ulcers b/l legs                 FOTO X            IE/RE IE              Manuals             R knee patellar mobility   SE           R knee PROM  SE                                     Neuro Re-Ed             Quad set   5"  1x10           SLR  demo 1x10           Marina walk over                                                                  Ther Ex             Bike ROM-Full   x2'           Mini squats  demo 1x10           Side stepping   6 ft x 3 laps           Step up              Marching   5"  1x10           SA knee flex             SA knee ext   LAQ  5" 1x10           Leg press                                        Modalities

## 2023-07-11 ENCOUNTER — OFFICE VISIT (OUTPATIENT)
Dept: PHYSICAL THERAPY | Facility: REHABILITATION | Age: 83
End: 2023-07-11
Payer: COMMERCIAL

## 2023-07-11 DIAGNOSIS — M25.561 RIGHT KNEE PAIN, UNSPECIFIED CHRONICITY: ICD-10-CM

## 2023-07-11 DIAGNOSIS — M17.11 ARTHRITIS OF RIGHT KNEE: ICD-10-CM

## 2023-07-11 DIAGNOSIS — M25.561 ACUTE PAIN OF RIGHT KNEE: Primary | ICD-10-CM

## 2023-07-11 PROCEDURE — 97112 NEUROMUSCULAR REEDUCATION: CPT

## 2023-07-11 PROCEDURE — 97110 THERAPEUTIC EXERCISES: CPT

## 2023-07-11 NOTE — PROGRESS NOTES
Daily Note     Today's date: 2023  Patient name: Hanna Graves  : 1940  MRN: [id number]  Referring provider: Daniella Rinaldi  Dx:   Encounter Diagnosis     ICD-10-CM    1. Acute pain of right knee  M25.561       2. Right knee pain, unspecified chronicity  M25.561       3. Arthritis of right knee  M17.11                      Subjective: Pt reports that his knee is feeling about the same. Objective: See treatment diary below      Assessment: Pt tolerated treatment fair. After bike, pt laid supine and reported feeling dizzy and uncomfortable so he self- returned to seated positioning, pts O2 and HR was checked, measured at 98 and 99 respectively. Pt continued to sit for a few additional minutes and reported that he felt better. HR and O2 were measured again, both measured at 99. Pt denied needing water. Pt stated he was comfortable to continue with exercises and requested to start. Today's exercises were continued in seated positioning for patient comfort. BP was assessed prior to completing standing exercises on this date as well. BP as measured below. Patient demonstrated fatigue post treatment, exhibited good technique with therapeutic exercises and would benefit from continued PT. Plan: Continue per plan of care. Progress treatment as tolerated.        Precautions: JORGE, HTN, AAA, ischemic cardiomyopathy, NSVT, CAD, angina pectoris, PAD, stage 3 CKD, hx malignant melanoma of neck, Hx MI, venous ulcers b/l legs                FOTO X            IE/RE IE              Manuals             R knee patellar mobility   SE           R knee PROM  SE              BP seated L arm 132/78 BP pre     BP post  BP  BP  BP BP BP BP                Neuro Re-Ed             Quad set   5"  1x10 Held           SLR  demo 1x10 Held           Marina walk over                 seated heel slide x10 b/l                                                  Ther Ex             Bike ROM-Full   x2' x5 min Mini squats  demo 1x10 x10           Side stepping   6 ft x 3 laps           Step up              Marching   5"  1x10 seated unilaterl march x10 ea           SA knee flex             SA knee ext   LAQ  5" 1x10 LAQ 5" 1x10 b/l           Leg press    HR x10                                     Modalities

## 2023-07-14 ENCOUNTER — OFFICE VISIT (OUTPATIENT)
Dept: PHYSICAL THERAPY | Facility: REHABILITATION | Age: 83
End: 2023-07-14
Payer: COMMERCIAL

## 2023-07-14 DIAGNOSIS — M25.561 ACUTE PAIN OF RIGHT KNEE: Primary | ICD-10-CM

## 2023-07-14 DIAGNOSIS — M25.561 RIGHT KNEE PAIN, UNSPECIFIED CHRONICITY: ICD-10-CM

## 2023-07-14 DIAGNOSIS — M17.11 ARTHRITIS OF RIGHT KNEE: ICD-10-CM

## 2023-07-14 PROCEDURE — 97110 THERAPEUTIC EXERCISES: CPT

## 2023-07-14 PROCEDURE — 97112 NEUROMUSCULAR REEDUCATION: CPT

## 2023-07-14 NOTE — PROGRESS NOTES
Daily Note     Today's date: 2023  Patient name: Bethany Monsalve  : 1940  MRN: [id number]  Referring provider: Hilda Pollack  Dx:   Encounter Diagnosis     ICD-10-CM    1. Acute pain of right knee  M25.561       2. Right knee pain, unspecified chronicity  M25.561       3. Arthritis of right knee  M17.11                      Subjective: Pt reports that he is feeling okay today but the heat is really bothering him. Pt reports that he was tired after his last appointment, he also reports that he followed up with his cardiologist and they are trying to change a water pill for him and he has to follow up with the doctor about his AAA at the end of the month. Objective: See treatment diary below      Assessment: Pt tolerated treatment well. Vitals were checked and are as listed below. Pt was able to complete all exercises to his tolerance without exacerbation of any symptoms. Patient demonstrated fatigue post treatment and would benefit from continued PT. Plan: Continue per plan of care. Progress treatment as tolerated.        Precautions: JORGE, HTN, AAA, ischemic cardiomyopathy, NSVT, CAD, angina pectoris, PAD, stage 3 CKD, hx malignant melanoma of neck, Hx MI, venous ulcers b/l legs               FOTO X            IE/RE IE              Manuals             R knee patellar mobility   SE           R knee PROM  SE              BP seated L arm 132/78 BP pre 128/70seated L arm    HR 98, O2 99    BP post 132/72seated L arm    , O2 99 BP  BP  BP BP BP BP                Neuro Re-Ed             Quad set   5"  1x10 Held           SLR  demo 1x10 Held           Marina walk over                 seated heel slide x10 b/l  seated heel slide x10 b/l                                                Ther Ex             Bike ROM-Full   x2' x5 min          Mini squats  demo 1x10 x10  x10         Side stepping   6 ft x 3 laps           Step up              Marching   5"  1x10 seated unilaterl march x10 ea  seated unilaterl march x10 ea          SA knee flex             SA knee ext   LAQ  5" 1x10 LAQ 5" 1x10 b/l  LAQ 5" x10 ea b/l          Leg press    HR x10  HR/TR x20             Stand HS curl x10 ea                      Modalities

## 2023-07-18 ENCOUNTER — OFFICE VISIT (OUTPATIENT)
Dept: PHYSICAL THERAPY | Facility: REHABILITATION | Age: 83
End: 2023-07-18
Payer: COMMERCIAL

## 2023-07-18 DIAGNOSIS — M17.11 ARTHRITIS OF RIGHT KNEE: ICD-10-CM

## 2023-07-18 DIAGNOSIS — M25.561 ACUTE PAIN OF RIGHT KNEE: Primary | ICD-10-CM

## 2023-07-18 DIAGNOSIS — M25.561 RIGHT KNEE PAIN, UNSPECIFIED CHRONICITY: ICD-10-CM

## 2023-07-18 PROCEDURE — 97112 NEUROMUSCULAR REEDUCATION: CPT

## 2023-07-18 PROCEDURE — 97110 THERAPEUTIC EXERCISES: CPT

## 2023-07-18 NOTE — PROGRESS NOTES
Daily Note     Today's date: 2023  Patient name: Lauren Keith  : 1940  MRN: [id number]  Referring provider: Anna Vale  Dx:   Encounter Diagnosis     ICD-10-CM    1. Acute pain of right knee  M25.561       2. Right knee pain, unspecified chronicity  M25.561       3. Arthritis of right knee  M17.11                      Subjective:  Patient reports that his R knee is feeling ok today. He notes that he can walk without the cane but he continues to carry it with him outside of his home jut in case. Objective: See treatment diary below      Assessment: Patient tolerated treatment well. Patient performed ex as noted with direct supervision. Patient demonstrated a good understanding of the exercises with cues. No shortness of breath noted. Patient required 1 short rest break during ex. Patient denied any R knee pain during the session. Patient would benefit from continued PT intervention to address deficits and attain set goals. Plan: Continue per plan of care.       Precautions: JORGE, HTN, AAA, ischemic cardiomyopathy, NSVT, CAD, angina pectoris, PAD, stage 3 CKD, hx malignant melanoma of neck, Hx MI, venous ulcers b/l legs              FOTO X            IE/RE IE              Manuals             R knee patellar mobility   SE           R knee PROM  SE              BP seated L arm 132/78 BP pre 128/70seated L arm    HR 98, O2 99    BP post 132/72seated L arm    , O2 99 BP   Pre  seated L arm  120/71      O2 99      Post   111 HR  98 O2   BP  BP BP BP BP                Neuro Re-Ed             Quad set   5"  1x10 Held           SLR  demo 1x10 Held           Marina walk over                 seated heel slide x10 b/l  seated heel slide x10 b/l Seated heel slide  x12   b/l                                               Ther Ex             Bike ROM-Full   x2' x5 min  x5'        Mini squats  demo 1x10 x10  x10 x12        Side stepping   6 ft x 3 laps Step up              Marching   5"  1x10 seated unilaterl march x10 ea  seated unilaterl march x10 ea  Seated unilateral march  x12 ea          SA knee flex             SA knee ext   LAQ  5" 1x10 LAQ 5" 1x10 b/l  LAQ 5" x10 ea b/l  LAQ  5" x15 ea b/l        Leg press    HR x10  HR/TR x20 HR/TR  2x10            Stand HS curl x10 ea Stand HS curl  x15   ea                     Modalities

## 2023-07-21 ENCOUNTER — OFFICE VISIT (OUTPATIENT)
Dept: PHYSICAL THERAPY | Facility: REHABILITATION | Age: 83
End: 2023-07-21
Payer: COMMERCIAL

## 2023-07-21 DIAGNOSIS — M25.561 ACUTE PAIN OF RIGHT KNEE: Primary | ICD-10-CM

## 2023-07-21 DIAGNOSIS — M25.561 RIGHT KNEE PAIN, UNSPECIFIED CHRONICITY: ICD-10-CM

## 2023-07-21 DIAGNOSIS — M17.11 ARTHRITIS OF RIGHT KNEE: ICD-10-CM

## 2023-07-21 PROCEDURE — 97110 THERAPEUTIC EXERCISES: CPT

## 2023-07-21 PROCEDURE — 97112 NEUROMUSCULAR REEDUCATION: CPT
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
## 2023-07-25 ENCOUNTER — OFFICE VISIT (OUTPATIENT)
Dept: PHYSICAL THERAPY | Facility: REHABILITATION | Age: 83
End: 2023-07-25
Payer: COMMERCIAL

## 2023-07-25 DIAGNOSIS — M17.11 ARTHRITIS OF RIGHT KNEE: ICD-10-CM

## 2023-07-25 DIAGNOSIS — M25.561 RIGHT KNEE PAIN, UNSPECIFIED CHRONICITY: ICD-10-CM

## 2023-07-25 DIAGNOSIS — M25.561 ACUTE PAIN OF RIGHT KNEE: Primary | ICD-10-CM

## 2023-07-25 PROCEDURE — 97110 THERAPEUTIC EXERCISES: CPT

## 2023-07-25 PROCEDURE — 97112 NEUROMUSCULAR REEDUCATION: CPT

## 2023-07-25 PROCEDURE — 97530 THERAPEUTIC ACTIVITIES: CPT

## 2023-07-25 NOTE — PROGRESS NOTES
Daily Note     Today's date: 2023  Patient name: Holger Voss  : 1940  MRN: [id number]  Referring provider: Agatha Kim  Dx:   Encounter Diagnosis     ICD-10-CM    1. Acute pain of right knee  M25.561       2. Right knee pain, unspecified chronicity  M25.561       3. Arthritis of right knee  M17.11                      Subjective: Pt reports that he is feeling good but it's getting too warm outside. Pt reports that he feels like his knee is coming along. Objective: See treatment diary below      Assessment: Pt tolerated treatment well. Pt presented with stable vitals on this date for exercise and was able to complete all to his tolerance. Pt is ambulating with more normalized gait pattern and is demonstrating improvements with knee ROM. Patient demonstrated fatigue post treatment, exhibited good technique with therapeutic exercises and would benefit from continued PT. Plan: Continue per plan of care. Progress treatment as tolerated.        Precautions: JOREG, HTN, AAA, ischemic cardiomyopathy, NSVT, CAD, angina pectoris, PAD, stage 3 CKD, hx malignant melanoma of neck, Hx MI, venous ulcers b/l legs            FOTO X            IE/RE IE              Manuals             R knee patellar mobility   SE           R knee PROM  SE              BP seated L arm 132/78 BP pre 128/70seated L arm    HR 98, O2 99    BP post 132/72seated L arm    , O2 99 BP   Pre  seated L arm  120/71      O2 99      Post   111 HR  98 O2   BP Pre seated L arm, manual 118/70     O2 99     Post seated L arm manual 122/72 BP  Pre seated L arm, manual 120/70     HR 95 O2 99    Post    L arm seated 122/70, HR 99, O2 99 BP BP BP                Neuro Re-Ed             Quad set   5"  1x10 Held           SLR  demo 1x10 Held           Marina walk over                 seated heel slide x10 b/l  seated heel slide x10 b/l Seated heel slide  x12   b/l Ther Ex             Bike ROM-Full   x2' x5 min  x5'        Mini squats  demo 1x10 x10  x10 x12 x12        Side stepping   6 ft x 3 laps    8 ft x3 laps  8ft x4 laps      Step up       Stand hip abd x10 ea Stand hip abd x15 ea      Marching   5"  1x10 seated unilaterl march x10 ea  seated unilaterl march x10 ea  Seated unilateral march  x12 ea   seated unilateral march x15 ea  seated unilateral march x15 ea       SA knee flex             SA knee ext   LAQ  5" 1x10 LAQ 5" 1x10 b/l  LAQ 5" x10 ea b/l  LAQ  5" x15 ea b/l LAQ 5" x15 b/l  LAQ 5" x20 b/l       Leg press    HR x10  HR/TR x20 HR/TR  2x10 Stand HR/TR x15 ea  HR/TR x15 ea          Stand HS curl x10 ea Stand HS curl  x15   ea Stand HS curl  x15   ea Stand HS curl x15 ea                   Modalities

## 2023-07-26 ENCOUNTER — OFFICE VISIT (OUTPATIENT)
Dept: INTERNAL MEDICINE CLINIC | Facility: CLINIC | Age: 83
End: 2023-07-26
Payer: COMMERCIAL

## 2023-07-26 VITALS
HEIGHT: 70 IN | OXYGEN SATURATION: 100 % | SYSTOLIC BLOOD PRESSURE: 124 MMHG | DIASTOLIC BLOOD PRESSURE: 78 MMHG | BODY MASS INDEX: 31.24 KG/M2 | WEIGHT: 218.2 LBS | HEART RATE: 61 BPM | TEMPERATURE: 96.7 F

## 2023-07-26 DIAGNOSIS — H69.82 DYSFUNCTION OF LEFT EUSTACHIAN TUBE: ICD-10-CM

## 2023-07-26 DIAGNOSIS — R53.83 FATIGUE, UNSPECIFIED TYPE: Primary | ICD-10-CM

## 2023-07-26 DIAGNOSIS — I50.22 CHRONIC SYSTOLIC CONGESTIVE HEART FAILURE (HCC): ICD-10-CM

## 2023-07-26 DIAGNOSIS — G47.33 OSA (OBSTRUCTIVE SLEEP APNEA): ICD-10-CM

## 2023-07-26 PROCEDURE — 99214 OFFICE O/P EST MOD 30 MIN: CPT | Performed by: INTERNAL MEDICINE

## 2023-07-26 RX ORDER — FLUTICASONE PROPIONATE 50 MCG
2 SPRAY, SUSPENSION (ML) NASAL DAILY
Qty: 15.8 ML | Refills: 3 | Status: SHIPPED | OUTPATIENT
Start: 2023-07-26

## 2023-07-26 NOTE — PROGRESS NOTES
INTERNAL MEDICINE OFFICE VISIT  [facility] Medicine- [place]    NAME: Fausto Sevilla  AGE: 81 y.o. SEX: male    DATE OF ENCOUNTER: 7/26/2023    Assessment and Plan/History of Present Illness     Here today for follow-up. Past medical history of hypertension, CAD status post CABG, ischemic cardiomyopathy, hypertension, hyperlipidemia, AAA status post EVAR October 2021, PAD, JORGE, chronic kidney disease    Patient has been having issues with significant fatigue over the last month or so. Wife and family has been concerned. Patient has been getting poor sleep, having discomfort with his CPAP mask. He frequently takes naps during the day, sometimes over an hour long. Sits in his recliner, not doing much during the day. Appetite is adequate. He has been working with physical therapy but feels exhausted when he comes back from PT. It does not appear he is having significant dyspnea on exertion. Denies chest pain, palpitations. Currently on Lasix requested by cardiology for worsening peripheral edema which seems to be improved today  -Cause of symptoms unclear. Possibly in part related to poor sleep hygiene/lack of sleep. Recommend patient touch base with sleep medicine to discuss his difficulties. We reviewed good sleep hygiene practices. We will explore other etiologies if this persists       1. Fatigue, unspecified type    2. Dysfunction of left eustachian tube  -     fluticasone (FLONASE) 50 mcg/act nasal spray; 2 sprays into each nostril daily    3. JORGE (obstructive sleep apnea)  Assessment & Plan:  Establish with sleep medicine. Maintained on CPAP. Advised to follow-up with sleep medicine as outlined elsewhere      4.  Chronic systolic congestive heart failure [facility])  Assessment & Plan:  -history of CAD status post CABG in 1996, status post PCI to [facility] in 2006, PCI to ramus intermedius October 2021  -hospital admission in October 2021 for shortness of breath - status post catheterization and KYE to intermediate ramus  -2D echo completed January 2023 EF 45%  -goal directed medical therapy with Toprol-XL, not on ACEi/ARB was discontinued due to hyperkalemia, Jardiance  -on aspirin, Plavix, high-intensity statin  -LDL at goal   -Noted to be volume overloaded at recent cardiology visit. Currently on Lasix. Peripheral edema seems to be improved today. He is to go for repeat blood work shortly as requested by cardiology                      No orders of the defined types were placed in this encounter. Chief Complaint     Chief Complaint   Patient presents with   • Follow-up   • Breathing Problem       Review of Systems     10 point ROS negative except per HPI    The following portions of the patient's history were reviewed and updated as appropriate: allergies, current medications, past family history, past medical history, past social history, past surgical history and problem list.    Active Problem List     Patient Active Problem List   Diagnosis   • Acute coronary syndrome/unstable angina, to consider NSTEMI with ischemic related new left bundle branch block   • Hypercholesterolemia   • Essential hypertension   • Impaired fasting glucose   • Malignant melanoma of neck ([facility])   • JORGE (obstructive sleep apnea)   • Bradycardia   • Depression   • Elevated PSA   • BPH with obstruction/lower urinary tract symptoms   • PLMD (periodic limb movement disorder)   • Obesity (BMI 30-39. 9)   • Bronchospasm   • Left forearm pain   • Ischemic cardiomyopathy   • AAA (abdominal aortic aneurysm) ([facility])   • NSVT (nonsustained ventricular tachycardia) ([facility])   • Status post endovascular aneurysm repair ([facility])   • Anxiety   • COVID-19 infection   • Chronic systolic congestive heart failure ([facility])   • PAD (peripheral artery disease) ([facility])   • Venous ulcer of right leg ([facility])   • Venous ulcer of left leg ([facility])   • Stage 3 chronic kidney disease, unspecified whether stage 3a or 3b CKD ([facility])   • Acute pain of right knee Objective     /78 (BP Location: Right arm, Patient Position: Sitting, Cuff Size: Standard)   Pulse 61   Temp (!) 96.7 °F (35.9 °C) (Tympanic)   Ht 5' 10" (1.778 m)   Wt 99 kg (218 lb 3.2 oz)   SpO2 100%   BMI 31.31 kg/m²     Physical Exam    Pertinent Laboratory/Diagnostic Studies:  XR knee 3 vw right non injury    Result Date: 6/7/2023  Impression: Moderate osteoarthritis Workstation performed: [id number]       Images and diagnostics reviewed     Current Medications     Current Outpatient Medications:   •  albuterol (PROVENTIL HFA,VENTOLIN HFA) 90 mcg/act inhaler, INHALE 1 PUFF BY MOUTH EVERY 6 HOURS AS NEEDED FOR WHEEZE, Disp: 18 g, Rfl: 1  •  aspirin 81 MG tablet, Take 1 tablet by mouth daily, Disp: , Rfl:   •  atorvastatin (LIPITOR) 80 mg tablet, TAKE 1 TABLET BY MOUTH EVERY DAY, Disp: 90 tablet, Rfl: 0  •  clopidogrel (PLAVIX) 75 mg tablet, Take 75 mg by mouth daily  , Disp: , Rfl:   •  Diclofenac Sodium (VOLTAREN) 1 %, Apply every 6 hrs to right knee, Disp: 150 g, Rfl: 1  •  fluticasone (FLONASE) 50 mcg/act nasal spray, 2 sprays into each nostril daily, Disp: 15.8 mL, Rfl: 3  •  furosemide (LASIX) 40 mg tablet, Take 40 mg by mouth daily, Disp: , Rfl:   •  isosorbide mononitrate (IMDUR) 30 mg 24 hr tablet, Take 30 mg by mouth daily, Disp: , Rfl:   •  Jardiance 10 MG TABS, Take 10 mg by mouth daily, Disp: , Rfl:   •  nitroglycerin (NITROSTAT) 0.4 mg SL tablet, PLACE 1 TABLET (0.4 MG TOTAL) UNDER THE TONGUE EVERY 5 (FIVE) MINUTES AS NEEDED FOR CHEST PAIN, Disp: 100 tablet, Rfl: 1  •  metoprolol succinate (TOPROL-XL) 100 mg 24 hr tablet, Take 1 tablet (100 mg total) by mouth daily Take metoprolol succinate 100 mg daily in the morning, Disp: 30 tablet, Rfl: 0    Current Facility-Administered Medications:   •  lidocaine (LMX) 4 % cream, , Topical, Once, Gloria Loredo DPM    Health Maintenance     Health Maintenance   Topic Date Due   • SLP PLAN OF CARE  Never done   • Colorectal Cancer Screening  04/12/2021   • COVID-19 Vaccine (3 - Moderna series) 05/04/2021   • PT PLAN OF CARE  07/27/2023   • Influenza Vaccine (1) 09/01/2023   • Medicare Annual Wellness Visit (AWV)  10/14/2023   • BMI: Followup Plan  04/26/2024   • Fall Risk  06/27/2024   • BMI: Adult  07/26/2024   • Pneumococcal Vaccine: 65+ Years  Completed   • HIB Vaccine  Aged Out   • IPV Vaccine  Aged Out   • Hepatitis A Vaccine  Aged Out   • Meningococcal ACWY Vaccine  Aged Out   • HPV Vaccine  Aged Out     Immunization History   Administered Date(s) Administered   • COVID-19 MODERNA VACC 0.5 ML IM 02/06/2021, 03/09/2021   • INFLUENZA 11/03/2015, 09/26/2016, 09/28/2016, 11/14/2017, 11/27/2018   • Influenza Quadrivalent Preservative Free 3 years and older IM 10/14/2014   • Influenza Quadrivalent, 6-35 Months IM 11/03/2015   • Influenza Split High Dose Preservative Free IM 09/26/2016, 11/14/2017   • Influenza, high dose seasonal 0.7 mL 11/27/2018, 10/22/2019, 10/22/2020   • Influenza, seasonal, injectable 09/27/2011, 10/01/2012, 12/02/2013   • Pneumococcal Conjugate 13-Valent 07/28/2015   • Pneumococcal Polysaccharide PPV23 09/20/2006, 01/03/2017   • Tdap 01/03/2017       Curly Sahni D.O.   [facility] Internal Medicine [place] [address], [facility] [id number]  [facility], [address]  Office: [phone]  Fax: [phone]

## 2023-07-26 NOTE — ASSESSMENT & PLAN NOTE
Establish with sleep medicine. Maintained on CPAP.   Advised to follow-up with sleep medicine as outlined elsewhere

## 2023-07-26 NOTE — ASSESSMENT & PLAN NOTE
-history of CAD status post CABG in 1996, status post PCI to [facility] in 2006, PCI to ramus intermedius October 2021  -hospital admission in October 2021 for shortness of breath - status post catheterization and KYE to intermediate ramus  -2D echo completed January 2023 EF 45%  -goal directed medical therapy with Toprol-XL, not on ACEi/ARB was discontinued due to hyperkalemia, Jardiance  -on aspirin, Plavix, high-intensity statin  -LDL at goal   -Noted to be volume overloaded at recent cardiology visit. Currently on Lasix. Peripheral edema seems to be improved today.   He is to go for repeat blood work shortly as requested by cardiology

## 2023-07-27 ENCOUNTER — TELEPHONE (OUTPATIENT)
Dept: SLEEP CENTER | Facility: CLINIC | Age: 83
End: 2023-07-27

## 2023-07-27 DIAGNOSIS — G47.33 OBSTRUCTIVE SLEEP APNEA (ADULT) (PEDIATRIC): Primary | ICD-10-CM

## 2023-07-27 NOTE — TELEPHONE ENCOUNTER
This patient's machine smells like it producing an excessive amount of heat and potentially will overheat. It was checked out by Rohan Eubanks In our [facility] location and she deemed it broken beyond repair. We want to try to get the INS to pay for a replacement so I have requested a new Rx from the drYuridia to do so.

## 2023-07-28 ENCOUNTER — EVALUATION (OUTPATIENT)
Dept: PHYSICAL THERAPY | Facility: REHABILITATION | Age: 83
End: 2023-07-28
Payer: COMMERCIAL

## 2023-07-28 DIAGNOSIS — M25.561 ACUTE PAIN OF RIGHT KNEE: Primary | ICD-10-CM

## 2023-07-28 DIAGNOSIS — M17.11 ARTHRITIS OF RIGHT KNEE: ICD-10-CM

## 2023-07-28 DIAGNOSIS — M25.561 RIGHT KNEE PAIN, UNSPECIFIED CHRONICITY: ICD-10-CM

## 2023-07-28 PROCEDURE — 97112 NEUROMUSCULAR REEDUCATION: CPT

## 2023-07-28 PROCEDURE — 97110 THERAPEUTIC EXERCISES: CPT

## 2023-07-28 PROCEDURE — 97530 THERAPEUTIC ACTIVITIES: CPT

## 2023-07-28 NOTE — PROGRESS NOTES
PT Re-evaluation     Today's date: 2023  Patient name: Kedar Espino  : 1940  MRN: [id number]  Referring provider: Karen Stoll  Dx:   Encounter Diagnosis     ICD-10-CM    1. Acute pain of right knee  M25.561       2. Right knee pain, unspecified chronicity  M25.561       3. Arthritis of right knee  M17.11                    Assessment  Assessment details: Problem List:  Upon RE on this date, pt presents with improve ROM and strength as well as overall improved tolerance to functional activities and ambulation. Pt has been compliant with his HEP and done well with therapy thus far. At this time, pt would benefit from continued OP PT to continue improving ROM, strength, and tolerance to functional activities as well as for increased ease and safety with ambulation to decrease risk of falls and other injury.       Impairments: abnormal gait, abnormal or restricted ROM, activity intolerance, impaired balance, impaired physical strength, lacks appropriate home exercise program, pain with function and poor body mechanics     Goals  STG:   Pt will report 25% decrease in pain in 2 weeks to demonstrate improved tolerance to functional activities MET   Pt will demonstrate 25% improvements in ROM in 4 weeks MET   Pt will be I with initial HEP to progress towards independence with exercise MET     LTG: CONTINUE ALL   Pt will be able to complete 5x STS without increased pain by d/c   Pt will improve R knee ROM to [facility] by d/c for increased ease and safety with ADLs and overall functional mobility   Pt will improve RLE strength to 4+/5 or greater by d/c for increased ease and safety with functional mobility and activities   Pt will be I with modified/updated HEP and demonstrate ability to complete with confidence and proper mechanics/form to safely be d/c from skilled OP PT and continue with exercises on their own      Plan  Patient would benefit from: PT eval and skilled physical therapy  Planned modality interventions: cryotherapy  Planned therapy interventions: balance/weight bearing training, balance, body mechanics training, functional ROM exercises, gait training, home exercise program, therapeutic exercise, therapeutic activities, stretching, strengthening, patient education, postural training, neuromuscular re-education, manual therapy, joint mobilization and IADL retraining  Frequency: 2x week  Duration in visits: 8  Duration in weeks: 4  Treatment plan discussed with: patient         Subjective Evaluation     History of Present Illness  Mechanism of injury: Pt reports that he is having R knee pain. Pt reports that he was given cream for his knee. He reports that he was offered therapy or an injection and he wants to try therapy first. Pt reports that he has no trouble walking but getting up and down seems to bother him the most. He reports that he is still doing exercises given to him after he had an aneurysm last year and they seem to help his knee a little bit. Pt reports that getting his foot in/out of the car is a challenge for him. Pt reports no issues sleeping because of his knee. Pt reports that he is able to ambulate without the cane but he uses it for stability. Pt denies any ERIC for his new knee pain. 7/28/23  Pt reports that he feels like his knee is moving better than it was when he first started therapy. He reports that he gets tired after therapy but he thinks it's going well. Pt reports that he has been very tired lately because of how hot it is outside. Pt reports that if it would be nicer outside he would like to go for walks.      Pain  Quality: tight, dull ache and discomfort  Alleviating factors: deep blue, voltaren cream.  Aggravating factors: standing, stair climbing and walking     Social Support  Steps to enter house: yes  Stairs in house: yes   Lives in: multiple-level home  Lives with: spouse     Patient Goals  Patient goals for therapy: decreased pain, improved balance, increased motion, increased strength, independence with ADLs/IADLs and return to sport/leisure activities          Objective      Observations   Left Knee   Positive for trophic changes.      Right Knee   Positive for trophic changes.      Additional Observation Details  (+) distal color change in RLE with wound on anterior shin with scab coverage      Active Range of Motion   Left Knee   Flexion: 110 degrees   Extension: 0 degrees      Right Knee   Flexion: 75 degrees   Extension: -12 degrees     7/28/23  Left Knee   Flexion: 110 degrees   Extension: 0 degrees      Right Knee   Flexion: 100 degrees   Extension: -5 degrees      Mobility   Patellar Mobility:   Left Knee   WFL: medial, lateral, superior and inferior.      Right Knee   Hypomobile: medial, lateral, superior and inferior      Strength/Myotome Testing      Left Hip   Planes of Motion   Flexion: 4     Right Hip   Planes of Motion   Flexion: 4-     Left Knee   Flexion: 4+  Extension: 4+  Quadriceps contraction: good     Right Knee   Flexion: 4-  Extension: 4-  Quadriceps contraction: fair     Left Ankle/Foot   Dorsiflexion: 5     Right Ankle/Foot   Dorsiflexion: 5    7/28/23  Left Hip   Planes of Motion   Flexion: 4     Right Hip   Planes of Motion   Flexion: 4     Left Knee   Flexion: 4+  Extension: 4+  Quadriceps contraction: good     Right Knee   Flexion: 4  Extension: 4  Quadriceps contraction: fair     Left Ankle/Foot   Dorsiflexion: 5     Right Ankle/Foot   Dorsiflexion: 5         Precautions: JORGE, HTN, AAA, ischemic cardiomyopathy, NSVT, CAD, angina pectoris, PAD, stage 3 CKD, hx malignant melanoma of neck, Hx MI, venous ulcers b/l legs      6/27 7/6 7/11 7/14 7/18 7/21 7/25 7/28     FOTO X            IE/RE IE       RE       Manuals             R knee patellar mobility   SE           R knee PROM  SE              BP seated L arm 132/78 BP pre 128/70seated L arm    HR 98, O2 99    BP post 132/72seated L arm    , O2 99 BP   Pre  seated L arm  120/71      O2 99      Post   111 HR  98 O2   BP Pre seated L arm, manual 118/70     O2 99     Post seated L arm manual 122/72 BP  Pre seated L arm, manual 120/70     HR 95 O2 99    Post    L arm seated 122/70, HR 99, O2 99 BP  Pre seated L arm manual 124/74     HR 99, O2 98     Post L arm seated     HR 94, O2 99 BP BP                Neuro Re-Ed             Quad set   5"  1x10 Held           SLR  demo 1x10 Held           Marina walk over                 seated heel slide x10 b/l  seated heel slide x10 b/l Seated heel slide  x12   b/l                                               Ther Ex             Bike ROM-Full   x2' x5 min  x5'        Mini squats  demo 1x10 x10  x10 x12 x12   Stand reciprocal march x10      Side stepping   6 ft x 3 laps    8 ft x3 laps  8ft x4 laps      Step up       Stand hip abd x10 ea Stand hip abd x15 ea Stand hip abd x15 ea     Marching   5"  1x10 seated unilaterl march x10 ea  seated unilaterl march x10 ea  Seated unilateral march  x12 ea   seated unilateral march x15 ea  seated unilateral march x15 ea  seated unilateral march x15 ea      SA knee flex             SA knee ext   LAQ  5" 1x10 LAQ 5" 1x10 b/l  LAQ 5" x10 ea b/l  LAQ  5" x15 ea b/l LAQ 5" x15 b/l  LAQ 5" x20 b/l  LAQ 5" x20 b/l      Leg press    HR x10  HR/TR x20 HR/TR  2x10 Stand HR/TR x15 ea  HR/TR x15 ea HR/TR x20         Stand HS curl x10 ea Stand HS curl  x15   ea Stand HS curl  x15   ea Stand HS curl x15 ea Stand HS curl x20 ea                  Modalities

## 2023-07-31 DIAGNOSIS — M25.561 ACUTE PAIN OF RIGHT KNEE: ICD-10-CM

## 2023-08-01 ENCOUNTER — OFFICE VISIT (OUTPATIENT)
Dept: PHYSICAL THERAPY | Facility: REHABILITATION | Age: 83
End: 2023-08-01
Payer: COMMERCIAL

## 2023-08-01 DIAGNOSIS — M25.561 RIGHT KNEE PAIN, UNSPECIFIED CHRONICITY: ICD-10-CM

## 2023-08-01 DIAGNOSIS — M17.11 ARTHRITIS OF RIGHT KNEE: ICD-10-CM

## 2023-08-01 DIAGNOSIS — M25.561 ACUTE PAIN OF RIGHT KNEE: Primary | ICD-10-CM

## 2023-08-01 PROCEDURE — 97112 NEUROMUSCULAR REEDUCATION: CPT

## 2023-08-01 PROCEDURE — 97530 THERAPEUTIC ACTIVITIES: CPT

## 2023-08-01 PROCEDURE — 97110 THERAPEUTIC EXERCISES: CPT

## 2023-08-01 NOTE — PROGRESS NOTES
Daily Note     Today's date: 2023  Patient name: Monisha Espinoza  : 1940  MRN: [id number]  Referring provider: Vandana Gu  Dx:   Encounter Diagnosis     ICD-10-CM    1. Acute pain of right knee  M25.561       2. Right knee pain, unspecified chronicity  M25.561       3. Arthritis of right knee  M17.11                      Subjective: Pt reports his knee is feeling alright this morning. He has a f/u with ortho on Thursday. Objective: See treatment diary below      Assessment: Pt with good tolerance to treatment and progressions as listed below on this date. No increased pain or adverse symptoms reported. Will continue to progress as tolerated in upcoming visits. Pt would benefit from continued skilled PT to improve current deficits and maximize function. Plan: Continue per plan of care.       Precautions: JORGE, HTN, AAA, ischemic cardiomyopathy, NSVT, CAD, angina pectoris, PAD, stage 3 CKD, hx malignant melanoma of neck, Hx MI, venous ulcers b/l legs          FOTO X            IE/RE IE       RE       Manuals             R knee patellar mobility   SE           R knee PROM  SE              BP seated L arm 132/78 BP pre 128/70seated L arm    HR 98, O2 99    BP post 132/72seated L arm    , O2 99 BP   Pre  seated L arm  120/71      O2 99      Post   111 HR  98 O2   BP Pre seated L arm, manual 118/70     O2 99     Post seated L arm manual 122/72 BP  Pre seated L arm, manual 120/70     HR 95 O2 99    Post    L arm seated 122/70, HR 99, O2 99 BP  Pre seated L arm manual 124/74     HR 99, O2 98     Post L arm seated     HR 94, O2 99 BP pre seated L arm manual 124/78    HR  90, O2 99    Post L arm seated 122/80    HR 95  O2 99 BP                Neuro Re-Ed             Quad set   5"  1x10 Held           SLR  demo 1x10 Held           Marina walk over                 seated heel slide x10 b/l  seated heel slide x10 b/l Seated heel slide  x12 b/l                                               Ther Ex             Bike ROM-Full   x2' x5 min  x5'        Mini squats  demo 1x10 x10  x10 x12 x12   Stand reciprocal march x10  Stand reciprocal march x20    Side stepping   6 ft x 3 laps    8 ft x3 laps  8ft x4 laps      Step up       Stand hip abd x10 ea Stand hip abd x15 ea Stand hip abd x15 ea Stand hip abd  2x10 ea    Marching   5"  1x10 seated unilaterl march x10 ea  seated unilaterl march x10 ea  Seated unilateral march  x12 ea   seated unilateral march x15 ea  seated unilateral march x15 ea  seated unilateral march x15 ea  seated unilateral march  x15 ea    SA knee flex             SA knee ext   LAQ  5" 1x10 LAQ 5" 1x10 b/l  LAQ 5" x10 ea b/l  LAQ  5" x15 ea b/l LAQ 5" x15 b/l  LAQ 5" x20 b/l  LAQ 5" x20 b/l  LAQ  5" x20 b/l    Leg press    HR x10  HR/TR x20 HR/TR  2x10 Stand HR/TR x15 ea  HR/TR x15 ea HR/TR x20 HR/TR x20        Stand HS curl x10 ea Stand HS curl  x15   ea Stand HS curl  x15   ea Stand HS curl x15 ea Stand HS curl x20 ea Stand HS curl x20 ea                 Modalities

## 2023-08-03 ENCOUNTER — OFFICE VISIT (OUTPATIENT)
Dept: URGENT CARE | Age: 83
End: 2023-08-03
Payer: COMMERCIAL

## 2023-08-03 ENCOUNTER — OFFICE VISIT (OUTPATIENT)
Dept: OBGYN CLINIC | Facility: CLINIC | Age: 83
End: 2023-08-03
Payer: COMMERCIAL

## 2023-08-03 VITALS
RESPIRATION RATE: 18 BRPM | DIASTOLIC BLOOD PRESSURE: 73 MMHG | OXYGEN SATURATION: 96 % | SYSTOLIC BLOOD PRESSURE: 106 MMHG | TEMPERATURE: 97.3 F | HEART RATE: 64 BPM

## 2023-08-03 VITALS
WEIGHT: 220 LBS | DIASTOLIC BLOOD PRESSURE: 87 MMHG | BODY MASS INDEX: 31.5 KG/M2 | SYSTOLIC BLOOD PRESSURE: 116 MMHG | HEIGHT: 70 IN | HEART RATE: 80 BPM

## 2023-08-03 DIAGNOSIS — M17.11 PRIMARY OSTEOARTHRITIS OF RIGHT KNEE: Primary | ICD-10-CM

## 2023-08-03 DIAGNOSIS — S80.821A BLISTER OF RIGHT LOWER EXTREMITY, INITIAL ENCOUNTER: Primary | ICD-10-CM

## 2023-08-03 PROCEDURE — 99213 OFFICE O/P EST LOW 20 MIN: CPT | Performed by: ORTHOPAEDIC SURGERY

## 2023-08-03 PROCEDURE — S9083 URGENT CARE CENTER GLOBAL: HCPCS

## 2023-08-03 PROCEDURE — 99213 OFFICE O/P EST LOW 20 MIN: CPT

## 2023-08-03 NOTE — PATIENT INSTRUCTIONS
Discussed wound care and follow up with wound clinic - established patient. Wound supplies provided. Do not pop blister. Discussed elevating the leg. Follow up with PCP in 3-5 days. Proceed to ER if symptoms worsen.  Discussed red flag symptoms such as purulent drainage, increased pain or fevers, redness & warmth, etc.

## 2023-08-03 NOTE — PROGRESS NOTES
Assessment/Plan:  1. Primary osteoarthritis of right knee      Orders Placed This Encounter   Procedures   • Ambulatory Referral to Physical Therapy       · Patient has right knee severe patellofemoral arthritis, mild to moderate elsewhere. · Patient is doing well. · Continue PT. Updated script provided. · Continue tylenol as needed for pain up to 3000 mg per day. · Patient will follow up with Dr Courtney Lucio for right lower leg swelling/ blisters. Previously treated by Dr Courtney Lucio Dec 2022 for left lower leg wounds. Return if symptoms worsen or fail to improve. I answered all of the patient's questions during the visit and provided education of the patient's condition during the visit. The patient verbalized understanding of the information given and agrees with the plan. This note was dictated using [id number] software. It may contain errors including improperly dictated words. Please contact physician directly for any questions. Subjective   Chief Complaint:   Chief Complaint   Patient presents with   • Right Knee - Follow-up       [facility] [place]  Andrzej Harding is a 81 y.o. male who presents for follow up for right knee pain. Patient is present with his daughter. Patient reports the right knee is doing well. He notes he has started PT and this has helped with knee pain and motion. Patient takes tylenol as needed for pain. Patient is not wearing a knee brace. He is using a cane for assistance. He is wearing a stockinette on the lower leg where he developed some blistering. Patient notes chronic lower leg swelling but yesterday he developed some scabbing and a large blister. He reports he was previously evaluated by Dr. Courtney Lucio and the wound care center at [facility]. Review of Systems  ROS:    See [facility] [place] for musculoskeletal review.    All other systems reviewed are negative     History:  Past Medical History:   Diagnosis Date   • AAA (abdominal aortic aneurysm) without rupture ([address])     has annual US to check   • Angina pectoris ([address])     chronic stable angina, but it is mild and tolerates walking dogs and walking up flight os stairs without symptoms. Using Nitro no more than 2 times per mt.    • Ankle pain, left    • Bilateral wrist pain    • BPH with obstruction/lower urinary tract symptoms    • Coronary artery disease    • CPAP (continuous positive airway pressure) dependence    • Elevated PSA    • History of colon polyps    • [place] (hard of hearing)     Slight   • Hyperlipidemia    • Ischemic cardiomyopathy     with apical aneurysm   • Melanoma ([address]) 12/2016    Right side of neck   • Myocardial infarction ([address]) 1996    x1     Past Surgical History:   Procedure Laterality Date   • ARTERIAL ANEURYSM REPAIR  10/2021   • CARDIAC SURGERY      CABG x6   • CATARACT EXTRACTION Bilateral    • COLONOSCOPY     • CORONARY ANGIOPLASTY WITH STENT PLACEMENT      2 stents   • CORONARY ANGIOPLASTY WITH STENT PLACEMENT      10/2021   • CORONARY ARTERY BYPASS GRAFT     • PROSTATE BIOPSY  2000,2001   • SKIN BIOPSY      Melanoma - surgical removal   • SKIN LESION EXCISION Right 2/6/2017    Procedure: WIDE EXCISION MELANOMA SCAR LATERAL NECK;  Surgeon: Jessica Durán MD;  Location: [facility];  Service:    • WISDOM TOOTH EXTRACTION       Social History   Social History     Substance and Sexual Activity   Alcohol Use Yes    Comment: 1-2 beers rarely     Social History     Substance and Sexual Activity   Drug Use No     Social History     Tobacco Use   Smoking Status Never   Smokeless Tobacco Never     Family History:   Family History   Problem Relation Age of Onset   • Hypertension Mother    • Breast cancer Mother        Current Outpatient Medications on File Prior to Visit   Medication Sig Dispense Refill   • albuterol (PROVENTIL HFA,VENTOLIN HFA) 90 mcg/act inhaler INHALE 1 PUFF BY MOUTH EVERY 6 HOURS AS NEEDED FOR WHEEZE 18 g 1   • aspirin 81 MG tablet Take 1 tablet by mouth daily     • atorvastatin (LIPITOR) 80 mg tablet TAKE 1 TABLET BY MOUTH EVERY DAY 90 tablet 0   • clopidogrel (PLAVIX) 75 mg tablet Take 75 mg by mouth daily       • Diclofenac Sodium (VOLTAREN) 1 % APPLY TOPICALLY TO AFFECTED AREA(RIGHT KNEE) EVERY 6 HOURS 100 g 1   • fluticasone (FLONASE) 50 mcg/act nasal spray 2 sprays into each nostril daily 15.8 mL 3   • furosemide (LASIX) 40 mg tablet Take 40 mg by mouth daily     • isosorbide mononitrate (IMDUR) 30 mg 24 hr tablet Take 30 mg by mouth daily     • Jardiance 10 MG TABS Take 10 mg by mouth daily     • metoprolol succinate (TOPROL-XL) 100 mg 24 hr tablet Take 1 tablet (100 mg total) by mouth daily Take metoprolol succinate 100 mg daily in the morning 30 tablet 0   • nitroglycerin (NITROSTAT) 0.4 mg SL tablet PLACE 1 TABLET (0.4 MG TOTAL) UNDER THE TONGUE EVERY 5 (FIVE) MINUTES AS NEEDED FOR CHEST PAIN 100 tablet 1     Current Facility-Administered Medications on File Prior to Visit   Medication Dose Route Frequency Provider Last Rate Last Admin   • lidocaine (LMX) 4 % cream   Topical Once Radha Riggs DPM         Allergies   Allergen Reactions   • Abciximab Other (See Comments)     rcd 9/27/01 & 5/16/03   • Escitalopram Rash        Objective     /87   Pulse 80   Ht 5' 10" (1.778 m)   Wt 99.8 kg (220 lb)   BMI 31.57 kg/m²      PE:  AAOx 3  WDWN  Hearing intact, no drainage from eyes  no audible wheezing  no abdominal distension  LE compartments soft, skin intact    Ortho Exam:  right Knee:   No erythema  + lower leg swelling and chronic discoloration  no effusion  no warmth  Large blister over the posteromedial ankle without drainage or erythema  Several small scabs over the anterior ankle without drainage or erythema   No TTP  AROM: 5- 105  Stable to varus/valgus stress        Scribe Attestation    I,:  Burton Mcmullen PA-C am acting as a scribe while in the presence of the attending physician.:       I,:  Rosie Sweet DO personally performed the services described in this documentation    as scribed in my presence.:

## 2023-08-03 NOTE — PROGRESS NOTES
[facility]        NAME: Maxine Wilson is a 81 y.o. male  : 1940    MRN: [id number]  DATE: August 3, 2023  TIME: 3:33 PM    Assessment and Plan   Blister of right lower extremity, initial encounter [S80.821A]  1. Blister of right lower extremity, initial encounter              Patient Instructions     Stable RLE blister. Discussed local wound care - supplied provided. Discussed f/u with wound clinic. Follow up with PCP in 3-5 days. Proceed to ER if symptoms worsen. Chief Complaint     Chief Complaint   Patient presents with   • Blister     Blister RLE, noticed a few days ago but has gotten larger. History of Present Illness     81 y.o. M  Significant PMH  Blister to RLE x 4-5 days  Follows with wound clinic for recurrent venous ulcers   Wife present - complains about worsening swelling of BL LE  Denies trauma or injury  Denies purulent drainage  No fevers  No warmth or erythema  No OTC treatment    Review of Systems   Review of Systems   Constitutional: Negative for chills, fatigue and fever. HENT: Negative for congestion, ear discharge, ear pain, facial swelling, rhinorrhea, sinus pressure, sinus pain, sore throat and trouble swallowing. Eyes: Negative for photophobia, pain and visual disturbance. Respiratory: Negative for cough, chest tightness, shortness of breath and wheezing. Cardiovascular: Negative for chest pain, palpitations and leg swelling. Gastrointestinal: Negative for abdominal pain, diarrhea, nausea and vomiting. Genitourinary: Negative for dysuria, flank pain and hematuria. Musculoskeletal: Negative for arthralgias, back pain, myalgias and neck pain. Skin: Positive for wound. Negative for pallor. Neurological: Negative for dizziness, syncope, weakness, numbness and headaches. Psychiatric/Behavioral: Negative for confusion and sleep disturbance. All other systems reviewed and are negative.         Current Medications       Current Outpatient Medications:   •  albuterol (PROVENTIL HFA,VENTOLIN HFA) 90 mcg/act inhaler, INHALE 1 PUFF BY MOUTH EVERY 6 HOURS AS NEEDED FOR WHEEZE, Disp: 18 g, Rfl: 1  •  aspirin 81 MG tablet, Take 1 tablet by mouth daily, Disp: , Rfl:   •  atorvastatin (LIPITOR) 80 mg tablet, TAKE 1 TABLET BY MOUTH EVERY DAY, Disp: 90 tablet, Rfl: 0  •  clopidogrel (PLAVIX) 75 mg tablet, Take 75 mg by mouth daily  , Disp: , Rfl:   •  Diclofenac Sodium (VOLTAREN) 1 %, APPLY TOPICALLY TO AFFECTED AREA(RIGHT KNEE) EVERY 6 HOURS, Disp: 100 g, Rfl: 1  •  fluticasone (FLONASE) 50 mcg/act nasal spray, 2 sprays into each nostril daily, Disp: 15.8 mL, Rfl: 3  •  furosemide (LASIX) 40 mg tablet, Take 40 mg by mouth daily, Disp: , Rfl:   •  isosorbide mononitrate (IMDUR) 30 mg 24 hr tablet, Take 30 mg by mouth daily, Disp: , Rfl:   •  Jardiance 10 MG TABS, Take 10 mg by mouth daily, Disp: , Rfl:   •  nitroglycerin (NITROSTAT) 0.4 mg SL tablet, PLACE 1 TABLET (0.4 MG TOTAL) UNDER THE TONGUE EVERY 5 (FIVE) MINUTES AS NEEDED FOR CHEST PAIN, Disp: 100 tablet, Rfl: 1  •  metoprolol succinate (TOPROL-XL) 100 mg 24 hr tablet, Take 1 tablet (100 mg total) by mouth daily Take metoprolol succinate 100 mg daily in the morning, Disp: 30 tablet, Rfl: 0    Current Facility-Administered Medications:   •  lidocaine (LMX) 4 % cream, , Topical, Once, Gar Andes, DPM    Current Allergies     Allergies as of 08/03/2023 - Reviewed 08/03/2023   Allergen Reaction Noted   • Abciximab Other (See Comments)    • Escitalopram Rash 07/17/2012            The following portions of the patient's history were reviewed and updated as appropriate: allergies, current medications, past family history, past medical history, past social history, past surgical history and problem list.     Past Medical History:   Diagnosis Date   • AAA (abdominal aortic aneurysm) without rupture ([address])     has annual US to check   • Angina pectoris ([facility])     chronic stable angina, but it is mild and tolerates walking dogs and walking up flight os stairs without symptoms. Using Nitro no more than 2 times per mt. • Ankle pain, left    • Bilateral wrist pain    • BPH with obstruction/lower urinary tract symptoms    • Coronary artery disease    • CPAP (continuous positive airway pressure) dependence    • Elevated PSA    • History of colon polyps    • [place] (hard of hearing)     Slight   • Hyperlipidemia    • Ischemic cardiomyopathy     with apical aneurysm   • Melanoma ([address]) 12/2016    Right side of neck   • Myocardial infarction ([address]) 1996    x1       Past Surgical History:   Procedure Laterality Date   • ARTERIAL ANEURYSM REPAIR  10/2021   • CARDIAC SURGERY      CABG x6   • CATARACT EXTRACTION Bilateral    • COLONOSCOPY     • CORONARY ANGIOPLASTY WITH STENT PLACEMENT      2 stents   • CORONARY ANGIOPLASTY WITH STENT PLACEMENT      10/2021   • CORONARY ARTERY BYPASS GRAFT     • PROSTATE BIOPSY  2000,2001   • SKIN BIOPSY      Melanoma - surgical removal   • SKIN LESION EXCISION Right 2/6/2017    Procedure: WIDE EXCISION MELANOMA SCAR LATERAL NECK;  Surgeon: Marguerite Irene MD;  Location: [facility];  Service:    • WISDOM TOOTH EXTRACTION         Family History   Problem Relation Age of Onset   • Hypertension Mother    • Breast cancer Mother          Medications have been verified. Objective   /73   Pulse 64   Temp (!) 97.3 °F (36.3 °C) (Tympanic)   Resp 18   SpO2 96%   No LMP for male patient. Physical Exam     Physical Exam  Vitals reviewed. Constitutional:       General: He is not in acute distress. Appearance: He is normal weight. He is not ill-appearing or toxic-appearing. HENT:      Head: Normocephalic. Right Ear: Tympanic membrane normal. No middle ear effusion. Tympanic membrane is not erythematous or bulging. Left Ear: Tympanic membrane normal.  No middle ear effusion. Tympanic membrane is not erythematous or bulging.       Nose: Nose normal.      Right Sinus: No maxillary sinus tenderness or frontal sinus tenderness. Left Sinus: No maxillary sinus tenderness or frontal sinus tenderness. Mouth/Throat:      Mouth: Mucous membranes are moist.      Pharynx: Uvula midline. No oropharyngeal exudate, posterior oropharyngeal erythema or uvula swelling. Tonsils: No tonsillar exudate or tonsillar abscesses. Eyes:      Extraocular Movements: Extraocular movements intact. Conjunctiva/sclera: Conjunctivae normal.      Pupils: Pupils are equal, round, and reactive to light. Cardiovascular:      Rate and Rhythm: Normal rate and regular rhythm. Pulses: Normal pulses. Heart sounds: Normal heart sounds. Pulmonary:      Effort: Pulmonary effort is normal. No tachypnea or respiratory distress. Breath sounds: Normal breath sounds and air entry. No decreased breath sounds, wheezing, rhonchi or rales. Abdominal:      General: Bowel sounds are normal.      Palpations: Abdomen is soft. Tenderness: There is no abdominal tenderness. Musculoskeletal:         General: Normal range of motion. Cervical back: Normal range of motion and neck supple. Right lower le+ Edema present. Left lower le+ Edema present. Lymphadenopathy:      Cervical: No cervical adenopathy. Skin:     General: Skin is warm and dry. Capillary Refill: Capillary refill takes less than 2 seconds. Findings: Wound present. Comments:   Stable blister to RLE with clear/yellow-tinged fluid  Approx 7 cm x 4 cm  No purulent drainage  No surrounding erythema or warmth  No pustules or vesicles  No scaling  PVD changes to RLE   Neurological:      General: No focal deficit present. Mental Status: He is alert. Cranial Nerves: No cranial nerve deficit. Sensory: Sensation is intact. Motor: Motor function is intact. Coordination: Coordination is intact. Deep Tendon Reflexes: Reflexes are normal and symmetric.

## 2023-08-04 ENCOUNTER — OFFICE VISIT (OUTPATIENT)
Dept: PHYSICAL THERAPY | Facility: REHABILITATION | Age: 83
End: 2023-08-04
Payer: COMMERCIAL

## 2023-08-04 DIAGNOSIS — M25.561 RIGHT KNEE PAIN, UNSPECIFIED CHRONICITY: ICD-10-CM

## 2023-08-04 DIAGNOSIS — M17.11 ARTHRITIS OF RIGHT KNEE: ICD-10-CM

## 2023-08-04 DIAGNOSIS — M25.561 ACUTE PAIN OF RIGHT KNEE: Primary | ICD-10-CM

## 2023-08-04 PROCEDURE — 97110 THERAPEUTIC EXERCISES: CPT

## 2023-08-04 PROCEDURE — 97112 NEUROMUSCULAR REEDUCATION: CPT

## 2023-08-04 PROCEDURE — 97530 THERAPEUTIC ACTIVITIES: CPT

## 2023-08-04 NOTE — PROGRESS NOTES
Daily Note     Today's date: 2023  Patient name: Michael Beatty  : 1940  MRN: [id number]  Referring provider: Valerie Puckett  Dx:   Encounter Diagnosis     ICD-10-CM    1. Acute pain of right knee  M25.561       2. Right knee pain, unspecified chronicity  M25.561       3. Arthritis of right knee  M17.11                      Subjective: Pt reports that he followed up with his doctor and she wants him to continue with therapy. He reports that overall his knee is doing well and is much better than when he started therapy. Pt reports that he also had an issue with a blister open on his RLE yesterday and comes into therapy today with his leg wrapped up and bandaged. He reports that he was woken up earlier than usual this morning so he is tired. He reports that he was also finally given a new CPAP machine so he is adjusting to sleeping better now. Objective: See treatment diary below      Assessment: Pt tolerated treatment fair. Pt appeared very fatigued throughout session today however was able to complete exercises with decreased reps to his tolerance. Vitals remained stable. He reported fatigue during and after all exercises. Since beginning PT pt has demonstrated compliance with his HEP as well as good understanding of needing to complete it to maintain progress. Due to progress made this far with exercises and pts compliance with HEP, pt will trial HEP at home and return in 2 weeks to check in on progress. Plan:Pt will trial HEP management for 1 week and then return to therapy to check in.       Precautions: JORGE, HTN, AAA, ischemic cardiomyopathy, NSVT, CAD, angina pectoris, PAD, stage 3 CKD, hx malignant melanoma of neck, Hx MI, venous ulcers b/l legs       7   FOTO X            IE/RE IE       RE       Manuals             R knee patellar mobility   SE           R knee PROM  SE              BP seated L arm 132/78 BP pre 128/70seated L arm    HR 98, O2 99    BP post 132/72seated L arm    , O2 99 BP   Pre  seated L arm  120/71      O2 99      Post   111 HR  98 O2   BP Pre seated L arm, manual 118/70     O2 99     Post seated L arm manual 122/72 BP  Pre seated L arm, manual 120/70     HR 95 O2 99    Post    L arm seated 122/70, HR 99, O2 99 BP  Pre seated L arm manual 124/74     HR 99, O2 98     Post L arm seated     HR 94, O2 99 BP pre seated L arm manual 124/78    HR  90, O2 99    Post L arm seated 122/80    HR 95  O2 99 BP   Pre seated L arm, [id number]    HR 97, O2 99    Post L arm seated 124/70    HR 90, O2 99                Neuro Re-Ed             Quad set   5"  1x10 Held           SLR  demo 1x10 Held           Marina walk over                 seated heel slide x10 b/l  seated heel slide x10 b/l Seated heel slide  x12   b/l                                               Ther Ex             Bike ROM-Full   x2' x5 min  x5'        Mini squats  demo 1x10 x10  x10 x12 x12   Stand reciprocal march x10  Stand reciprocal march x20    Side stepping   6 ft x 3 laps    8 ft x3 laps  8ft x4 laps      Step up       Stand hip abd x10 ea Stand hip abd x15 ea Stand hip abd x15 ea Stand hip abd  2x10 ea Stand hip abd x10 ea   Marching   5"  1x10 seated unilaterl march x10 ea  seated unilaterl march x10 ea  Seated unilateral march  x12 ea   seated unilateral march x15 ea  seated unilateral march x15 ea  seated unilateral march x15 ea  seated unilateral march  x15 ea seated unilateral march  x15 ea   SA knee flex             SA knee ext   LAQ  5" 1x10 LAQ 5" 1x10 b/l  LAQ 5" x10 ea b/l  LAQ  5" x15 ea b/l LAQ 5" x15 b/l  LAQ 5" x20 b/l  LAQ 5" x20 b/l  LAQ  5" x20 b/l LAQ 5" x20 b/l   Leg press    HR x10  HR/TR x20 HR/TR  2x10 Stand HR/TR x15 ea  HR/TR x15 ea HR/TR x20 HR/TR x20 HR/TR x20       Stand HS curl x10 ea Stand HS curl  x15   ea Stand HS curl  x15   ea Stand HS curl x15 ea Stand HS curl x20 ea Stand HS curl x20 ea Stand HS curl x10 ea                Modalities

## 2023-08-14 ENCOUNTER — TRANSITIONAL CARE MANAGEMENT (OUTPATIENT)
Dept: INTERNAL MEDICINE CLINIC | Facility: CLINIC | Age: 83
End: 2023-08-14

## 2023-08-15 ENCOUNTER — RA CDI HCC (OUTPATIENT)
Dept: OTHER | Facility: HOSPITAL | Age: 83
End: 2023-08-15

## 2023-08-15 NOTE — PROGRESS NOTES
[facility] [address] [facility] coding opportunities          Chart Reviewed number of suggestions sent to Provider: 1   I13.0    Patients Insurance     Medicare Insurance: [employer] Advantage

## 2023-08-17 ENCOUNTER — TELEPHONE (OUTPATIENT)
Dept: NEPHROLOGY | Facility: CLINIC | Age: 83
End: 2023-08-17

## 2023-08-17 ENCOUNTER — TELEPHONE (OUTPATIENT)
Dept: INTERNAL MEDICINE CLINIC | Facility: CLINIC | Age: 83
End: 2023-08-17

## 2023-08-17 NOTE — TELEPHONE ENCOUNTER
New Patient Intake Form   Patient Details   Elif Jacome     1940     [id number]     Insurance Information   Name of [facility] Rep   Does the patient need an insurance referral? No   If patient has [employer], please ask if they will be using their [employer]. Appointment Information   Who is calling to schedule? If not patient, what is callers name? Mendel Forward   Referring Provider Dr Freya Toure   Reason for Appt (Diagnosis) CKD unspec   Does Patient have labs/urine done at [facility]? If not, where do they go? List the date of last lab / urine  *Please try to get labs 2 years back if not at  No, [facility] 8/16/23  In chart Care Everywhere   Has patient been hospitalized recently? If yes, list name and location of hospital they were in Yes [facility] 8/16/23   Has patient been seen by a Nephrologist before? If yes, list name, location and phone number No   Has the patient had renal imaging done? If so, list the most recent date and type of imaging Yes Renal US 8/7/23   Does patient have a history of Kidney Stones? No   Appointment Details   Is there a referral on file?  Yes from [facility] ER discharge summary   Appointment Date 11/9/23   Location [facility] ([place])   Miscellaneous   Asked Ed Jansen to fax referral order

## 2023-08-17 NOTE — TELEPHONE ENCOUNTER
Hello, I'm calling from my  Faby Bose had a prescription medication prescribed to him yesterday in the emergency room and I have a question on the amount of it. It's 500 milligrams four times a day for seven days and I'm just want to make sure it's right. I was up to the pharmacy and they said it is and my  is Bevely Half is [birth year] birth date and I can be reached at [phone] to make sure that all these numbers, the amount of this is prescription is OK before I give it to my . Thank you. Bye.         Per Dr. Keren Lama he stated that this is correct what they prescribed for him and pt is aware

## 2023-08-18 ENCOUNTER — APPOINTMENT (OUTPATIENT)
Dept: PHYSICAL THERAPY | Facility: REHABILITATION | Age: 83
End: 2023-08-18
Payer: COMMERCIAL

## 2023-08-21 ENCOUNTER — OFFICE VISIT (OUTPATIENT)
Dept: INTERNAL MEDICINE CLINIC | Facility: CLINIC | Age: 83
End: 2023-08-21
Payer: COMMERCIAL

## 2023-08-21 VITALS
OXYGEN SATURATION: 99 % | HEIGHT: 70 IN | SYSTOLIC BLOOD PRESSURE: 110 MMHG | WEIGHT: 199 LBS | BODY MASS INDEX: 28.49 KG/M2 | HEART RATE: 72 BPM | DIASTOLIC BLOOD PRESSURE: 60 MMHG | TEMPERATURE: 97.2 F

## 2023-08-21 DIAGNOSIS — R21 RASH OF BACK: ICD-10-CM

## 2023-08-21 DIAGNOSIS — L03.115 CELLULITIS OF RIGHT LOWER EXTREMITY: ICD-10-CM

## 2023-08-21 DIAGNOSIS — H69.82 DYSFUNCTION OF LEFT EUSTACHIAN TUBE: ICD-10-CM

## 2023-08-21 DIAGNOSIS — I50.22 CHRONIC SYSTOLIC CONGESTIVE HEART FAILURE (HCC): Primary | ICD-10-CM

## 2023-08-21 DIAGNOSIS — N18.30 STAGE 3 CHRONIC KIDNEY DISEASE, UNSPECIFIED WHETHER STAGE 3A OR 3B CKD (HCC): ICD-10-CM

## 2023-08-21 PROCEDURE — 99495 TRANSJ CARE MGMT MOD F2F 14D: CPT | Performed by: INTERNAL MEDICINE

## 2023-08-21 RX ORDER — TRIAMCINOLONE ACETONIDE 1 MG/G
CREAM TOPICAL 2 TIMES DAILY
Qty: 45 G | Refills: 0 | Status: SHIPPED | OUTPATIENT
Start: 2023-08-21

## 2023-08-21 NOTE — PROGRESS NOTES
INTERNAL MEDICINE OFFICE VISIT  [facility] Internal Medicine- [place]    NAME: Roseann Vigil  AGE: 81 y.o. SEX: male    DATE OF ENCOUNTER: 8/21/2023    History of Present Illness/Assessment and Plan     Here today for TCM appointment. Past medical history of hypertension, CAD status post CABG, ischemic cardiomyopathy, hypertension, hyperlipidemia, AAA status post EVAR October 2021, PAD, JORGE, chronic kidney disease    Patient presented to the hospital with progressive shortness of breath and diaphoresis. On arrival noted to be tachypneic requiring BiPAP support. Patient improved from a respiratory standpoint with BiPAP and IV diuresis. He was noted to be in atrial fibrillation with rapid ventricular response. He underwent NIKKI guided cardioversion and converted to sinus rhythm but reverted back to rate controlled atrial fibrillation. 2D echo remarkable for decreased EF of 35 to 40%. Also seen by nephrology service for DANELLE on CKD. On discharge maintained on amiodarone, clopidogrel, apixaban, atorvastatin    Seen in the ED again on 8/16 for rash of the right lower leg. Patient had a blister in that area which burst.  He was started on course of Keflex. Today, he still has some residual erythema of the right lower extremity with superficial wound      Plan:  -Currently on clopidogrel, apixaban for history of CAD status post PCI/CABG and atrial fibrillation  -Continue goal-directed medical therapy  -Continue follow-up with cardiology and nephrology  -Repeat BMP in 1 week to follow elevated creatinine  -Discharged on torsemide 40 mg daily. Lower extremity edema appears to be improved today. Can readdress with cardiology at follow-up this Wednesday  -Right lower extremity wound was redressed today with Xeroform gauze, nonadherent pad, gauze. Provided family with wound care instructions. can follow until they see wound care next week.   Continue Keflex as prescribed  -Continue follow-up with sleep medicine/pulmonology regarding CPAP        1. Chronic systolic congestive heart failure (HCC)  -     torsemide 40 MG TABS; Take 40 mg by mouth daily    2. Rash of back  -     triamcinolone (KENALOG) 0.1 % cream; Apply topically 2 (two) times a day    3. Stage 3 chronic kidney disease, unspecified whether stage 3a or 3b CKD ([address])  -     Basic metabolic panel; Future; Expected date: 08/28/2023    4. Cellulitis of right lower extremity              Orders Placed This Encounter   Procedures   • Basic metabolic panel         Chief Complaint     Chief Complaint   Patient presents with   • Transition of Care Management     TCM:  D/C [facility] 8.12.23 for acute on chronic CHF  Wound right leg  Itchy rash all over back       TCM Call     Date and time call was made  8/14/2023 11:26 AM    Patient was hospitialized at  [facility]    Date of Admission  08/05/23    Date of discharge  08/12/23    Diagnosis  acute on chronic CHF    Disposition  Home    Were the patients medications reviewed and updated  Yes    Current Symptoms  None      TCM Call     Should patient be enrolled in anticoag monitoring? No    Scheduled for follow up? Yes    Did you obtain your prescribed medications  Yes    Do you need help managing your prescriptions or medications  No    Is transportation to your appointment needed  No    I have advised the patient to call PCP with any new or worsening symptoms  Toni Her MA    Comments  Wilma Kaiser (sabrina) cancelled 1.17.22 OV:  she is not comfortable bring Iris Corea to the office for the visit and does not use My Chart (for a virtual).              The following portions of the patient's history were reviewed and updated as appropriate: allergies, current medications, past family history, past medical history, past social history, past surgical history and problem list.    Review of Systems     10 point ROS negative except per HPI    Active Problem List     Patient Active Problem List   Diagnosis   • Acute coronary syndrome/unstable angina, to consider NSTEMI with ischemic related new left bundle branch block   • Hypercholesterolemia   • Essential hypertension   • Impaired fasting glucose   • Malignant melanoma of neck (HCC)   • JORGE (obstructive sleep apnea)   • Bradycardia   • Depression   • Elevated PSA   • BPH with obstruction/lower urinary tract symptoms   • PLMD (periodic limb movement disorder)   • Obesity (BMI 30-39. 9)   • Bronchospasm   • Left forearm pain   • Ischemic cardiomyopathy   • AAA (abdominal aortic aneurysm) ([facility])   • NSVT (nonsustained ventricular tachycardia) ([facility])   • Status post endovascular aneurysm repair (EVAR)   • Anxiety   • Chronic systolic congestive heart failure (HCC)   • PAD (peripheral artery disease) (HCC)   • Venous ulcer of right leg (HCC)   • Venous ulcer of left leg (HCC)   • Stage 3 chronic kidney disease, unspecified whether stage 3a or 3b CKD (HCC)   • Acute pain of right knee   • Obstructive sleep apnea (adult) (pediatric)       Objective     /60 (BP Location: Left arm, Patient Position: Sitting)   Pulse 72   Temp (!) 97.2 °F (36.2 °C)   Ht 5' 10" (1.778 m)   Wt 90.3 kg (199 lb)   SpO2 99%   BMI 28.55 kg/m²     Physical Exam    Pertinent Laboratory/Diagnostic Studies:  XR knee 3 vw right non injury    Result Date: 6/7/2023  Impression: Moderate osteoarthritis Workstation performed: [id number]       Images and diagnostics reviewed     Current Medications     Current Outpatient Medications:   •  albuterol (PROVENTIL HFA,VENTOLIN HFA) 90 mcg/act inhaler, INHALE 1 PUFF BY MOUTH EVERY 6 HOURS AS NEEDED FOR WHEEZE, Disp: 18 g, Rfl: 1  •  aspirin 81 MG tablet, Take 1 tablet by mouth daily, Disp: , Rfl:   •  atorvastatin (LIPITOR) 80 mg tablet, TAKE 1 TABLET BY MOUTH EVERY DAY, Disp: 90 tablet, Rfl: 0  •  clopidogrel (PLAVIX) 75 mg tablet, Take 75 mg by mouth daily  , Disp: , Rfl:   •  Diclofenac Sodium (VOLTAREN) 1 %, APPLY TOPICALLY TO AFFECTED AREA(RIGHT KNEE) EVERY 6 HOURS, Disp: 100 g, Rfl: 1  •  fluticasone (FLONASE) 50 mcg/act nasal spray, 2 sprays into each nostril daily, Disp: 15.8 mL, Rfl: 3  •  isosorbide mononitrate (IMDUR) 30 mg 24 hr tablet, Take 30 mg by mouth daily, Disp: , Rfl:   •  Jardiance 10 MG TABS, Take 10 mg by mouth daily, Disp: , Rfl:   •  metoprolol succinate (TOPROL-XL) 100 mg 24 hr tablet, Take 1 tablet (100 mg total) by mouth daily Take metoprolol succinate 100 mg daily in the morning, Disp: 30 tablet, Rfl: 0  •  nitroglycerin (NITROSTAT) 0.4 mg SL tablet, PLACE 1 TABLET (0.4 MG TOTAL) UNDER THE TONGUE EVERY 5 (FIVE) MINUTES AS NEEDED FOR CHEST PAIN, Disp: 100 tablet, Rfl: 1  •  torsemide 40 MG TABS, Take 40 mg by mouth daily, Disp: 90 tablet, Rfl: 0  •  triamcinolone (KENALOG) 0.1 % cream, Apply topically 2 (two) times a day, Disp: 45 g, Rfl: 0    Current Facility-Administered Medications:   •  lidocaine (LMX) 4 % cream, , Topical, Once, Josie Coello, DPM    Health Maintenance     Health Maintenance   Topic Date Due   • SLP PLAN OF CARE  Never done   • Colorectal Cancer Screening  04/12/2021   • COVID-19 Vaccine (3 - Moderna series) 05/04/2021   • Influenza Vaccine (1) 09/01/2023   • Medicare Annual Wellness Visit (AWV)  10/14/2023   • BMI: Followup Plan  04/26/2024   • Fall Risk  08/21/2024   • BMI: Adult  08/21/2024   • Pneumococcal Vaccine: 65+ Years  Completed   • HIB Vaccine  Aged Out   • IPV Vaccine  Aged Out   • Hepatitis A Vaccine  Aged Out   • Meningococcal ACWY Vaccine  Aged Out   • HPV Vaccine  Aged Out     Immunization History   Administered Date(s) Administered   • COVID-19 MODERNA VACC 0.5 ML IM 02/06/2021, 03/09/2021   • INFLUENZA 11/03/2015, 09/26/2016, 09/28/2016, 11/14/2017, 11/27/2018   • Influenza Quadrivalent Preservative Free 3 years and older IM 10/14/2014   • Influenza Quadrivalent, 6-35 Months IM 11/03/2015   • Influenza Split High Dose Preservative Free IM 09/26/2016, 11/14/2017   • Influenza, high dose seasonal 0.7 mL 11/27/2018, 10/22/2019, 10/22/2020   • Influenza, seasonal, injectable 09/27/2011, 10/01/2012, 12/02/2013   • Pneumococcal Conjugate 13-Valent 07/28/2015   • Pneumococcal Polysaccharide PPV23 09/20/2006, 01/03/2017   • Tdap 01/03/2017       Curly Schreiber D.O.   [facility] Internal Medicine [place] [address], [facility] [address]  [address]  Office: [phone]  Fax: [phone]

## 2023-08-21 NOTE — PATIENT INSTRUCTIONS
For wound care, please continue with oral antibiotic as prescribed  For any oozing or for cleansing of the wound you can gently irrigate with saline solution  To dress the wound, you can directly apply a nonadherent pad over the open wound with or without Xeroform or similar petrolatum dressing underneath. You can then wrap the area with gauze. The dressing can be changed every 2 days or so. If you notice the dressing is becoming saturated, it can be changed more frequently.

## 2023-08-22 RX ORDER — FLUTICASONE PROPIONATE 50 MCG
SPRAY, SUSPENSION (ML) NASAL
Qty: 48 ML | Refills: 2 | Status: SHIPPED | OUTPATIENT
Start: 2023-08-22

## 2023-08-28 ENCOUNTER — APPOINTMENT (OUTPATIENT)
Dept: LAB | Age: 83
End: 2023-08-28
Payer: COMMERCIAL

## 2023-08-28 DIAGNOSIS — I25.5 ISCHEMIC CARDIOMYOPATHY: ICD-10-CM

## 2023-08-28 DIAGNOSIS — N18.30 STAGE 3 CHRONIC KIDNEY DISEASE, UNSPECIFIED WHETHER STAGE 3A OR 3B CKD (HCC): ICD-10-CM

## 2023-08-28 LAB
ANION GAP SERPL CALCULATED.3IONS-SCNC: 13 MMOL/L
BUN SERPL-MCNC: 43 MG/DL (ref 5–25)
CALCIUM SERPL-MCNC: 9.4 MG/DL (ref 8.4–10.2)
CHLORIDE SERPL-SCNC: 100 MMOL/L (ref 96–108)
CO2 SERPL-SCNC: 30 MMOL/L (ref 21–32)
CREAT SERPL-MCNC: 2.25 MG/DL (ref 0.6–1.3)
GFR SERPL CREATININE-BSD FRML MDRD: 26 ML/MIN/1.73SQ M
GLUCOSE P FAST SERPL-MCNC: 95 MG/DL (ref 65–99)
POTASSIUM SERPL-SCNC: 4 MMOL/L (ref 3.5–5.3)
SODIUM SERPL-SCNC: 143 MMOL/L (ref 135–147)

## 2023-08-28 PROCEDURE — 80048 BASIC METABOLIC PNL TOTAL CA: CPT

## 2023-08-28 PROCEDURE — 36415 COLL VENOUS BLD VENIPUNCTURE: CPT

## 2023-08-29 DIAGNOSIS — R79.89 ELEVATED SERUM CREATININE: Primary | ICD-10-CM

## 2023-08-30 ENCOUNTER — TELEPHONE (OUTPATIENT)
Dept: INTERNAL MEDICINE CLINIC | Facility: CLINIC | Age: 83
End: 2023-08-30

## 2023-08-30 RX ORDER — AMIODARONE HYDROCHLORIDE 200 MG/1
200 TABLET ORAL DAILY
COMMUNITY
Start: 2023-08-12

## 2023-08-30 RX ORDER — CETIRIZINE HYDROCHLORIDE 10 MG/1
10 TABLET ORAL DAILY
COMMUNITY

## 2023-08-30 RX ORDER — LOSARTAN POTASSIUM 25 MG/1
25 TABLET ORAL DAILY
COMMUNITY
Start: 2023-08-13 | End: 2024-08-12

## 2023-08-30 RX ORDER — METOPROLOL SUCCINATE 25 MG/1
25 TABLET, EXTENDED RELEASE ORAL DAILY
COMMUNITY
Start: 2023-08-12

## 2023-08-30 NOTE — TELEPHONE ENCOUNTER
Spoke to Trinity Wolfe, daughter, and reviewed the lab results. I will send her the lab order to the patient's home. She will have him repeat in 1 month, prior to nephrologist appt. No further questions        ----- Message from [address], DO sent at 8/29/2023 11:13 AM EDT -----  Kidney numbers are stable, slight improvement from prior.   Recommend rechecking labs again in about a month just before follow-up with nephrology

## 2023-09-29 DIAGNOSIS — E78.00 HYPERCHOLESTEROLEMIA: ICD-10-CM

## 2023-09-29 RX ORDER — ATORVASTATIN CALCIUM 80 MG/1
TABLET, FILM COATED ORAL
Qty: 90 TABLET | Refills: 0 | Status: SHIPPED | OUTPATIENT
Start: 2023-09-29

## 2023-09-30 ENCOUNTER — APPOINTMENT (OUTPATIENT)
Dept: LAB | Age: 83
End: 2023-09-30
Payer: COMMERCIAL

## 2023-09-30 DIAGNOSIS — R79.89 ELEVATED SERUM CREATININE: ICD-10-CM

## 2023-09-30 DIAGNOSIS — R97.20 ELEVATED PSA: ICD-10-CM

## 2023-09-30 LAB
ANION GAP SERPL CALCULATED.3IONS-SCNC: 11 MMOL/L
BUN SERPL-MCNC: 44 MG/DL (ref 5–25)
CALCIUM SERPL-MCNC: 9.1 MG/DL (ref 8.4–10.2)
CHLORIDE SERPL-SCNC: 101 MMOL/L (ref 96–108)
CO2 SERPL-SCNC: 29 MMOL/L (ref 21–32)
CREAT SERPL-MCNC: 1.87 MG/DL (ref 0.6–1.3)
GFR SERPL CREATININE-BSD FRML MDRD: 32 ML/MIN/1.73SQ M
GLUCOSE P FAST SERPL-MCNC: 87 MG/DL (ref 65–99)
POTASSIUM SERPL-SCNC: 4.2 MMOL/L (ref 3.5–5.3)
PSA SERPL-MCNC: 5.03 NG/ML (ref 0–4)
SODIUM SERPL-SCNC: 141 MMOL/L (ref 135–147)

## 2023-09-30 PROCEDURE — 36415 COLL VENOUS BLD VENIPUNCTURE: CPT

## 2023-09-30 PROCEDURE — 80048 BASIC METABOLIC PNL TOTAL CA: CPT

## 2023-09-30 PROCEDURE — 84153 ASSAY OF PSA TOTAL: CPT

## 2023-10-05 ENCOUNTER — APPOINTMENT (OUTPATIENT)
Dept: LAB | Age: 83
End: 2023-10-05
Payer: COMMERCIAL

## 2023-10-05 ENCOUNTER — TELEPHONE (OUTPATIENT)
Dept: INTERNAL MEDICINE CLINIC | Facility: CLINIC | Age: 83
End: 2023-10-05

## 2023-10-05 DIAGNOSIS — I50.22 CHRONIC SYSTOLIC CONGESTIVE HEART FAILURE (HCC): ICD-10-CM

## 2023-10-05 DIAGNOSIS — N17.9 ACUTE RENAL FAILURE, UNSPECIFIED ACUTE RENAL FAILURE TYPE (HCC): ICD-10-CM

## 2023-10-05 DIAGNOSIS — I10 ESSENTIAL HYPERTENSION: ICD-10-CM

## 2023-10-05 DIAGNOSIS — I48.0 PAROXYSMAL A-FIB (HCC): Primary | ICD-10-CM

## 2023-10-05 LAB
ALBUMIN SERPL BCP-MCNC: 3.8 G/DL (ref 3.5–5)
ALP SERPL-CCNC: 149 U/L (ref 34–104)
ALT SERPL W P-5'-P-CCNC: 23 U/L (ref 7–52)
ANION GAP SERPL CALCULATED.3IONS-SCNC: 12 MMOL/L
AST SERPL W P-5'-P-CCNC: 30 U/L (ref 13–39)
BILIRUB SERPL-MCNC: 1.09 MG/DL (ref 0.2–1)
BUN SERPL-MCNC: 51 MG/DL (ref 5–25)
CALCIUM SERPL-MCNC: 9.1 MG/DL (ref 8.4–10.2)
CHLORIDE SERPL-SCNC: 99 MMOL/L (ref 96–108)
CO2 SERPL-SCNC: 28 MMOL/L (ref 21–32)
CREAT SERPL-MCNC: 1.9 MG/DL (ref 0.6–1.3)
GFR SERPL CREATININE-BSD FRML MDRD: 32 ML/MIN/1.73SQ M
GLUCOSE P FAST SERPL-MCNC: 72 MG/DL (ref 65–99)
MAGNESIUM SERPL-MCNC: 2.7 MG/DL (ref 1.9–2.7)
PHOSPHATE SERPL-MCNC: 3.7 MG/DL (ref 2.3–4.1)
POTASSIUM SERPL-SCNC: 4.2 MMOL/L (ref 3.5–5.3)
PROT SERPL-MCNC: 7.2 G/DL (ref 6.4–8.4)
SODIUM SERPL-SCNC: 139 MMOL/L (ref 135–147)

## 2023-10-05 PROCEDURE — 36415 COLL VENOUS BLD VENIPUNCTURE: CPT

## 2023-10-05 PROCEDURE — 80053 COMPREHEN METABOLIC PANEL: CPT

## 2023-10-05 PROCEDURE — 84100 ASSAY OF PHOSPHORUS: CPT

## 2023-10-05 PROCEDURE — 83735 ASSAY OF MAGNESIUM: CPT

## 2023-10-05 NOTE — TELEPHONE ENCOUNTER
----- Message from [address] ,  sent at 10/5/2023  8:00 AM EDT -----  Slight improvement in kidney function seen, creatinine of 1.87.   Appointment with nephrology is scheduled for 10/16

## 2023-10-06 RX ORDER — LOSARTAN POTASSIUM 25 MG/1
25 TABLET ORAL DAILY
Qty: 90 TABLET | Refills: 1 | Status: SHIPPED | OUTPATIENT
Start: 2023-10-06 | End: 2024-10-05

## 2023-10-06 RX ORDER — METOPROLOL SUCCINATE 25 MG/1
25 TABLET, EXTENDED RELEASE ORAL DAILY
Qty: 90 TABLET | Refills: 1 | Status: SHIPPED | OUTPATIENT
Start: 2023-10-06

## 2023-10-06 RX ORDER — AMIODARONE HYDROCHLORIDE 200 MG/1
200 TABLET ORAL DAILY
Qty: 90 TABLET | Refills: 0 | Status: SHIPPED | OUTPATIENT
Start: 2023-10-06

## 2023-10-22 ENCOUNTER — RA CDI HCC (OUTPATIENT)
Dept: OTHER | Facility: HOSPITAL | Age: 83
End: 2023-10-22

## 2023-10-22 NOTE — PROGRESS NOTES
I13.0  [address] [facility] coding opportunities          Chart Reviewed number of suggestions sent to Provider: 1     Patients Insurance     Medicare Insurance: [id number]

## 2023-10-25 DIAGNOSIS — I48.0 PAROXYSMAL A-FIB (HCC): Primary | ICD-10-CM

## 2023-10-31 ENCOUNTER — OFFICE VISIT (OUTPATIENT)
Dept: INTERNAL MEDICINE CLINIC | Facility: CLINIC | Age: 83
End: 2023-10-31
Payer: COMMERCIAL

## 2023-10-31 VITALS
WEIGHT: 206 LBS | RESPIRATION RATE: 16 BRPM | HEIGHT: 70 IN | HEART RATE: 75 BPM | TEMPERATURE: 97.2 F | BODY MASS INDEX: 29.49 KG/M2 | OXYGEN SATURATION: 99 % | SYSTOLIC BLOOD PRESSURE: 120 MMHG | DIASTOLIC BLOOD PRESSURE: 72 MMHG

## 2023-10-31 DIAGNOSIS — G47.33 OSA (OBSTRUCTIVE SLEEP APNEA): ICD-10-CM

## 2023-10-31 DIAGNOSIS — H91.93 BILATERAL HEARING LOSS, UNSPECIFIED HEARING LOSS TYPE: ICD-10-CM

## 2023-10-31 DIAGNOSIS — I83.019 VENOUS ULCER OF RIGHT LEG (HCC): ICD-10-CM

## 2023-10-31 DIAGNOSIS — I71.43 INFRARENAL ABDOMINAL AORTIC ANEURYSM (AAA) WITHOUT RUPTURE (HCC): ICD-10-CM

## 2023-10-31 DIAGNOSIS — E78.00 HYPERCHOLESTEROLEMIA: ICD-10-CM

## 2023-10-31 DIAGNOSIS — I50.22 CHRONIC SYSTOLIC CONGESTIVE HEART FAILURE (HCC): ICD-10-CM

## 2023-10-31 DIAGNOSIS — N18.30 STAGE 3 CHRONIC KIDNEY DISEASE, UNSPECIFIED WHETHER STAGE 3A OR 3B CKD (HCC): ICD-10-CM

## 2023-10-31 DIAGNOSIS — L97.919 VENOUS ULCER OF RIGHT LEG (HCC): ICD-10-CM

## 2023-10-31 DIAGNOSIS — Z00.00 MEDICARE ANNUAL WELLNESS VISIT, SUBSEQUENT: Primary | ICD-10-CM

## 2023-10-31 PROCEDURE — 3074F SYST BP LT 130 MM HG: CPT | Performed by: INTERNAL MEDICINE

## 2023-10-31 PROCEDURE — 1160F RVW MEDS BY RX/DR IN RCRD: CPT | Performed by: INTERNAL MEDICINE

## 2023-10-31 PROCEDURE — 1159F MED LIST DOCD IN RCRD: CPT | Performed by: INTERNAL MEDICINE

## 2023-10-31 PROCEDURE — 3725F SCREEN DEPRESSION PERFORMED: CPT | Performed by: INTERNAL MEDICINE

## 2023-10-31 PROCEDURE — G0439 PPPS, SUBSEQ VISIT: HCPCS | Performed by: INTERNAL MEDICINE

## 2023-10-31 PROCEDURE — 1125F AMNT PAIN NOTED PAIN PRSNT: CPT | Performed by: INTERNAL MEDICINE

## 2023-10-31 PROCEDURE — 99214 OFFICE O/P EST MOD 30 MIN: CPT | Performed by: INTERNAL MEDICINE

## 2023-10-31 PROCEDURE — 3078F DIAST BP <80 MM HG: CPT | Performed by: INTERNAL MEDICINE

## 2023-10-31 RX ORDER — FUROSEMIDE 40 MG/1
40 TABLET ORAL DAILY
COMMUNITY
Start: 2023-10-19 | End: 2023-10-31

## 2023-10-31 NOTE — ASSESSMENT & PLAN NOTE
-history of CAD status post CABG in 1996, status post PCI to [facility] in 2006, PCI to ramus intermedius October 2021  -hospital admission in October 2021 for shortness of breath - s/p KYE to intermediate ramus  -2D echo completed January 2023 EF 45%  -Goal-directed medical therapy with Toprol-XL, losartan, Jardiance. Could consider MRA though caution with current degree of kidney function and has hx of hyperkalemia  -Continue Plavix, high intensity statin.   Also on Eliquis for history of A-fib  -LDL goal less than 70  -Currently on torsemide 40 mg daily  -  Appears euvolemic today on exam

## 2023-10-31 NOTE — ASSESSMENT & PLAN NOTE
Baseline creatinine around 1.5-1.7.   Most recent creatinine 1.90, EGFR in the 30s   kidney function has been stable overall  Continue follow-up with nephrology

## 2023-10-31 NOTE — PROGRESS NOTES
Assessment and Plan:     Past medical history of hypertension, CAD status post CABG, ischemic cardiomyopathy, hypertension, hyperlipidemia, AAA status post EVAR October 2021, PAD, JORGE, chronic kidney disease, atrial fibrillation       Problem List Items Addressed This Visit        Respiratory    JORGE (obstructive sleep apnea)     Established with sleep medicine. Maintained on CPAP. Cardiovascular and Mediastinum    AAA (abdominal aortic aneurysm) ([address])     -status post endovascular repair of abdominal aortic aneurysm in October 2021  -Per vascular surgery evidence of possible endoleak on recent duplex, slight enlargement and aneurysm sac. Has been referred to IR for treatment of type II endoleak with possible coil embolization  -Continue f/u with vascular surgery/IR   -Attention to BP control           Chronic systolic congestive heart failure ([address])     -history of CAD status post CABG in 1996, status post PCI to [facility] in 2006, PCI to ramus intermedius October 2021  -hospital admission in October 2021 for shortness of breath - s/p KYE to intermediate ramus  -2D echo completed January 2023 EF 45%  -Goal-directed medical therapy with Toprol-XL, losartan, Jardiance. Could consider MRA though caution with current degree of kidney function and has hx of hyperkalemia  -Continue Plavix, high intensity statin. Also on Eliquis for history of A-fib  -LDL goal less than 70  -Currently on torsemide 40 mg daily  -  Appears euvolemic today on exam            Nervous and Auditory    Bilateral hearing loss     Wife reports patient will at times have difficulty hearing her in conversation. no cerumen visualized on exam today. He states he has had hearing evaluation many years ago.   He does not appear to be interested in repeat hearing evaluation or hearing aids at this time            Musculoskeletal and Integument    Venous ulcer of right leg ([address])     Continue follow-up with wound care            Genitourinary Stage 3 chronic kidney disease, unspecified whether stage 3a or 3b CKD ([facility])     Baseline creatinine around 1.5-1.7. Most recent creatinine 1.90, EGFR in the 30s   kidney function has been stable overall  Continue follow-up with nephrology           Relevant Orders    CBC and differential    Basic metabolic panel    Magnesium       Other    Hypercholesterolemia    Relevant Orders    Lipid panel   Other Visit Diagnoses     Medicare annual wellness visit, subsequent    -  Primary           Preventive health issues were discussed with patient, and age appropriate screening tests were ordered as noted in patient's After Visit Summary. Personalized health advice and appropriate referrals for health education or preventive services given if needed, as noted in patient's After Visit Summary. History of Present Illness:     Patient presents for a Medicare Wellness Visit    HPI   Patient Care Team:  Curly Villafuerte DO as PCP - General (Internal Medicine)  DO Colt Sykes MD Rowland Cleveland, DPM (Podiatry)  Gabino Pyle MD (Cardiology)     Review of Systems:     Review of Systems     Problem List:     Patient Active Problem List   Diagnosis   • Acute coronary syndrome/unstable angina, to consider NSTEMI with ischemic related new left bundle branch block   • Hypercholesterolemia   • Essential hypertension   • Impaired fasting glucose   • Malignant melanoma of neck (HCC)   • JORGE (obstructive sleep apnea)   • Bradycardia   • Depression   • Elevated PSA   • BPH with obstruction/lower urinary tract symptoms   • PLMD (periodic limb movement disorder)   • Obesity (BMI 30-39. 9)   • Bronchospasm   • Left forearm pain   • Ischemic cardiomyopathy   • AAA (abdominal aortic aneurysm) ([facility])   • NSVT (nonsustained ventricular tachycardia) ([facility])   • Status post endovascular aneurysm repair (EVAR)   • Anxiety   • Chronic systolic congestive heart failure ([facility])   • PAD (peripheral artery disease) [facility])   • Venous ulcer of right leg ([address])   • Venous ulcer of left leg (HCC)   • Stage 3 chronic kidney disease, unspecified whether stage 3a or 3b CKD (HCC)   • Acute pain of right knee   • Obstructive sleep apnea (adult) (pediatric)   • Bilateral hearing loss      Past Medical and Surgical History:     Past Medical History:   Diagnosis Date   • AAA (abdominal aortic aneurysm) without rupture ([address])     has annual US to check   • Angina pectoris ([address])     chronic stable angina, but it is mild and tolerates walking dogs and walking up flight os stairs without symptoms. Using Nitro no more than 2 times per mt.    • Ankle pain, left    • Bilateral wrist pain    • BPH with obstruction/lower urinary tract symptoms    • Coronary artery disease    • CPAP (continuous positive airway pressure) dependence    • Elevated PSA    • History of colon polyps    • [place] (hard of hearing)     Slight   • Hyperlipidemia    • Ischemic cardiomyopathy     with apical aneurysm   • Melanoma ([address]) 12/2016    Right side of neck   • Myocardial infarction ([address]) 1996    x1     Past Surgical History:   Procedure Laterality Date   • ARTERIAL ANEURYSM REPAIR  10/2021   • CARDIAC SURGERY      CABG x6   • CATARACT EXTRACTION Bilateral    • COLONOSCOPY     • CORONARY ANGIOPLASTY WITH STENT PLACEMENT      2 stents   • CORONARY ANGIOPLASTY WITH STENT PLACEMENT      10/2021   • CORONARY ARTERY BYPASS GRAFT     • PROSTATE BIOPSY  2000,2001   • SKIN BIOPSY      Melanoma - surgical removal   • SKIN LESION EXCISION Right 2/6/2017    Procedure: WIDE EXCISION MELANOMA SCAR LATERAL NECK;  Surgeon: Sarabjit Sharp MD;  Location: [facility];  Service:    • WISDOM TOOTH EXTRACTION        Family History:     Family History   Problem Relation Age of Onset   • Hypertension Mother    • Breast cancer Mother       Social History:     Social History     Socioeconomic History   • Marital status: /Civil Union     Spouse name: None   • Number of children: None   • Years of education: None   • Highest education level: None   Occupational History   • None   Tobacco Use   • Smoking status: Never   • Smokeless tobacco: Never   Vaping Use   • Vaping Use: Never used   Substance and Sexual Activity   • Alcohol use: Yes     Comment: 1-2 beers rarely   • Drug use: No   • Sexual activity: Not Currently   Other Topics Concern   • None   Social History Narrative    DOES NOT CONSUME CAFFEINE     Social Determinants of Health     Financial Resource Strain: Low Risk  (10/31/2023)    Overall Financial Resource Strain (CARDIA)    • Difficulty of Paying Living Expenses: Not very hard   Food Insecurity: Not on file   Transportation Needs: No Transportation Needs (10/31/2023)    PRAPARE - Transportation    • Lack of Transportation (Medical): No    • Lack of Transportation (Non-Medical):  No   Physical Activity: Not on file   Stress: Not on file   Social Connections: Not on file   Intimate Partner Violence: Not on file   Housing Stability: Not on file      Medications and Allergies:     Current Outpatient Medications   Medication Sig Dispense Refill   • albuterol (PROVENTIL HFA,VENTOLIN HFA) 90 mcg/act inhaler INHALE 1 PUFF BY MOUTH EVERY 6 HOURS AS NEEDED FOR WHEEZE 18 g 1   • amiodarone 200 mg tablet Take 1 tablet (200 mg total) by mouth in the morning 90 tablet 0   • apixaban (ELIQUIS) 2.5 mg Take 1 tablet (2.5 mg total) by mouth 2 (two) times a day 60 tablet 4   • atorvastatin (LIPITOR) 80 mg tablet TAKE 1 TABLET BY MOUTH EVERY DAY 90 tablet 0   • cetirizine (ZyrTEC) 10 mg tablet Take 10 mg by mouth daily     • clopidogrel (PLAVIX) 75 mg tablet Take 75 mg by mouth daily       • Diclofenac Sodium (VOLTAREN) 1 % APPLY TOPICALLY TO AFFECTED AREA(RIGHT KNEE) EVERY 6 HOURS 100 g 1   • fluticasone (FLONASE) 50 mcg/act nasal spray SPRAY 2 SPRAYS INTO EACH NOSTRIL EVERY DAY 48 mL 2   • Jardiance 10 MG TABS Take 10 mg by mouth daily     • losartan (COZAAR) 25 mg tablet Take 1 tablet (25 mg total) by mouth daily 90 tablet 1   • metoprolol succinate (TOPROL-XL) 25 mg 24 hr tablet Take 1 tablet (25 mg total) by mouth in the morning 90 tablet 1   • nitroglycerin (NITROSTAT) 0.4 mg SL tablet PLACE 1 TABLET (0.4 MG TOTAL) UNDER THE TONGUE EVERY 5 (FIVE) MINUTES AS NEEDED FOR CHEST PAIN 100 tablet 1   • Torsemide 40 MG TABS Take 40 mg by mouth daily (Patient not taking: Reported on 10/31/2023) 90 tablet 1     Current Facility-Administered Medications   Medication Dose Route Frequency Provider Last Rate Last Admin   • lidocaine (LMX) 4 % cream   Topical Once Tg Zhane DPRAYNE         Allergies   Allergen Reactions   • Abciximab Other (See Comments)     rcd 9/27/01 & 5/16/03   • Escitalopram Rash      Immunizations:     Immunization History   Administered Date(s) Administered   • COVID-19 MODERNA VACC 0.5 ML IM 02/06/2021, 03/09/2021   • INFLUENZA 11/03/2015, 09/26/2016, 09/28/2016, 11/14/2017, 11/27/2018   • Influenza Quadrivalent Preservative Free 3 years and older IM 10/14/2014   • Influenza Quadrivalent, 6-35 Months IM 11/03/2015   • Influenza Split High Dose Preservative Free IM 09/26/2016, 11/14/2017   • Influenza, high dose seasonal 0.7 mL 11/27/2018, 10/22/2019, 10/22/2020   • Influenza, seasonal, injectable 09/27/2011, 10/01/2012, 12/02/2013   • Pneumococcal Conjugate 13-Valent 07/28/2015   • Pneumococcal Polysaccharide PPV23 09/20/2006, 01/03/2017   • Tdap 01/03/2017      Health Maintenance:         Topic Date Due   • Colorectal Cancer Screening  04/12/2021         Topic Date Due   • COVID-19 Vaccine (3 - Moderna series) 05/04/2021   • Influenza Vaccine (1) 09/01/2023      Medicare Screening Tests and Risk Assessments:     Mian Burnette is here for his Subsequent Wellness visit. Health Risk Assessment:   Patient rates overall health as good. Patient feels that their physical health rating is much better. Patient is satisfied with their life. Eyesight was rated as same. Hearing was rated as same.  Patient feels that their emotional and mental health rating is slightly better. Patients states they are never, rarely angry. Patient states they are sometimes unusually tired/fatigued. Pain experienced in the last 7 days has been some. Patient's pain rating has been 4/10. Patient states that he has experienced no weight loss or gain in last 6 months. Depression Screening:   PHQ-9 Score: 0      PREVENTIVE SCREENINGS      Cardiovascular Screening:    General: Screening Not Indicated and History Lipid Disorder      Diabetes Screening:     General: Screening Current      Colorectal Cancer Screening:     General: Screening Not Indicated      Prostate Cancer Screening:    General: Screening Not Indicated      Osteoporosis Screening:    General: Screening Not Indicated      Abdominal Aortic Aneurysm (AAA) Screening:        General: Screening Not Indicated and History AAA      Lung Cancer Screening:     General: Screening Not Indicated      Hepatitis C Screening:    General: Screening Not Indicated    No results found. Physical Exam:     /72 (BP Location: Left arm, Patient Position: Sitting, Cuff Size: Standard)   Pulse 75   Temp (!) 97.2 °F (36.2 °C)   Resp 16   Ht 5' 10" (1.778 m)   Wt 93.4 kg (206 lb)   SpO2 99%   BMI 29.56 kg/m²     Physical Exam  Cardiovascular:      Rate and Rhythm: Normal rate and regular rhythm. Heart sounds: No murmur heard. Pulmonary:      Effort: Pulmonary effort is normal.      Breath sounds: Normal breath sounds. No wheezing or rales. Musculoskeletal:         General: No swelling. Skin:     Findings: Lesion present. No erythema.           Curly Stewart DO

## 2023-10-31 NOTE — ASSESSMENT & PLAN NOTE
-status post endovascular repair of abdominal aortic aneurysm in October 2021  -Per vascular surgery evidence of possible endoleak on recent duplex, slight enlargement and aneurysm sac.   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

## 2023-10-31 NOTE — ASSESSMENT & PLAN NOTE
Wife reports patient will at times have difficulty hearing her in conversation. no cerumen visualized on exam today. He states he has had hearing evaluation many years ago.   He does not appear to be interested in repeat hearing evaluation or hearing aids at this time

## 2023-12-31 DIAGNOSIS — E78.00 HYPERCHOLESTEROLEMIA: ICD-10-CM

## 2024-01-02 DIAGNOSIS — I48.0 PAROXYSMAL A-FIB (HCC): ICD-10-CM

## 2024-01-02 RX ORDER — ATORVASTATIN CALCIUM 80 MG/1
TABLET, FILM COATED ORAL
Qty: 90 TABLET | Refills: 0 | Status: SHIPPED | OUTPATIENT
Start: 2024-01-02

## 2024-01-02 RX ORDER — AMIODARONE HYDROCHLORIDE 200 MG/1
TABLET ORAL
Qty: 90 TABLET | Refills: 0 | Status: SHIPPED | OUTPATIENT
Start: 2024-01-02

## 2024-01-08 DIAGNOSIS — I50.22 CHRONIC SYSTOLIC CONGESTIVE HEART FAILURE (HCC): ICD-10-CM

## 2024-01-08 DIAGNOSIS — I10 ESSENTIAL HYPERTENSION: ICD-10-CM

## 2024-01-08 DIAGNOSIS — I48.0 PAROXYSMAL A-FIB (HCC): ICD-10-CM

## 2024-01-08 RX ORDER — LOSARTAN POTASSIUM 25 MG/1
25 TABLET ORAL DAILY
Qty: 90 TABLET | Refills: 1 | Status: SHIPPED | OUTPATIENT
Start: 2024-01-08 | End: 2025-01-07

## 2024-01-08 RX ORDER — METOPROLOL SUCCINATE 25 MG/1
25 TABLET, EXTENDED RELEASE ORAL DAILY
Qty: 90 TABLET | Refills: 1 | Status: SHIPPED | OUTPATIENT
Start: 2024-01-08

## 2024-01-08 RX ORDER — AMIODARONE HYDROCHLORIDE 200 MG/1
200 TABLET ORAL DAILY
Qty: 90 TABLET | Refills: 0 | Status: SHIPPED | OUTPATIENT
Start: 2024-01-08

## 2024-01-15 DIAGNOSIS — I50.22 CHRONIC SYSTOLIC CONGESTIVE HEART FAILURE (HCC): Primary | ICD-10-CM

## 2024-01-15 RX ORDER — EMPAGLIFLOZIN 10 MG/1
10 TABLET, FILM COATED ORAL DAILY
Qty: 90 TABLET | Refills: 1 | Status: SHIPPED | OUTPATIENT
Start: 2024-01-15

## 2024-01-15 NOTE — TELEPHONE ENCOUNTER
Reason for call:   [x] Refill   [] Prior Auth  [x] Other: Was prescribed by patient's Cardiologist but he is now requesting refill from PCP     Office:   [x] PCP/Provider - Yvette PCP   [] Specialty/Provider -     Medication: Jardiance     Dose/Frequency: 10mg - 1 daily     Quantity: 30    Pharmacy: [id number]    Does the patient have enough for 3 days?   [x] Yes   [] No - Send as HP to POD

## 2024-01-18 DIAGNOSIS — I50.22 CHRONIC SYSTOLIC CONGESTIVE HEART FAILURE (HCC): ICD-10-CM

## 2024-01-18 RX ORDER — TORSEMIDE 20 MG/1
40 TABLET ORAL DAILY
Qty: 180 TABLET | Refills: 1 | Status: SHIPPED | OUTPATIENT
Start: 2024-01-18

## 2024-01-18 NOTE — TELEPHONE ENCOUNTER
Patient asked for Torsemide 20mg 2 tablets daily be dispensed, since 40mg is backordered     Reason for call:   [x] Refill   [] Prior Auth  [] Other:     Office:   [x] PCP/Provider - Curly Crawford,    [] Specialty/Provider -     Medication:     Torsemide       Dose/Frequency:     40 mg, Oral, Daily       Quantity: 180    Pharmacy: [facility] - [place], [place] - [id number]      Does the patient have enough for 3 days?   [] Yes   [x] No - Send as HP to POD

## 2024-01-31 ENCOUNTER — OFFICE VISIT (OUTPATIENT)
Dept: INTERNAL MEDICINE CLINIC | Facility: CLINIC | Age: 84
End: 2024-01-31
Payer: COMMERCIAL

## 2024-01-31 VITALS
OXYGEN SATURATION: 97 % | HEIGHT: 70 IN | SYSTOLIC BLOOD PRESSURE: 110 MMHG | BODY MASS INDEX: 30.21 KG/M2 | DIASTOLIC BLOOD PRESSURE: 60 MMHG | HEART RATE: 66 BPM | RESPIRATION RATE: 18 BRPM | TEMPERATURE: 97.8 F | WEIGHT: 211 LBS

## 2024-01-31 DIAGNOSIS — G47.33 OSA (OBSTRUCTIVE SLEEP APNEA): ICD-10-CM

## 2024-01-31 DIAGNOSIS — I50.22 CHRONIC SYSTOLIC CONGESTIVE HEART FAILURE (HCC): Primary | ICD-10-CM

## 2024-01-31 DIAGNOSIS — N18.30 STAGE 3 CHRONIC KIDNEY DISEASE, UNSPECIFIED WHETHER STAGE 3A OR 3B CKD (HCC): ICD-10-CM

## 2024-01-31 DIAGNOSIS — I71.43 INFRARENAL ABDOMINAL AORTIC ANEURYSM (AAA) WITHOUT RUPTURE (HCC): ICD-10-CM

## 2024-01-31 PROCEDURE — 99214 OFFICE O/P EST MOD 30 MIN: CPT | Performed by: INTERNAL MEDICINE

## 2024-01-31 PROCEDURE — 1160F RVW MEDS BY RX/DR IN RCRD: CPT | Performed by: INTERNAL MEDICINE

## 2024-01-31 PROCEDURE — 1159F MED LIST DOCD IN RCRD: CPT | Performed by: INTERNAL MEDICINE

## 2024-01-31 NOTE — PROGRESS NOTES
INTERNAL MEDICINE OFFICE VISIT  [facility] Internal Medicine- [place]    NAME: Bahman Saleem  AGE: 83 y.o. SEX: male    DATE OF ENCOUNTER: 1/31/2024    Assessment and Plan/History of Present Illness     Here today for follow up  Past medical history of hypertension, CAD status post CABG, ischemic cardiomyopathy, hypertension, hyperlipidemia, AAA status post EVAR October 2021, PAD, JORGE, chronic kidney disease, atrial fibrillation       1. Chronic systolic congestive heart failure (HCC)  Assessment & Plan:  -history of CAD status post CABG in 1996, status post PCI to OM1 in 2006, PCI to ramus intermedius October 2021  -hospital admission in October 2021 for shortness of breath - s/p KYE to intermediate ramus  -2D echo completed November 2023 LVEF 35 to 40%  -Goal-directed medical therapy with Toprol-XL, losartan, Jardiance. Could consider MRA though caution with current degree of kidney function and has hx of hyperkalemia  -Continue Plavix, high intensity statin.  Also on Eliquis for history of A-fib  -LDL goal less than 70  -Currently on torsemide 40 mg daily.  There apparently was an issue at the pharmacy where furosemide was being dispensed.  He has had possible issue with rash with furosemide in the future, will add to allergy list  - Appears euvolemic today on exam              2. Infrarenal abdominal aortic aneurysm (AAA) without rupture (HCC)  Assessment & Plan:  -status post endovascular repair of abdominal aortic aneurysm in October 2021  -Possible prior evidence of endoleak on CT scan, saw IR who recommended monitoring EVAR as opposed to repair due to small leak and minimal change in aneurysm sac size  -Continue f/u with vascular surgery   -Attention to BP control        3. JORGE (obstructive sleep apnea)  Assessment & Plan:  Established with sleep medicine.  Maintained on CPAP, appears to be tolerating well      4. Stage 3 chronic kidney disease, unspecified whether stage 3a or 3b CKD (HCC)  Assessment &  "Plan:  Most recent creatinine just under 2.0, prior creatinine in the summer 2023 closer to the mid twos.  Improvement in proteinuria noted  -Continue follow-up with nephrology  -Continue treatment with ARB, SGLT2i  -Attention to vascular risk factors                 No orders of the defined types were placed in this encounter.      Chief Complaint     Chief Complaint   Patient presents with   • Follow-up     3 month        Review of Systems     10 point ROS negative except per HPI    The following portions of the patient's history were reviewed and updated as appropriate: allergies, current medications, past family history, past medical history, past social history, past surgical history and problem list.    Active Problem List     Patient Active Problem List   Diagnosis   • Acute coronary syndrome/unstable angina, to consider NSTEMI with ischemic related new left bundle branch block   • Hypercholesterolemia   • Essential hypertension   • Impaired fasting glucose   • Malignant melanoma of neck (HCC)   • JORGE (obstructive sleep apnea)   • Bradycardia   • Depression   • Elevated PSA   • BPH with obstruction/lower urinary tract symptoms   • PLMD (periodic limb movement disorder)   • Obesity (BMI 30-39.9)   • Bronchospasm   • Left forearm pain   • Ischemic cardiomyopathy   • AAA (abdominal aortic aneurysm) ([facility])   • NSVT (nonsustained ventricular tachycardia) ([facility])   • Status post endovascular aneurysm repair (EVAR)   • Anxiety   • Chronic systolic congestive heart failure (HCC)   • PAD (peripheral artery disease) ([facility])   • Venous ulcer of right leg (HCC)   • Venous ulcer of left leg (HCC)   • Stage 3 chronic kidney disease, unspecified whether stage 3a or 3b CKD (HCC)   • Acute pain of right knee   • Bilateral hearing loss       Objective     /60 (BP Location: Left arm, Patient Position: Sitting, Cuff Size: Standard)   Pulse 66   Temp 97.8 °F (36.6 °C)   Resp 18   Ht 5' 10\" (1.778 m)   Wt 95.7 kg (211 lb)   " SpO2 97%   BMI 30.28 kg/m²     Physical Exam  Cardiovascular:      Rate and Rhythm: Normal rate and regular rhythm.      Heart sounds: Normal heart sounds. No murmur heard.  Pulmonary:      Effort: Pulmonary effort is normal.      Breath sounds: Normal breath sounds. No wheezing, rhonchi or rales.         Pertinent Laboratory/Diagnostic Studies:  XR knee 3 vw right non injury    Result Date: 6/7/2023  Impression: Moderate osteoarthritis Workstation performed: [id number]       Images and diagnostics reviewed     Current Medications     Current Outpatient Medications:   •  albuterol (PROVENTIL HFA,VENTOLIN HFA) 90 mcg/act inhaler, INHALE 1 PUFF BY MOUTH EVERY 6 HOURS AS NEEDED FOR WHEEZE, Disp: 18 g, Rfl: 1  •  amiodarone 200 mg tablet, Take 1 tablet (200 mg total) by mouth daily, Disp: 90 tablet, Rfl: 0  •  apixaban (ELIQUIS) 2.5 mg, Take 1 tablet (2.5 mg total) by mouth 2 (two) times a day, Disp: 60 tablet, Rfl: 4  •  atorvastatin (LIPITOR) 80 mg tablet, TAKE 1 TABLET BY MOUTH EVERY DAY, Disp: 90 tablet, Rfl: 0  •  cetirizine (ZyrTEC) 10 mg tablet, Take 10 mg by mouth daily, Disp: , Rfl:   •  clopidogrel (PLAVIX) 75 mg tablet, Take 75 mg by mouth daily  , Disp: , Rfl:   •  Diclofenac Sodium (VOLTAREN) 1 %, APPLY TOPICALLY TO AFFECTED AREA(RIGHT KNEE) EVERY 6 HOURS, Disp: 100 g, Rfl: 1  •  fluticasone (FLONASE) 50 mcg/act nasal spray, SPRAY 2 SPRAYS INTO EACH NOSTRIL EVERY DAY, Disp: 48 mL, Rfl: 2  •  Jardiance 10 MG TABS tablet, Take 1 tablet (10 mg total) by mouth daily, Disp: 90 tablet, Rfl: 1  •  losartan (COZAAR) 25 mg tablet, Take 1 tablet (25 mg total) by mouth daily, Disp: 90 tablet, Rfl: 1  •  metoprolol succinate (TOPROL-XL) 25 mg 24 hr tablet, Take 1 tablet (25 mg total) by mouth in the morning, Disp: 90 tablet, Rfl: 1  •  nitroglycerin (NITROSTAT) 0.4 mg SL tablet, PLACE 1 TABLET (0.4 MG TOTAL) UNDER THE TONGUE EVERY 5 (FIVE) MINUTES AS NEEDED FOR CHEST PAIN, Disp: 100 tablet, Rfl: 1  •  torsemide  (DEMADEX) 20 mg tablet, Take 2 tablets (40 mg total) by mouth daily, Disp: 180 tablet, Rfl: 1    Current Facility-Administered Medications:   •  lidocaine (LMX) 4 % cream, , Topical, Once, Marvin Vieyra DPM    Health Maintenance     Health Maintenance   Topic Date Due   • SLP PLAN OF CARE  Never done   • Zoster Vaccine (1 of 2) Never done   • Colorectal Cancer Screening  04/12/2021   • Influenza Vaccine (1) 09/01/2023   • COVID-19 Vaccine (3 - 2023-24 season) 09/01/2023   • Fall Risk  08/21/2024   • Depression Screening  10/31/2024   • Medicare Annual Wellness Visit (AWV)  10/31/2024   • Pneumococcal Vaccine: 65+ Years  Completed   • HIB Vaccine  Aged Out   • IPV Vaccine  Aged Out   • Hepatitis A Vaccine  Aged Out   • Meningococcal ACWY Vaccine  Aged Out   • HPV Vaccine  Aged Out     Immunization History   Administered Date(s) Administered   • COVID-19 MODERNA VACC 0.5 ML IM 02/06/2021, 03/09/2021   • INFLUENZA 11/03/2015, 09/26/2016, 09/28/2016, 11/14/2017, 11/27/2018   • Influenza Quadrivalent Preservative Free 3 years and older IM 10/14/2014   • Influenza Quadrivalent, 6-35 Months IM 11/03/2015   • Influenza Split High Dose Preservative Free IM 09/26/2016, 11/14/2017   • Influenza, high dose seasonal 0.7 mL 11/27/2018, 10/22/2019, 10/22/2020   • Influenza, seasonal, injectable 09/27/2011, 10/01/2012, 12/02/2013   • Pneumococcal Conjugate 13-Valent 07/28/2015   • Pneumococcal Polysaccharide PPV23 09/20/2006, 01/03/2017   • Tdap 01/03/2017       Curly Crawford D.O.  [facility] Internal Medicine [address] [place] [address]  Office: [phone]  Fax: [phone]

## 2024-01-31 NOTE — ASSESSMENT & PLAN NOTE
Most recent creatinine just under 2.0, prior creatinine in the summer 2023 closer to the mid twos.  Improvement in proteinuria noted  -Continue follow-up with nephrology  -Continue treatment with ARB, SGLT2i  -Attention to vascular risk factors

## 2024-01-31 NOTE — ASSESSMENT & PLAN NOTE
-status post endovascular repair of abdominal aortic aneurysm in October 2021  -Possible prior evidence of endoleak on CT scan, saw IR who recommended monitoring EVAR as opposed to repair due to small leak and minimal change in aneurysm sac size  -Continue f/u with vascular surgery   -Attention to BP control

## 2024-01-31 NOTE — ASSESSMENT & PLAN NOTE
-history of CAD status post CABG in 1996, status post PCI to OM1 in 2006, PCI to ramus intermedius October 2021  -hospital admission in October 2021 for shortness of breath - s/p KYE to intermediate ramus  -2D echo completed November 2023 LVEF 35 to 40%  -Goal-directed medical therapy with Toprol-XL, losartan, Jardiance. Could consider MRA though caution with current degree of kidney function and has hx of hyperkalemia  -Continue Plavix, high intensity statin.  Also on Eliquis for history of A-fib  -LDL goal less than 70  -Currently on torsemide 40 mg daily.  There apparently was an issue at the pharmacy where furosemide was being dispensed.  He has had possible issue with rash with furosemide in the future, will add to allergy list  - Appears euvolemic today on exam

## 2024-03-25 DIAGNOSIS — I48.0 PAROXYSMAL A-FIB (HCC): ICD-10-CM

## 2024-03-25 NOTE — TELEPHONE ENCOUNTER
Reason for call:   [x] Refill   [] Prior Auth  [] Other:     Office:   [x] PCP/Provider -   [] Specialty/Provider -     Medication: ELIQUIS     Dose/Frequency: 2.5 mg / 1 tab BID     Quantity: 60 tabs    Pharmacy: [id number] - [place] - [address] [place] [address]        Does the patient have enough for 3 days?   [] Yes   [x] No - Send as HP to POD

## 2024-03-29 DIAGNOSIS — E78.00 HYPERCHOLESTEROLEMIA: ICD-10-CM

## 2024-03-29 RX ORDER — ATORVASTATIN CALCIUM 80 MG/1
TABLET, FILM COATED ORAL
Qty: 90 TABLET | Refills: 1 | Status: SHIPPED | OUTPATIENT
Start: 2024-03-29

## 2024-04-25 ENCOUNTER — RA CDI HCC (OUTPATIENT)
Dept: OTHER | Facility: HOSPITAL | Age: 84
End: 2024-04-25

## 2024-04-25 NOTE — LETTER
May 14, 2018     Shae Medina MD  [address] [facility]    Patient: Diallo Youssef   YOB: 1940   Date of Visit: 5/14/2018       Dear Dr Sebastien Bowen: Thank you for referring Diallo Youssef to me for evaluation  Below are my notes for this consultation  If you have questions, please do not hesitate to call me  I look forward to following your patient along with you           Sincerely,        Feng Hart MD        CC: No Recipients
Patient

## 2024-05-02 ENCOUNTER — OFFICE VISIT (OUTPATIENT)
Dept: INTERNAL MEDICINE CLINIC | Facility: CLINIC | Age: 84
End: 2024-05-02
Payer: COMMERCIAL

## 2024-05-02 VITALS
DIASTOLIC BLOOD PRESSURE: 60 MMHG | WEIGHT: 215 LBS | OXYGEN SATURATION: 96 % | HEIGHT: 70 IN | TEMPERATURE: 97 F | HEART RATE: 60 BPM | BODY MASS INDEX: 30.78 KG/M2 | RESPIRATION RATE: 18 BRPM | SYSTOLIC BLOOD PRESSURE: 110 MMHG

## 2024-05-02 DIAGNOSIS — N18.30 STAGE 3 CHRONIC KIDNEY DISEASE, UNSPECIFIED WHETHER STAGE 3A OR 3B CKD (HCC): ICD-10-CM

## 2024-05-02 DIAGNOSIS — I71.43 INFRARENAL ABDOMINAL AORTIC ANEURYSM (AAA) WITHOUT RUPTURE (HCC): ICD-10-CM

## 2024-05-02 DIAGNOSIS — L97.929 VENOUS ULCER OF LEFT LEG (HCC): ICD-10-CM

## 2024-05-02 DIAGNOSIS — I50.22 CHRONIC SYSTOLIC CONGESTIVE HEART FAILURE (HCC): ICD-10-CM

## 2024-05-02 DIAGNOSIS — L97.919 VENOUS ULCER OF RIGHT LEG (HCC): ICD-10-CM

## 2024-05-02 DIAGNOSIS — C43.4 MALIGNANT MELANOMA OF SCALP AND NECK (HCC): ICD-10-CM

## 2024-05-02 DIAGNOSIS — E78.00 HYPERCHOLESTEROLEMIA: ICD-10-CM

## 2024-05-02 DIAGNOSIS — I50.23 ACUTE ON CHRONIC SYSTOLIC (CONGESTIVE) HEART FAILURE (HCC): ICD-10-CM

## 2024-05-02 DIAGNOSIS — N18.32 STAGE 3B CHRONIC KIDNEY DISEASE (HCC): ICD-10-CM

## 2024-05-02 DIAGNOSIS — I83.029 VENOUS ULCER OF LEFT LEG (HCC): ICD-10-CM

## 2024-05-02 DIAGNOSIS — N25.81 HYPERPARATHYROIDISM DUE TO RENAL INSUFFICIENCY (HCC): Primary | ICD-10-CM

## 2024-05-02 DIAGNOSIS — I83.019 VENOUS ULCER OF RIGHT LEG (HCC): ICD-10-CM

## 2024-05-02 DIAGNOSIS — I25.118 CORONARY ARTERY DISEASE OF NATIVE ARTERY OF NATIVE HEART WITH STABLE ANGINA PECTORIS (HCC): ICD-10-CM

## 2024-05-02 DIAGNOSIS — I47.29 NSVT (NONSUSTAINED VENTRICULAR TACHYCARDIA) (HCC): ICD-10-CM

## 2024-05-02 PROCEDURE — 99214 OFFICE O/P EST MOD 30 MIN: CPT | Performed by: INTERNAL MEDICINE

## 2024-05-02 PROCEDURE — G2211 COMPLEX E/M VISIT ADD ON: HCPCS | Performed by: INTERNAL MEDICINE

## 2024-05-02 PROCEDURE — 1160F RVW MEDS BY RX/DR IN RCRD: CPT | Performed by: INTERNAL MEDICINE

## 2024-05-02 PROCEDURE — 1159F MED LIST DOCD IN RCRD: CPT | Performed by: INTERNAL MEDICINE

## 2024-05-02 RX ORDER — ATORVASTATIN CALCIUM 80 MG/1
80 TABLET, FILM COATED ORAL DAILY
Qty: 90 TABLET | Refills: 1 | Status: SHIPPED | OUTPATIENT
Start: 2024-05-02

## 2024-05-02 NOTE — ASSESSMENT & PLAN NOTE
Most recent PTH level 162.9.  Vitamin D within normal limits.  Calcium within normal limits  Continue to monitor  Following with nephrology

## 2024-05-02 NOTE — ASSESSMENT & PLAN NOTE
Stable  Baseline creatinine appears to be around 1.8-2.2  -Has mild proteinuria  -Continue follow-up with nephrology  -Continue treatment with ARB, SGLT2i  -Attention to vascular risk factors

## 2024-05-02 NOTE — ASSESSMENT & PLAN NOTE
Continue aspirin, apixaban.  Plavix discontinued by cardiology, patient around 28 months out from most recent stent placement  Continue high intensity statin  Goal BP less than 130/80  Goal LDL less than 70

## 2024-05-02 NOTE — PROGRESS NOTES
INTERNAL MEDICINE OFFICE VISIT  [facility] Internal Medicine- [place]    NAME: Bahman Saleem  AGE: 83 y.o. SEX: male    DATE OF ENCOUNTER: 5/2/2024    Assessment and Plan/History of Present Illness     Here today for follow-up  Past medical history of hypertension, CAD status post CABG, ischemic cardiomyopathy, hypertension, hyperlipidemia, AAA status post EVAR October 2021, PAD, JORGE, chronic kidney disease, atrial fibrillation     1. Hyperparathyroidism due to renal insufficiency (HCC)  Assessment & Plan:  Most recent PTH level 162.9.  Vitamin D within normal limits.  Calcium within normal limits  Continue to monitor  Following with nephrology      2. Coronary artery disease of native artery of native heart with stable angina pectoris (HCC)  Assessment & Plan:  Continue aspirin, apixaban.  Plavix discontinued by cardiology, patient around 28 months out from most recent stent placement  Continue high intensity statin  Goal BP less than 130/80  Goal LDL less than 70      3. Venous ulcer of left leg (HCC)    4. Venous ulcer of right leg (HCC)    5. NSVT (nonsustained ventricular tachycardia) ([facility])  Assessment & Plan:  Maintained on Toprol-XL  Continue follow-up with cardiology      6. Malignant melanoma of scalp and neck (HCC)    7. Acute on chronic systolic (congestive) heart failure (HCC)    8. Stage 3b chronic kidney disease (HCC)    9. Hypercholesterolemia  -     atorvastatin (LIPITOR) 80 mg tablet; Take 1 tablet (80 mg total) by mouth daily    10. Chronic systolic congestive heart failure (HCC)  Assessment & Plan:  -history of CAD status post CABG in 1996, status post PCI to OM1 in 2006, PCI to ramus intermedius October 2021  -hospital admission in October 2021 for shortness of breath - s/p KYE to intermediate ramus  -2D echo completed November 2023 LVEF 35 to 40%  -Goal-directed medical therapy with Toprol-XL, losartan, Jardiance  -Maintained on torsemide                        11. Stage 3 chronic kidney  disease, unspecified whether stage 3a or 3b CKD ([facility])  Assessment & Plan:  Stable  Baseline creatinine appears to be around 1.8-2.2  -Has mild proteinuria  -Continue follow-up with nephrology  -Continue treatment with ARB, SGLT2i  -Attention to vascular risk factors      12. Infrarenal abdominal aortic aneurysm (AAA) without rupture ([facility])  Assessment & Plan:  -status post endovascular repair of abdominal aortic aneurysm in October 2021  -Continue f/u with vascular surgery   -Attention to BP control                   No orders of the defined types were placed in this encounter.      Chief Complaint     Chief Complaint   Patient presents with   • Follow-up     3 month // pt refused zoster        Review of Systems     10 point ROS negative except per HPI    The following portions of the patient's history were reviewed and updated as appropriate: allergies, current medications, past family history, past medical history, past social history, past surgical history and problem list.    Active Problem List     Patient Active Problem List   Diagnosis   • Acute coronary syndrome/unstable angina, to consider NSTEMI with ischemic related new left bundle branch block   • Hypercholesterolemia   • Essential hypertension   • Impaired fasting glucose   • Malignant melanoma of neck ([facility])   • JORGE (obstructive sleep apnea)   • Bradycardia   • Depression   • Elevated PSA   • BPH with obstruction/lower urinary tract symptoms   • PLMD (periodic limb movement disorder)   • Obesity (BMI 30-39.9)   • Bronchospasm   • Left forearm pain   • Ischemic cardiomyopathy   • AAA (abdominal aortic aneurysm) ([facility])   • NSVT (nonsustained ventricular tachycardia) ([facility])   • Status post endovascular aneurysm repair (EVAR)   • Anxiety   • Chronic systolic congestive heart failure ([facility])   • PAD (peripheral artery disease) ([facility])   • Stage 3 chronic kidney disease, unspecified whether stage 3a or 3b CKD ([facility])   • Acute pain of right knee   • Bilateral hearing  "loss   • 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      Heart sounds: Normal heart sounds. No murmur heard.  Pulmonary:      Effort: Pulmonary effort is normal.      Breath sounds: Normal breath sounds. No wheezing, rhonchi or rales.   Musculoskeletal:      Right lower leg: No edema.      Left lower leg: No edema.         Pertinent Laboratory/Diagnostic Studies:  XR knee 3 vw right non injury    Result Date: 6/7/2023  Impression: Moderate osteoarthritis Workstation performed: [id number]       Images and diagnostics reviewed     Current Medications     Current Outpatient Medications:   •  albuterol (PROVENTIL HFA,VENTOLIN HFA) 90 mcg/act inhaler, INHALE 1 PUFF BY MOUTH EVERY 6 HOURS AS NEEDED FOR WHEEZE, Disp: 18 g, Rfl: 1  •  amiodarone 200 mg tablet, Take 1 tablet (200 mg total) by mouth daily, Disp: 90 tablet, Rfl: 0  •  apixaban (ELIQUIS) 2.5 mg, Take 1 tablet (2.5 mg total) by mouth 2 (two) times a day, Disp: 60 tablet, Rfl: 5  •  atorvastatin (LIPITOR) 80 mg tablet, Take 1 tablet (80 mg total) by mouth daily, Disp: 90 tablet, Rfl: 1  •  cetirizine (ZyrTEC) 10 mg tablet, Take 10 mg by mouth daily, Disp: , Rfl:   •  Diclofenac Sodium (VOLTAREN) 1 %, APPLY TOPICALLY TO AFFECTED AREA(RIGHT KNEE) EVERY 6 HOURS, Disp: 100 g, Rfl: 1  •  fluticasone (FLONASE) 50 mcg/act nasal spray, SPRAY 2 SPRAYS INTO EACH NOSTRIL EVERY DAY, Disp: 48 mL, Rfl: 2  •  Jardiance 10 MG TABS tablet, Take 1 tablet (10 mg total) by mouth daily, Disp: 90 tablet, Rfl: 1  •  losartan (COZAAR) 25 mg tablet, Take 1 tablet (25 mg " total) by mouth daily, Disp: 90 tablet, Rfl: 1  •  metoprolol succinate (TOPROL-XL) 25 mg 24 hr tablet, Take 1 tablet (25 mg total) by mouth in the morning, Disp: 90 tablet, Rfl: 1  •  nitroglycerin (NITROSTAT) 0.4 mg SL tablet, PLACE 1 TABLET (0.4 MG TOTAL) UNDER THE TONGUE EVERY 5 (FIVE) MINUTES AS NEEDED FOR CHEST PAIN, Disp: 100 tablet, Rfl: 1  •  torsemide (DEMADEX) 20 mg tablet, Take 2 tablets (40 mg total) by mouth daily, Disp: 180 tablet, Rfl: 1  •  clopidogrel (PLAVIX) 75 mg tablet, Take 75 mg by mouth daily   (Patient not taking: Reported on 5/2/2024), Disp: , Rfl:     Current Facility-Administered Medications:   •  lidocaine (LMX) 4 % cream, , Topical, Once, Marvin Vieyra DPM    Health Maintenance     Health Maintenance   Topic Date Due   • SLP PLAN OF CARE  Never done   • Zoster Vaccine (1 of 2) Never done   • Colorectal Cancer Screening  04/12/2021   • COVID-19 Vaccine (3 - 2023-24 season) 09/01/2023   • Depression Screening  10/31/2024   • Fall Risk  08/21/2024   • Influenza Vaccine (Season Ended) 09/01/2024   • Medicare Annual Wellness Visit (AWV)  10/31/2024   • Pneumococcal Vaccine: 65+ Years  Completed   • HIB Vaccine  Aged Out   • IPV Vaccine  Aged Out   • Hepatitis A Vaccine  Aged Out   • Meningococcal ACWY Vaccine  Aged Out   • HPV Vaccine  Aged Out     Immunization History   Administered Date(s) Administered   • COVID-19 MODERNA VACC 0.5 ML IM 02/06/2021, 03/09/2021   • INFLUENZA 11/03/2015, 09/26/2016, 09/28/2016, 11/14/2017, 11/27/2018   • Influenza Quadrivalent Preservative Free 3 years and older IM 10/14/2014   • Influenza Quadrivalent, 6-35 Months IM 11/03/2015   • Influenza Split High Dose Preservative Free IM 09/26/2016, 11/14/2017   • Influenza, high dose seasonal 0.7 mL 11/27/2018, 10/22/2019, 10/22/2020   • Influenza, seasonal, injectable 09/27/2011, 10/01/2012, 12/02/2013   • Pneumococcal Conjugate 13-Valent 07/28/2015   • Pneumococcal Polysaccharide PPV23 09/20/2006,  01/03/2017   • Tdap 01/03/2017       Curly Crawford D.O.  [facility] Internal Medicine [address]  [place] [address]  Office: [phone]  Fax: [phone]

## 2024-05-02 NOTE — ASSESSMENT & PLAN NOTE
-history of CAD status post CABG in 1996, status post PCI to OM1 in 2006, PCI to ramus intermedius October 2021  -hospital admission in October 2021 for shortness of breath - s/p KYE to intermediate ramus  -2D echo completed November 2023 LVEF 35 to 40%  -Goal-directed medical therapy with Toprol-XL, losartan, Jardiance  -Maintained on torsemide

## 2024-05-30 DIAGNOSIS — H69.92 DYSFUNCTION OF LEFT EUSTACHIAN TUBE: ICD-10-CM

## 2024-05-31 DIAGNOSIS — I48.0 PAROXYSMAL A-FIB (HCC): ICD-10-CM

## 2024-05-31 RX ORDER — FLUTICASONE PROPIONATE 50 MCG
SPRAY, SUSPENSION (ML) NASAL
Qty: 48 ML | Refills: 5 | Status: SHIPPED | OUTPATIENT
Start: 2024-05-31

## 2024-05-31 RX ORDER — AMIODARONE HYDROCHLORIDE 200 MG/1
200 TABLET ORAL DAILY
Qty: 90 TABLET | Refills: 0 | Status: SHIPPED | OUTPATIENT
Start: 2024-05-31

## 2024-07-06 DIAGNOSIS — I50.22 CHRONIC SYSTOLIC CONGESTIVE HEART FAILURE (HCC): ICD-10-CM

## 2024-07-07 RX ORDER — EMPAGLIFLOZIN 10 MG/1
10 TABLET, FILM COATED ORAL DAILY
Qty: 100 TABLET | Refills: 1 | Status: SHIPPED | OUTPATIENT
Start: 2024-07-07

## 2024-07-11 DIAGNOSIS — I50.22 CHRONIC SYSTOLIC CONGESTIVE HEART FAILURE (HCC): ICD-10-CM

## 2024-07-11 NOTE — TELEPHONE ENCOUNTER
Medication: torsemide    Dose/Frequency: 20mg- take 2 tablets by mouth daily  Quantity: 180      Medication: Jardiance    Dose/Frequency: 10mg- take 1 tablet by mouth daily  Quantity: 100        Pharmacy: [facility] #[id number] - [place] - [address] [place] [address]      Office:   [x] PCP/Provider - Curly Crawford  [] Speciality/Provider -     Does the patient have enough for 3 days?   [x] Yes   [] No - Send as HP to POD

## 2024-07-12 RX ORDER — TORSEMIDE 20 MG/1
40 TABLET ORAL DAILY
Qty: 180 TABLET | Refills: 1 | Status: SHIPPED | OUTPATIENT
Start: 2024-07-12

## 2024-07-12 RX ORDER — EMPAGLIFLOZIN 10 MG/1
10 TABLET, FILM COATED ORAL DAILY
Qty: 100 TABLET | Refills: 1 | OUTPATIENT
Start: 2024-07-12

## 2024-07-29 DIAGNOSIS — E78.00 HYPERCHOLESTEROLEMIA: ICD-10-CM

## 2024-07-30 RX ORDER — ATORVASTATIN CALCIUM 80 MG/1
80 TABLET, FILM COATED ORAL DAILY
Qty: 30 TABLET | Refills: 0 | Status: SHIPPED | OUTPATIENT
Start: 2024-07-30

## 2024-07-31 ENCOUNTER — RA CDI HCC (OUTPATIENT)
Dept: OTHER | Facility: HOSPITAL | Age: 84
End: 2024-07-31

## 2024-08-09 ENCOUNTER — OFFICE VISIT (OUTPATIENT)
Dept: INTERNAL MEDICINE CLINIC | Facility: CLINIC | Age: 84
End: 2024-08-09
Payer: COMMERCIAL

## 2024-08-09 VITALS
WEIGHT: 218 LBS | DIASTOLIC BLOOD PRESSURE: 70 MMHG | SYSTOLIC BLOOD PRESSURE: 119 MMHG | HEIGHT: 70 IN | BODY MASS INDEX: 31.21 KG/M2 | TEMPERATURE: 98.4 F | HEART RATE: 59 BPM

## 2024-08-09 DIAGNOSIS — I25.118 CORONARY ARTERY DISEASE OF NATIVE ARTERY OF NATIVE HEART WITH STABLE ANGINA PECTORIS (HCC): ICD-10-CM

## 2024-08-09 DIAGNOSIS — E55.9 VITAMIN D DEFICIENCY: ICD-10-CM

## 2024-08-09 DIAGNOSIS — N18.4 CHRONIC KIDNEY DISEASE, STAGE 4 (SEVERE) (HCC): ICD-10-CM

## 2024-08-09 DIAGNOSIS — I10 ESSENTIAL HYPERTENSION: Primary | ICD-10-CM

## 2024-08-09 DIAGNOSIS — N25.81 HYPERPARATHYROIDISM DUE TO RENAL INSUFFICIENCY (HCC): ICD-10-CM

## 2024-08-09 DIAGNOSIS — I50.22 CHRONIC SYSTOLIC CONGESTIVE HEART FAILURE (HCC): ICD-10-CM

## 2024-08-09 DIAGNOSIS — E78.00 HYPERCHOLESTEROLEMIA: ICD-10-CM

## 2024-08-09 DIAGNOSIS — E03.8 SUBCLINICAL HYPOTHYROIDISM: ICD-10-CM

## 2024-08-09 PROCEDURE — G2211 COMPLEX E/M VISIT ADD ON: HCPCS | Performed by: INTERNAL MEDICINE

## 2024-08-09 PROCEDURE — 99214 OFFICE O/P EST MOD 30 MIN: CPT | Performed by: INTERNAL MEDICINE

## 2024-08-09 NOTE — ASSESSMENT & PLAN NOTE
Stable. Stable.  Most recent creatinine 2.36, EGFR now in the 20s to low 30s  Baseline creatinine appears to be around 1.8-2.2  -Has mild proteinuria  -Continue follow-up with nephrology  -Continue treatment with ARB, SGLT2i  -Attention to vascular risk factors

## 2024-08-09 NOTE — ASSESSMENT & PLAN NOTE
-history of CAD status post CABG in 1996, status post PCI to OM1 in 2006, PCI to ramus intermedius October 2021  -hospital admission in October 2021 for shortness of breath - s/p KYE to intermediate ramus  -2D echo completed November 2023 LVEF 35 to 40%  -Goal-directed medical therapy with Toprol-XL, losartan, Jardiance  -Maintained on torsemide  -Appears euvolemic on exam today    Continue follow-up with cardiology

## 2024-08-09 NOTE — PROGRESS NOTES
INTERNAL MEDICINE OFFICE VISIT  [facility] Internal Medicine- [place]    NAME: Bahman Saleem  AGE: 84 y.o. SEX: male    DATE OF ENCOUNTER: 8/9/2024    Assessment and Plan/History of Present Illness     Here today for follow-up  Past medical history of hypertension, CAD status post CABG, ischemic cardiomyopathy, hypertension, hyperlipidemia, AAA status post EVAR October 2021, PAD, JORGE, chronic kidney disease, atrial fibrillation       1. Essential hypertension  Assessment & Plan:  Adequately controlled  Continue losartan 25 mg daily  Continue Toprol-XL 25 mg daily  Orders:  -     Magnesium; Future  -     CBC and differential; Future  -     Comprehensive metabolic panel; Future  2. Subclinical hypothyroidism  Assessment & Plan:  TSH 6.45 on most recent set of labs, T4 within normal limits  Of note, patient is on amiodarone    Recheck TSH and free T4 with neck set of labs prior to follow-up  Orders:  -     T4, free; Future  -     TSH, 3rd generation; Future  3. Hypercholesterolemia  -     Lipid Panel with Direct LDL reflex; Future  4. Chronic kidney disease, stage 4 (severe) (HCC)  Assessment & Plan:  Stable. Stable.  Most recent creatinine 2.36, EGFR now in the 20s to low 30s  Baseline creatinine appears to be around 1.8-2.2  -Has mild proteinuria  -Continue follow-up with nephrology  -Continue treatment with ARB, SGLT2i  -Attention to vascular risk factors  5. Chronic systolic congestive heart failure (HCC)  Assessment & Plan:  -history of CAD status post CABG in 1996, status post PCI to OM1 in 2006, PCI to ramus intermedius October 2021  -hospital admission in October 2021 for shortness of breath - s/p KYE to intermediate ramus  -2D echo completed November 2023 LVEF 35 to 40%  -Goal-directed medical therapy with Toprol-XL, losartan, Jardiance  -Maintained on torsemide  -Appears euvolemic on exam today    Continue follow-up with cardiology      6. 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  Plavix previously discontinued  Continue high intensity statin  Goal BP less than 130/80  Goal LDL less than 70  7. Vitamin D deficiency  -     Vitamin D 25 hydroxy; Future  8. Hyperparathyroidism due to renal insufficiency (HCC)  Assessment & Plan:  PTH level up to 196 June 2024.  Calcium within normal limits.  He is on vitamin D supplementation.  Prior vitamin D level within normal limits.  Continue to monitor.  Can consider calcitriol in the future if PTH remains elevated  Following with nephrology             Orders Placed This Encounter   Procedures   • T4, free   • TSH, 3rd generation   • Magnesium   • CBC and differential   • Comprehensive metabolic panel   • Lipid Panel with Direct LDL reflex   • Vitamin D 25 hydroxy       Chief Complaint     Chief Complaint   Patient presents with   • Follow-up     3 month follow up        Review of Systems     10 point ROS negative except per HPI    The following portions of the patient's history were reviewed and updated as appropriate: allergies, current medications, past family history, past medical history, past social history, past surgical history and problem list.    Objective     /70 (BP Location: Left arm, Patient Position: Sitting, Cuff Size: Standard)   Pulse 59   Temp 98.4 °F (36.9 °C)   Ht 5' 10\" (1.778 m)   Wt 98.9 kg (218 lb)   BMI 31.28 kg/m²     Physical Exam  Cardiovascular:      Rate and Rhythm: Normal rate and regular rhythm.      Heart sounds: Normal heart sounds. No murmur heard.  Pulmonary:      Effort: Pulmonary effort is normal.      Breath sounds: Normal breath sounds. No wheezing, rhonchi or rales.   Musculoskeletal:      Right lower leg: No edema.      Left lower leg: No edema.           Current Medications     Current Outpatient Medications:   •  albuterol (PROVENTIL HFA,VENTOLIN HFA) 90 mcg/act inhaler, INHALE 1 PUFF BY MOUTH EVERY 6 HOURS AS NEEDED FOR WHEEZE, " Disp: 18 g, Rfl: 1  •  amiodarone 200 mg tablet, TAKE 1 TABLET BY MOUTH EVERY DAY, Disp: 90 tablet, Rfl: 0  •  apixaban (ELIQUIS) 2.5 mg, Take 1 tablet (2.5 mg total) by mouth 2 (two) times a day, Disp: 60 tablet, Rfl: 5  •  atorvastatin (LIPITOR) 80 mg tablet, TAKE 1 TABLET BY MOUTH EVERY DAY, Disp: 30 tablet, Rfl: 0  •  cetirizine (ZyrTEC) 10 mg tablet, Take 10 mg by mouth daily, Disp: , Rfl:   •  fluticasone (FLONASE) 50 mcg/act nasal spray, SPRAY 2 SPRAYS INTO EACH NOSTRIL EVERY DAY, Disp: 48 mL, Rfl: 5  •  Jardiance 10 MG TABS tablet, TAKE 1 TABLET BY MOUTH EVERY DAY, Disp: 100 tablet, Rfl: 1  •  losartan (COZAAR) 25 mg tablet, Take 1 tablet (25 mg total) by mouth daily, Disp: 90 tablet, Rfl: 1  •  metoprolol succinate (TOPROL-XL) 25 mg 24 hr tablet, Take 1 tablet (25 mg total) by mouth in the morning, Disp: 90 tablet, Rfl: 1  •  nitroglycerin (NITROSTAT) 0.4 mg SL tablet, PLACE 1 TABLET (0.4 MG TOTAL) UNDER THE TONGUE EVERY 5 (FIVE) MINUTES AS NEEDED FOR CHEST PAIN, Disp: 100 tablet, Rfl: 1  •  torsemide (DEMADEX) 20 mg tablet, Take 2 tablets (40 mg total) by mouth daily, Disp: 180 tablet, Rfl: 1  •  Diclofenac Sodium (VOLTAREN) 1 %, APPLY TOPICALLY TO AFFECTED AREA(RIGHT KNEE) EVERY 6 HOURS (Patient not taking: Reported on 8/9/2024), Disp: 100 g, Rfl: 1    Current Facility-Administered Medications:   •  lidocaine (LMX) 4 % cream, , Topical, Once, YOLANDA Coker D.O.  [facility] Internal Medicine [address]  [place] [address]  Office: [phone]  Fax: [phone]

## 2024-08-09 NOTE — ASSESSMENT & PLAN NOTE
Continue aspirin, apixaban.  Plavix previously discontinued  Continue high intensity statin  Goal BP less than 130/80  Goal LDL less than 70

## 2024-08-09 NOTE — ASSESSMENT & PLAN NOTE
PTH level up to 196 June 2024.  Calcium within normal limits.  He is on vitamin D supplementation.  Prior vitamin D level within normal limits.  Continue to monitor.  Can consider calcitriol in the future if PTH remains elevated  Following with nephrology

## 2024-08-09 NOTE — ASSESSMENT & PLAN NOTE
TSH 6.45 on most recent set of labs, T4 within normal limits  Of note, patient is on amiodarone    Recheck TSH and free T4 with neck set of labs prior to follow-up

## 2024-08-10 DIAGNOSIS — I50.22 CHRONIC SYSTOLIC CONGESTIVE HEART FAILURE (HCC): ICD-10-CM

## 2024-08-10 DIAGNOSIS — I10 ESSENTIAL HYPERTENSION: ICD-10-CM

## 2024-08-12 RX ORDER — METOPROLOL SUCCINATE 25 MG/1
25 TABLET, EXTENDED RELEASE ORAL DAILY
Qty: 90 TABLET | Refills: 1 | Status: SHIPPED | OUTPATIENT
Start: 2024-08-12

## 2024-08-12 RX ORDER — LOSARTAN POTASSIUM 25 MG/1
25 TABLET ORAL DAILY
Qty: 90 TABLET | Refills: 1 | Status: SHIPPED | OUTPATIENT
Start: 2024-08-12 | End: 2025-08-12

## 2024-09-09 DIAGNOSIS — I48.0 PAROXYSMAL A-FIB (HCC): ICD-10-CM

## 2024-09-09 RX ORDER — AMIODARONE HYDROCHLORIDE 200 MG/1
200 TABLET ORAL DAILY
Qty: 90 TABLET | Refills: 0 | Status: SHIPPED | OUTPATIENT
Start: 2024-09-09

## 2024-09-20 DIAGNOSIS — I48.0 PAROXYSMAL A-FIB (HCC): ICD-10-CM

## 2024-09-20 RX ORDER — APIXABAN 2.5 MG/1
2.5 TABLET, FILM COATED ORAL 2 TIMES DAILY
Qty: 60 TABLET | Refills: 5 | Status: SHIPPED | OUTPATIENT
Start: 2024-09-20

## 2024-09-20 NOTE — TELEPHONE ENCOUNTER
Patient called requesting refill for eloquis. Patient made aware medication was refilled on 09/20/2024 for 60 with 5 refills to [facility] pharmacy. Patient instructed to contact the pharmacy to obtain refills of medication. Patient verbalized understanding.

## 2024-10-12 DIAGNOSIS — I50.22 CHRONIC SYSTOLIC CONGESTIVE HEART FAILURE (HCC): ICD-10-CM

## 2024-10-14 RX ORDER — TORSEMIDE 20 MG/1
40 TABLET ORAL DAILY
Qty: 180 TABLET | Refills: 1 | Status: SHIPPED | OUTPATIENT
Start: 2024-10-14

## 2024-10-26 DIAGNOSIS — E78.00 HYPERCHOLESTEROLEMIA: ICD-10-CM

## 2024-10-28 RX ORDER — ATORVASTATIN CALCIUM 80 MG/1
80 TABLET, FILM COATED ORAL DAILY
Qty: 90 TABLET | Refills: 1 | Status: SHIPPED | OUTPATIENT
Start: 2024-10-28

## 2024-11-05 ENCOUNTER — RA CDI HCC (OUTPATIENT)
Dept: OTHER | Facility: HOSPITAL | Age: 84
End: 2024-11-05

## 2024-11-06 ENCOUNTER — TELEPHONE (OUTPATIENT)
Age: 84
End: 2024-11-06

## 2024-11-07 ENCOUNTER — APPOINTMENT (OUTPATIENT)
Dept: LAB | Age: 84
End: 2024-11-07
Payer: COMMERCIAL

## 2024-11-07 DIAGNOSIS — I10 ESSENTIAL HYPERTENSION: ICD-10-CM

## 2024-11-07 DIAGNOSIS — E55.9 VITAMIN D DEFICIENCY: ICD-10-CM

## 2024-11-07 DIAGNOSIS — E03.8 SUBCLINICAL HYPOTHYROIDISM: ICD-10-CM

## 2024-11-07 DIAGNOSIS — E78.00 HYPERCHOLESTEROLEMIA: ICD-10-CM

## 2024-11-07 LAB
25(OH)D3 SERPL-MCNC: 49.1 NG/ML (ref 30–100)
ALBUMIN SERPL BCG-MCNC: 4 G/DL (ref 3.5–5)
ALP SERPL-CCNC: 112 U/L (ref 34–104)
ALT SERPL W P-5'-P-CCNC: 32 U/L (ref 7–52)
ANION GAP SERPL CALCULATED.3IONS-SCNC: 9 MMOL/L (ref 4–13)
AST SERPL W P-5'-P-CCNC: 34 U/L (ref 13–39)
BASOPHILS # BLD AUTO: 0.05 THOUSANDS/ÂΜL (ref 0–0.1)
BASOPHILS NFR BLD AUTO: 1 % (ref 0–1)
BILIRUB SERPL-MCNC: 0.94 MG/DL (ref 0.2–1)
BUN SERPL-MCNC: 44 MG/DL (ref 5–25)
CALCIUM SERPL-MCNC: 8.6 MG/DL (ref 8.4–10.2)
CHLORIDE SERPL-SCNC: 99 MMOL/L (ref 96–108)
CHOLEST SERPL-MCNC: 124 MG/DL
CO2 SERPL-SCNC: 31 MMOL/L (ref 21–32)
CREAT SERPL-MCNC: 2.27 MG/DL (ref 0.6–1.3)
EOSINOPHIL # BLD AUTO: 0.19 THOUSAND/ÂΜL (ref 0–0.61)
EOSINOPHIL NFR BLD AUTO: 3 % (ref 0–6)
ERYTHROCYTE [DISTWIDTH] IN BLOOD BY AUTOMATED COUNT: 17 % (ref 11.6–15.1)
GFR SERPL CREATININE-BSD FRML MDRD: 25 ML/MIN/1.73SQ M
GLUCOSE P FAST SERPL-MCNC: 97 MG/DL (ref 65–99)
HCT VFR BLD AUTO: 40.9 % (ref 36.5–49.3)
HDLC SERPL-MCNC: 40 MG/DL
HGB BLD-MCNC: 13 G/DL (ref 12–17)
IMM GRANULOCYTES # BLD AUTO: 0.03 THOUSAND/UL (ref 0–0.2)
IMM GRANULOCYTES NFR BLD AUTO: 1 % (ref 0–2)
LDLC SERPL CALC-MCNC: 60 MG/DL (ref 0–100)
LYMPHOCYTES # BLD AUTO: 1.22 THOUSANDS/ÂΜL (ref 0.6–4.47)
LYMPHOCYTES NFR BLD AUTO: 20 % (ref 14–44)
MAGNESIUM SERPL-MCNC: 2.6 MG/DL (ref 1.9–2.7)
MCH RBC QN AUTO: 28 PG (ref 26.8–34.3)
MCHC RBC AUTO-ENTMCNC: 31.8 G/DL (ref 31.4–37.4)
MCV RBC AUTO: 88 FL (ref 82–98)
MONOCYTES # BLD AUTO: 0.77 THOUSAND/ÂΜL (ref 0.17–1.22)
MONOCYTES NFR BLD AUTO: 12 % (ref 4–12)
NEUTROPHILS # BLD AUTO: 4 THOUSANDS/ÂΜL (ref 1.85–7.62)
NEUTS SEG NFR BLD AUTO: 63 % (ref 43–75)
NRBC BLD AUTO-RTO: 0 /100 WBCS
PLATELET # BLD AUTO: 151 THOUSANDS/UL (ref 149–390)
PMV BLD AUTO: 11.4 FL (ref 8.9–12.7)
POTASSIUM SERPL-SCNC: 4.1 MMOL/L (ref 3.5–5.3)
PROT SERPL-MCNC: 7.1 G/DL (ref 6.4–8.4)
RBC # BLD AUTO: 4.65 MILLION/UL (ref 3.88–5.62)
SODIUM SERPL-SCNC: 139 MMOL/L (ref 135–147)
T4 FREE SERPL-MCNC: 0.92 NG/DL (ref 0.61–1.12)
TRIGL SERPL-MCNC: 121 MG/DL
TSH SERPL DL<=0.05 MIU/L-ACNC: 6.05 UIU/ML (ref 0.45–4.5)
WBC # BLD AUTO: 6.26 THOUSAND/UL (ref 4.31–10.16)

## 2024-11-07 PROCEDURE — 85025 COMPLETE CBC W/AUTO DIFF WBC: CPT

## 2024-11-07 PROCEDURE — 80061 LIPID PANEL: CPT

## 2024-11-07 PROCEDURE — 84439 ASSAY OF FREE THYROXINE: CPT

## 2024-11-07 PROCEDURE — 84443 ASSAY THYROID STIM HORMONE: CPT

## 2024-11-07 PROCEDURE — 80053 COMPREHEN METABOLIC PANEL: CPT

## 2024-11-07 PROCEDURE — 83735 ASSAY OF MAGNESIUM: CPT

## 2024-11-07 PROCEDURE — 36415 COLL VENOUS BLD VENIPUNCTURE: CPT

## 2024-11-07 PROCEDURE — 82306 VITAMIN D 25 HYDROXY: CPT

## 2024-11-13 ENCOUNTER — OFFICE VISIT (OUTPATIENT)
Dept: INTERNAL MEDICINE CLINIC | Facility: CLINIC | Age: 84
End: 2024-11-13
Payer: COMMERCIAL

## 2024-11-13 VITALS
BODY MASS INDEX: 31.07 KG/M2 | RESPIRATION RATE: 18 BRPM | HEIGHT: 70 IN | DIASTOLIC BLOOD PRESSURE: 64 MMHG | OXYGEN SATURATION: 99 % | WEIGHT: 217 LBS | SYSTOLIC BLOOD PRESSURE: 122 MMHG | TEMPERATURE: 97 F | HEART RATE: 57 BPM

## 2024-11-13 DIAGNOSIS — N18.4 CHRONIC KIDNEY DISEASE, STAGE 4 (SEVERE) (HCC): ICD-10-CM

## 2024-11-13 DIAGNOSIS — E03.8 SUBCLINICAL HYPOTHYROIDISM: ICD-10-CM

## 2024-11-13 DIAGNOSIS — I48.11 LONGSTANDING PERSISTENT ATRIAL FIBRILLATION (HCC): ICD-10-CM

## 2024-11-13 DIAGNOSIS — E78.00 HYPERCHOLESTEROLEMIA: ICD-10-CM

## 2024-11-13 DIAGNOSIS — Z00.00 MEDICARE ANNUAL WELLNESS VISIT, SUBSEQUENT: Primary | ICD-10-CM

## 2024-11-13 DIAGNOSIS — I10 ESSENTIAL HYPERTENSION: ICD-10-CM

## 2024-11-13 DIAGNOSIS — J98.01 BRONCHOSPASM: ICD-10-CM

## 2024-11-13 DIAGNOSIS — I25.118 CORONARY ARTERY DISEASE OF NATIVE ARTERY OF NATIVE HEART WITH STABLE ANGINA PECTORIS (HCC): ICD-10-CM

## 2024-11-13 DIAGNOSIS — I50.22 CHRONIC SYSTOLIC CONGESTIVE HEART FAILURE (HCC): ICD-10-CM

## 2024-11-13 PROBLEM — M25.561 ACUTE PAIN OF RIGHT KNEE: Status: RESOLVED | Noted: 2023-06-02 | Resolved: 2024-11-13

## 2024-11-13 PROCEDURE — 99214 OFFICE O/P EST MOD 30 MIN: CPT | Performed by: INTERNAL MEDICINE

## 2024-11-13 PROCEDURE — G0439 PPPS, SUBSEQ VISIT: HCPCS | Performed by: INTERNAL MEDICINE

## 2024-11-13 RX ORDER — ALBUTEROL SULFATE 90 UG/1
1 INHALANT RESPIRATORY (INHALATION) EVERY 6 HOURS PRN
Qty: 18 G | Refills: 1 | Status: SHIPPED | OUTPATIENT
Start: 2024-11-13

## 2024-11-13 NOTE — ASSESSMENT & PLAN NOTE
Lab Results   Component Value Date    EGFR 25 11/07/2024    EGFR 27 (L) 07/30/2024    EGFR 27 (L) 06/04/2024    CREATININE 2.27 (H) 11/07/2024    CREATININE 2.36 (H) 07/30/2024    CREATININE 2.33 (H) 06/04/2024     -Stable  Baseline creatinine appears to be around 1.8-2.2  -Has mild proteinuria  -Continue follow-up with nephrology  -Continue treatment with ARB, SGLT2i  -Attention to vascular risk factors

## 2024-11-13 NOTE — ASSESSMENT & PLAN NOTE
Continue Toprol-XL, continue amiodarone  Anticoagulated with Eliquis 2.5 mg twice daily  Following with cardiology    Continue to monitor thyroid studies, LFTs while on amiodarone

## 2024-11-13 NOTE — PROGRESS NOTES
Ambulatory Visit  Name: Bahman Saleem      : 1940      MRN: [id number]  Encounter Provider: Curly Crawford DO  Encounter Date: 2024   Encounter department: [facility] INTERNAL MEDICINE [place]    Past medical history of hypertension, CAD status post CABG, ischemic cardiomyopathy, hypertension, hyperlipidemia, AAA status post EVAR 2021, PAD, JORGE, chronic kidney disease, atrial fibrillation     Here today for follow-up/AWV  Assessment & Plan  Medicare annual wellness visit, subsequent         Chronic systolic congestive heart failure (HCC)  -history of CAD status post CABG in , status post PCI to OM1 in , PCI to ramus intermedius 2021  -hospital admission in 2021 for shortness of breath - s/p KYE to intermediate ramus  -2D echo completed 2023 LVEF 35 to 40%  -Goal-directed medical therapy with Toprol-XL, losartan, Jardiance  -Maintained on torsemide    Stable.  Remains euvolemic on exam  Continue follow-up with cardiology             Coronary artery disease of native artery of native heart with stable angina pectoris (HCC)  Continue aspirin, apixaban  Continue high intensity statin  Goal BP less than 130/80  Goal LDL less than 70    Stable.  Does not report any recent anginal symptoms today         Essential hypertension  Adequately controlled  Continue losartan 25 mg daily  Continue Toprol-XL 25 mg daily         Longstanding persistent atrial fibrillation (HCC)  Continue Toprol-XL, continue amiodarone  Anticoagulated with Eliquis 2.5 mg twice daily  Following with cardiology    Continue to monitor thyroid studies, LFTs while on amiodarone         Bronchospasm  Stable.  Refill for as needed albuterol sent to pharmacy per patient request  Orders:    albuterol (PROVENTIL HFA,VENTOLIN HFA) 90 mcg/act inhaler; Inhale 1 puff every 6 (six) hours as needed for wheezing or shortness of breath    Hypercholesterolemia  Well-controlled  Continue atorvastatin 80 mg  daily       Subclinical hypothyroidism  TSH slightly out of range at 6.049, T4 within normal limits.  Patient maintained on amiodarone    No indication for treatment at this time.  Continue to periodically monitor       Chronic kidney disease, stage 4 (severe) (HCC)  Lab Results   Component Value Date    EGFR 25 11/07/2024    EGFR 27 (L) 07/30/2024    EGFR 27 (L) 06/04/2024    CREATININE 2.27 (H) 11/07/2024    CREATININE 2.36 (H) 07/30/2024    CREATININE 2.33 (H) 06/04/2024     -Stable  Baseline creatinine appears to be around 1.8-2.2  -Has mild proteinuria  -Continue follow-up with nephrology  -Continue treatment with ARB, SGLT2i  -Attention to vascular risk factors            Preventive health issues were discussed with patient, and age appropriate screening tests were ordered as noted in patient's After Visit Summary. Personalized health advice and appropriate referrals for health education or preventive services given if needed, as noted in patient's After Visit Summary.    History of Present Illness     HPI   Patient Care Team:  Curly Crawford DO as PCP - General (Internal Medicine)  DO Ari Smiley MD Eric Bronfenbrenner, DPM (Podiatry)  Mallory Arevalo MD (Cardiology)    Review of Systems  Medical History Reviewed by provider this encounter:       Annual Wellness Visit Questionnaire   Bahman is here for his Subsequent Wellness visit.     Health Risk Assessment:   Patient rates overall health as good. Patient feels that their physical health rating is same. Patient is very satisfied with their life. Eyesight was rated as same. Hearing was rated as same. Patient feels that their emotional and mental health rating is slightly better. Patients states they are sometimes angry. Patient states they are sometimes unusually tired/fatigued. Pain experienced in the last 7 days has been none. Patient states that he has experienced no weight loss or gain in last 6 months.     Depression  Screening:   PHQ-9 Score: 3      Fall Risk Screening:   In the past year, patient has experienced: no history of falling in past year      Home Safety:  Patient has trouble with stairs inside or outside of their home. Patient has working smoke alarms and has working carbon monoxide detector. Home safety hazards include: none.     Nutrition:   Current diet is Regular.     Medications:   Patient is currently taking over-the-counter supplements. OTC medications include: see medication list. Patient is not able to manage medications.     Activities of Daily Living (ADLs)/Instrumental Activities of Daily Living (IADLs):   Walk and transfer into and out of bed and chair?: Yes  Dress and groom yourself?: Yes    Bathe or shower yourself?: Yes    Feed yourself? Yes  Do your laundry/housekeeping?: Yes  Manage your money, pay your bills and track your expenses?: Yes  Make your own meals?: Yes    Do your own shopping?: Yes    Previous Hospitalizations:   Any hospitalizations or ED visits within the last 12 months?: No      Advance Care Planning:   Living will: Yes    Durable POA for healthcare: Yes    Advanced directive: Yes      Cognitive Screening:   Provider or family/friend/caregiver concerned regarding cognition?: No    PREVENTIVE SCREENINGS      Cardiovascular Screening:    General: Screening Not Indicated and History Lipid Disorder      Diabetes Screening:     General: Screening Current      Colorectal Cancer Screening:     General: Screening Not Indicated      Prostate Cancer Screening:    General: Screening Not Indicated      Osteoporosis Screening:    General: Screening Not Indicated      Abdominal Aortic Aneurysm (AAA) Screening:        General: Screening Not Indicated and History AAA      Lung Cancer Screening:     General: Screening Not Indicated      Hepatitis C Screening:    General: Screening Not Indicated    Screening, Brief Intervention, and Referral to Treatment (SBIRT)    Screening  Typical number of drinks  in a day: 0  Typical number of drinks in a week: 0  Interpretation: Low risk drinking behavior.    Single Item Drug Screening:  How often have you used an illegal drug (including marijuana) or a prescription medication for non-medical reasons in the past year? never    Single Item Drug Screen Score: 0  Interpretation: Negative screen for possible drug use disorder    Brief Intervention  Alcohol & drug use screenings were reviewed. No concerns regarding substance use disorder identified.     Social Drivers of Health     Financial Resource Strain: Low Risk  (10/31/2023)    Overall Financial Resource Strain (CARDIA)     Difficulty of Paying Living Expenses: Not very hard   Food Insecurity: No Food Insecurity (8/7/2023)    Received from [facility], [facility] [employer] [facility]    Hunger Vital Sign     Worried About Running Out of Food in the Last Year: Never true     Ran Out of Food in the Last Year: Never true   Transportation Needs: No Transportation Needs (10/31/2023)    PRAPARE - Transportation     Lack of Transportation (Medical): No     Lack of Transportation (Non-Medical): No   Housing Stability: Low Risk  (8/7/2023)    Received from [facility] [employer] [facility], [facility] [employer] [facility]    Housing Stability Vital Sign     Unable to Pay for Housing in the Last Year: No     Number of Places Lived in the Last Year: 1     Unstable Housing in the Last Year: No     No results found.    Objective     There were no vitals taken for this visit.    Physical Exam  Cardiovascular:      Rate and Rhythm: Normal rate. Rhythm irregular.      Heart sounds: No murmur heard.  Pulmonary:      Effort: Pulmonary effort is normal.      Breath sounds: Normal breath sounds. No wheezing or rales.   Musculoskeletal:      Right lower leg: No edema.      Left lower leg: No edema.

## 2024-11-13 NOTE — ASSESSMENT & PLAN NOTE
Continue aspirin, apixaban  Continue high intensity statin  Goal BP less than 130/80  Goal LDL less than 70    Stable.  Does not report any recent anginal symptoms today

## 2024-11-13 NOTE — ASSESSMENT & PLAN NOTE
-history of CAD status post CABG in 1996, status post PCI to OM1 in 2006, PCI to ramus intermedius October 2021  -hospital admission in October 2021 for shortness of breath - s/p KYE to intermediate ramus  -2D echo completed November 2023 LVEF 35 to 40%  -Goal-directed medical therapy with Toprol-XL, losartan, Jardiance  -Maintained on torsemide    Stable.  Remains euvolemic on exam  Continue follow-up with cardiology

## 2024-11-13 NOTE — ASSESSMENT & PLAN NOTE
TSH slightly out of range at 6.049, T4 within normal limits.  Patient maintained on amiodarone    No indication for treatment at this time.  Continue to periodically monitor

## 2024-12-19 DIAGNOSIS — I48.0 PAROXYSMAL A-FIB (HCC): ICD-10-CM

## 2024-12-20 RX ORDER — AMIODARONE HYDROCHLORIDE 200 MG/1
200 TABLET ORAL DAILY
Qty: 90 TABLET | Refills: 0 | Status: SHIPPED | OUTPATIENT
Start: 2024-12-20

## 2024-12-23 NOTE — NURSING NOTE
PA sent to plan.   Patient discharged 1/6/2022  AVS and discharge instructions reviewed with patient  All questions answered  Patient verbalized having all belongings for discharge  Patient taken to exit by PCA to be transported home by wife      Sree Galindo RN 1/6/2022 4:30 PM

## 2024-12-30 ENCOUNTER — APPOINTMENT (OUTPATIENT)
Dept: LAB | Age: 84
End: 2024-12-30
Payer: COMMERCIAL

## 2024-12-30 DIAGNOSIS — N18.4 CHRONIC KIDNEY DISEASE, STAGE IV (SEVERE) (HCC): ICD-10-CM

## 2024-12-30 LAB
ALBUMIN SERPL BCG-MCNC: 3.8 G/DL (ref 3.5–5)
ANION GAP SERPL CALCULATED.3IONS-SCNC: 10 MMOL/L (ref 4–13)
BUN SERPL-MCNC: 36 MG/DL (ref 5–25)
CALCIUM SERPL-MCNC: 9.1 MG/DL (ref 8.4–10.2)
CHLORIDE SERPL-SCNC: 99 MMOL/L (ref 96–108)
CO2 SERPL-SCNC: 29 MMOL/L (ref 21–32)
CREAT SERPL-MCNC: 2.35 MG/DL (ref 0.6–1.3)
CREAT UR-MCNC: 102.8 MG/DL
ERYTHROCYTE [DISTWIDTH] IN BLOOD BY AUTOMATED COUNT: 17.3 % (ref 11.6–15.1)
GFR SERPL CREATININE-BSD FRML MDRD: 24 ML/MIN/1.73SQ M
GLUCOSE P FAST SERPL-MCNC: 89 MG/DL (ref 65–99)
HCT VFR BLD AUTO: 39.4 % (ref 36.5–49.3)
HGB BLD-MCNC: 12.7 G/DL (ref 12–17)
MCH RBC QN AUTO: 28.7 PG (ref 26.8–34.3)
MCHC RBC AUTO-ENTMCNC: 32.2 G/DL (ref 31.4–37.4)
MCV RBC AUTO: 89 FL (ref 82–98)
PHOSPHATE SERPL-MCNC: 3.2 MG/DL (ref 2.3–4.1)
PLATELET # BLD AUTO: 179 THOUSANDS/UL (ref 149–390)
PMV BLD AUTO: 10.7 FL (ref 8.9–12.7)
POTASSIUM SERPL-SCNC: 4.2 MMOL/L (ref 3.5–5.3)
PROT UR-MCNC: 15.2 MG/DL
PROT/CREAT UR: 0.1 MG/G{CREAT} (ref 0–0.1)
PTH-INTACT SERPL-MCNC: 159.5 PG/ML (ref 12–88)
RBC # BLD AUTO: 4.42 MILLION/UL (ref 3.88–5.62)
SODIUM SERPL-SCNC: 138 MMOL/L (ref 135–147)
WBC # BLD AUTO: 6.62 THOUSAND/UL (ref 4.31–10.16)

## 2024-12-30 PROCEDURE — 85027 COMPLETE CBC AUTOMATED: CPT

## 2024-12-30 PROCEDURE — 36415 COLL VENOUS BLD VENIPUNCTURE: CPT

## 2024-12-30 PROCEDURE — 83970 ASSAY OF PARATHORMONE: CPT

## 2024-12-30 PROCEDURE — 82570 ASSAY OF URINE CREATININE: CPT

## 2024-12-30 PROCEDURE — 84156 ASSAY OF PROTEIN URINE: CPT

## 2024-12-30 PROCEDURE — 80069 RENAL FUNCTION PANEL: CPT

## 2024-12-31 DIAGNOSIS — I50.22 CHRONIC SYSTOLIC CONGESTIVE HEART FAILURE (HCC): ICD-10-CM

## 2024-12-31 RX ORDER — EMPAGLIFLOZIN 10 MG/1
10 TABLET, FILM COATED ORAL DAILY
Qty: 90 TABLET | Refills: 2 | Status: SHIPPED | OUTPATIENT
Start: 2024-12-31

## 2025-01-07 DIAGNOSIS — J98.01 BRONCHOSPASM: ICD-10-CM

## 2025-01-08 RX ORDER — ALBUTEROL SULFATE 90 UG/1
INHALANT RESPIRATORY (INHALATION)
Qty: 18 G | Refills: 1 | Status: SHIPPED | OUTPATIENT
Start: 2025-01-08

## 2025-02-05 ENCOUNTER — APPOINTMENT (OUTPATIENT)
Dept: LAB | Age: 85
End: 2025-02-05
Payer: COMMERCIAL

## 2025-02-05 DIAGNOSIS — I48.11 LONGSTANDING PERSISTENT ATRIAL FIBRILLATION (HCC): ICD-10-CM

## 2025-02-05 LAB
T4 FREE SERPL-MCNC: 0.9 NG/DL (ref 0.61–1.12)
TSH SERPL DL<=0.05 MIU/L-ACNC: 8.13 UIU/ML (ref 0.45–4.5)

## 2025-02-05 PROCEDURE — 36415 COLL VENOUS BLD VENIPUNCTURE: CPT

## 2025-02-05 PROCEDURE — 84443 ASSAY THYROID STIM HORMONE: CPT

## 2025-02-05 PROCEDURE — 84439 ASSAY OF FREE THYROXINE: CPT

## 2025-02-05 PROCEDURE — 80053 COMPREHEN METABOLIC PANEL: CPT

## 2025-02-06 LAB
ALBUMIN SERPL BCG-MCNC: 4.2 G/DL (ref 3.5–5)
ALP SERPL-CCNC: 103 U/L (ref 34–104)
ALT SERPL W P-5'-P-CCNC: 26 U/L (ref 7–52)
ANION GAP SERPL CALCULATED.3IONS-SCNC: 8 MMOL/L (ref 4–13)
AST SERPL W P-5'-P-CCNC: 28 U/L (ref 13–39)
BILIRUB SERPL-MCNC: 0.97 MG/DL (ref 0.2–1)
BUN SERPL-MCNC: 45 MG/DL (ref 5–25)
CALCIUM SERPL-MCNC: 9 MG/DL (ref 8.4–10.2)
CHLORIDE SERPL-SCNC: 102 MMOL/L (ref 96–108)
CO2 SERPL-SCNC: 32 MMOL/L (ref 21–32)
CREAT SERPL-MCNC: 2.35 MG/DL (ref 0.6–1.3)
GFR SERPL CREATININE-BSD FRML MDRD: 24 ML/MIN/1.73SQ M
GLUCOSE SERPL-MCNC: 82 MG/DL (ref 65–140)
POTASSIUM SERPL-SCNC: 4 MMOL/L (ref 3.5–5.3)
PROT SERPL-MCNC: 7.5 G/DL (ref 6.4–8.4)
SODIUM SERPL-SCNC: 142 MMOL/L (ref 135–147)

## 2025-02-07 ENCOUNTER — RA CDI HCC (OUTPATIENT)
Dept: OTHER | Facility: HOSPITAL | Age: 85
End: 2025-02-07

## 2025-02-14 ENCOUNTER — RESULTS FOLLOW-UP (OUTPATIENT)
Dept: INTERNAL MEDICINE CLINIC | Facility: CLINIC | Age: 85
End: 2025-02-14

## 2025-02-14 ENCOUNTER — OFFICE VISIT (OUTPATIENT)
Dept: INTERNAL MEDICINE CLINIC | Facility: CLINIC | Age: 85
End: 2025-02-14
Payer: COMMERCIAL

## 2025-02-14 ENCOUNTER — TELEPHONE (OUTPATIENT)
Dept: INTERNAL MEDICINE CLINIC | Facility: CLINIC | Age: 85
End: 2025-02-14

## 2025-02-14 ENCOUNTER — APPOINTMENT (OUTPATIENT)
Dept: RADIOLOGY | Age: 85
End: 2025-02-14
Payer: COMMERCIAL

## 2025-02-14 VITALS
SYSTOLIC BLOOD PRESSURE: 100 MMHG | HEART RATE: 62 BPM | OXYGEN SATURATION: 95 % | WEIGHT: 217 LBS | DIASTOLIC BLOOD PRESSURE: 62 MMHG | BODY MASS INDEX: 31.07 KG/M2 | HEIGHT: 70 IN | RESPIRATION RATE: 18 BRPM | TEMPERATURE: 96.9 F

## 2025-02-14 DIAGNOSIS — I50.22 CHRONIC SYSTOLIC CONGESTIVE HEART FAILURE (HCC): ICD-10-CM

## 2025-02-14 DIAGNOSIS — I25.118 CORONARY ARTERY DISEASE OF NATIVE ARTERY OF NATIVE HEART WITH STABLE ANGINA PECTORIS (HCC): ICD-10-CM
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
## 2025-02-14 DIAGNOSIS — N18.4 CHRONIC KIDNEY DISEASE, STAGE 4 (SEVERE) (HCC): ICD-10-CM

## 2025-02-14 DIAGNOSIS — M25.571 ACUTE RIGHT ANKLE PAIN: ICD-10-CM

## 2025-02-14 DIAGNOSIS — S82.831A CLOSED FRACTURE OF DISTAL END OF RIGHT FIBULA, UNSPECIFIED FRACTURE MORPHOLOGY, INITIAL ENCOUNTER: Primary | ICD-10-CM

## 2025-02-14 DIAGNOSIS — C43.4 MALIGNANT MELANOMA OF SCALP AND NECK (HCC): ICD-10-CM

## 2025-02-14 DIAGNOSIS — E03.8 SUBCLINICAL HYPOTHYROIDISM: Primary | ICD-10-CM

## 2025-02-14 DIAGNOSIS — I48.11 LONGSTANDING PERSISTENT ATRIAL FIBRILLATION (HCC): ICD-10-CM

## 2025-02-14 PROCEDURE — G2211 COMPLEX E/M VISIT ADD ON: HCPCS | Performed by: INTERNAL MEDICINE

## 2025-02-14 PROCEDURE — 99214 OFFICE O/P EST MOD 30 MIN: CPT | Performed by: INTERNAL MEDICINE

## 2025-02-14 PROCEDURE — 73610 X-RAY EXAM OF ANKLE: CPT

## 2025-02-14 NOTE — PROGRESS NOTES
Name: Bahman Saleem      : 1940      MRN: [id number]  Encounter Provider: Curly Crawford DO  Encounter Date: 2025   Encounter department: [facility] INTERNAL MEDICINE [place]    Past medical history of hypertension, CAD status post CABG, ischemic cardiomyopathy, hypertension, hyperlipidemia, AAA status post EVAR 2021, PAD, JORGE, chronic kidney disease, atrial fibrillation     Assessment & Plan  Chronic kidney disease, stage 4 (severe) (HCC)  -Stable  Baseline creatinine appears to be around 1.8-2.2  -Has mild proteinuria  -Continue follow-up with nephrology  -Continue treatment with ARB, SGLT2i  -Attention to vascular risk factors  Orders:  •  Comprehensive metabolic panel; Future  •  Magnesium; Future    Longstanding persistent atrial fibrillation (HCC)  Continue Toprol-XL, continue amiodarone  Anticoagulated with Eliquis 2.5 mg twice daily  Following with cardiology       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  Patient maintained on amiodarone.  He does not appear to be having any symptoms of hypothyroidism    Would continue to closely monitor thyroid function and recheck labs prior to follow-up.  If TSH continues to increase in particular if >10, would consider starting low dose levothyroxine    Orders:  •  TSH, 3rd generation; Future  •  T4, free; Future    Coronary artery disease of native artery of native heart with stable angina pectoris (HCC)  Continue apixaban  Continue high intensity statin  Goal BP less than 130/80  Goal LDL less than 70    Stable.  Does not report any recent anginal symptoms today.  Continues to exercise daily         Chronic systolic congestive heart failure (HCC)  Wt Readings from Last 3 Encounters:   25 98.4 kg (217 lb)   24 98.4 kg (217 lb)   24 98.9 kg (218 lb)     -history of  "CAD status post CABG in 1996, status post PCI to OM1 in 2006, PCI to ramus intermedius October 2021  -hospital admission in October 2021 for shortness of breath - s/p KYE to intermediate ramus  -2D echo completed November 2023 LVEF 35 to 40%  -Goal-directed medical therapy with Toprol-XL, losartan, Jardiance  -Maintained on torsemide    Stable.  Remains euvolemic on exam  Continue follow-up with cardiology         Acute right ankle pain    Orders:  •  XR ankle 3+ vw right; Future    Fall, initial encounter  Patient sustained mechanical fall on Monday.  Slipped on ice outside.  Landed on right knee and ankle does not report head strike or loss of consciousness. He is not having any knee discomfort at this time.  Appears to be ambulating at his baseline with cane.  No gross deformities noted on exam.  Wife reports that he is about \"95% better\".  He does have some tenderness to palpation along the lateral malleolus of the right ankle.  He is mentating at his baseline  -Suspect possible mild ankle sprain.  Will check x-rays to rule out fracture              History of Present Illness   HPI  Review of Systems    Objective   /62 (BP Location: Left arm, Patient Position: Sitting, Cuff Size: Standard)   Pulse 62   Temp (!) 96.9 °F (36.1 °C) (Temporal)   Resp 18   Ht 5' 10\" (1.778 m)   Wt 98.4 kg (217 lb)   SpO2 95%   BMI 31.14 kg/m²      Physical Exam  Cardiovascular:      Rate and Rhythm: Normal rate and regular rhythm.      Heart sounds: No murmur heard.  Pulmonary:      Effort: Pulmonary effort is normal.      Breath sounds: Normal breath sounds. No wheezing or rales.   Musculoskeletal:         General: Tenderness present.         "

## 2025-02-14 NOTE — TELEPHONE ENCOUNTER
Reviewed results with Bahman's wife, Stephanie, over the phone.  Also reviewed with Bahman's daughter, Roxanna. Finding of acute, minimally displaced fracture of the right lateral malleolus with overlying soft tissue swelling.  No evidence of ankle dislocation.    Referral placed to orthopedics.  In the interim, would suggest CAM boot and light activity as tolerated.  Can continue with ice, compression, elevation, of the ankle.  Okay to continue with Tylenol as needed for pain    All questions answered. Stephanie and Roxanna voiced their understanding

## 2025-02-14 NOTE — ASSESSMENT & PLAN NOTE
-Stable  Baseline creatinine appears to be around 1.8-2.2  -Has mild proteinuria  -Continue follow-up with nephrology  -Continue treatment with ARB, SGLT2i  -Attention to vascular risk factors  Orders:  •  Comprehensive metabolic panel; Future  •  Magnesium; Future

## 2025-02-14 NOTE — ASSESSMENT & PLAN NOTE
Continue apixaban  Continue high intensity statin  Goal BP less than 130/80  Goal LDL less than 70    Stable.  Does not report any recent anginal symptoms today.  Continues to exercise daily

## 2025-02-14 NOTE — ASSESSMENT & PLAN NOTE
Wt Readings from Last 3 Encounters:   02/14/25 98.4 kg (217 lb)   11/13/24 98.4 kg (217 lb)   08/09/24 98.9 kg (218 lb)     -history of CAD status post CABG in 1996, status post PCI to OM1 in 2006, PCI to ramus intermedius October 2021  -hospital admission in October 2021 for shortness of breath - s/p KYE to intermediate ramus  -2D echo completed November 2023 LVEF 35 to 40%  -Goal-directed medical therapy with Toprol-XL, losartan, Jardiance  -Maintained on torsemide    Stable.  Remains euvolemic on exam  Continue follow-up with cardiology

## 2025-02-14 NOTE — ASSESSMENT & PLAN NOTE
Increase in TSH from prior now at 8.13, free T4 remains within normal limits.  Patient maintained on amiodarone.  He does not appear to be having any symptoms of hypothyroidism    Would continue to closely monitor thyroid function and recheck labs prior to follow-up.  If TSH continues to increase in particular if >10, would consider starting low dose levothyroxine    Orders:  •  TSH, 3rd generation; Future  •  T4, free; Future

## 2025-02-14 NOTE — ASSESSMENT & PLAN NOTE
Continue Toprol-XL, continue amiodarone  Anticoagulated with Eliquis 2.5 mg twice daily  Following with cardiology

## 2025-02-21 ENCOUNTER — APPOINTMENT (OUTPATIENT)
Age: 85
End: 2025-02-21
Payer: COMMERCIAL

## 2025-02-21 ENCOUNTER — OFFICE VISIT (OUTPATIENT)
Age: 85
End: 2025-02-21
Payer: COMMERCIAL

## 2025-02-21 DIAGNOSIS — S82.831A CLOSED FRACTURE OF DISTAL END OF RIGHT FIBULA, UNSPECIFIED FRACTURE MORPHOLOGY, INITIAL ENCOUNTER: ICD-10-CM

## 2025-02-21 DIAGNOSIS — S82.831A CLOSED FRACTURE OF DISTAL END OF RIGHT FIBULA, UNSPECIFIED FRACTURE MORPHOLOGY, INITIAL ENCOUNTER: Primary | ICD-10-CM

## 2025-02-21 PROCEDURE — 73590 X-RAY EXAM OF LOWER LEG: CPT

## 2025-02-21 PROCEDURE — 99204 OFFICE O/P NEW MOD 45 MIN: CPT | Performed by: ORTHOPAEDIC SURGERY

## 2025-02-21 NOTE — PROGRESS NOTES
ASSESSMENT:  RIGHT ankle fracture, Howe B distal fibula, stable on stress view    PLAN:  Discussed treatment options including operative and nonoperative.  CAM boot provided today - to be worn with all weight bearing activities   Continue taking Tylenol PRN   Follow up 6 weeks - new xrays  Start PT after that time       REASON FOR VISIT:   Chief Complaint   Patient presents with    Right Ankle - Pain       HISTORY OF PRESENT ILLNESS:  RIGHT ankle pain    Was walking his dog and tripped on 2/10/25  Evaluated by internal med - xrays revealed distal fibula fracture  Been ambulating with a cane   Wearing an ACE bandage now   Pain is well controlled at this time     Taking Tylenol for pain control       Past Medical History:   Diagnosis Date    AAA (abdominal aortic aneurysm) without rupture (HCC)     has annual US to check    Acute coronary syndrome/unstable angina, to consider NSTEMI with ischemic related new left bundle branch block 07/16/2012    Acute pain of right knee 06/02/2023    Angina pectoris (HCC)     chronic stable angina, but it is mild and tolerates walking dogs and walking up flight os stairs without symptoms.  Using Nitro no more than 2 times per mt.    Ankle pain, left     Bilateral wrist pain     BPH with obstruction/lower urinary tract symptoms     Coronary artery disease     CPAP (continuous positive airway pressure) dependence     Elevated PSA     History of colon polyps     [place] (hard of hearing)     Slight    Hyperlipidemia     Ischemic cardiomyopathy     with apical aneurysm    Melanoma (HCC) 12/2016    Right side of neck    Myocardial infarction (HCC) 1996    x1        Past Surgical History:   Procedure Laterality Date    ARTERIAL ANEURYSM REPAIR  10/2021    CARDIAC SURGERY      CABG x6    CATARACT EXTRACTION Bilateral     COLONOSCOPY      CORONARY ANGIOPLASTY WITH STENT PLACEMENT      2 stents    CORONARY ANGIOPLASTY WITH STENT PLACEMENT      10/2021    CORONARY ARTERY BYPASS GRAFT       PROSTATE BIOPSY  2000,2001    SKIN BIOPSY      Melanoma - surgical removal    SKIN LESION EXCISION Right 2/6/2017    Procedure: WIDE EXCISION MELANOMA SCAR LATERAL NECK;  Surgeon: Ari Quinonez MD;  Location: [facility];  Service:     WISDOM TOOTH EXTRACTION         Social History     Socioeconomic History    Marital status: /Civil Union     Spouse name: Not on file    Number of children: Not on file    Years of education: Not on file    Highest education level: Not on file   Occupational History    Not on file   Tobacco Use    Smoking status: Never    Smokeless tobacco: Never   Vaping Use    Vaping status: Never Used   Substance and Sexual Activity    Alcohol use: Yes     Comment: 1-2 beers rarely    Drug use: No    Sexual activity: Not Currently   Other Topics Concern    Not on file   Social History Narrative    DOES NOT CONSUME CAFFEINE     Social Drivers of Health     Financial Resource Strain: Low Risk  (10/31/2023)    Overall Financial Resource Strain (CARDIA)     Difficulty of Paying Living Expenses: Not very hard   Food Insecurity: No Food Insecurity (11/13/2024)    Hunger Vital Sign     Worried About Running Out of Food in the Last Year: Never true     Ran Out of Food in the Last Year: Never true   Transportation Needs: No Transportation Needs (11/13/2024)    PRAPARE - Transportation     Lack of Transportation (Medical): No     Lack of Transportation (Non-Medical): No   Physical Activity: Not on file   Stress: Not on file   Social Connections: Not on file   Intimate Partner Violence: Not At Risk (8/7/2023)    [facility]    Humiliation, Afraid, Rape, and Kick questionnaire     Fear of Current or Ex-Partner: No     Emotionally Abused: No     Physically Abused: No     Sexually Abused: No   Housing Stability: Low Risk  (11/13/2024)    Housing Stability Vital Sign     Unable to Pay for Housing in the Last Year: No     Number of Times Moved  in the Last Year: 1     Homeless in the Last Year: No       MEDICATIONS:    Current Outpatient Medications:     albuterol (PROVENTIL HFA,VENTOLIN HFA) 90 mcg/act inhaler, INHALE 1 PUFF EVERY 6 HOURS AS NEEDED FOR WHEEZING OR SHORTNESS OF BREATH., Disp: 18 g, Rfl: 1    amiodarone 200 mg tablet, TAKE 1 TABLET BY MOUTH EVERY DAY, Disp: 90 tablet, Rfl: 0    atorvastatin (LIPITOR) 80 mg tablet, TAKE 1 TABLET BY MOUTH EVERY DAY, Disp: 90 tablet, Rfl: 1    cetirizine (ZyrTEC) 10 mg tablet, Take 10 mg by mouth daily, Disp: , Rfl:     Diclofenac Sodium (VOLTAREN) 1 %, APPLY TOPICALLY TO AFFECTED AREA(RIGHT KNEE) EVERY 6 HOURS, Disp: 100 g, Rfl: 1    Eliquis 2.5 MG, TAKE 1 TABLET BY MOUTH TWICE A DAY, Disp: 60 tablet, Rfl: 5    fluticasone (FLONASE) 50 mcg/act nasal spray, SPRAY 2 SPRAYS INTO EACH NOSTRIL EVERY DAY, Disp: 48 mL, Rfl: 5    Jardiance 10 MG TABS tablet, TAKE 1 TABLET BY MOUTH EVERY DAY, Disp: 90 tablet, Rfl: 2    losartan (COZAAR) 25 mg tablet, TAKE 1 TABLET (25 MG TOTAL) BY MOUTH DAILY., Disp: 90 tablet, Rfl: 1    metoprolol succinate (TOPROL-XL) 25 mg 24 hr tablet, TAKE 1 TABLET (25 MG TOTAL) BY MOUTH IN THE MORNING, Disp: 90 tablet, Rfl: 1    nitroglycerin (NITROSTAT) 0.4 mg SL tablet, PLACE 1 TABLET (0.4 MG TOTAL) UNDER THE TONGUE EVERY 5 (FIVE) MINUTES AS NEEDED FOR CHEST PAIN, Disp: 100 tablet, Rfl: 1    torsemide (DEMADEX) 20 mg tablet, TAKE 2 TABLETS (40 MG TOTAL) BY MOUTH DAILY., Disp: 180 tablet, Rfl: 1    Current Facility-Administered Medications:     lidocaine (LMX) 4 % cream, , Topical, Once, Marvin Vieyra DPM    ALLERGIES:  Allergies   Allergen Reactions    Abciximab Other (See Comments)     rcd 9/27/01 & 5/16/03    Escitalopram Rash    Furosemide Rash       MAJOR FINDINGS:    RIGHT lower extremity  Skin intact  Ankle DF: neutral, PF: 30  Diffuse lateral tenderness  No tenderness over navicular, base of 5th MT, no medial tenderness   No proximal fibular tenderness, no knee  tenderness  Anterior drawer: plantarflexion- stable, dorsiflexion-stable  Negative squeeze test   Sensation intact sp/dp/t  Strength intact 5/5 dorsiflexion/plantarflexion   Extremity wwp      IMAGING  I personally reviewed and interpreted the imaging studies  X-rays performed on the RIGHT ankle show acute, spiral, minimally displaced fracture of the right lateral malleolus with extension to the syndesmosis (Howe B), with overlying soft tissue swelling.     External rotation stress view obtained by me shows stable mortise       CC:  DO Yvette Serrano, Curly Jeong DO

## 2025-02-28 DIAGNOSIS — I25.10 CORONARY ARTERY DISEASE INVOLVING NATIVE HEART WITHOUT ANGINA PECTORIS, UNSPECIFIED VESSEL OR LESION TYPE: ICD-10-CM

## 2025-02-28 RX ORDER — NITROGLYCERIN 0.4 MG/1
0.4 TABLET SUBLINGUAL
Qty: 25 TABLET | Refills: 0 | Status: SHIPPED | OUTPATIENT
Start: 2025-02-28

## 2025-02-28 NOTE — TELEPHONE ENCOUNTER
Reason for call:   [x] Refill   [] Prior Auth  [] Other:     Office:   [x] PCP/Provider - Curly Crawford,    [] Specialty/Provider -     Medication:     nitroglycerin (NITROSTAT) 0.4 mg SL tablet       Dose/Frequency:     0.4 mg, Sublingual, Every 5 minutes PRN       Quantity: 100    Pharmacy: [facility]/pharmacy #[id number] - [place] - [address] [place] [address]     Does the patient have enough for 3 days?   [] Yes   [x] No - Send as HP to POD

## 2025-03-12 DIAGNOSIS — I10 ESSENTIAL HYPERTENSION: ICD-10-CM

## 2025-03-12 DIAGNOSIS — I50.22 CHRONIC SYSTOLIC CONGESTIVE HEART FAILURE (HCC): ICD-10-CM

## 2025-03-13 RX ORDER — LOSARTAN POTASSIUM 25 MG/1
25 TABLET ORAL DAILY
Qty: 90 TABLET | Refills: 1 | Status: SHIPPED | OUTPATIENT
Start: 2025-03-13 | End: 2026-03-13

## 2025-03-19 DIAGNOSIS — I48.0 PAROXYSMAL A-FIB (HCC): ICD-10-CM

## 2025-03-20 RX ORDER — AMIODARONE HYDROCHLORIDE 200 MG/1
200 TABLET ORAL DAILY
Qty: 90 TABLET | Refills: 0 | Status: SHIPPED | OUTPATIENT
Start: 2025-03-20

## 2025-03-24 DIAGNOSIS — I10 ESSENTIAL HYPERTENSION: ICD-10-CM

## 2025-03-24 DIAGNOSIS — I50.22 CHRONIC SYSTOLIC CONGESTIVE HEART FAILURE (HCC): ICD-10-CM

## 2025-03-24 DIAGNOSIS — I48.0 PAROXYSMAL A-FIB (HCC): ICD-10-CM

## 2025-03-24 RX ORDER — METOPROLOL SUCCINATE 25 MG/1
25 TABLET, EXTENDED RELEASE ORAL DAILY
Qty: 90 TABLET | Refills: 1 | Status: SHIPPED | OUTPATIENT
Start: 2025-03-24

## 2025-03-24 NOTE — TELEPHONE ENCOUNTER
Reason for call:   [x] Refill   [] Prior Auth  [] Other:     Office:   [x] PCP/Provider -   [] Specialty/Provider -     Medication: Eliquis 2.5 mg, take 1 tablet by mouth twice a day                        Metoprolol Succinate 25 mg, take 1 tablet by mouth in the morning       Pharmacy: [facility]     Local Pharmacy   Does the patient have enough for 3 days?   [] Yes   [x] No - Send as HP to POD    Mail Away Pharmacy   Does the patient have enough for 10 days?   [] Yes   [] No - Send as HP to POD

## 2025-04-04 ENCOUNTER — OFFICE VISIT (OUTPATIENT)
Age: 85
End: 2025-04-04
Payer: COMMERCIAL

## 2025-04-04 ENCOUNTER — APPOINTMENT (OUTPATIENT)
Age: 85
End: 2025-04-04
Payer: COMMERCIAL

## 2025-04-04 DIAGNOSIS — S82.831A CLOSED FRACTURE OF DISTAL END OF RIGHT FIBULA, UNSPECIFIED FRACTURE MORPHOLOGY, INITIAL ENCOUNTER: ICD-10-CM

## 2025-04-04 DIAGNOSIS — S82.831A CLOSED FRACTURE OF DISTAL END OF RIGHT FIBULA, UNSPECIFIED FRACTURE MORPHOLOGY, INITIAL ENCOUNTER: Primary | ICD-10-CM

## 2025-04-04 PROCEDURE — 73610 X-RAY EXAM OF ANKLE: CPT

## 2025-04-04 PROCEDURE — 99213 OFFICE O/P EST LOW 20 MIN: CPT | Performed by: ORTHOPAEDIC SURGERY

## 2025-04-04 NOTE — PROGRESS NOTES
ASSESSMENT:  RIGHT ankle fracture, Howe B distal fibula, stable on stress view    PLAN:  Discussed treatment options including operative and nonoperative.  Discussed the xrays today, will continue nonoperative treatment   Stop boot  Ankle brace provided  PT script  Follow up 2 months     Wife is a patient of mine as well      REASON FOR VISIT:   Chief Complaint   Patient presents with    Right Ankle - Follow-up           Interval Hx 4/4/25:   Has been using boot. Not having any pain.         HISTORY OF PRESENT ILLNESS:  RIGHT ankle pain    Was walking his dog and tripped on 2/10/25  Evaluated by internal med - xrays revealed distal fibula fracture  Been ambulating with a cane   Wearing an ACE bandage now   Pain is well controlled at this time     Taking Tylenol for pain control       Past Medical History:   Diagnosis Date    AAA (abdominal aortic aneurysm) without rupture (HCC)     has annual US to check    Acute coronary syndrome/unstable angina, to consider NSTEMI with ischemic related new left bundle branch block 07/16/2012    Acute pain of right knee 06/02/2023    Angina pectoris (HCC)     chronic stable angina, but it is mild and tolerates walking dogs and walking up flight os stairs without symptoms.  Using Nitro no more than 2 times per mt.    Ankle pain, left     Bilateral wrist pain     BPH with obstruction/lower urinary tract symptoms     Coronary artery disease     CPAP (continuous positive airway pressure) dependence     Elevated PSA     History of colon polyps     [place] (hard of hearing)     Slight    Hyperlipidemia     Ischemic cardiomyopathy     with apical aneurysm    Melanoma (HCC) 12/2016    Right side of neck    Myocardial infarction (HCC) 1996    x1        Past Surgical History:   Procedure Laterality Date    ARTERIAL ANEURYSM REPAIR  10/2021    CARDIAC SURGERY      CABG x6    CATARACT EXTRACTION Bilateral     COLONOSCOPY      CORONARY ANGIOPLASTY WITH STENT PLACEMENT      2 stents    CORONARY  ANGIOPLASTY WITH STENT PLACEMENT      10/2021    CORONARY ARTERY BYPASS GRAFT      PROSTATE BIOPSY  2000,2001    SKIN BIOPSY      Melanoma - surgical removal    SKIN LESION EXCISION Right 2/6/2017    Procedure: WIDE EXCISION MELANOMA SCAR LATERAL NECK;  Surgeon: Ari Quinonez MD;  Location: [facility];  Service:     WISDOM TOOTH EXTRACTION         Social History     Socioeconomic History    Marital status: /Civil Union     Spouse name: Not on file    Number of children: Not on file    Years of education: Not on file    Highest education level: Not on file   Occupational History    Not on file   Tobacco Use    Smoking status: Never    Smokeless tobacco: Never   Vaping Use    Vaping status: Never Used   Substance and Sexual Activity    Alcohol use: Yes     Comment: 1-2 beers rarely    Drug use: No    Sexual activity: Not Currently   Other Topics Concern    Not on file   Social History Narrative    DOES NOT CONSUME CAFFEINE     Social Drivers of Health     Financial Resource Strain: Low Risk  (10/31/2023)    Overall Financial Resource Strain (CARDIA)     Difficulty of Paying Living Expenses: Not very hard   Food Insecurity: No Food Insecurity (11/13/2024)    Hunger Vital Sign     Worried About Running Out of Food in the Last Year: Never true     Ran Out of Food in the Last Year: Never true   Transportation Needs: No Transportation Needs (11/13/2024)    PRAPARE - Transportation     Lack of Transportation (Medical): No     Lack of Transportation (Non-Medical): No   Physical Activity: Not on file   Stress: Not on file   Social Connections: Not on file   Intimate Partner Violence: Not At Risk (8/7/2023)    [facility]    Humiliation, Afraid, Rape, and Kick questionnaire     Fear of Current or Ex-Partner: No     Emotionally Abused: No     Physically Abused: No     Sexually Abused: No   Housing Stability: Low Risk  (11/13/2024)    Housing Stability Vital  Sign     Unable to Pay for Housing in the Last Year: No     Number of Times Moved in the Last Year: 1     Homeless in the Last Year: No       MEDICATIONS:    Current Outpatient Medications:     albuterol (PROVENTIL HFA,VENTOLIN HFA) 90 mcg/act inhaler, INHALE 1 PUFF EVERY 6 HOURS AS NEEDED FOR WHEEZING OR SHORTNESS OF BREATH., Disp: 18 g, Rfl: 1    amiodarone 200 mg tablet, TAKE 1 TABLET BY MOUTH EVERY DAY, Disp: 90 tablet, Rfl: 0    apixaban (Eliquis) 2.5 mg, Take 1 tablet (2.5 mg total) by mouth 2 (two) times a day, Disp: 180 tablet, Rfl: 1    atorvastatin (LIPITOR) 80 mg tablet, TAKE 1 TABLET BY MOUTH EVERY DAY, Disp: 90 tablet, Rfl: 1    cetirizine (ZyrTEC) 10 mg tablet, Take 10 mg by mouth daily, Disp: , Rfl:     Diclofenac Sodium (VOLTAREN) 1 %, APPLY TOPICALLY TO AFFECTED AREA(RIGHT KNEE) EVERY 6 HOURS, Disp: 100 g, Rfl: 1    fluticasone (FLONASE) 50 mcg/act nasal spray, SPRAY 2 SPRAYS INTO EACH NOSTRIL EVERY DAY, Disp: 48 mL, Rfl: 5    Jardiance 10 MG TABS tablet, TAKE 1 TABLET BY MOUTH EVERY DAY, Disp: 90 tablet, Rfl: 2    losartan (COZAAR) 25 mg tablet, TAKE 1 TABLET (25 MG TOTAL) BY MOUTH DAILY., Disp: 90 tablet, Rfl: 1    metoprolol succinate (TOPROL-XL) 25 mg 24 hr tablet, Take 1 tablet (25 mg total) by mouth in the morning, Disp: 90 tablet, Rfl: 1    nitroglycerin (NITROSTAT) 0.4 mg SL tablet, Place 1 tablet (0.4 mg total) under the tongue every 5 (five) minutes as needed for chest pain, Disp: 25 tablet, Rfl: 0    torsemide (DEMADEX) 20 mg tablet, TAKE 2 TABLETS (40 MG TOTAL) BY MOUTH DAILY., Disp: 180 tablet, Rfl: 1    Current Facility-Administered Medications:     lidocaine (LMX) 4 % cream, , Topical, Once, Marvin Vieyra, DPM    ALLERGIES:  Allergies   Allergen Reactions    Abciximab Other (See Comments)     rcd 9/27/01 & 5/16/03    Escitalopram Rash    Furosemide Rash       MAJOR FINDINGS:    RIGHT lower extremity  Skin intact  Ankle DF: neutral, PF: 30  Diffuse lateral tenderness  No  tenderness over navicular, base of 5th MT, no medial tenderness   No proximal fibular tenderness, no knee tenderness  Anterior drawer: plantarflexion- stable, dorsiflexion-stable  Negative squeeze test   Sensation intact sp/dp/t  Strength intact 5/5 dorsiflexion/plantarflexion   Extremity wwp      IMAGING  I personally reviewed and interpreted the imaging studies  X-rays performed on the RIGHT ankle show acute, spiral, minimally displaced fracture of the right lateral malleolus with extension to the syndesmosis (Howe B), with overlying soft tissue swelling.     External rotation stress view obtained by me shows stable mortise       Xrays RIGHT ankle 4/4/25: Some interval healing of distal fibula fracture with very subtle displacement on 1 view, still with good alignment     CC:  Curly Crawford,  No ref. provider found

## 2025-04-07 DIAGNOSIS — I50.22 CHRONIC SYSTOLIC CONGESTIVE HEART FAILURE (HCC): ICD-10-CM

## 2025-04-07 DIAGNOSIS — E78.00 HYPERCHOLESTEROLEMIA: ICD-10-CM

## 2025-04-08 RX ORDER — ATORVASTATIN CALCIUM 80 MG/1
80 TABLET, FILM COATED ORAL DAILY
Qty: 90 TABLET | Refills: 1 | Status: SHIPPED | OUTPATIENT
Start: 2025-04-08

## 2025-04-08 RX ORDER — TORSEMIDE 20 MG/1
40 TABLET ORAL DAILY
Qty: 180 TABLET | Refills: 1 | Status: SHIPPED | OUTPATIENT
Start: 2025-04-08

## 2025-04-14 ENCOUNTER — EVALUATION (OUTPATIENT)
Age: 85
End: 2025-04-14
Payer: COMMERCIAL

## 2025-04-14 DIAGNOSIS — S82.831A CLOSED FRACTURE OF DISTAL END OF RIGHT FIBULA, UNSPECIFIED FRACTURE MORPHOLOGY, INITIAL ENCOUNTER: Primary | ICD-10-CM

## 2025-04-14 PROCEDURE — 97163 PT EVAL HIGH COMPLEX 45 MIN: CPT | Performed by: PHYSICAL THERAPIST

## 2025-04-14 NOTE — PROGRESS NOTES
PT Evaluation   Today's date: 2025  Patient name: Bahman Saleem  : 1940  MRN: [id number]  Referring provider: Pito Patel MD  Dx:   Encounter Diagnosis     ICD-10-CM    1. Closed fracture of distal end of right fibula, unspecified fracture morphology, initial encounter  S82.831A Ambulatory Referral to Physical Therapy        Start Time: 1000  Stop Time: 1045  Total time in clinic (min): 45 minutes    Assessment  Impairments: abnormal coordination, abnormal muscle firing, abnormal or restricted ROM, activity intolerance, impaired physical strength, lacks appropriate home exercise program, pain with function and poor body mechanics  Symptom irritability: moderate    Assessment details: Bahman Saleem is a 84 y.o. male who presents with complaints of closed fracture of distal end of right fibula, unspecified fracture morphology, initial encounter  (primary encounter diagnosis).  No further referral appears necessary at this time based upon examination results.  Patient presents to PT with impaired strength, impaired ROM, decreased flexibility and impaired ability to complete IADLs.  Prognosis is good given HEP compliance and PT 2-3x/wk.  Positive prognostic indicators include positive attitude toward recovery.   Please contact me if you have any questions or recommendations.  Thank you for the opportunity to share in Bahman's care.       Barriers to therapy: Complex PMH including AAA, fall risk, PAD, JORGE, NSVT and essential HTN  Understanding of Dx/Px/POC: good     Prognosis: good    Goals  Short Term:  Pt will report decreased levels of pain by at least 2 subjective ratings in 4 weeks  Pt will demonstrate improved ROM by at least 10 degrees in 4 weeks  Pt will demonstrate improved strength by 1/2 grade  Long Term:   Pt will be independent in their HEP in 8 weeks  Pt will be be pain free with IADL's  Pt will demonstrate improved FOTO, > 80         Plan  Patient would benefit from: skilled physical  "therapy  Planned modality interventions: thermotherapy: hydrocollator packs, cryotherapy and low level laser therapy    Planned therapy interventions: activity modification, joint mobilization, manual therapy, motor coordination training, neuromuscular re-education, patient education, self care, therapeutic activities, therapeutic exercise, graded activity, home exercise program, abdominal trunk stabilization, IASTM, balance, flexibility, functional ROM exercises, strengthening and stretching    Frequency: 2x week  Plan of Care beginning date: 4/14/2025  Plan of Care expiration date: 7/14/2025  Treatment plan discussed with: patient  Plan details: Prognosis above is given PT services 2x/week tapering to 1x/week over the next 3 months and home program adherence.      Subjective Evaluation    History of Present Illness  Mechanism of injury: Patient reports he fell in February while walking his dog.  He said that he had a small fracture in his R ankle.  He told PT that he was booted for approximately 6 weeks.  He said last Friday was his first day out of the boot.  He said that he has been trying to more active.  He said [place].  He said his follow up with his MD is in two months.    Pain   R Ankle   Currently 0/10  At Best 0/10  At Worst 0/10  Patient denies pain at this time.   AGGS: Patient reports no issues except for balance  EASES: Patient reports the cane helps with his balance.    Patient's Goal: \"I want to be able to walk my dogs again.\"       Objective     General Comments:      Ankle/Foot Comments   Patient has a complex PMH which includes blisters on his RLE around his R ankle.  He had the area bandaged and an Aircast on.  PT kept the Ace Wrap and the Aircast on during the evaluation.  ROM is most likely limited due to this equipment.      LQS: not tested today     ROM   AROM  L Ankle WNL   R Ankle:   DF 10*   PF 30*   Inv 20*   Ev 10*   Great Toe 20*     Strength "   L Ankle 5/5 throughout   R Ankle:   DF 3+/5   PF 4/5   Inv 3+/5   Ev 3+/5   Great Toe DF 3+/5    Joint Mobility  Hypomobile AP TCJ   Hypomobile LM STJ     Precautions: Complex PMH including AAA, fall risk, PAD, JORGE, NSVT and essential HTN    Daily Treatment Log  Manuals             PROM              Gentle Joint Mobs                                       Neuro Re-Ed             SLS              Toe Curls              Arch Lifts                                                                 Ther Ex             Ankle 4 Way             HR              TR                                                                               Ther Activity                                       Gait Training                                       Modalities                                          Access Code: [email or web address]  URL: [email or web address]  Date: 04/14/2025  Prepared by: Geronimo Jain    Exercises  - Ankle Dorsiflexion with Resistance  - 1 x daily - 7 x weekly - 3 sets - 10 reps  - Ankle Inversion with Resistance  - 1 x daily - 7 x weekly - 3 sets - 10 reps  - Ankle Eversion with Resistance  - 1 x daily - 7 x weekly - 3 sets - 10 reps  - Ankle and Toe Plantarflexion with Resistance  - 1 x daily - 7 x weekly - 3 sets - 10 reps  - Long Sitting Calf Stretch with Strap  - 1 x daily - 7 x weekly - 1 sets - 10 reps - 10 hold

## 2025-04-21 ENCOUNTER — OFFICE VISIT (OUTPATIENT)
Age: 85
End: 2025-04-21
Attending: ORTHOPAEDIC SURGERY
Payer: COMMERCIAL

## 2025-04-21 DIAGNOSIS — S82.831A CLOSED FRACTURE OF DISTAL END OF RIGHT FIBULA, UNSPECIFIED FRACTURE MORPHOLOGY, INITIAL ENCOUNTER: Primary | ICD-10-CM

## 2025-04-21 PROCEDURE — 97110 THERAPEUTIC EXERCISES: CPT

## 2025-04-21 PROCEDURE — 97112 NEUROMUSCULAR REEDUCATION: CPT

## 2025-04-21 PROCEDURE — 97140 MANUAL THERAPY 1/> REGIONS: CPT

## 2025-04-21 NOTE — PROGRESS NOTES
"Daily Note     Today's date: 2025  Patient name: Bahman Saleem  : 1940  MRN: [id number]  Referring provider: Pito Patel MD  Dx:   Encounter Diagnosis     ICD-10-CM    1. Closed fracture of distal end of right fibula, unspecified fracture morphology, initial encounter  S82.831A                      Subjective: Pt reports that he was having pain this morning 3/10 in his R ankle. Notes that he has been doing his HEP.       Objective: See treatment diary below      Assessment: Tolerated treatment well. Pt was able to perform all newly added exercises this session with no increased symptoms. Increased tightness was present in all 4 ankle directions. All exercises were performed seated today.  HHAx1 and min A needed with SLB. Patient demonstrated fatigue post treatment, exhibited good technique with therapeutic exercises, and would benefit from continued PT      Plan: Continue per plan of care.        Precautions: Complex PMH including AAA, fall risk, PAD, JORGE, NSVT and essential HTN    Daily Treatment Log  Manuals             PROM  MM            Gentle Joint Mobs                                       Neuro Re-Ed             SLS  10\"x5             Toe Curls  3'            Arch Lifts 5\"x10            Towel Inv/ Erv 30x ea                                                   Ther Ex             Ankle 4 Way OTB 20x ea            HR  20x seated            TR  20x seated            Gastroc stretch w strap 30\"x3                                                                Ther Activity                                       Gait Training                                       Modalities                                          Access Code: [email or web address]  URL: [email or web address]  Date: 2025  Prepared by: Geronimo Jain    Exercises  - Ankle Dorsiflexion with Resistance  - 1 x daily - 7 x weekly - 3 sets - 10 reps  - Ankle Inversion with Resistance  - 1 x daily - 7 x weekly - 3 sets - 10 reps  - " Ankle Eversion with Resistance  - 1 x daily - 7 x weekly - 3 sets - 10 reps  - Ankle and Toe Plantarflexion with Resistance  - 1 x daily - 7 x weekly - 3 sets - 10 reps  - Long Sitting Calf Stretch with Strap  - 1 x daily - 7 x weekly - 1 sets - 10 reps - 10 hold

## 2025-04-28 ENCOUNTER — OFFICE VISIT (OUTPATIENT)
Age: 85
End: 2025-04-28
Attending: ORTHOPAEDIC SURGERY
Payer: COMMERCIAL

## 2025-04-28 DIAGNOSIS — S82.831A CLOSED FRACTURE OF DISTAL END OF RIGHT FIBULA, UNSPECIFIED FRACTURE MORPHOLOGY, INITIAL ENCOUNTER: Primary | ICD-10-CM

## 2025-04-28 PROCEDURE — 97140 MANUAL THERAPY 1/> REGIONS: CPT

## 2025-04-28 PROCEDURE — 97110 THERAPEUTIC EXERCISES: CPT

## 2025-04-28 PROCEDURE — 97112 NEUROMUSCULAR REEDUCATION: CPT

## 2025-04-28 NOTE — PROGRESS NOTES
"Daily Note     Today's date: 2025  Patient name: Bahman Saleem  : 1940  MRN: [id number]  Referring provider: Pito Patel MD  Dx:   Encounter Diagnosis     ICD-10-CM    1. Closed fracture of distal end of right fibula, unspecified fracture morphology, initial encounter  S82.831A                      Subjective: Patient reports did pretty good after last treatment.       Objective: See treatment diary below      Assessment: Bahman Saleem tolerated treatment well. Some difficulty with not guarding during PROM. Continued treatment indicated.       Plan: Continue per plan of care.      Precautions: Complex PMH including AAA, fall risk, PAD, JORGE, NSVT and essential HTN    Daily Treatment Log  Manuals            PROM  MM SJ           Gentle Joint Mobs                                       Neuro Re-Ed             SLS  10\"x5  10\"x5            Toe Curls  3' 3'           Arch Lifts 5\"x10 5\"x10           Towel Inv/ Erv 30x ea 30x ea           Up/over/down/scoop  X20 ea                                     Ther Ex             Ankle 4 Way OTB 20x ea OTB 30x ea           HR  20x seated X30 seated           TR  20x seated X30 seated           Gastroc stretch w strap 30\"x3 30\"x3                                                               Ther Activity                                       Gait Training                                       Modalities                                          Access Code: [email or web address]  URL: [email or web address]  Date: 2025  Prepared by: Geronimo Jain    Exercises  - Ankle Dorsiflexion with Resistance  - 1 x daily - 7 x weekly - 3 sets - 10 reps  - Ankle Inversion with Resistance  - 1 x daily - 7 x weekly - 3 sets - 10 reps  - Ankle Eversion with Resistance  - 1 x daily - 7 x weekly - 3 sets - 10 reps  - Ankle and Toe Plantarflexion with Resistance  - 1 x daily - 7 x weekly - 3 sets - 10 reps  - Long Sitting Calf Stretch with Strap  - 1 x daily - 7 x weekly - 1 " sets - 10 reps - 10 hold

## 2025-05-05 ENCOUNTER — OFFICE VISIT (OUTPATIENT)
Age: 85
End: 2025-05-05
Attending: ORTHOPAEDIC SURGERY
Payer: COMMERCIAL

## 2025-05-05 DIAGNOSIS — S82.831A CLOSED FRACTURE OF DISTAL END OF RIGHT FIBULA, UNSPECIFIED FRACTURE MORPHOLOGY, INITIAL ENCOUNTER: Primary | ICD-10-CM

## 2025-05-05 PROCEDURE — 97140 MANUAL THERAPY 1/> REGIONS: CPT

## 2025-05-05 PROCEDURE — 97110 THERAPEUTIC EXERCISES: CPT

## 2025-05-05 PROCEDURE — 97112 NEUROMUSCULAR REEDUCATION: CPT

## 2025-05-05 NOTE — PROGRESS NOTES
"Daily Note     Today's date: 2025  Patient name: Bahman Saleem  : 1940  MRN: [id number]  Referring provider: Pito Patel MD  Dx:   Encounter Diagnosis     ICD-10-CM    1. Closed fracture of distal end of right fibula, unspecified fracture morphology, initial encounter  S82.831A                      Subjective:  Patient reports that his ankle is ok.  He notes doing some yard work this weekend but he stayed off of any grades or inclines.       Objective: See treatment diary below      Assessment: Patient tolerated treatment well. Patient performed ex and received manual therapy as noted.  Provided verbal and demonstration cues to ensure good ex technique and benefit.   Bahman denied any soreness or discomfort post treatment.  Patient would benefit from continued PT intervention to address deficits and attain set goals.       Plan: Continue per plan of care.      Precautions: Complex PMH including AAA, fall risk, PAD, JORGE, NSVT and essential HTN    Daily Treatment Log  Manuals  5          PROM  MM SJ CC          Gentle Joint Mobs                                       Neuro Re-Ed             SLS  10\"x5  10\"x5  10\"x5          Toe Curls  3' 3' 3'          Arch Lifts 5\"x10 5\"x10 5\"x10          Towel Inv/ Erv 30x ea 30x ea 30x ea          Up/over/down/scoop  X20 ea X20   ea                                    Ther Ex             Ankle 4 Way OTB 20x ea OTB 30x ea OTB  X30 ea            HR  20x seated X30 seated X30   seated          TR  20x seated X30 seated X30 seated          Gastroc stretch w strap 30\"x3 30\"x3 30\"x3                                                              Ther Activity                                       Gait Training                                       Modalities                                          Access Code: [email or web address]  URL: [email or web address]  Date: 2025  Prepared by: Geronimo Jain    Exercises  - Ankle Dorsiflexion with Resistance  - 1 x " daily - 7 x weekly - 3 sets - 10 reps  - Ankle Inversion with Resistance  - 1 x daily - 7 x weekly - 3 sets - 10 reps  - Ankle Eversion with Resistance  - 1 x daily - 7 x weekly - 3 sets - 10 reps  - Ankle and Toe Plantarflexion with Resistance  - 1 x daily - 7 x weekly - 3 sets - 10 reps  - Long Sitting Calf Stretch with Strap  - 1 x daily - 7 x weekly - 1 sets - 10 reps - 10 hold

## 2025-05-12 ENCOUNTER — RA CDI HCC (OUTPATIENT)
Dept: OTHER | Facility: HOSPITAL | Age: 85
End: 2025-05-12

## 2025-05-12 ENCOUNTER — TELEPHONE (OUTPATIENT)
Age: 85
End: 2025-05-12

## 2025-05-12 ENCOUNTER — OFFICE VISIT (OUTPATIENT)
Age: 85
End: 2025-05-12
Attending: ORTHOPAEDIC SURGERY
Payer: COMMERCIAL

## 2025-05-12 DIAGNOSIS — S82.831A CLOSED FRACTURE OF DISTAL END OF RIGHT FIBULA, UNSPECIFIED FRACTURE MORPHOLOGY, INITIAL ENCOUNTER: Primary | ICD-10-CM

## 2025-05-12 PROCEDURE — 97140 MANUAL THERAPY 1/> REGIONS: CPT

## 2025-05-12 PROCEDURE — 97112 NEUROMUSCULAR REEDUCATION: CPT

## 2025-05-12 PROCEDURE — 97110 THERAPEUTIC EXERCISES: CPT

## 2025-05-12 NOTE — TELEPHONE ENCOUNTER
Patients wife, Stephanie  called to confirm if there are active lab orders for her ..  Confirmed Primary Care Provider  Did order lab work, lab orders are active 2/14/2025  Patient states she will have labs drawn prior to next visit, 5/19

## 2025-05-12 NOTE — PROGRESS NOTES
"Daily Note     Today's date: 2025  Patient name: Bahman Saleem  : 1940  MRN: [id number]  Referring provider: Pito Patel MD  Dx:   Encounter Diagnosis     ICD-10-CM    1. Closed fracture of distal end of right fibula, unspecified fracture morphology, initial encounter  S82.831A                      Subjective: Pt reports he is doing well overall, has been doing some yard work and other daily activities recently with minimal pain.       Objective: See treatment diary below      Assessment: Tolerated treatment well. Pt performed all exercises without issue, continue to progress to tolerance within protocol. Pt responded positively to PROM stretching. Patient demonstrated fatigue post treatment, exhibited good technique with therapeutic exercises, and would benefit from continued PT      Plan: Continue per plan of care.      Precautions: Complex PMH including AAA, fall risk, PAD, JORGE, NSVT and essential HTN    Daily Treatment Log  Manuals          PROM  MM SJ CC KM         Gentle Joint Mobs                                       Neuro Re-Ed             SLS  10\"x5  10\"x5  10\"x5 3x20\"         Toe Curls  3' 3' 3' 3'         Arch Lifts 5\"x10 5\"x10 5\"x10 5\"x10         Towel Inv/ Erv 30x ea 30x ea 30x ea 30x ea         Up/over/down/scoop  X20 ea X20   ea X20 ea                                   Ther Ex             Ankle 4 Way OTB 20x ea OTB 30x ea OTB  X30 ea   OTB x30 ea         HR  20x seated X30 seated X30   seated X30 seated         TR  20x seated X30 seated X30 seated X30 seated         Gastroc stretch w strap 30\"x3 30\"x3 30\"x3 30\"x3                                                             Ther Activity                                       Gait Training                                       Modalities                                          Access Code: [email or web address]  URL: [email or web address]  Date: 2025  Prepared by: Geronimo Jarrett  - Ankle Dorsiflexion " with Resistance  - 1 x daily - 7 x weekly - 3 sets - 10 reps  - Ankle Inversion with Resistance  - 1 x daily - 7 x weekly - 3 sets - 10 reps  - Ankle Eversion with Resistance  - 1 x daily - 7 x weekly - 3 sets - 10 reps  - Ankle and Toe Plantarflexion with Resistance  - 1 x daily - 7 x weekly - 3 sets - 10 reps  - Long Sitting Calf Stretch with Strap  - 1 x daily - 7 x weekly - 1 sets - 10 reps - 10 hold

## 2025-05-14 ENCOUNTER — APPOINTMENT (OUTPATIENT)
Dept: LAB | Age: 85
End: 2025-05-14
Payer: COMMERCIAL

## 2025-05-14 DIAGNOSIS — E03.8 SUBCLINICAL HYPOTHYROIDISM: ICD-10-CM

## 2025-05-14 DIAGNOSIS — N18.4 CHRONIC KIDNEY DISEASE, STAGE 4 (SEVERE) (HCC): ICD-10-CM

## 2025-05-14 LAB
ALBUMIN SERPL BCG-MCNC: 3.9 G/DL (ref 3.5–5)
ALP SERPL-CCNC: 109 U/L (ref 34–104)
ALT SERPL W P-5'-P-CCNC: 26 U/L (ref 7–52)
ANION GAP SERPL CALCULATED.3IONS-SCNC: 9 MMOL/L (ref 4–13)
AST SERPL W P-5'-P-CCNC: 29 U/L (ref 13–39)
BILIRUB SERPL-MCNC: 0.87 MG/DL (ref 0.2–1)
BUN SERPL-MCNC: 37 MG/DL (ref 5–25)
CALCIUM SERPL-MCNC: 8.7 MG/DL (ref 8.4–10.2)
CHLORIDE SERPL-SCNC: 103 MMOL/L (ref 96–108)
CO2 SERPL-SCNC: 31 MMOL/L (ref 21–32)
CREAT SERPL-MCNC: 2.03 MG/DL (ref 0.6–1.3)
GFR SERPL CREATININE-BSD FRML MDRD: 29 ML/MIN/1.73SQ M
GLUCOSE P FAST SERPL-MCNC: 84 MG/DL (ref 65–99)
MAGNESIUM SERPL-MCNC: 2.7 MG/DL (ref 1.9–2.7)
POTASSIUM SERPL-SCNC: 4.2 MMOL/L (ref 3.5–5.3)
PROT SERPL-MCNC: 6.8 G/DL (ref 6.4–8.4)
SODIUM SERPL-SCNC: 143 MMOL/L (ref 135–147)
T4 FREE SERPL-MCNC: 0.9 NG/DL (ref 0.61–1.12)
TSH SERPL DL<=0.05 MIU/L-ACNC: 6.52 UIU/ML (ref 0.45–4.5)

## 2025-05-14 PROCEDURE — 80053 COMPREHEN METABOLIC PANEL: CPT

## 2025-05-14 PROCEDURE — 84439 ASSAY OF FREE THYROXINE: CPT

## 2025-05-14 PROCEDURE — 36415 COLL VENOUS BLD VENIPUNCTURE: CPT

## 2025-05-14 PROCEDURE — 84443 ASSAY THYROID STIM HORMONE: CPT

## 2025-05-14 PROCEDURE — 83735 ASSAY OF MAGNESIUM: CPT

## 2025-05-19 ENCOUNTER — OFFICE VISIT (OUTPATIENT)
Dept: INTERNAL MEDICINE CLINIC | Facility: CLINIC | Age: 85
End: 2025-05-19
Payer: COMMERCIAL

## 2025-05-19 ENCOUNTER — OFFICE VISIT (OUTPATIENT)
Age: 85
End: 2025-05-19
Attending: ORTHOPAEDIC SURGERY
Payer: COMMERCIAL

## 2025-05-19 VITALS
HEART RATE: 54 BPM | SYSTOLIC BLOOD PRESSURE: 110 MMHG | WEIGHT: 212 LBS | BODY MASS INDEX: 30.35 KG/M2 | RESPIRATION RATE: 18 BRPM | OXYGEN SATURATION: 95 % | TEMPERATURE: 97.6 F | DIASTOLIC BLOOD PRESSURE: 60 MMHG | HEIGHT: 70 IN

## 2025-05-19 DIAGNOSIS — N18.4 CHRONIC KIDNEY DISEASE, STAGE 4 (SEVERE) (HCC): ICD-10-CM

## 2025-05-19 DIAGNOSIS — S82.831A CLOSED FRACTURE OF DISTAL END OF RIGHT FIBULA, UNSPECIFIED FRACTURE MORPHOLOGY, INITIAL ENCOUNTER: Primary | ICD-10-CM

## 2025-05-19 DIAGNOSIS — E03.8 SUBCLINICAL HYPOTHYROIDISM: ICD-10-CM

## 2025-05-19 DIAGNOSIS — I48.11 LONGSTANDING PERSISTENT ATRIAL FIBRILLATION (HCC): ICD-10-CM

## 2025-05-19 DIAGNOSIS — I25.118 CORONARY ARTERY DISEASE OF NATIVE ARTERY OF NATIVE HEART WITH STABLE ANGINA PECTORIS (HCC): ICD-10-CM

## 2025-05-19 DIAGNOSIS — I71.43 INFRARENAL ABDOMINAL AORTIC ANEURYSM (AAA) WITHOUT RUPTURE (HCC): ICD-10-CM

## 2025-05-19 DIAGNOSIS — I50.22 CHRONIC SYSTOLIC CONGESTIVE HEART FAILURE (HCC): ICD-10-CM

## 2025-05-19 PROCEDURE — 97112 NEUROMUSCULAR REEDUCATION: CPT

## 2025-05-19 PROCEDURE — G2211 COMPLEX E/M VISIT ADD ON: HCPCS | Performed by: INTERNAL MEDICINE

## 2025-05-19 PROCEDURE — 99214 OFFICE O/P EST MOD 30 MIN: CPT | Performed by: INTERNAL MEDICINE

## 2025-05-19 PROCEDURE — 97110 THERAPEUTIC EXERCISES: CPT

## 2025-05-19 PROCEDURE — 97140 MANUAL THERAPY 1/> REGIONS: CPT

## 2025-05-19 NOTE — ASSESSMENT & PLAN NOTE
Slipped on ice back in February 2025 and landed on right knee and ankle.  Subsequent x-ray showed acute spiral minimally displaced fracture of the right lateral malleolus.  Following with orthopedics, this has been managed conservatively.  No longer in boot, has ankle brace in place.  He states he is doing well today  -Continue follow-up with orthopedics and physical therapy as directed

## 2025-05-19 NOTE — ASSESSMENT & PLAN NOTE
-Stable  Baseline creatinine appears to be around 1.8-2.2  -Has mild proteinuria  -Continue follow-up with nephrology  -Continue treatment with ARB, SGLT2i  -Attention to vascular risk factors

## 2025-05-19 NOTE — ASSESSMENT & PLAN NOTE
TSH trend has been stable, free T4 within normal limits.  Patient remains on amiodarone.  Continue to closely monitor thyroid function off medication

## 2025-05-19 NOTE — PROGRESS NOTES
"Daily Note     Today's date: 2025  Patient name: Bahman Saleem  : 1940  MRN: [id number]  Referring provider: Pito Patel MD  Dx:   Encounter Diagnosis     ICD-10-CM    1. Closed fracture of distal end of right fibula, unspecified fracture morphology, initial encounter  S82.831A           Start Time: 825  Stop Time: 905  Total time in clinic (min): 40 minutes    Subjective: Patient denies any change in status upon arrival. Notes he continues to wear ankle brace for all activities.       Objective: See treatment diary below      Assessment: Patient tolerated exercises well. Provided cueing for proper isolation of ankle movement with tband inversion/eversion related to both weakness and decreased ankle ROM. No pain experienced with passive stretches however, is limited in all planes. Patient would benefit from continued PT to improve level of function.       Plan: Continue per POC. Increase reps/resistance as tolerated.      Precautions: Complex PMH including AAA, fall risk, PAD, JORGE, NSVT and essential HTN    Daily Treatment Log  Manuals         PROM  MM SJ CC KM MS        Gentle Joint Mobs                                       Neuro Re-Ed             SLS  10\"x5  10\"x5  10\"x5 3x20\" 3x20\"        Toe Curls  3' 3' 3' 3' 3'        Arch Lifts 5\"x10 5\"x10 5\"x10 5\"x10 5\"x10        Towel Inv/ Erv 30x ea 30x ea 30x ea 30x ea 30x ea        Up/over/down/scoop  X20 ea X20   ea X20 ea X20 ea                                  Ther Ex             Ankle 4 Way OTB 20x ea OTB 30x ea OTB  X30 ea   OTB x30 ea OTB x30 ea        HR  20x seated X30 seated X30   seated X30 seated X30 seated        TR  20x seated X30 seated X30 seated X30 seated X30 seated        Gastroc stretch w strap 30\"x3 30\"x3 30\"x3 30\"x3 30\"x3                                                            Ther Activity                                       Gait Training                                       Modalities                   "                        Access Code: [email or web address]  URL: [email or web address]  Date: 04/14/2025  Prepared by: Geronimo Jain    Exercises  - Ankle Dorsiflexion with Resistance  - 1 x daily - 7 x weekly - 3 sets - 10 reps  - Ankle Inversion with Resistance  - 1 x daily - 7 x weekly - 3 sets - 10 reps  - Ankle Eversion with Resistance  - 1 x daily - 7 x weekly - 3 sets - 10 reps  - Ankle and Toe Plantarflexion with Resistance  - 1 x daily - 7 x weekly - 3 sets - 10 reps  - Long Sitting Calf Stretch with Strap  - 1 x daily - 7 x weekly - 1 sets - 10 reps - 10 hold

## 2025-05-19 NOTE — ASSESSMENT & PLAN NOTE
Wt Readings from Last 3 Encounters:   05/19/25 96.2 kg (212 lb)   02/14/25 98.4 kg (217 lb)   11/13/24 98.4 kg (217 lb)     -history of CAD status post CABG in 1996, status post PCI to OM1 in 2006, PCI to ramus intermedius October 2021  -hospital admission in October 2021 for shortness of breath - s/p KYE to intermediate ramus  -2D echo completed November 2023 LVEF 35 to 40%  -Goal-directed medical therapy with Toprol-XL, losartan, Jardiance  -Maintained on torsemide    Stable.  Remains euvolemic on exam  Continue follow-up with cardiology

## 2025-05-19 NOTE — ASSESSMENT & PLAN NOTE
Continue apixaban  Continue high intensity statin  Goal BP less than 130/80  Goal LDL less than 70    Stable.  Does not report any recent anginal symptoms today

## 2025-05-19 NOTE — PROGRESS NOTES
Name: Bahman Saleem      : 1940      MRN: [id number]  Encounter Provider: Curly Crawford DO  Encounter Date: 2025   [facility]ounter [facility]  :  Past medical history of hypertension, CAD status post CABG, ischemic cardiomyopathy, hypertension, hyperlipidemia, AAA status post EVAR 2021, PAD, JORGE, chronic kidney disease, atrial fibrillation   Assessment & Plan  Closed fracture of distal end of right fibula, unspecified fracture morphology, initial encounter  Slipped on ice back in 2025 and landed on right knee and ankle.  Subsequent x-ray showed acute spiral minimally displaced fracture of the right lateral malleolus.  Following with orthopedics, this has been managed conservatively.  No longer in boot, has ankle brace in place.  He states he is doing well today  -Continue follow-up with orthopedics and physical therapy as directed       Longstanding persistent atrial fibrillation (HCC)  Continue Toprol-XL, continue amiodarone  Anticoagulated with Eliquis 2.5 mg twice daily  Following with cardiology          Coronary artery disease of native artery of native heart with stable angina pectoris (HCC)  Continue apixaban  Continue high intensity statin  Goal BP less than 130/80  Goal LDL less than 70    Stable.  Does not report any recent anginal symptoms today            Infrarenal abdominal aortic aneurysm (AAA) without rupture (HCC)  -status post endovascular repair of abdominal aortic aneurysm in 2021  -Continue f/u with vascular surgery   -Attention to BP control          Chronic systolic congestive heart failure (HCC)  Wt Readings from Last 3 Encounters:   25 96.2 kg (212 lb)   25 98.4 kg (217 lb)   24 98.4 kg (217 lb)     -history of CAD status post CABG in , status post PCI to OM1 in , PCI to ramus intermedius 2021  -hospital admission in 2021 for shortness of breath - s/p KYE to intermediate  "ramus  -2D echo completed November 2023 LVEF 35 to 40%  -Goal-directed medical therapy with Toprol-XL, losartan, Jardiance  -Maintained on torsemide    Stable.  Remains euvolemic on exam  Continue follow-up with cardiology            Chronic kidney disease, stage 4 (severe) (HCC)  -Stable  Baseline creatinine appears to be around 1.8-2.2  -Has mild proteinuria  -Continue follow-up with nephrology  -Continue treatment with ARB, SGLT2i  -Attention to vascular risk factors          Subclinical hypothyroidism  TSH trend has been stable, free T4 within normal limits.  Patient remains on amiodarone.  Continue to closely monitor thyroid function off medication              History of Present Illness   HPI  Review of Systems    Objective   /60 (BP Location: Left arm, Patient Position: Sitting, Cuff Size: Standard)   Pulse (!) 54   Temp 97.6 °F (36.4 °C) (Temporal)   Resp 18   Ht 5' 10\" (1.778 m)   Wt 96.2 kg (212 lb)   SpO2 95%   BMI 30.42 kg/m²      Physical Exam    Cardiovascular:      Rate and Rhythm: Normal rate and regular rhythm.      Heart sounds: No murmur heard.  Pulmonary:      Effort: Pulmonary effort is normal.      Breath sounds: Normal breath sounds. No wheezing or rales.     Musculoskeletal:      Right lower leg: No edema.      Left lower leg: No edema.         "

## 2025-05-27 ENCOUNTER — OFFICE VISIT (OUTPATIENT)
Age: 85
End: 2025-05-27
Attending: ORTHOPAEDIC SURGERY
Payer: COMMERCIAL

## 2025-05-27 DIAGNOSIS — S82.831A CLOSED FRACTURE OF DISTAL END OF RIGHT FIBULA, UNSPECIFIED FRACTURE MORPHOLOGY, INITIAL ENCOUNTER: Primary | ICD-10-CM

## 2025-05-27 PROCEDURE — 97112 NEUROMUSCULAR REEDUCATION: CPT

## 2025-05-27 PROCEDURE — 97110 THERAPEUTIC EXERCISES: CPT

## 2025-05-27 NOTE — PROGRESS NOTES
"Daily Note     Today's date: 2025  Patient name: Bahman Saleem  : 1940  MRN: [id number]  Referring provider: Pito Patel MD  Dx:   Encounter Diagnosis     ICD-10-CM    1. Closed fracture of distal end of right fibula, unspecified fracture morphology, initial encounter  S82.831A           Start Time: 0825  Stop Time: 0910  Total time in clinic (min): 45 minutes    Subjective: \"I finally put the brace on properly today\"      Objective: See treatment diary below      Assessment: Tolerated treatment well. Patient exhibited good technique with therapeutic exercises and would benefit from continued PT. Pt demonstrated good effort throughout treatment and showed good knowledge of exercises. Pt can benefit from increase strength and balance to ankles due to overall ankle weakness. Pt requires verbal cuing for most exercises.       Plan: Continue per plan of care.  Progress treatment as tolerated.    Patient was treated by SWETA Xiong under direct supervision of Som Agee PTA,IS,OPTA.       Precautions: Complex PMH including AAA, fall risk, PAD, JORGE, NSVT and essential HTN    Daily Treatment Log  Manuals        PROM  MM SJ CC KM MS ZF       Gentle Joint Mobs                                       Neuro Re-Ed             SLS  10\"x5  10\"x5  10\"x5 3x20\" 3x20\" 3x20\"       Toe Curls  3' 3' 3' 3' 3' 30x       Arch Lifts 5\"x10 5\"x10 5\"x10 5\"x10 5\"x10 5\"x10       Towel Inv/ Erv 30x ea 30x ea 30x ea 30x ea 30x ea 30x ea       Up/over/down/scoop  X20 ea X20   ea X20 ea X20 ea 30x ea                                 Ther Ex             Ankle 4 Way OTB 20x ea OTB 30x ea OTB  X30 ea   OTB x30 ea OTB x30 ea OTB x 30 ea       HR  20x seated X30 seated X30   seated X30 seated X30 seated X30 seated       TR  20x seated X30 seated X30 seated X30 seated X30 seated X30 seated       Gastroc stretch w strap 30\"x3 30\"x3 30\"x3 30\"x3 30\"x3 30\"x3                                                   "         Ther Activity                                       Gait Training                                       Modalities                                          Access Code: [email or web address]  URL: [email or web address]  Date: 04/14/2025  Prepared by: Geronimo Jain    Exercises  - Ankle Dorsiflexion with Resistance  - 1 x daily - 7 x weekly - 3 sets - 10 reps  - Ankle Inversion with Resistance  - 1 x daily - 7 x weekly - 3 sets - 10 reps  - Ankle Eversion with Resistance  - 1 x daily - 7 x weekly - 3 sets - 10 reps  - Ankle and Toe Plantarflexion with Resistance  - 1 x daily - 7 x weekly - 3 sets - 10 reps  - Long Sitting Calf Stretch with Strap  - 1 x daily - 7 x weekly - 1 sets - 10 reps - 10 hold

## 2025-06-02 ENCOUNTER — OFFICE VISIT (OUTPATIENT)
Age: 85
End: 2025-06-02
Attending: ORTHOPAEDIC SURGERY
Payer: COMMERCIAL

## 2025-06-02 DIAGNOSIS — S82.831A CLOSED FRACTURE OF DISTAL END OF RIGHT FIBULA, UNSPECIFIED FRACTURE MORPHOLOGY, INITIAL ENCOUNTER: Primary | ICD-10-CM

## 2025-06-02 PROCEDURE — 97112 NEUROMUSCULAR REEDUCATION: CPT

## 2025-06-02 PROCEDURE — 97110 THERAPEUTIC EXERCISES: CPT

## 2025-06-02 PROCEDURE — 97140 MANUAL THERAPY 1/> REGIONS: CPT

## 2025-06-02 NOTE — PROGRESS NOTES
"Daily Note     Today's date: 2025  Patient name: Bahman Saleem  : 1940  MRN: [id number]  Referring provider: Pito Patel MD  Dx:   Encounter Diagnosis     ICD-10-CM    1. Closed fracture of distal end of right fibula, unspecified fracture morphology, initial encounter  S82.831A           Start Time: 0830  Stop Time: 0915  Total time in clinic (min): 45 minutes    Subjective: Patient reports that he is not wearing the brace because he has some skin irritation.       Objective: See treatment diary below      Assessment: No changes to program this session. Patient required moderate cuing throughout the session for proper technique. Significant tightness into all planes of motion and toe flexion and extension as well. Tolerated treatment well. Patient demonstrated fatigue post treatment, exhibited good technique with therapeutic exercises, and would benefit from continued PT      Plan: Continue per plan of care.  Progress treatment as tolerated.       Precautions: Complex PMH including AAA, fall risk, PAD, JORGE, NSVT and essential HTN    Daily Treatment Log  Manuals       PROM  MM SJ CC KM MS ZF JL      Gentle Joint Mobs                                       Neuro Re-Ed            SLS  10\"x5  10\"x5  10\"x5 3x20\" 3x20\" 3x20\" 20\"x3      Toe Curls  3' 3' 3' 3' 3' 30x x30      Arch Lifts 5\"x10 5\"x10 5\"x10 5\"x10 5\"x10 5\"x10 5\" x10      Towel Inv/ Erv 30x ea 30x ea 30x ea 30x ea 30x ea 30x ea X30 ea      Up/over/down/scoop  X20 ea X20   ea X20 ea X20 ea 30x ea 30 ea                                Ther Ex            Ankle 4 Way OTB 20x ea OTB 30x ea OTB  X30 ea   OTB x30 ea OTB x30 ea OTB x 30 ea OTB x30 ea      HR  20x seated X30 seated X30   seated X30 seated X30 seated X30 seated X30 seated      TR  20x seated X30 seated X30 seated X30 seated X30 seated X30 seated X30 seated      Gastroc stretch w strap 30\"x3 30\"x3 30\"x3 30\"x3 30\"x3 30\"x3 30\"x3                      "                                     Ther Activity                                       Gait Training                                       Modalities                                          Access Code: [email or web address]  URL: [email or web address]  Date: 04/14/2025  Prepared by: Geronimo Jain    Exercises  - Ankle Dorsiflexion with Resistance  - 1 x daily - 7 x weekly - 3 sets - 10 reps  - Ankle Inversion with Resistance  - 1 x daily - 7 x weekly - 3 sets - 10 reps  - Ankle Eversion with Resistance  - 1 x daily - 7 x weekly - 3 sets - 10 reps  - Ankle and Toe Plantarflexion with Resistance  - 1 x daily - 7 x weekly - 3 sets - 10 reps  - Long Sitting Calf Stretch with Strap  - 1 x daily - 7 x weekly - 1 sets - 10 reps - 10 hold

## 2025-06-05 ENCOUNTER — APPOINTMENT (OUTPATIENT)
Age: 85
End: 2025-06-05
Attending: ORTHOPAEDIC SURGERY
Payer: COMMERCIAL

## 2025-06-05 ENCOUNTER — OFFICE VISIT (OUTPATIENT)
Age: 85
End: 2025-06-05
Payer: COMMERCIAL

## 2025-06-05 VITALS — HEIGHT: 70 IN | WEIGHT: 212 LBS | BODY MASS INDEX: 30.35 KG/M2

## 2025-06-05 DIAGNOSIS — S82.831A CLOSED FRACTURE OF DISTAL END OF RIGHT FIBULA, UNSPECIFIED FRACTURE MORPHOLOGY, INITIAL ENCOUNTER: ICD-10-CM

## 2025-06-05 DIAGNOSIS — S82.831A CLOSED FRACTURE OF DISTAL END OF RIGHT FIBULA, UNSPECIFIED FRACTURE MORPHOLOGY, INITIAL ENCOUNTER: Primary | ICD-10-CM

## 2025-06-05 PROCEDURE — 73610 X-RAY EXAM OF ANKLE: CPT

## 2025-06-05 PROCEDURE — 99213 OFFICE O/P EST LOW 20 MIN: CPT | Performed by: ORTHOPAEDIC SURGERY

## 2025-06-05 NOTE — PROGRESS NOTES
:  Assessment & Plan  Closed fracture of distal end of right fibula, unspecified fracture morphology, initial encounter    RIGHT ankle fracture, Howe B distal fibula, stable on stress view  Discussed treatment options including operative and nonoperative.  Doing well today   Ok to progress activity if tolerated  Has completed PT  Can use ankle brace PRN, he does feel this is helpful for him  Follow up PRN    Wife is a patient of mine as well      REASON FOR VISIT:   Chief Complaint   Patient presents with    Right Ankle - Follow-up       Interval Hx 6/5/25  Patient doing well with no pain   Has been more active and wears brace consistently   Enjoying PT, has completed this  No need for pain meds       Interval Hx 4/4/25:   Has been using boot. Not having any pain.       HISTORY OF PRESENT ILLNESS:  RIGHT ankle pain    Was walking his dog and tripped on 2/10/25  Evaluated by internal med - xrays revealed distal fibula fracture  Been ambulating with a cane   Wearing an ACE bandage now   Pain is well controlled at this time     Taking Tylenol for pain control       Past Medical History:   Diagnosis Date    AAA (abdominal aortic aneurysm) without rupture (HCC)     has annual US to check    Acute coronary syndrome/unstable angina, to consider NSTEMI with ischemic related new left bundle branch block 07/16/2012    Acute pain of right knee 06/02/2023    Angina pectoris (HCC)     chronic stable angina, but it is mild and tolerates walking dogs and walking up flight os stairs without symptoms.  Using Nitro no more than 2 times per mt.    Ankle pain, left     Bilateral wrist pain     BPH with obstruction/lower urinary tract symptoms     Coronary artery disease     CPAP (continuous positive airway pressure) dependence     Elevated PSA     History of colon polyps     [place] (hard of hearing)     Slight    Hyperlipidemia     Ischemic cardiomyopathy     with apical aneurysm    Melanoma (HCC) 12/2016    Right side of neck     Myocardial infarction (HCC) 1996    x1        Past Surgical History:   Procedure Laterality Date    ARTERIAL ANEURYSM REPAIR  10/2021    CARDIAC SURGERY      CABG x6    CATARACT EXTRACTION Bilateral     COLONOSCOPY      CORONARY ANGIOPLASTY WITH STENT PLACEMENT      2 stents    CORONARY ANGIOPLASTY WITH STENT PLACEMENT      10/2021    CORONARY ARTERY BYPASS GRAFT      PROSTATE BIOPSY  2000,2001    SKIN BIOPSY      Melanoma - surgical removal    SKIN LESION EXCISION Right 2/6/2017    Procedure: WIDE EXCISION MELANOMA SCAR LATERAL NECK;  Surgeon: Ari Quinonez MD;  Location: [facility];  Service:     WISDOM TOOTH EXTRACTION         Social History     Socioeconomic History    Marital status: /Civil Union     Spouse name: Not on file    Number of children: Not on file    Years of education: Not on file    Highest education level: Not on file   Occupational History    Not on file   Tobacco Use    Smoking status: Never    Smokeless tobacco: Never   Vaping Use    Vaping status: Never Used   Substance and Sexual Activity    Alcohol use: Yes     Comment: 1-2 beers rarely    Drug use: No    Sexual activity: Not Currently   Other Topics Concern    Not on file   Social History Narrative    DOES NOT CONSUME CAFFEINE     Social Drivers of Health     Financial Resource Strain: Low Risk  (10/31/2023)    Overall Financial Resource Strain (CARDIA)     Difficulty of Paying Living Expenses: Not very hard   Food Insecurity: No Food Insecurity (11/13/2024)    Nursing - Inadequate Food Risk Classification     Worried About Running Out of Food in the Last Year: Never true     Ran Out of Food in the Last Year: Never true     Ran Out of Food in the Last Year: Not on file   Transportation Needs: No Transportation Needs (11/13/2024)    PRAPARE - Transportation     Lack of Transportation (Medical): No     Lack of Transportation (Non-Medical): No   Physical Activity: Not on file   Stress: Not on file   Social Connections: Not on file    Intimate Partner Violence: Not At Risk (8/7/2023)    Received from [facility]    Humiliation, Afraid, Rape, and Kick questionnaire     Fear of Current or Ex-Partner: No     Emotionally Abused: No     Physically Abused: No     Sexually Abused: No   Housing Stability: Low Risk  (11/13/2024)    Housing Stability Vital Sign     Unable to Pay for Housing in the Last Year: No     Number of Times Moved in the Last Year: 1     Homeless in the Last Year: No       MEDICATIONS:    Current Outpatient Medications:     albuterol (PROVENTIL HFA,VENTOLIN HFA) 90 mcg/act inhaler, INHALE 1 PUFF EVERY 6 HOURS AS NEEDED FOR WHEEZING OR SHORTNESS OF BREATH., Disp: 18 g, Rfl: 1    amiodarone 200 mg tablet, TAKE 1 TABLET BY MOUTH EVERY DAY, Disp: 90 tablet, Rfl: 0    apixaban (Eliquis) 2.5 mg, Take 1 tablet (2.5 mg total) by mouth 2 (two) times a day, Disp: 180 tablet, Rfl: 1    atorvastatin (LIPITOR) 80 mg tablet, TAKE 1 TABLET BY MOUTH EVERY DAY, Disp: 90 tablet, Rfl: 1    cetirizine (ZyrTEC) 10 mg tablet, Take 10 mg by mouth in the morning., Disp: , Rfl:     Diclofenac Sodium (VOLTAREN) 1 %, APPLY TOPICALLY TO AFFECTED AREA(RIGHT KNEE) EVERY 6 HOURS, Disp: 100 g, Rfl: 1    fluticasone (FLONASE) 50 mcg/act nasal spray, SPRAY 2 SPRAYS INTO EACH NOSTRIL EVERY DAY, Disp: 48 mL, Rfl: 5    Jardiance 10 MG TABS tablet, TAKE 1 TABLET BY MOUTH EVERY DAY, Disp: 90 tablet, Rfl: 2    losartan (COZAAR) 25 mg tablet, TAKE 1 TABLET (25 MG TOTAL) BY MOUTH DAILY., Disp: 90 tablet, Rfl: 1    metoprolol succinate (TOPROL-XL) 25 mg 24 hr tablet, Take 1 tablet (25 mg total) by mouth in the morning, Disp: 90 tablet, Rfl: 1    nitroglycerin (NITROSTAT) 0.4 mg SL tablet, Place 1 tablet (0.4 mg total) under the tongue every 5 (five) minutes as needed for chest pain, Disp: 25 tablet, Rfl: 0    torsemide (DEMADEX) 20 mg tablet, TAKE 2 TABLETS (40 MG TOTAL) BY MOUTH DAILY., Disp: 180 tablet, Rfl: 1    Current Facility-Administered Medications:      lidocaine (LMX) 4 % cream, , Topical, Once, Marvin Vieyra DPM    ALLERGIES:  Allergies   Allergen Reactions    Abciximab Other (See Comments)     rcd 9/27/01 & 5/16/03    Escitalopram Rash    Furosemide Rash       MAJOR FINDINGS:    RIGHT lower extremity  Skin intact  Ankle DF: neutral, PF: 30  No lateral tenderness  No tenderness over navicular, base of 5th MT, no medial tenderness   No proximal fibular tenderness, no knee tenderness  Anterior drawer: plantarflexion- stable, dorsiflexion-stable  Negative squeeze test   Sensation intact sp/dp/t  Strength intact 5/5 dorsiflexion/plantarflexion   Extremity wwp      IMAGING  I personally reviewed and interpreted the imaging studies  X-rays performed on the RIGHT ankle show acute, spiral, minimally displaced fracture of the right lateral malleolus with extension to the syndesmosis (Howe B), with overlying soft tissue swelling.     External rotation stress view obtained by me shows stable mortise       Xrays RIGHT ankle 4/4/25: Some interval healing of distal fibula fracture with very subtle displacement on 1 view, still with good alignment     XR RIGHT ankle 6/5/25: some continued interval healing of prior distal fibula fracture without change from prior     CC:  Curly Crawford, DO No ref. provider found

## 2025-06-18 DIAGNOSIS — H69.92 DYSFUNCTION OF LEFT EUSTACHIAN TUBE: ICD-10-CM

## 2025-06-18 DIAGNOSIS — I48.0 PAROXYSMAL A-FIB (HCC): ICD-10-CM

## 2025-06-18 RX ORDER — AMIODARONE HYDROCHLORIDE 200 MG/1
200 TABLET ORAL DAILY
Qty: 90 TABLET | Refills: 1 | Status: SHIPPED | OUTPATIENT
Start: 2025-06-18

## 2025-06-18 RX ORDER — FLUTICASONE PROPIONATE 50 MCG
SPRAY, SUSPENSION (ML) NASAL
Qty: 48 ML | Refills: 2 | Status: SHIPPED | OUTPATIENT
Start: 2025-06-18

## 2025-07-04 NOTE — ASSESSMENT & PLAN NOTE
· CAD with history of CABG 1996 and recent KYE October 2021 at [facility] and follows with [facility] cardiology  · Cardiac enzymes minimally elevated    · Cardiology input appreciated, will discuss whether there is a plan for percutaneous coronary intervention  · Follow-up echo done this admission, continue heparin drip, Plavix, aspirin, isosorbide mononitrate and statin  · Lisinopril held due to increasing creatinine  · Of with recurrence of chest pain, consider nitroglycerin infusion    Lab Results   Component Value Date    HSTNI0 29 01/02/2022    HSTNI2 43 01/02/2022    HSTNI4 54 ([place]) 01/02/2022 .

## 2025-08-13 ENCOUNTER — TELEPHONE (OUTPATIENT)
Age: 85
End: 2025-08-13

## 2025-08-19 ENCOUNTER — OFFICE VISIT (OUTPATIENT)
Dept: INTERNAL MEDICINE CLINIC | Facility: CLINIC | Age: 85
End: 2025-08-19
Payer: COMMERCIAL

## 2025-08-19 VITALS
TEMPERATURE: 97.4 F | WEIGHT: 212 LBS | HEART RATE: 52 BPM | HEIGHT: 70 IN | DIASTOLIC BLOOD PRESSURE: 62 MMHG | SYSTOLIC BLOOD PRESSURE: 114 MMHG | RESPIRATION RATE: 18 BRPM | BODY MASS INDEX: 30.35 KG/M2 | OXYGEN SATURATION: 96 %

## 2025-08-19 DIAGNOSIS — I48.11 LONGSTANDING PERSISTENT ATRIAL FIBRILLATION (HCC): ICD-10-CM

## 2025-08-19 DIAGNOSIS — N18.4 CHRONIC KIDNEY DISEASE, STAGE 4 (SEVERE) (HCC): ICD-10-CM

## 2025-08-19 DIAGNOSIS — Z79.899 LONG TERM CURRENT USE OF AMIODARONE: Primary | ICD-10-CM

## 2025-08-19 DIAGNOSIS — I25.5 ISCHEMIC CARDIOMYOPATHY: ICD-10-CM

## 2025-08-19 DIAGNOSIS — E03.8 SUBCLINICAL HYPOTHYROIDISM: ICD-10-CM

## 2025-08-19 DIAGNOSIS — I73.9 PAD (PERIPHERAL ARTERY DISEASE) (HCC): ICD-10-CM

## 2025-08-19 PROCEDURE — G2211 COMPLEX E/M VISIT ADD ON: HCPCS | Performed by: INTERNAL MEDICINE

## 2025-08-19 PROCEDURE — 99214 OFFICE O/P EST MOD 30 MIN: CPT | Performed by: INTERNAL MEDICINE

## (undated) DEVICE — ALL PURPOSE SPONGES,NON-WOVEN, 4 PLY: Brand: CURITY

## (undated) DEVICE — INTENDED FOR TISSUE SEPARATION, AND OTHER PROCEDURES THAT REQUIRE A SHARP SURGICAL BLADE TO PUNCTURE OR CUT.: Brand: BARD-PARKER SAFETY BLADES SIZE 15, STERILE

## (undated) DEVICE — GLOVE INDICATOR PI UNDERGLOVE SZ 6.5 BLUE

## (undated) DEVICE — SCD SEQUENTIAL COMPRESSION COMFORT SLEEVE MEDIUM KNEE LENGTH: Brand: KENDALL SCD

## (undated) DEVICE — LIGACLIP MCA MULTIPLE CLIP APPLIERS, 20 SMALL CLIPS: Brand: LIGACLIP

## (undated) DEVICE — 2000CC GUARDIAN II: Brand: GUARDIAN

## (undated) DEVICE — 3M™ STERI-STRIP™ COMPOUND BENZOIN TINCTURE 40 BAGS/CARTON 4 CARTONS/CASE C1544: Brand: 3M™ STERI-STRIP™

## (undated) DEVICE — TUBING SUCTION 5MM X 12 FT

## (undated) DEVICE — GLOVE INDICATOR PI UNDERGLOVE SZ 7 BLUE

## (undated) DEVICE — 3M™ TEGADERM™ TRANSPARENT FILM DRESSING FRAME STYLE, 1626W, 4 IN X 4-3/4 IN (10 CM X 12 CM), 50/CT 4CT/CASE: Brand: 3M™ TEGADERM™

## (undated) DEVICE — SUT SILK 3-0 SH 30 IN K832H

## (undated) DEVICE — 10FR FRAZIER SUCTION HANDLE: Brand: CARDINAL HEALTH

## (undated) DEVICE — ADHESIVE SKN CLSR HISTOACRYL FLEX 0.5ML LF

## (undated) DEVICE — CHLORAPREP HI-LITE 26ML ORANGE

## (undated) DEVICE — SUT MONOCRYL 4-0 PS-2 27 IN Y426H

## (undated) DEVICE — GLOVE INDICATOR PI UNDERGLOVE SZ 7.5 BLUE

## (undated) DEVICE — REM POLYHESIVE ADULT PATIENT RETURN ELECTRODE: Brand: VALLEYLAB

## (undated) DEVICE — STRL UNIVERSAL MINOR GENERAL: Brand: CARDINAL HEALTH

## (undated) DEVICE — 3M™ STERI-STRIP™ REINFORCED ADHESIVE SKIN CLOSURES, R1547, 1/2 IN X 4 IN (12 MM X 100 MM), 6 STRIPS/ENVELOPE: Brand: 3M™ STERI-STRIP™

## (undated) DEVICE — GLOVE SRG BIOGEL 6.5

## (undated) DEVICE — SUT VICRYL 3-0 SH 27 IN J416H

## (undated) DEVICE — GLOVE SRG BIOGEL 7

## (undated) DEVICE — NEEDLE 25G X 1 1/2

## (undated) DEVICE — MICRODISSECTION NEEDLE STRAIGHT SLEEVE: Brand: COLORADO

## (undated) DEVICE — GLOVE SRG BIOGEL ECLIPSE 7